# Patient Record
Sex: FEMALE | Race: WHITE | Employment: UNEMPLOYED | URBAN - METROPOLITAN AREA
[De-identification: names, ages, dates, MRNs, and addresses within clinical notes are randomized per-mention and may not be internally consistent; named-entity substitution may affect disease eponyms.]

---
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
## 2020-01-01 ENCOUNTER — HOSPITAL ENCOUNTER (EMERGENCY)
Age: 20
Discharge: HOME | End: 2020-01-01
Attending: EMERGENCY MEDICINE
Payer: MEDICAID

## 2020-01-01 VITALS
TEMPERATURE: 97.7 F | RESPIRATION RATE: 17 BRPM | SYSTOLIC BLOOD PRESSURE: 106 MMHG | OXYGEN SATURATION: 98 % | HEART RATE: 83 BPM | HEIGHT: 63 IN | WEIGHT: 130 LBS | BODY MASS INDEX: 23.04 KG/M2 | DIASTOLIC BLOOD PRESSURE: 68 MMHG

## 2020-01-01 LAB
-: ABNORMAL
AMORPHOUS: ABNORMAL
BACTERIA: ABNORMAL
BILIRUBIN URINE: NEGATIVE
CASTS UA: ABNORMAL /LPF (ref 0–2)
COLOR: YELLOW
COMMENT UA: ABNORMAL
CRYSTALS, UA: ABNORMAL /HPF
DIRECT EXAM: NORMAL
EPITHELIAL CELLS UA: ABNORMAL /HPF (ref 0–5)
GLUCOSE URINE: NEGATIVE
HCG(URINE) PREGNANCY TEST: NEGATIVE
KETONES, URINE: NEGATIVE
LEUKOCYTE ESTERASE, URINE: NEGATIVE
Lab: NORMAL
MUCUS: ABNORMAL
NITRITE, URINE: NEGATIVE
OTHER OBSERVATIONS UA: ABNORMAL
PH UA: 6 (ref 5–8)
PROTEIN UA: ABNORMAL
RBC UA: ABNORMAL /HPF (ref 0–2)
RENAL EPITHELIAL, UA: ABNORMAL /HPF
SPECIFIC GRAVITY UA: 1.03 (ref 1–1.03)
SPECIMEN DESCRIPTION: NORMAL
TRICHOMONAS: ABNORMAL
TURBIDITY: ABNORMAL
URINE HGB: ABNORMAL
UROBILINOGEN, URINE: NORMAL
WBC UA: ABNORMAL /HPF (ref 0–5)
YEAST: ABNORMAL

## 2020-01-01 PROCEDURE — 81025 URINE PREGNANCY TEST: CPT

## 2020-01-01 PROCEDURE — 87591 N.GONORRHOEAE DNA AMP PROB: CPT

## 2020-01-01 PROCEDURE — 99283 EMERGENCY DEPT VISIT LOW MDM: CPT

## 2020-01-01 PROCEDURE — 6370000000 HC RX 637 (ALT 250 FOR IP): Performed by: EMERGENCY MEDICINE

## 2020-01-01 PROCEDURE — 81001 URINALYSIS AUTO W/SCOPE: CPT

## 2020-01-01 PROCEDURE — 87660 TRICHOMONAS VAGIN DIR PROBE: CPT

## 2020-01-01 PROCEDURE — 87510 GARDNER VAG DNA DIR PROBE: CPT

## 2020-01-01 PROCEDURE — 87480 CANDIDA DNA DIR PROBE: CPT

## 2020-01-01 PROCEDURE — 87491 CHLMYD TRACH DNA AMP PROBE: CPT

## 2020-01-01 RX ORDER — ACETAMINOPHEN 325 MG/1
650 TABLET ORAL ONCE
Status: COMPLETED | OUTPATIENT
Start: 2020-01-01 | End: 2020-01-01

## 2020-01-01 RX ADMIN — ACETAMINOPHEN 650 MG: 325 TABLET ORAL at 05:25

## 2020-01-01 ASSESSMENT — PAIN SCALES - GENERAL: PAINLEVEL_OUTOF10: 4

## 2020-01-01 ASSESSMENT — ENCOUNTER SYMPTOMS
COUGH: 0
SHORTNESS OF BREATH: 0
CONSTIPATION: 0
BACK PAIN: 0
SORE THROAT: 0
VOMITING: 0
ABDOMINAL PAIN: 1
DIARRHEA: 0

## 2020-01-01 NOTE — ED PROVIDER NOTES
[facility]  ED  Emergency Department Encounter  EmergencyMedicine Resident     Pt Kelly Whatley  MRN: [id number]  [place] [address]  Date of evaluation: 1/1/20  PCP:  No primary care provider on file. CHIEF COMPLAINT       Chief Complaint   Patient presents with    Dysuria       HISTORY OF PRESENT ILLNESS  (Location/Symptom, Timing/Onset, Context/Setting, Quality, Duration, Modifying Factors, Severity.)      Josette Hathaway is a 23 y.o. female who presents with dysuria and mild vaginal discharge and spotting. States she just finished her menstrual period again. Denies any severe abdominal pain, states she has very mild lower cramping. Has not concern for STDs. Denies prior abdominal surgeries. Denies fevers chills nausea vomiting chest pain shortness of breath diarrhea constipation. PAST MEDICAL / SURGICAL / SOCIAL / FAMILY HISTORY      has a past medical history of ADD (attention deficit disorder), Anxiety, CMV (cytomegalovirus infection) ([address] Depression, Gestational diabetes mellitus (GDM), antepartum, Hearing loss in left ear, Hypothyroidism, Immunizations up to date in pediatric patient, Raynaud disease, Tachycardia, Traumatic injury during pregnancy, third trimester, and Wears glasses. has a past surgical history that includes REMOVAL FOREIGN BODY EAR (Left) and Tympanoplasty (Left, 06/24/2016).     Social History     Socioeconomic History    Marital status: Single     Spouse name: Not on file    Number of children: Not on file    Years of education: Not on file    Highest education level: Not on file   Occupational History    Not on file   Social Needs    Financial resource strain: Not on file    Food insecurity:     Worry: Not on file     Inability: Not on file    Transportation needs:     Medical: Not on file     Non-medical: Not on file   Tobacco Use    Smoking status: Current Every Day Smoker     Types: Cigars    Smokeless tobacco: Never Used  Tobacco comment: 1-2 black and milds daily    Substance and Sexual Activity    Alcohol use: No    Drug use: Yes     Types: Marijuana    Sexual activity: Yes     Partners: Male     Birth control/protection: Condom   Lifestyle    Physical activity:     Days per week: Not on file     Minutes per session: Not on file    Stress: Not on file   Relationships    Social connections:     Talks on phone: Not on file     Gets together: Not on file     Attends Worship service: Not on file     Active member of club or organization: Not on file     Attends meetings of clubs or organizations: Not on file     Relationship status: Not on file    Intimate partner violence:     Fear of current or ex partner: Not on file     Emotionally abused: Not on file     Physically abused: Not on file     Forced sexual activity: Not on file   Other Topics Concern    Not on file   Social History Narrative    Not on file       Family History   Adopted: Yes   Problem Relation Age of Onset    Diabetes Mother         borderline diet controlled    Atrial Fibrillation Mother     Anxiety Disorder Mother     Depression Mother     Seizures Maternal Grandmother     COPD Maternal Grandmother     Hypertension Maternal Grandmother     Cancer Maternal Grandmother         ovarian    Diabetes Type 1  Maternal Grandfather     Diabetes Type 1  Other         m uncle    Bleeding Prob Other         m aunt protein S&C deficiency       Allergies:  Sulfa antibiotics    Home Medications:  Prior to Admission medications    Medication Sig Start Date End Date Taking?  Authorizing Provider   atenolol (TENORMIN) 25 MG tablet Take 1 tablet by mouth daily 12/6/19   Nighat Campbell PA-C   famotidine (PEPCID) 20 MG tablet Take 1 tablet by mouth 2 times daily 12/5/19   Phong Andrade,    ARIPiprazole (ABILIFY) 5 MG tablet Take 5 mg by mouth daily    Historical Provider, MD   ibuprofen (ADVIL;MOTRIN) 800 MG tablet Take 1 tablet by mouth every 8 hours as Pregnancy, Urine   Result Value Ref Range    HCG(Urine) Pregnancy Test NEGATIVE NEGATIVE   Microscopic Urinalysis   Result Value Ref Range    -          WBC, UA 0 TO 2 0 - 5 /HPF    RBC, UA 10 TO 20 0 - 2 /HPF    Casts UA NOT REPORTED 0 - 2 /LPF    Crystals UA NOT REPORTED None /HPF    Epithelial Cells UA 5 TO 10 0 - 5 /HPF    Renal Epithelial, Urine NOT REPORTED 0 /HPF    Bacteria, UA FEW (A) None    Mucus, UA NOT REPORTED None    Trichomonas, UA NOT REPORTED None    Amorphous, UA NOT REPORTED None    Other Observations UA NOT REPORTED NOT REQ. Yeast, UA NOT REPORTED None       RADIOLOGY:  None    EKG  None    All EKG's are interpreted by the Emergency Department Physician who either signs or Co-signs this chart in the absence of a cardiologist.    MDM/EMERGENCY DEPARTMENT COURSE:  Patient is a 22-year-old female that presents with vaginal discharge and dysuria. On exam no acute distress belly is soft nontender. Vitals are stable. Please see attendings note for pelvic exam which she performed. Will send swabs urine and pregnancy test and treat accordingly. Urinalysis no signs of infection, does show blood but she is on her menstrual cycle. Vaginitis probe is negative. Negative pregnancy. Discussed again with the patient if you would want any empiric treatment for STDs, she is not concerned and will follow-up for results on MyChart. Possibly abnormal vaginal bleeding causing the symptoms. Instructed to follow-up with her PCP, will give dose of Tylenol here per patient request.  Return if symptoms worsen. PROCEDURES:  None    CONSULTS:  None    CRITICAL CARE:  None    FINAL IMPRESSION      1.  Abnormal vaginal bleeding          DISPOSITION / PLAN     DISPOSITION Decision To Discharge 01/01/2020 05:14:07 AM      PATIENT REFERRED TO:  [facility]  [address]    If symptoms worsen    [address]  [address]

## 2020-01-01 NOTE — ED PROVIDER NOTES
[address] [facility]     Emergency Department     Faculty Attestation    I performed a history and physical examination of the patient and discussed management with the resident. I have reviewed and agree with the residents findings including all diagnostic interpretations, and treatment plans as written. Any areas of disagreement are noted on the chart. I was personally present for the key portions of any procedures. I have documented in the chart those procedures where I was not present during the key portions. I have reviewed the emergency nurses triage note. I agree with the chief complaint, past medical history, past surgical history, allergies, medications, social and family history as documented unless otherwise noted below. Documentation of the HPI, Physical Exam and Medical Decision Making performed by [email or web address] is based on my personal performance of the HPI, PE and MDM. For Physician Assistant/ Nurse Practitioner cases/documentation I have personally evaluated this patient and have completed at least one if not all key elements of the E/M (history, physical exam, and MDM). Additional findings are as noted. Lower abdominal cramping, vaginal discharge and vaginal bleeding, and spotting.   Patient's last menstrual period lasted for 3 weeks and she just finished it on the 23rd  Also reports burning with urination, and increased frequency    Pelvic exam was performed that shows that present in the vaginal vault, cervical loss is closed with blood oozing from it, no cervical motion tenderness no adnexal tenderness bilaterally  Abdomen is soft nondistended nontender palpation all quadrants no rebound or guarding noted  STD testing, pelvic exam, urine analysis urine pregnancy    Karol Granados D.O, M.P.H  Attending Emergency Medicine Physician         Karol Granados,   01/01/20 2909

## 2020-01-02 LAB
C TRACH DNA GENITAL QL NAA+PROBE: ABNORMAL
N. GONORRHOEAE DNA: NEGATIVE
SPECIMEN DESCRIPTION: ABNORMAL

## 2020-01-03 ENCOUNTER — TELEPHONE (OUTPATIENT)
Dept: PHARMACY | Age: 20
End: 2020-01-03

## 2020-01-03 RX ORDER — AZITHROMYCIN 500 MG/1
1000 TABLET, FILM COATED ORAL ONCE
Qty: 2 TABLET | Refills: 0 | Status: SHIPPED | OUTPATIENT
Start: 2020-01-03 | End: 2020-01-03

## 2020-01-03 NOTE — TELEPHONE ENCOUNTER
CLINICAL PHARMACY NOTE:  Telephone Follow-up for Positive STD Test    At the time of Siddhartha Oviedo's visit to [facility] Emergency Department on 1/1/2020 STD testing was performed. DNA testing was positive for Chlamydia. Spoke to patient on 1/3/2020 to review test results and regarding need to start oral antibiotic therapy. Instructed patient to abstain from sexual intercourse until receiving the antibiotic and then for 7 days after treatment. Patient also advised to inform any partners that they will need to be tested as well. Patient verbally expressed understanding of above plan. Per protocol, prescription for azithromycin 500 mg x2 for 1 dose sent to [facility] on [address] on behalf of Dr. Ana Cruz. [address] [place] [address]  Ph., CACP, Clinical Pharmacist  Anticoagulation Services, [address] [place] [address] Coumadin Clinic  1/3/2020  10:20 AM

## 2020-01-05 ENCOUNTER — HOSPITAL ENCOUNTER (EMERGENCY)
Age: 20
Discharge: HOME | End: 2020-01-05
Attending: EMERGENCY MEDICINE
Payer: MEDICAID

## 2020-01-05 VITALS
SYSTOLIC BLOOD PRESSURE: 102 MMHG | TEMPERATURE: 97.6 F | HEIGHT: 63 IN | HEART RATE: 89 BPM | RESPIRATION RATE: 16 BRPM | DIASTOLIC BLOOD PRESSURE: 62 MMHG | WEIGHT: 130 LBS | OXYGEN SATURATION: 99 % | BODY MASS INDEX: 23.04 KG/M2

## 2020-01-05 PROCEDURE — 6370000000 HC RX 637 (ALT 250 FOR IP): Performed by: PHYSICIAN ASSISTANT

## 2020-01-05 PROCEDURE — 99283 EMERGENCY DEPT VISIT LOW MDM: CPT

## 2020-01-05 RX ORDER — FLUCONAZOLE 100 MG/1
100 TABLET ORAL DAILY
Qty: 3 TABLET | Refills: 0 | Status: SHIPPED | OUTPATIENT
Start: 2020-01-05 | End: 2020-01-08

## 2020-01-05 RX ORDER — FLUCONAZOLE 150 MG/1
150 TABLET ORAL ONCE
Status: COMPLETED | OUTPATIENT
Start: 2020-01-05 | End: 2020-01-05

## 2020-01-05 RX ADMIN — FLUCONAZOLE 150 MG: 150 TABLET ORAL at 14:36

## 2020-01-05 NOTE — ED NOTES
CHIEF COMPLAINT ON ADMISSION: Vaginal itching  Pt states that she started medication for chlamydia 2 days ago. Itching started after taking medication. Itching is constant. Pt has tried warm compresses which relieve symptoms for a period of time. Pt is AOx4 and ambulates per self. C= \"Have you ever felt that you should Cut down on your drinking? \"  No  A= \"Have people Annoyed you by criticizing your drinking? \"  No  G= \"Have you ever felt bad or Guilty about your drinking? \"  No  E= \"Have you ever had a drink as an Eye-opener first thing in the morning to steady your nerves or to help a hangover? \"  No      Deferred []      Reason for deferring: N/A    *If yes to two or more: probable alcohol abuse. [address] [facility], RN  01/05/20 1075

## 2020-01-05 NOTE — ED PROVIDER NOTES
[address]  eMERGENCY dEPARTMENT eNCOUnter      Pt Name: Alethea Sicard  MRN: [id number]  [place] [address]  Date of evaluation: 1/5/2020  Provider: MAY Ag    CHIEF COMPLAINT       Chief Complaint   Patient presents with    Vaginal Itching           HISTORY OF PRESENT ILLNESS  (Location/Symptom, Timing/Onset, Context/Setting, Quality, Duration, Modifying Factors, Severity.)   Alethea Sicard is a 23 y.o. female who presents to the emergency department complaining of vaginal itching. Denies any abdominal pain nausea vomiting discharge or bleeding. Patient states that she was recently treated for STDs with antibiotics, recently had pelvic exam.  She states that shortly thereafter she developed itching. She has developed yeast infections in the past.      Nursing Notes were reviewed. REVIEW OF SYSTEMS    (2-9 systems for level 4, 10 or more for level 5)     Review of Systems   Vaginal itching external    Except as noted above the remainder of the review of systems was reviewed and negative. PAST MEDICAL HISTORY     Past Medical History:   Diagnosis Date    ADD (attention deficit disorder)     Anxiety     CMV (cytome[facility]ovirus infection) ([facility] [address] 2019    Depression     Gestational diabetes mellitus (GDM), antepartum 6/24/2019    Hearing loss in left ear     Hypothyroidism 1/16/2019    Immunizations up to date in pediatric patient     Raynaud disease     Tachycardia     Saw Dr. Vishnu Barker 5 yrs ago\".   Echo and holter monitor done, neg results    Traumatic injury during pregnancy, third trimester     Wears glasses      None otherwise stated in nurses notes    SURGICAL HISTORY       Past Surgical History:   Procedure Laterality Date    REMOVAL FOREIGN BODY EAR Left     \"insect laid eggs\"    TYMPANOPLASTY Left 06/24/2016     None otherwise stated in nurses notes    [address]       Discharge Medication List as of 1/5/2020  2:35 PM      CONTINUE these Nose normal and midline. Eyes: Conjunctivae and EOM are normal. Pupils are equal, round, and reactive to light. Neck: Normal range of motion. Neck supple. Abdominal: Soft. Bowel sounds are normal. No distension and no mass. There is no tenderness. There is no rebound and no guarding. Musculoskeletal: Normal range of motion. Neurological: Alert and oriented to person, place, and time. GCS score is 15. Skin: Skin is warm and dry. No rash noted. No erythema. No pallor. Psychiatric: Mood, memory, affect and judgment normal.           DIAGNOSTIC RESULTS     EKG: All EKG's are interpreted by the Emergency Department Physician who either signs or Co-signs this chart in the absence of a cardiologist.        RADIOLOGY:   All plain film, CT, MRI, and formal ultrasound images (except ED bedside ultrasound) are read by the radiologist  No orders to display       No results found. LABS:  Labs Reviewed - No data to display    All other labs were within normal range or not returned as of this dictation. EMERGENCY DEPARTMENT COURSE and DIFFERENTIAL DIAGNOSIS/MDM:   Vitals:    Vitals:    01/05/20 1358   BP: 102/62   Pulse: 89   Resp: 16   Temp: 97.6 °F (36.4 °C)   SpO2: 99%   Weight: 130 lb (59 kg)   Height: 5' 3\" (1.6 m)         Patient instructed to return to the emergency room if symptoms worsen, return, or any other concern right away which is agreed by the patient    ED MEDS:  Orders Placed This Encounter   Medications    fluconazole (DIFLUCAN) tablet 150 mg    fluconazole (DIFLUCAN) 100 MG tablet     Sig: Take 1 tablet by mouth daily for 3 days     Dispense:  3 tablet     Refill:  0         CONSULTS:  None    PROCEDURES:  None      FINAL IMPRESSION      1.  Yeast infection          DISPOSITION/PLAN   DISPOSITION Decision To Discharge    PATIENT REFERRED TO:  clinic list    Schedule an appointment as soon as possible for a visit       [facility] ED  [facility]

## 2020-01-11 ENCOUNTER — HOSPITAL ENCOUNTER (EMERGENCY)
Age: 20
Discharge: PSYCHIATRIC OR SUBSTANCE USE FACILITY | End: 2020-01-11
Attending: EMERGENCY MEDICINE
Payer: MEDICAID

## 2020-01-11 ENCOUNTER — HOSPITAL ENCOUNTER (INPATIENT)
Age: 20
LOS: 4 days | Discharge: HOME | DRG: 751 | End: 2020-01-15
Attending: PSYCHIATRY & NEUROLOGY | Admitting: PSYCHIATRY & NEUROLOGY
Payer: MEDICAID

## 2020-01-11 VITALS
OXYGEN SATURATION: 98 % | TEMPERATURE: 98.2 F | HEART RATE: 84 BPM | RESPIRATION RATE: 16 BRPM | SYSTOLIC BLOOD PRESSURE: 110 MMHG | DIASTOLIC BLOOD PRESSURE: 71 MMHG

## 2020-01-11 PROBLEM — F33.9 MAJOR DEPRESSIVE DISORDER, RECURRENT (HCC): Status: ACTIVE | Noted: 2020-01-11

## 2020-01-11 PROCEDURE — 99285 EMERGENCY DEPT VISIT HI MDM: CPT

## 2020-01-11 PROCEDURE — 6370000000 HC RX 637 (ALT 250 FOR IP): Performed by: NURSE PRACTITIONER

## 2020-01-11 PROCEDURE — 1240000000 HC EMOTIONAL WELLNESS R&B

## 2020-01-11 PROCEDURE — 90792 PSYCH DIAG EVAL W/MED SRVCS: CPT | Performed by: NURSE PRACTITIONER

## 2020-01-11 RX ORDER — HYDROXYZINE HYDROCHLORIDE 25 MG/1
25 TABLET, FILM COATED ORAL 3 TIMES DAILY PRN
Status: CANCELLED | OUTPATIENT
Start: 2020-01-11

## 2020-01-11 RX ORDER — FAMOTIDINE 20 MG/1
20 TABLET, FILM COATED ORAL 2 TIMES DAILY
Status: DISCONTINUED | OUTPATIENT
Start: 2020-01-11 | End: 2020-01-15 | Stop reason: HOSPADM

## 2020-01-11 RX ORDER — HYDROXYZINE HYDROCHLORIDE 25 MG/1
25 TABLET, FILM COATED ORAL 3 TIMES DAILY PRN
Status: DISCONTINUED | OUTPATIENT
Start: 2020-01-11 | End: 2020-01-15 | Stop reason: HOSPADM

## 2020-01-11 RX ORDER — ACETAMINOPHEN 325 MG/1
650 TABLET ORAL EVERY 4 HOURS PRN
Status: DISCONTINUED | OUTPATIENT
Start: 2020-01-11 | End: 2020-01-15 | Stop reason: HOSPADM

## 2020-01-11 RX ORDER — TRAZODONE HYDROCHLORIDE 50 MG/1
50 TABLET ORAL NIGHTLY PRN
Status: DISCONTINUED | OUTPATIENT
Start: 2020-01-11 | End: 2020-01-15 | Stop reason: HOSPADM

## 2020-01-11 RX ORDER — ARIPIPRAZOLE 5 MG/1
5 TABLET ORAL DAILY
Status: DISCONTINUED | OUTPATIENT
Start: 2020-01-11 | End: 2020-01-13

## 2020-01-11 RX ORDER — ATENOLOL 25 MG/1
25 TABLET ORAL DAILY
Status: DISCONTINUED | OUTPATIENT
Start: 2020-01-11 | End: 2020-01-15 | Stop reason: HOSPADM

## 2020-01-11 RX ORDER — TRAZODONE HYDROCHLORIDE 50 MG/1
50 TABLET ORAL NIGHTLY PRN
Status: CANCELLED | OUTPATIENT
Start: 2020-01-11

## 2020-01-11 RX ORDER — NICOTINE 21 MG/24HR
1 PATCH, TRANSDERMAL 24 HOURS TRANSDERMAL DAILY
Status: DISCONTINUED | OUTPATIENT
Start: 2020-01-11 | End: 2020-01-11

## 2020-01-11 RX ORDER — MAGNESIUM HYDROXIDE/ALUMINUM HYDROXICE/SIMETHICONE 120; 1200; 1200 MG/30ML; MG/30ML; MG/30ML
30 SUSPENSION ORAL EVERY 6 HOURS PRN
Status: CANCELLED | OUTPATIENT
Start: 2020-01-11

## 2020-01-11 RX ORDER — ACETAMINOPHEN 325 MG/1
650 TABLET ORAL EVERY 4 HOURS PRN
Status: CANCELLED | OUTPATIENT
Start: 2020-01-11

## 2020-01-11 RX ORDER — BENZTROPINE MESYLATE 1 MG/ML
2 INJECTION INTRAMUSCULAR; INTRAVENOUS 2 TIMES DAILY PRN
Status: DISCONTINUED | OUTPATIENT
Start: 2020-01-11 | End: 2020-01-15 | Stop reason: HOSPADM

## 2020-01-11 RX ORDER — BENZTROPINE MESYLATE 1 MG/ML
2 INJECTION INTRAMUSCULAR; INTRAVENOUS 2 TIMES DAILY PRN
Status: CANCELLED | OUTPATIENT
Start: 2020-01-11

## 2020-01-11 RX ORDER — MAGNESIUM HYDROXIDE/ALUMINUM HYDROXICE/SIMETHICONE 120; 1200; 1200 MG/30ML; MG/30ML; MG/30ML
30 SUSPENSION ORAL EVERY 6 HOURS PRN
Status: DISCONTINUED | OUTPATIENT
Start: 2020-01-11 | End: 2020-01-15 | Stop reason: HOSPADM

## 2020-01-11 RX ORDER — NICOTINE 21 MG/24HR
1 PATCH, TRANSDERMAL 24 HOURS TRANSDERMAL DAILY
Status: CANCELLED | OUTPATIENT
Start: 2020-01-11

## 2020-01-11 RX ADMIN — FAMOTIDINE 20 MG: 20 TABLET ORAL at 21:09

## 2020-01-11 RX ADMIN — ARIPIPRAZOLE 5 MG: 10 TABLET ORAL at 12:03

## 2020-01-11 RX ADMIN — HYDROXYZINE HYDROCHLORIDE 25 MG: 25 TABLET, FILM COATED ORAL at 21:09

## 2020-01-11 RX ADMIN — FAMOTIDINE 20 MG: 20 TABLET ORAL at 13:21

## 2020-01-11 RX ADMIN — TRAZODONE HYDROCHLORIDE 50 MG: 50 TABLET ORAL at 21:09

## 2020-01-11 RX ADMIN — SERTRALINE HYDROCHLORIDE 50 MG: 50 TABLET ORAL at 12:03

## 2020-01-11 ASSESSMENT — ENCOUNTER SYMPTOMS
ABDOMINAL PAIN: 0
DIARRHEA: 0
BACK PAIN: 0
CONSTIPATION: 0
RHINORRHEA: 0
RHINORRHEA: 0
SINUS PRESSURE: 0
COUGH: 0
TROUBLE SWALLOWING: 0
CHEST TIGHTNESS: 0
SHORTNESS OF BREATH: 0
VOMITING: 0
NAUSEA: 0
DIARRHEA: 0
SHORTNESS OF BREATH: 0
CONSTIPATION: 0
ABDOMINAL PAIN: 0
VOMITING: 0
NAUSEA: 0
COUGH: 0
WHEEZING: 0
COLOR CHANGE: 0
WHEEZING: 0
BACK PAIN: 0

## 2020-01-11 ASSESSMENT — SLEEP AND FATIGUE QUESTIONNAIRES
DO YOU HAVE DIFFICULTY SLEEPING: NO
AVERAGE NUMBER OF SLEEP HOURS: 7
DO YOU USE A SLEEP AID: NO
DO YOU HAVE DIFFICULTY SLEEPING: NO
DO YOU USE A SLEEP AID: NO
AVERAGE NUMBER OF SLEEP HOURS: 6

## 2020-01-11 ASSESSMENT — LIFESTYLE VARIABLES
HISTORY_ALCOHOL_USE: NO
HISTORY_ALCOHOL_USE: NO

## 2020-01-11 ASSESSMENT — PATIENT HEALTH QUESTIONNAIRE - PHQ9: SUM OF ALL RESPONSES TO PHQ QUESTIONS 1-9: 16

## 2020-01-11 ASSESSMENT — PAIN - FUNCTIONAL ASSESSMENT: PAIN_FUNCTIONAL_ASSESSMENT: 0-10

## 2020-01-11 ASSESSMENT — PAIN SCALES - GENERAL: PAINLEVEL_OUTOF10: 0

## 2020-01-11 NOTE — GROUP NOTE
Group Therapy Note    Date: 1/11/2020    Group Start Time: 1000  Group End Time: 9650  Group Topic: Psychoeducation    [address], LSW    patient refused to attend psychoeducation group at 10a after encouragement from staff.   1:1 talk time provided as alternative to group session
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
## 2020-01-11 NOTE — BH NOTE
Patient given tobacco quitline number 33784460987 at this time, refusing to call at this time, states \" I just dont want to quit now\"- patient given information as to the dangers of long term tobacco use. Continue to reinforce the importance of tobacco cessation.
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
## 2020-01-11 NOTE — ED NOTES
MARIMAR spoke to [facility] [employer] to go to  [address]  sw spoke with [facility] with ADOLFO Figueroa. [facility], LSW  01/11/20 3240

## 2020-01-11 NOTE — BH NOTE
`[facility]  Admission Note     Admission Type:   Admission Type: Voluntary    Reason for admission:  Reason for Admission: fight with boy friend suicidal ideation to walk in front of traffic    PATIENT STRENGTHS:  Strengths: Positive Support, Social Skills    Patient Strengths and Limitations:  Limitations: Difficult relationships / poor social skills, Inappropriate/potentially harmful leisure interests    Addictive Behavior:   Addictive Behavior  In the past 3 months, have you felt or has someone told you that you have a problem with:  : None  Do you have a history of Chemical Use?: No  Do you have a history of Alcohol Use?: No  Do you have a history of Street Drug Abuse?: No  Histroy of Prescripton Drug Abuse?: No    Medical Problems:   Past Medical History:   Diagnosis Date    ADD (attention deficit disorder)     Anxiety     CMV (cytomegalovirus infection) ([facility] [address] 2019    Depression     Gestational diabetes mellitus (GDM), antepartum 6/24/2019    Hearing loss in left ear     Hypothyroidism 1/16/2019    Immunizations up to date in pediatric patient     Raynaud disease     Tachycardia     Saw Dr. Shaan Higuera 5 yrs ago\".   Echo and holter monitor done, neg results    Traumatic injury during pregnancy, third trimester     Wears glasses        Status EXAM:  Status and Exam  Normal: Yes  Facial Expression: Flat  Affect: Appropriate  Level of Consciousness: Alert  Mood:Normal: No  Mood: Depressed, Anxious  Motor Activity:Normal: Yes  Interview Behavior: Cooperative  Preception: [place] to Person, [place] to Time, [place] to Place, [place] to Situation  Attention:Normal: Yes  Thought Processes: Blocking  Thought Content:Normal: Yes  Hallucinations: None  Delusions: No  Memory:Normal: Yes  Insight and Judgment: No  Insight and Judgment: Poor Insight, Poor Judgment  Present Suicidal Ideation: Yes  Present Homicidal Ideation: No    Tobacco Screening:  Practical Counseling, on admission, kermit LONDONO, chencho applicable and completed (first 3 are required if patient doesn't refuse):            ( )  Recognizing danger situations (included triggers and roadblocks)                    ( )  Coping skills (new ways to manage stress, exercise, relaxation techniques, changing routine, distraction)                                                           ( )  Basic information about quitting (benefits of quitting, techniques in how to quit, available resources  ( ) Referral for counseling faxed to [facility]                                           (x ) Patient refused counseling  ( ) Patient has not smoked in the last 30 days    Metabolic Screening:    Lab Results   Component Value Date    LABA1C 5.3 06/19/2019       No results found for: CHOL  No results found for: TRIG  No results found for: HDL  No components found for: LDLCAL  No results found for: LABVLDL      There is no height or weight on file to calculate BMI. BP Readings from Last 2 Encounters:   01/11/20 110/71   01/05/20 102/62           Pt admitted with followings belongings:  Dentures: None  Vision - Corrective Lenses: Glasses  Hearing Aid: None  Jewelry: None  Body Piercings Removed: No  Clothing: Footwear, Pants, Shirt, Jacket / coat, Socks, Undergarments (Comment)  Were All Patient Medications Collected?: Not Applicable  Other Valuables: Cell phone(smart phone x2)     Valuables placed in safe in security envelope, number:  \[id number]. Patient taking no home medications. Patient oriented to surroundings and program expectations and copy of patient rights given. Received admission packet:  yes. Consents reviewed, signed yes. Patient verbalize understanding:  yes.     Patient education on precautions: yes  Patient was linked in the past with [facility] and bethany Jimenez RN

## 2020-01-11 NOTE — ED TRIAGE NOTES
Pt c/o suicidal thoughts, states that she got upset when she got into argument with her boyfriend, now states that she is fine & want to go home, states that she has 11 month old at home and 'wants to leave live for her', pt calm at this time, pt into scrubs, belongings labeled & placed in locker by [facility] per protocol, triage completed

## 2020-01-11 NOTE — PLAN OF CARE
Patient is unable to verbalize and or display a decrease in depressive symptoms,she currently denies all and is isolative to room. Patient denies thoughts of self harm and is agreeable to seeking out staff should thoughts of self harm arise. Safe environment maintained. 15 minute checks for safety continued per unit policy. Will continue to monitor for safety and provide support and reassurance as needed.         Problem: Altered Mood, Depressive Behavior:  Goal: Able to verbalize and/or display a decrease in depressive symptoms  Description  Able to verbalize and/or display a decrease in depressive symptoms  1/11/2020 1030 by Jess Ribera LPN  Outcome: Ongoing     Problem: Altered Mood, Depressive Behavior:  Goal: Absence of self-harm  Description  Absence of self-harm  1/11/2020 1030 by Jess Ribera LPN  Outcome: Ongoing

## 2020-01-11 NOTE — GROUP NOTE
Group Therapy Note    Date: 1/11/2020    Group Start Time: 4381  Group End Time: 8518  Group Topic: Community Meeting    [address]    patient refused to attend community meeting group at 9321 after encouragement from staff.   1:1 talk time provided as alternative to group session     Signature:  Letty Cheatham

## 2020-01-11 NOTE — H&P
HISTORY and [address]       NAME:  Filipe Gutierrez  MRN: [id number]   YOB: 2000   Date: 1/11/2020   Age: 23 y.o. Gender: female     COMPLAINT AND PRESENT HISTORY:      Filipe Gutierrez is 23 y.o.,  female, admitted because of suicidal ideation, plan to cut herself. She admitted via the ED at [facility]. Patient has an abrasion on her hands from cutting herself with glass. She denies a history of previous suicide attempts. Patient denies homicidal ideation. Patient denies auditory hallucinations, at times will see \"car crashes\". Patient states she has not been on medication for 1 month. Reports very poor sleep unable to stay asleep at night. Reports appetite is poor, eats about once per day. Memory is intact. She has racing thoughts and history of panic attacks. Per chart notes, was on atenolol with some improvement of panic attacks. Fair insight. Reports an increase in depressive symptoms and isolative behaviors. Her current stressors include not having custody over her 11month-old child and recent break-up with her boyfriend. Her child is in the custody of the child's father or her grandparents. Patient reports feeling hopeless and helpless prior to admission. She has been exhibiting a lack of interest in everyday activities. Fair insight. She denies alcohol or drug abuse. Currently she is living with her mother and is supposed to start work at [employer] in the next week. Prior to admission, patient states she was being worked up by PCP for hyperthyroidism. States she will f/u with PCP after discharge. Patient has a history of SVT, neurocardiogenic syncope and reports that she sees Dr. Marnie Lucas on an outpatient basis. See psychiatric admission note for further psychiatric history.      DIAGNOSTIC RESULTS      U/A:  Lab Results   Component Value Date    COLORU YELLOW 01/01/2020    WBCUA 0 TO 2 01/01/2020    RBCUA 10 TO 20 01/01/2020  Smokeless tobacco: Never Used    Tobacco comment: 1-2 black and milds daily    Substance and Sexual Activity    Alcohol use: No    Drug use: Yes     Types: Marijuana    Sexual activity: Yes     Partners: Male     Birth control/protection: Condom   Lifestyle    Physical activity:     Days per week: None     Minutes per session: None    Stress: None   Relationships    Social connections:     Talks on phone: None     Gets together: None     Attends Restorationist service: None     Active member of club or organization: None     Attends meetings of clubs or organizations: None     Relationship status: None    Intimate partner violence:     Fear of current or ex partner: None     Emotionally abused: None     Physically abused: None     Forced sexual activity: None   Other Topics Concern    None   Social History Narrative    None           REVIEW OF SYSTEMS      Allergies   Allergen Reactions    Sulfa Antibiotics      Unknown reaction       No current facility-administered medications on file prior to encounter. Current Outpatient Medications on File Prior to Encounter   Medication Sig Dispense Refill    atenolol (TENORMIN) 25 MG tablet Take 1 tablet by mouth daily 30 tablet 3    famotidine (PEPCID) 20 MG tablet Take 1 tablet by mouth 2 times daily 60 tablet 0    ARIPiprazole (ABILIFY) 5 MG tablet Take 5 mg by mouth daily      ibuprofen (ADVIL;MOTRIN) 800 MG tablet Take 1 tablet by mouth every 8 hours as needed for Pain 30 tablet 0    sertraline (ZOLOFT) 50 MG tablet take 1 tablet by mouth once daily  0        Review of Systems   Constitutional: Negative for chills and fever. HENT: Negative for congestion, postnasal drip, rhinorrhea, sinus pressure and trouble swallowing. Respiratory: Negative for cough, shortness of breath and wheezing. Cardiovascular: Negative for chest pain and palpitations. Gastrointestinal: Negative for abdominal pain, constipation, diarrhea, nausea and vomiting.

## 2020-01-11 NOTE — BH NOTE
Patient refused to attend Health and wellness  group at 4pm after encouragement from staff. 1:1 talk time offered but refused.

## 2020-01-11 NOTE — PLAN OF CARE
[address] [place] [address]  Initial Interdisciplinary Treatment Plan NOTE    Original treatment plan Date & Time: 1/11/2020 0744    Admission Type:  Admission Type: Voluntary    Reason for admission:   Reason for Admission: fight with boy friend suicidal ideation to walk in front of traffic    Estimated Length of Stay:  5-7days  Estimated Discharge Date:   to be determined by physician    PATIENT STRENGTHS:  Patient Strengths:Strengths: Positive Support, Social Skills  Patient Strengths and Limitations:Limitations: Difficult relationships / poor social skills, Inappropriate/potentially harmful leisure interests  Addictive Behavior: Addictive Behavior  In the past 3 months, have you felt or has someone told you that you have a problem with:  : None  Do you have a history of Chemical Use?: No  Do you have a history of Alcohol Use?: No  Do you have a history of Street Drug Abuse?: No  Histroy of Prescripton Drug Abuse?: No  Medical Problems:  Past Medical History:   Diagnosis Date    ADD (attention deficit disorder)     Anxiety     CMV (cytomegalovirus infection) ([facility] [address] 2019    Depression     Gestational diabetes mellitus (GDM), antepartum 6/24/2019    Hearing loss in left ear     Hypothyroidism 1/16/2019    Immunizations up to date in pediatric patient     Raynaud disease     Tachycardia     Saw Dr. Tomi Toure 5 yrs ago\".   Echo and holter monitor done, neg results    Traumatic injury during pregnancy, third trimester     Wears glasses      Status EXAM:Status and Exam  Normal: Yes  Facial Expression: Flat  Affect: Appropriate  Level of Consciousness: Alert  Mood:Normal: No  Mood: Depressed, Anxious  Motor Activity:Normal: Yes  Interview Behavior: Cooperative  Preception: [place] to Person, [place] to Time, [place] to Place, [place] to Situation  Attention:Normal: Yes  Thought Processes: Blocking  Thought Content:Normal: Yes  Hallucinations: None  Delusions: No  Memory:Normal: Yes  Insight and Judgment: No  Insight and Judgment: Poor Insight, Poor Judgment  Present Suicidal Ideation: Yes  Present Homicidal Ideation: No    EDUCATION:   Learner Progress Toward Treatment Goals:  Reviewed group plans and strategies for care    Method:  Group therapy, medication compliance, individualized assessments and care planning    Outcome:  Needs reinforcement    PATIENT GOALS:  Pt did not identify, to be discussed with patient within 72 hours.     PLAN/TREATMENT RECOMMENDATIONS:   Group therapy, medications compliance, goal setting, individualized assessments and care, continue to monitor pt on unit      SHORT-TERM GOALS:   Time frame for Short-Term Goals:  5-7 days    LONG-TERM GOALS:  Time frame for Long-Term Goals:  6 months    Members Present in Team Meeting:       Ayo Larios

## 2020-01-11 NOTE — ED PROVIDER NOTES
[address]  ED  Emergency Department Encounter  Emergency Medicine Resident     Pt Name: Anu Edgar  MRN: [id number]  [place] 2000  Date of evaluation: 1/11/20  PCP:  No primary care provider on file. CHIEF COMPLAINT       Chief Complaint   Patient presents with    Mental Health Problem       HISTORY OFPRESENT ILLNESS  (Location/Symptom, Timing/Onset, Context/Setting, Quality, Duration, Modifying Factors,Severity.)      Anu Edgar is a 23 y.o. female who presents with Brittany ideation. Patient walked to the emergency department after having thoughts of suicide. Patient states it has been going on over the last several days, including break-up with her boyfriend, not having custody of her child and family issues. Patient states she would walk into traffic to kill herself. Patient states she cut herself on the dorsal aspect of her left hand superficially with glass just 2 days ago. Patient denies any medical complaints. PAST MEDICAL / SURGICAL / SOCIAL / FAMILY HISTORY      has a past medical history of ADD (attention deficit disorder), Anxiety, CMV (cytomegalovirus infection) ([facility] [address] Depression, Gestational diabetes mellitus (GDM), antepartum, Hearing loss in left ear, Hypothyroidism, Immunizations up to date in pediatric patient, Raynaud disease, Tachycardia, Traumatic injury during pregnancy, third trimester, and Wears glasses. has a past surgical history that includes REMOVAL FOREIGN BODY EAR (Left) and Tympanoplasty (Left, 06/24/2016).      Social History     Socioeconomic History    Marital status: Single     Spouse name: Not on file    Number of children: Not on file    Years of education: Not on file    Highest education level: Not on file   Occupational History    Not on file   Social Needs    Financial resource strain: Not on file    Food insecurity:     Worry: Not on file     Inability: Not on file    Transportation needs:     Medical: Not

## 2020-01-11 NOTE — GROUP NOTE
Group Therapy Note    Date: 1/11/2020    Group Start Time: 1330  Group End Time: 8687  Group Topic: Recreational    [facility]    [address]    patient refused to attend recreational therapy group at 1330 after encouragement from staff.   1:1 talk time provided as alternative to group session     Signature:  Isaak De La Paz

## 2020-01-12 LAB
ABSOLUTE EOS #: 0.1 K/UL (ref 0–0.4)
ABSOLUTE IMMATURE GRANULOCYTE: ABNORMAL K/UL (ref 0–0.3)
ABSOLUTE LYMPH #: 2.3 K/UL (ref 1.2–5.2)
ABSOLUTE MONO #: 0.6 K/UL (ref 0.1–1.3)
ALBUMIN SERPL-MCNC: 4.3 G/DL (ref 3.5–5.2)
ALBUMIN/GLOBULIN RATIO: ABNORMAL (ref 1–2.5)
ALP BLD-CCNC: 94 U/L (ref 35–104)
ALT SERPL-CCNC: 21 U/L (ref 5–33)
ANION GAP SERPL CALCULATED.3IONS-SCNC: 14 MMOL/L (ref 9–17)
AST SERPL-CCNC: 32 U/L
BASOPHILS # BLD: 1 % (ref 0–2)
BASOPHILS ABSOLUTE: 0 K/UL (ref 0–0.2)
BILIRUB SERPL-MCNC: 0.43 MG/DL (ref 0.3–1.2)
BUN BLDV-MCNC: 11 MG/DL (ref 6–20)
BUN/CREAT BLD: ABNORMAL (ref 9–20)
CALCIUM SERPL-MCNC: 9.4 MG/DL (ref 8.6–10.4)
CHLORIDE BLD-SCNC: 103 MMOL/L (ref 98–107)
CHOLESTEROL/HDL RATIO: 4.9
CHOLESTEROL: 194 MG/DL
CO2: 23 MMOL/L (ref 20–31)
CREAT SERPL-MCNC: 0.71 MG/DL (ref 0.5–0.9)
DIFFERENTIAL TYPE: ABNORMAL
EOSINOPHILS RELATIVE PERCENT: 2 % (ref 0–4)
ESTIMATED AVERAGE GLUCOSE: 108 MG/DL
GFR AFRICAN AMERICAN: ABNORMAL ML/MIN
GFR NON-AFRICAN AMERICAN: ABNORMAL ML/MIN
GFR SERPL CREATININE-BSD FRML MDRD: ABNORMAL ML/MIN/{1.73_M2}
GFR SERPL CREATININE-BSD FRML MDRD: ABNORMAL ML/MIN/{1.73_M2}
GLUCOSE BLD-MCNC: 86 MG/DL (ref 70–99)
HBA1C MFR BLD: 5.4 % (ref 4–6)
HCT VFR BLD CALC: 39.9 % (ref 36–46)
HDLC SERPL-MCNC: 40 MG/DL
HEMOGLOBIN: 13.7 G/DL (ref 12–16)
IMMATURE GRANULOCYTES: ABNORMAL %
LDL CHOLESTEROL: 121 MG/DL (ref 0–130)
LYMPHOCYTES # BLD: 41 % (ref 25–45)
MCH RBC QN AUTO: 32.2 PG (ref 26–34)
MCHC RBC AUTO-ENTMCNC: 34.4 G/DL (ref 31–37)
MCV RBC AUTO: 93.8 FL (ref 80–100)
MONOCYTES # BLD: 10 % (ref 2–8)
NRBC AUTOMATED: ABNORMAL PER 100 WBC
PDW BLD-RTO: 13.4 % (ref 11.5–14.9)
PLATELET # BLD: 295 K/UL (ref 150–450)
PLATELET ESTIMATE: ABNORMAL
PMV BLD AUTO: 6.5 FL (ref 6–12)
POTASSIUM SERPL-SCNC: 3.9 MMOL/L (ref 3.7–5.3)
RBC # BLD: 4.25 M/UL (ref 4–5.2)
RBC # BLD: ABNORMAL 10*6/UL
SEG NEUTROPHILS: 46 % (ref 34–64)
SEGMENTED NEUTROPHILS ABSOLUTE COUNT: 2.6 K/UL (ref 1.3–9.1)
SODIUM BLD-SCNC: 140 MMOL/L (ref 135–144)
THYROXINE, FREE: 1 NG/DL (ref 0.93–1.7)
TOTAL PROTEIN: 7.4 G/DL (ref 6.4–8.3)
TRIGL SERPL-MCNC: 164 MG/DL
TSH SERPL DL<=0.05 MIU/L-ACNC: 0.81 MIU/L (ref 0.3–5)
VLDLC SERPL CALC-MCNC: ABNORMAL MG/DL (ref 1–30)
WBC # BLD: 5.6 K/UL (ref 4.5–13.5)
WBC # BLD: ABNORMAL 10*3/UL

## 2020-01-12 PROCEDURE — 1240000000 HC EMOTIONAL WELLNESS R&B

## 2020-01-12 PROCEDURE — 80061 LIPID PANEL: CPT

## 2020-01-12 PROCEDURE — 6370000000 HC RX 637 (ALT 250 FOR IP): Performed by: NURSE PRACTITIONER

## 2020-01-12 PROCEDURE — 85025 COMPLETE CBC W/AUTO DIFF WBC: CPT

## 2020-01-12 PROCEDURE — 84439 ASSAY OF FREE THYROXINE: CPT

## 2020-01-12 PROCEDURE — 80053 COMPREHEN METABOLIC PANEL: CPT

## 2020-01-12 PROCEDURE — 99232 SBSQ HOSP IP/OBS MODERATE 35: CPT | Performed by: PSYCHIATRY & NEUROLOGY

## 2020-01-12 PROCEDURE — 83036 HEMOGLOBIN GLYCOSYLATED A1C: CPT

## 2020-01-12 PROCEDURE — 36415 COLL VENOUS BLD VENIPUNCTURE: CPT

## 2020-01-12 PROCEDURE — 84443 ASSAY THYROID STIM HORMONE: CPT

## 2020-01-12 RX ADMIN — ARIPIPRAZOLE 5 MG: 10 TABLET ORAL at 08:10

## 2020-01-12 RX ADMIN — FAMOTIDINE 20 MG: 20 TABLET ORAL at 08:14

## 2020-01-12 RX ADMIN — TRAZODONE HYDROCHLORIDE 50 MG: 50 TABLET ORAL at 21:06

## 2020-01-12 RX ADMIN — SERTRALINE HYDROCHLORIDE 50 MG: 50 TABLET ORAL at 08:10

## 2020-01-12 RX ADMIN — FAMOTIDINE 20 MG: 20 TABLET ORAL at 21:06

## 2020-01-12 NOTE — PLAN OF CARE
[address] [place] [address]  Day 3 Interdisciplinary Treatment Plan NOTE    Review Date & Time: 1/12/2020 1307    Admission Type:   Admission Type: Voluntary    Reason for admission:  Reason for Admission: fight with boy friend suicidal ideation to walk in front of traffic  Estimated Length of Stay:  5-7 days  Estimated Discharge Date Update:   to be determined by physician    PATIENT STRENGTHS:  Patient Strengths:Strengths: Social Skills, Communication, No significant Physical Illness  Patient Strengths and Limitations:Limitations: Tendency to isolate self, Difficulty problem solving/relies on others to help solve problems, Difficult relationships / poor social skills  Addictive Behavior:Addictive Behavior  In the past 3 months, have you felt or has someone told you that you have a problem with:  : None  Do you have a history of Chemical Use?: No  Do you have a history of Alcohol Use?: No  Do you have a history of Street Drug Abuse?: No  Histroy of Prescripton Drug Abuse?: No  Medical Problems:  Past Medical History:   Diagnosis Date    ADD (attention deficit disorder)     Anxiety     Asthma     CMV (cytome[address]ovirus infection) ([facility] [address] 2019    Depression     Gestational diabetes mellitus (GDM), antepartum 6/24/2019    Hearing loss in left ear     Hypothyroidism 1/16/2019    Immunizations up to date in pediatric patient     Raynaud disease     Tachycardia     Saw Dr. Heidi Caban 5 yrs ago\".   Echo and holter monitor done, neg results    Traumatic injury during pregnancy, third trimester     Wears glasses        Risk:  Fall RiskTotal: 53  Dorian Scale Dorian Scale Score: 21  BVC Total: 0  Change in scores:  No Changes to plan of Care:  No    Status EXAM:   Status and Exam  Normal: No  Facial Expression: Flat, Expressionless  Affect: Appropriate  Level of Consciousness: Lethargic  Mood:Normal: No  Mood: Depressed, Anxious, Sad  Motor Activity:Normal: No  Motor Activity: Decreased  Interview Behavior: Cooperative  Preception: [place] to Person, [place] to Time, [place] to Place, [place] to Situation  Attention:Normal: Yes  Attention: (n/a)  Thought Processes: Blocking  Thought Content:Normal: Yes  Thought Content: Poverty of Content  Hallucinations: None  Delusions: No  Memory:Normal: Yes  Insight and Judgment: No  Insight and Judgment: Unmotivated  Present Suicidal Ideation: No  Present Homicidal Ideation: No    Daily Assessment Last Entry:   Daily Sleep (WDL): Within Defined Limits         Patient Currently in Pain: Denies  Daily Nutrition (WDL): Within Defined Limits    Patient Monitoring:  Frequency of Checks: 4 times per hour, close    Psychiatric Symptoms:   Depression Symptoms  Depression Symptoms: Isolative  Anxiety Symptoms  Anxiety Symptoms: No problems reported or observed. Ny Symptoms  Ny Symptoms: No problems reported or observed. Psychosis Symptoms  Delusion Type: No problems reported or observed. Suicide Risk CSSR-S:  1) Within the past month, have you wished you were dead or wished you could go to sleep and not wake up? : Yes  2) Have you actually had any thoughts of killing yourself? : Yes  3) Have you been thinking about how you might kill yourself? : Yes  5) Have you started to work out or worked out the details of how to kill yourself? Do you intend to carry out this plan? : No  6) Have you ever done anything, started to do anything, or prepared to do anything to end your life?: No  Change in Result:  no Change in Plan of care:  no      EDUCATION:   Learner Progress Toward Treatment Goals:   Reviewed results and recommendations of this team, Reviewed group plan and strategies, Reviewed signs, symptoms and risk of self harm and violent behavior, Reviewed goals and plan of care    Method:  small group, individual verbal education    Outcome:   Verbalized by patient but needs reinforcement to obtain goals    PATIENT GOALS:  Short term:  Continue medications, attend groups.   Long term: Follow up with [facility].      PLAN/TREATMENT RECOMMENDATIONS UPDATE:  continue with group therapies, increased socialization, continue planning for after discharge goals, continue with medication compliance    SHORT-TERM GOALS UPDATE:   Time frame for Short-Term Goals:  5-7 days    LONG-TERM GOALS UPDATE:   Time frame for Long-Term Goals:  6 months    Members Present in Team Meeting:     Reji Larios

## 2020-01-12 NOTE — GROUP NOTE
Date: 1/12/2020    Group Start Time: 1600  Group End Time: 36  Group Topic: Healthy Living/Wellness    FARHAD Willoughby        Group Therapy Note    Attendees: 10         Patient's Goal:  Wellness & Education    Status After Intervention:  Improved    Participation Level:  Active Listener    Participation Quality: Attentive      Speech:  normal      Thought Process/Content: Logical      Affective Functioning: Flat      Mood: anxious      Level of consciousness:  Alert and Oriented x4      Response to Learning: Capable of insight      Endings: None Reported    Modes of Intervention: Socialization      Discipline Responsible[facility]      Signature:  Geovanny Medina

## 2020-01-12 NOTE — PLAN OF CARE
Patient is unable to verbalize and or display a decrease in depressive symptoms, patient remains isolative to room majority of shift. Patient denies thoughts of self harm and is agreeable to seeking out staff should thoughts of self harm arise. Safe environment maintained. 15 minute checks for safety continued per unit policy. Will continue to monitor for safety and provide support and reassurance as needed.             Problem: Altered Mood, Depressive Behavior:  Goal: Able to verbalize and/or display a decrease in depressive symptoms  Description  Able to verbalize and/or display a decrease in depressive symptoms  1/12/2020 0909 by Sheldon Mccloud LPN  Outcome: Ongoing       Problem: Altered Mood, Depressive Behavior:  Goal: Absence of self-harm  Description  Absence of self-harm  1/12/2020 0909 by Sheldon Mccloud LPN  Outcome: Ongoing

## 2020-01-12 NOTE — GROUP NOTE
Group Therapy Note    Date: 1/12/2020    Group Start Time: 1000  Group End Time: 5435  Group Topic: Psychoeducation    [address], LSW    patient refused to attend psychoeducation group at 10a after encouragement from staff.   1:1 talk time provided as alternative to group session

## 2020-01-12 NOTE — BH NOTE
Patient did not attend 2:30 open rec group at 1430 due to refusal despite many attempts from this staff

## 2020-01-12 NOTE — GROUP NOTE
Group Therapy Note    Date: 1/12/2020    Group Start Time: 8205  Group End Time: 3751  Group Topic: Community Meeting    [address]    patient refused to attend community meeting group at 5179 after encouragement from staff.   1:1 talk time provided as alternative to group session     Signature:  Kizzy Archuleta

## 2020-01-12 NOTE — PROGRESS NOTES
Department of Psychiatry  Attending Progress Note      SUBJECTIVE:  Found her in her room. Very depressed and withdrawn. Isolating and withdrawing. Medication complaint. OBJECTIVE    Physical  VITALS:  BP (!) 96/55   Pulse 65   Temp 97.4 °F (36.3 °C) (Oral)   Resp 14   Ht 5' 3\" (1.6 m)   Wt 130 lb (59 kg)   LMP 12/08/2019 (Approximate)   BMI 23.03 kg/m²     Mental Status Examination:      Pt. was alert, fully oriented, and cooperative. Appearance and hygiene weredisheveled, poor hygiene . Mood was depressed. Affect was \"dysthymic and poorly reactive Thought process was circumstantial. Patient denied any hallucinations or paranoia.     Patient denied suicidal ideations. Patient denied homicidal ideations . Patient's gross cognitive functions were intact. Insight and judgement were poor.  Both recent and remote memory were intact.     Psychomotor status was slowed      Diagnostic Impression  Active Problems:    Major depressive disorder, recurrent (HCC)      Medications  Current Facility-Administered Medications: acetaminophen (TYLENOL) tablet 650 mg, 650 mg, Oral, Q4H PRN  aluminum & magnesium hydroxide-simethicone (MAALOX) 200-200-20 MG/5ML suspension 30 mL, 30 mL, Oral, Q6H PRN  benztropine mesylate (COGENTIN) injection 2 mg, 2 mg, Intramuscular, BID PRN  magnesium hydroxide (MILK OF MAGNESIA) 400 MG/5ML suspension 30 mL, 30 mL, Oral, Daily PRN  nicotine polacrilex (NICORETTE) gum 2 mg, 2 mg, Oral, Q2H PRN  traZODone (DESYREL) tablet 50 mg, 50 mg, Oral, Nightly PRN  hydrOXYzine (ATARAX) tablet 25 mg, 25 mg, Oral, TID PRN  ARIPiprazole (ABILIFY) tablet 5 mg, 5 mg, Oral, Daily  sertraline (ZOLOFT) tablet 50 mg, 50 mg, Oral, Daily  famotidine (PEPCID) tablet 20 mg, 20 mg, Oral, BID  atenolol (TENORMIN) tablet 25 mg, 25 mg, Oral, Daily    ASSESSMENT AND PLAN    Will continue to adjust medications accordingly

## 2020-01-13 LAB
AMPHETAMINE SCREEN URINE: NEGATIVE
BARBITURATE SCREEN URINE: NEGATIVE
BENZODIAZEPINE SCREEN, URINE: NEGATIVE
BUPRENORPHINE URINE: NORMAL
CANNABINOID SCREEN URINE: NEGATIVE
COCAINE METABOLITE, URINE: NEGATIVE
HCG(URINE) PREGNANCY TEST: NEGATIVE
MDMA URINE: NORMAL
METHADONE SCREEN, URINE: NEGATIVE
METHAMPHETAMINE, URINE: NORMAL
OPIATES, URINE: NEGATIVE
OXYCODONE SCREEN URINE: NEGATIVE
PHENCYCLIDINE, URINE: NEGATIVE
PROPOXYPHENE, URINE: NORMAL
TEST INFORMATION: NORMAL
TRICYCLIC ANTIDEPRESSANTS, UR: NORMAL

## 2020-01-13 PROCEDURE — 87491 CHLMYD TRACH DNA AMP PROBE: CPT

## 2020-01-13 PROCEDURE — 81025 URINE PREGNANCY TEST: CPT

## 2020-01-13 PROCEDURE — 87591 N.GONORRHOEAE DNA AMP PROB: CPT

## 2020-01-13 PROCEDURE — 6370000000 HC RX 637 (ALT 250 FOR IP): Performed by: NURSE PRACTITIONER

## 2020-01-13 PROCEDURE — 80307 DRUG TEST PRSMV CHEM ANLYZR: CPT

## 2020-01-13 PROCEDURE — 1240000000 HC EMOTIONAL WELLNESS R&B

## 2020-01-13 PROCEDURE — 99232 SBSQ HOSP IP/OBS MODERATE 35: CPT | Performed by: NURSE PRACTITIONER

## 2020-01-13 RX ORDER — ARIPIPRAZOLE 5 MG/1
5 TABLET ORAL NIGHTLY
Status: DISCONTINUED | OUTPATIENT
Start: 2020-01-14 | End: 2020-01-15 | Stop reason: HOSPADM

## 2020-01-13 RX ADMIN — SERTRALINE HYDROCHLORIDE 50 MG: 50 TABLET ORAL at 08:51

## 2020-01-13 RX ADMIN — FAMOTIDINE 20 MG: 20 TABLET ORAL at 22:21

## 2020-01-13 RX ADMIN — TRAZODONE HYDROCHLORIDE 50 MG: 50 TABLET ORAL at 22:21

## 2020-01-13 RX ADMIN — FAMOTIDINE 20 MG: 20 TABLET ORAL at 08:48

## 2020-01-13 RX ADMIN — HYDROXYZINE HYDROCHLORIDE 25 MG: 25 TABLET, FILM COATED ORAL at 22:21

## 2020-01-13 RX ADMIN — ARIPIPRAZOLE 5 MG: 10 TABLET ORAL at 08:48

## 2020-01-13 NOTE — GROUP NOTE
Group Therapy Note    Date: 1/13/2020    Group Start Time: 1100  Group End Time: 9803  Group Topic: Cognitive Skills    [facility] [address], [facility]        Group Therapy Note    Attendees: 4         Patient's Goal:  To increase interpersonal skills     Notes:  Patient attended group and participated     Status After Intervention:  Improved    Participation Level:  Active Listener and Interactive    Participation Quality: Appropriate, Attentive and Sharing      Speech:  normal      Thought Process/Content: Logical      Affective Functioning: Congruent      Mood: euthymic      Level of consciousness:  Alert and Oriented x4      Response to Learning: Progressing to goal      Endings: None Reported    Modes of Intervention: Socialization and Activity      Discipline Responsible: Psychoeducational Specialist      Signature:  Alfredo Lucia

## 2020-01-13 NOTE — BH NOTE
patient refused to attend wrap-up group at 2030 after encouragement from staff.   1:1 talk time provided as alternative to group session

## 2020-01-13 NOTE — GROUP NOTE
Group Therapy Note    Date: 1/13/2020    Group Start Time: 0900  Group End Time: 0930  Group Topic: Community Meeting    [facility] [address], CTRS        Group Therapy Note    Attendees: 9         Patient's Goal:  To increase interpersonal skills     Notes:  Patient attended group and participated     Status After Intervention:  Improved    Participation Level:  Active Listener and Interactive    Participation Quality: Appropriate, Attentive and Sharing      Speech:  normal      Thought Process/Content: Logical      Affective Functioning: Congruent      Mood: euthymic      Level of consciousness:  Alert and Oriented x4      Response to Learning: Progressing to goal      Endings: None Reported    Modes of Intervention: Education, Support and Socialization      Discipline Responsible: Psychoeducational Specialist      Signature:  Heather English

## 2020-01-13 NOTE — GROUP NOTE
Group Therapy Note    Date: 1/13/2020    Group Start Time: 1330  Group End Time: 6456  Group Topic: Psychoeducation    JEREMY Moreno    Group Therapy Note    Attendees: 7    Patient's Goal:  Pt will demonstrate improved interpersonal and communication skills    Notes:  Pt attended group and participated    Status After Intervention:  Improved    Participation Level:  Active Listener and Interactive    Participation Quality: Appropriate, Attentive, Sharing and Supportive      Speech:  normal      Thought Process/Content: Logical  Linear      Affective Functioning: Congruent      Mood: euthymic      Level of consciousness:  Alert, Oriented x4 and Attentive      Response to Learning: Able to verbalize current knowledge/experience, Able to verbalize/acknowledge new learning, Able to retain information, Capable of insight, Able to change behavior and Progressing to goal      Endings: None Reported    Modes of Intervention: Education, Support, Socialization, Exploration and Problem-solving      Discipline Responsible: Psychoeducational Specialist      Signature:  Gavin Jolley [place]

## 2020-01-13 NOTE — PROGRESS NOTES
MCH 32.2 26 - 34 pg    MCHC 34.4 31 - 37 g/dL    RDW 13.4 11.5 - 14.9 %    Platelets 846 391 - 924 k/uL    MPV 6.5 6.0 - 12.0 fL    NRBC Automated NOT REPORTED per 100 WBC    Differential Type NOT REPORTED     Seg Neutrophils 46 34 - 64 %    Lymphocytes 41 25 - 45 %    Monocytes 10 (H) 2 - 8 %    Eosinophils % 2 0 - 4 %    Basophils 1 0 - 2 %    Immature Granulocytes NOT REPORTED 0 %    Segs Absolute 2.60 1.3 - 9.1 k/uL    Absolute Lymph # 2.30 1.2 - 5.2 k/uL    Absolute Mono # 0.60 0.1 - 1.3 k/uL    Absolute Eos # 0.10 0.0 - 0.4 k/uL    Basophils Absolute 0.00 0.0 - 0.2 k/uL    Absolute Immature Granulocyte NOT REPORTED 0.00 - 0.30 k/uL    WBC Morphology NOT REPORTED     RBC Morphology NOT REPORTED     Platelet Estimate NOT REPORTED    Comprehensive Metabolic Panel w/ Reflex to MG    Collection Time: 01/12/20  7:12 AM   Result Value Ref Range    Glucose 86 70 - 99 mg/dL    BUN 11 6 - 20 mg/dL    CREATININE 0.71 0.50 - 0.90 mg/dL    Bun/Cre Ratio NOT REPORTED 9 - 20    Calcium 9.4 8.6 - 10.4 mg/dL    Sodium 140 135 - 144 mmol/L    Potassium 3.9 3.7 - 5.3 mmol/L    Chloride 103 98 - 107 mmol/L    CO2 23 20 - 31 mmol/L    Anion Gap 14 9 - 17 mmol/L    Alkaline Phosphatase 94 35 - 104 U/L    ALT 21 5 - 33 U/L    AST 32 (H) <32 U/L    Total Bilirubin 0.43 0.3 - 1.2 mg/dL    Total Protein 7.4 6.4 - 8.3 g/dL    Alb 4.3 3.5 - 5.2 g/dL    Albumin/Globulin Ratio NOT REPORTED 1.0 - 2.5    GFR Non-African American  >60 mL/min     Pediatric GFR requires additional information. Refer to [facility] website for calculator.     GFR  NOT REPORTED >60 mL/min    GFR Comment          GFR Staging NOT REPORTED    Hemoglobin A1c    Collection Time: 01/12/20  7:12 AM   Result Value Ref Range    Hemoglobin A1C 5.4 4.0 - 6.0 %    Estimated Avg Glucose 108 mg/dL   Lipid panel - fasting    Collection Time: 01/12/20  7:12 AM   Result Value Ref Range    Cholesterol 194 <200 mg/dL    HDL 40 (L) >40 mg/dL    LDL Cholesterol 121 0 - 130 mg/dL    Chol/HDL Ratio 4.9 <5    Triglycerides 164 (H) <150 mg/dL    VLDL NOT REPORTED (H) 1 - 30 mg/dL   T4, free    Collection Time: 01/12/20  7:12 AM   Result Value Ref Range    Thyroxine, Free 1.00 0.93 - 1.70 ng/dL   TSH without Reflex    Collection Time: 01/12/20  7:12 AM   Result Value Ref Range    TSH 0.81 0.30 - 5.00 mIU/L       Medications  Current Facility-Administered Medications   Medication Dose Route Frequency Provider Last Rate Last Dose    [START ON 1/14/2020] ARIPiprazole (ABILIFY) tablet 5 mg  5 mg Oral Nightly DARRIAN Whitt CNP        [START ON 1/14/2020] sertraline (ZOLOFT) tablet 50 mg  50 mg Oral Nightly DARRIAN Whitt CNP        acetaminophen (TYLENOL) tablet 650 mg  650 mg Oral Q4H PRN Shana Thomas, APRN - CNP        aluminum & magnesium hydroxide-simethicone (MAALOX) 200-200-20 MG/5ML suspension 30 mL  30 mL Oral Q6H PRN Shana Thomas, APRN - CNP        benztropine mesylate (COGENTIN) injection 2 mg  2 mg Intramuscular BID PRN Shana Thomas, DARRIAN - CNP        magnesium hydroxide (MILK OF MAGNESIA) 400 MG/5ML suspension 30 mL  30 mL Oral Daily PRN Shana Thomas, APRN - CNP        nicotine polacrilex (NICORETTE) gum 2 mg  2 mg Oral Q2H PRN Shana Thomas, DARRIAN - CNP        traZODone (DESYREL) tablet 50 mg  50 mg Oral Nightly PRN Shana Thomas, APRN - CNP   50 mg at 01/12/20 2106    hydrOXYzine (ATARAX) tablet 25 mg  25 mg Oral TID PRN Shana Thomas, DARRIAN - CNP   25 mg at 01/11/20 2109    famotidine (PEPCID) tablet 20 mg  20 mg Oral BID DARRIAN Whitt CNP   20 mg at 01/13/20 0848    atenolol (TENORMIN) tablet 25 mg  25 mg Oral Daily DARRIAN Whitt CNP             [START ON 1/14/2020] ARIPiprazole  5 mg Oral Nightly    [START ON 1/14/2020] sertraline  50 mg Oral Nightly    famotidine  20 mg Oral BID    atenolol  25 mg Oral Daily       ASSESSMENT  Major depressive disorder, recurrent ([facility]     Patient's Response to Treatment:

## 2020-01-13 NOTE — FLOWSHEET NOTE
*Patient participated in the Spirituality Group       01/13/20 1529   Encounter Summary   Services provided to: Patient   Referral/Consult From: Rounding   Continue Visiting   (1/13/20)   Complexity of Encounter Low   Length of Encounter 30 minutes   Spiritual/Sikhism   Type Spiritual support   Assessment Calm; Approachable   Intervention Active listening   Outcome Receptive

## 2020-01-14 LAB
-: ABNORMAL
AMORPHOUS: ABNORMAL
BACTERIA: ABNORMAL
BILIRUBIN URINE: NEGATIVE
CASTS UA: ABNORMAL /LPF
COLOR: YELLOW
COMMENT UA: ABNORMAL
CRYSTALS, UA: ABNORMAL /HPF
EPITHELIAL CELLS UA: ABNORMAL /HPF
GLUCOSE URINE: NEGATIVE
KETONES, URINE: NEGATIVE
LEUKOCYTE ESTERASE, URINE: NEGATIVE
MUCUS: ABNORMAL
NITRITE, URINE: NEGATIVE
OTHER OBSERVATIONS UA: ABNORMAL
PH UA: 6.5 (ref 5–8)
PROTEIN UA: NEGATIVE
RBC UA: ABNORMAL /HPF
RENAL EPITHELIAL, UA: ABNORMAL /HPF
SPECIFIC GRAVITY UA: 1.02 (ref 1–1.03)
TRICHOMONAS: ABNORMAL
TURBIDITY: CLEAR
URINE HGB: ABNORMAL
UROBILINOGEN, URINE: NORMAL
WBC UA: ABNORMAL /HPF
YEAST: ABNORMAL

## 2020-01-14 PROCEDURE — 1240000000 HC EMOTIONAL WELLNESS R&B

## 2020-01-14 PROCEDURE — 6370000000 HC RX 637 (ALT 250 FOR IP): Performed by: NURSE PRACTITIONER

## 2020-01-14 PROCEDURE — 99232 SBSQ HOSP IP/OBS MODERATE 35: CPT | Performed by: PSYCHIATRY & NEUROLOGY

## 2020-01-14 PROCEDURE — 6370000000 HC RX 637 (ALT 250 FOR IP): Performed by: PSYCHIATRY & NEUROLOGY

## 2020-01-14 PROCEDURE — 87491 CHLMYD TRACH DNA AMP PROBE: CPT

## 2020-01-14 PROCEDURE — 81001 URINALYSIS AUTO W/SCOPE: CPT

## 2020-01-14 RX ORDER — SERTRALINE HYDROCHLORIDE 100 MG/1
100 TABLET, FILM COATED ORAL NIGHTLY
Status: DISCONTINUED | OUTPATIENT
Start: 2020-01-14 | End: 2020-01-15 | Stop reason: HOSPADM

## 2020-01-14 RX ADMIN — ARIPIPRAZOLE 5 MG: 5 TABLET ORAL at 20:58

## 2020-01-14 RX ADMIN — SERTRALINE HYDROCHLORIDE 100 MG: 100 TABLET ORAL at 20:58

## 2020-01-14 RX ADMIN — TRAZODONE HYDROCHLORIDE 50 MG: 50 TABLET ORAL at 20:58

## 2020-01-14 RX ADMIN — HYDROXYZINE HYDROCHLORIDE 25 MG: 25 TABLET, FILM COATED ORAL at 20:58

## 2020-01-14 RX ADMIN — FAMOTIDINE 20 MG: 20 TABLET ORAL at 08:33

## 2020-01-14 RX ADMIN — FAMOTIDINE 20 MG: 20 TABLET ORAL at 20:58

## 2020-01-14 ASSESSMENT — PAIN SCALES - GENERAL: PAINLEVEL_OUTOF10: 0

## 2020-01-14 NOTE — PROGRESS NOTES
Department of Psychiatry  Attending Progress Note  Chief Complaint: Major depressive disorder, recurrent ([address]     SUBJECTIVE:    Patient was seen today in the dayroom area. Reports feeling better. Concerned about her STD work up. . Patient currently denies suicidal or homicidal ideation. Patient also denies any delusions, hallucinations or paranoia. Patient attends groups and can be seen socializing with peers. She states her sleep has been great and no change in her appetite. Patient will continue to require inpatient stabilization. Charting and medications were reviewed. OBJECTIVE    Physical  BP (!) 92/50   Pulse 60   Temp 97.2 °F (36.2 °C) (Oral)   Resp 14   Ht 5' 3\" (1.6 m)   Wt 130 lb (59 kg)   LMP 12/08/2019 (Approximate)   BMI 23.03 kg/m²      Mental Status Evaluation:  Orientation: alertness: alert   Mood:.  \"feeling great\"      Affect:  normal and mood-congruent      Appearance:  age appropriate and casually dressed   Activity:  Within Normal Limits   Gait/Posture: Normal   Speech:  normal pitch and normal volume   Thought Process:  goal directed   Thought Content:  Appropriate   Sensorium:  person, place, time/date and situation   Cognition:  grossly intact   Memory: intact   Insight:  Fair   Judgment: fair   Suicidal Ideations: denies suicidal ideation   Homicidal Ideations: Negative for homicidal ideation      Medication Side Effects: \"feels tired\"         Attention Span attention span and concentration were age appropriate       Labs  Recent Results (from the past 72 hour(s))   CBC auto differential    Collection Time: 01/12/20  7:12 AM   Result Value Ref Range    WBC 5.6 4.5 - 13.5 k/uL    RBC 4.25 4.0 - 5.2 m/uL    Hemoglobin 13.7 12.0 - 16.0 g/dL    Hematocrit 39.9 36 - 46 %    MCV 93.8 80 - 100 fL    MCH 32.2 26 - 34 pg    MCHC 34.4 31 - 37 g/dL    RDW 13.4 11.5 - 14.9 %    Platelets 641 412 - 029 k/uL    MPV 6.5 6.0 - 12.0 fL    NRBC Automated NOT REPORTED per 100 WBC    Differential Type NOT REPORTED     Seg Neutrophils 46 34 - 64 %    Lymphocytes 41 25 - 45 %    Monocytes 10 (H) 2 - 8 %    Eosinophils % 2 0 - 4 %    Basophils 1 0 - 2 %    Immature Granulocytes NOT REPORTED 0 %    Segs Absolute 2.60 1.3 - 9.1 k/uL    Absolute Lymph # 2.30 1.2 - 5.2 k/uL    Absolute Mono # 0.60 0.1 - 1.3 k/uL    Absolute Eos # 0.10 0.0 - 0.4 k/uL    Basophils Absolute 0.00 0.0 - 0.2 k/uL    Absolute Immature Granulocyte NOT REPORTED 0.00 - 0.30 k/uL    WBC Morphology NOT REPORTED     RBC Morphology NOT REPORTED     Platelet Estimate NOT REPORTED    Comprehensive Metabolic Panel w/ Reflex to MG    Collection Time: 01/12/20  7:12 AM   Result Value Ref Range    Glucose 86 70 - 99 mg/dL    BUN 11 6 - 20 mg/dL    CREATININE 0.71 0.50 - 0.90 mg/dL    Bun/Cre Ratio NOT REPORTED 9 - 20    Calcium 9.4 8.6 - 10.4 mg/dL    Sodium 140 135 - 144 mmol/L    Potassium 3.9 3.7 - 5.3 mmol/L    Chloride 103 98 - 107 mmol/L    CO2 23 20 - 31 mmol/L    Anion Gap 14 9 - 17 mmol/L    Alkaline Phosphatase 94 35 - 104 U/L    ALT 21 5 - 33 U/L    AST 32 (H) <32 U/L    Total Bilirubin 0.43 0.3 - 1.2 mg/dL    Total Protein 7.4 6.4 - 8.3 g/dL    Alb 4.3 3.5 - 5.2 g/dL    Albumin/Globulin Ratio NOT REPORTED 1.0 - 2.5    GFR Non-African American  >60 mL/min     Pediatric GFR requires additional information. Refer to [facility] website for calculator.     GFR  NOT REPORTED >60 mL/min    GFR Comment          GFR Staging NOT REPORTED    Hemoglobin A1c    Collection Time: 01/12/20  7:12 AM   Result Value Ref Range    Hemoglobin A1C 5.4 4.0 - 6.0 %    Estimated Avg Glucose 108 mg/dL   Lipid panel - fasting    Collection Time: 01/12/20  7:12 AM   Result Value Ref Range    Cholesterol 194 <200 mg/dL    HDL 40 (L) >40 mg/dL    LDL Cholesterol 121 0 - 130 mg/dL    Chol/HDL Ratio 4.9 <5    Triglycerides 164 (H) <150 mg/dL    VLDL NOT REPORTED (H) 1 - 30 mg/dL   T4, free    Collection Time: 01/12/20  7:12 AM   Result Value Ref Range

## 2020-01-14 NOTE — PLAN OF CARE
Problem: Altered Mood, Depressive Behavior:  Goal: Ability to disclose and discuss suicidal ideas will improve  Description  Ability to disclose and discuss suicidal ideas will improve  Outcome: Ongoing     Problem: Altered Mood, Depressive Behavior:  Goal: Absence of self-harm  Description  Absence of self-harm       Pt denies suicidal ideations at this time. Pt agreed to seek staff at anytime she felt like any urges to harm self would arise. Safety checks maintained [id number]. Pt remains free from self harm this shift. Pt denies wanting to cause harm to self or others at this time. Pt encouraged to seek nursing staff at anytime if she felt at danger to themselves or others. Pt states understanding. Safety checks maintained [id number]    Pt out this evening, social with peers and staff, on phone a lot. Pt denies SI/HI, denies thoughts of self harm. Reports that she feels ready for potential discharge.

## 2020-01-14 NOTE — PLAN OF CARE
Patient is out on the unit throughout the day, appetite is good and patient stated she slept well last night. Patients affect is bright upon approach and social with peers and staff. Patient is attending groups and took all medications as prescribed. Patient denies depression, anxiety and any thoughts to harm self or others. Patient is looking forward to discharge soon. No self harm noted.

## 2020-01-14 NOTE — GROUP NOTE
Date:  1/13/20 Start Time: 830pm  End Time: 915pm    Number Participants in Group:  6 of 18    Goal:  Patient will demonstrate increased interpersonal interaction   Topic: wrap up    Discipline Responsible:   OT  AT   x Nsg.  RT  Other       Participation Level:     None  Minimal   x Active Listener x Interactive    Monopolizing         Participation Quality:  x Appropriate  Inappropriate   x       Attentive        Intrusive   x       Sharing        Resistant   x       Supportive        Lethargic       Affective:   x Congruent  Incongruent  Blunted  Flat    Constricted  Anxious  Elated  Angry    Labile  Depressed  Other         Cognitive:  x Alert  Oriented PPTP     Concentration x G  F  P   Attention Span x G  F  P   Short-Term Memory x G  F  P   Long-Term Memory x G  F  P   ProblemSolving/  Decision Making x G  F  P   Ability to Process  Information x G  F  P      Contributing Factors             Delusional             Hallucinating             Flight of Ideas             Other:       Modes of Intervention:  x Education  Support  Exploration   x Clarifying  Problem Solving  Confrontation    Socialization  Limit Setting  Reality Testing    Activity  Movement  Media    Other:            Response to Learning:  x Able to verbalize current knowledge/experience   x Able to verbalize/acknowledge new learning    Able to retain information    Capable of insight    Able to change behavior    Progressing to goal    Other:        Comments:

## 2020-01-14 NOTE — GROUP NOTE
Group Therapy Note    Date: 1/14/2020    Group Start Time: 1330  Group End Time: 4791  Group Topic: Recreational    [facility] JOAQUINI C    Imelda Gonzalez        Group Therapy Note    Attendees: 8/17         Patient's Goal:  To increase positive coping skills     Notes:      Status After Intervention:  Improved    Participation Level:  Active Listener and Interactive    Participation Quality: Appropriate, Attentive and Sharing      Speech:  normal      Thought Process/Content: Logical  Linear      Affective Functioning: Congruent      Mood: euthymic      Level of consciousness:  Alert, Oriented x4 and Attentive      Response to Learning: Able to verbalize current knowledge/experience, Able to verbalize/acknowledge new learning, Able to retain information and Progressing to goal      Endings: None Reported    Modes of Intervention: Education, Support, Socialization, Exploration, Clarifying, Problem-solving and Activity      Discipline Responsible: Psychoeducational Specialist      Signature:  Imelda Gonzalez

## 2020-01-15 VITALS
TEMPERATURE: 98.1 F | RESPIRATION RATE: 14 BRPM | SYSTOLIC BLOOD PRESSURE: 91 MMHG | WEIGHT: 130 LBS | HEIGHT: 63 IN | BODY MASS INDEX: 23.04 KG/M2 | HEART RATE: 64 BPM | DIASTOLIC BLOOD PRESSURE: 57 MMHG

## 2020-01-15 PROBLEM — F33.9 MAJOR DEPRESSIVE DISORDER, RECURRENT (HCC): Status: RESOLVED | Noted: 2020-01-11 | Resolved: 2020-01-15

## 2020-01-15 LAB
C. TRACHOMATIS DNA ,URINE: NEGATIVE
C. TRACHOMATIS DNA ,URINE: NEGATIVE
N. GONORRHOEAE DNA, URINE: NEGATIVE
SPECIMEN DESCRIPTION: NORMAL
SPECIMEN DESCRIPTION: NORMAL

## 2020-01-15 PROCEDURE — 99238 HOSP IP/OBS DSCHRG MGMT 30/<: CPT | Performed by: PSYCHIATRY & NEUROLOGY

## 2020-01-15 PROCEDURE — 6370000000 HC RX 637 (ALT 250 FOR IP): Performed by: NURSE PRACTITIONER

## 2020-01-15 RX ORDER — SERTRALINE HYDROCHLORIDE 100 MG/1
100 TABLET, FILM COATED ORAL NIGHTLY
Qty: 30 TABLET | Refills: 0 | Status: ON HOLD | OUTPATIENT
Start: 2020-01-15 | End: 2020-03-18 | Stop reason: SDUPTHER

## 2020-01-15 RX ORDER — ARIPIPRAZOLE 5 MG/1
5 TABLET ORAL NIGHTLY
Qty: 30 TABLET | Refills: 0 | Status: ON HOLD | OUTPATIENT
Start: 2020-01-15 | End: 2020-03-18 | Stop reason: SDUPTHER

## 2020-01-15 RX ADMIN — FAMOTIDINE 20 MG: 20 TABLET ORAL at 08:30

## 2020-01-15 RX ADMIN — ATENOLOL 25 MG: 25 TABLET ORAL at 08:30

## 2020-01-15 NOTE — GROUP NOTE
Group Therapy Note    Date: 1/15/2020    Group Start Time: 0900  Group End Time: 0915  Group Topic: Community Meeting    FARHAD ZELAYA    Cornell Gerber        Group Therapy Note    Attendees: 6/19         Patient's Goal:  To increase interpersonal interaction     Notes:      Status After Intervention:  Improved    Participation Level:  Active Listener and Interactive    Participation Quality: Appropriate, Attentive and Sharing      Speech:  normal      Thought Process/Content: Logical  Linear      Affective Functioning: Congruent      Mood: euthymic      Level of consciousness:  Alert, Oriented x4 and Attentive      Response to Learning: Able to verbalize current knowledge/experience, Able to verbalize/acknowledge new learning, Able to retain information and Progressing to goal      Endings: None Reported    Modes of Intervention: Education, Support, Socialization, Exploration, Clarifying and Problem-solving      Discipline Responsible: Psychoeducational Specialist      Signature:  Cornell Gerber

## 2020-01-15 NOTE — DISCHARGE SUMMARY
Physician Discharge Summary     Patient ID:  Aleisha Hatfield  [id number]  1 y.o.  2000    Admit date: 1/11/2020    Discharge date and time: No discharge date for patient encounter. Admitting Physician: Lonnie Samaniego MD     Discharge Physician: Nikita Arora MD    Admission Diagnoses: Major depressive disorder, recurrent [facility]) [F33.9]    Discharge Diagnoses: same    Admission Condition: serious    Discharged Condition: fair    Indication for Admission: suicidal ideation    Hospital Course: Admitted to unit. Resumed and adjusted medications. Her moo dimproved and alter she denied any suicidal ideation. Consults: none    Significant Diagnostic Studies: labs: Outstanding Order Results     Date and Time Order Name Status Description    1/14/2020 1723 Chlamydia trachomatis DNA, Urine In process           Treatments: mood stabilizers    Discharge Exam:    Orientation: alertness: alert   Mood:.  \"feeling great\"      Affect:  normal and mood-congruent      Appearance:  age appropriate and casually dressed   Activity:  Within Normal Limits   Gait/Posture: Normal   Speech:  normal pitch and normal volume   Thought Process:  goal directed   Thought Content:  Appropriate   Sensorium:  person, place, time/date and situation   Cognition:  grossly intact   Memory: intact   Insight:  Fair   Judgment: fair   Suicidal Ideations: denies suicidal ideation   Homicidal Ideations: Negative for homicidal ideation      Medication Side Effects: none          Attention Span attention span and concentration were age appropriate       Disposition: home    Patient Instructions:      Medication List      CHANGE how you take these medications    ARIPiprazole 5 MG tablet  Commonly known as:  ABILIFY  Take 1 tablet by mouth nightly  What changed:  when to take this  Notes to patient:  Improves mood     sertraline 100 MG tablet  Commonly known as:  ZOLOFT  Take 1 tablet by mouth nightly  What changed:    · medication

## 2020-01-15 NOTE — CARE COORDINATION
Bridge Appointment completed: Reviewed Discharge Instructions with patient. Patient verbalizes understanding and agreement with the discharge plan using the teachback method.        Discharge Arrangements: Claire 1/22/2020 at [phone] with Lupis Michel NP    Guardian notified: n/a  Discharge destination/address: [address] [place] [address]  Transported by:  cab

## 2020-01-15 NOTE — GROUP NOTE
Group Therapy Note    Date: 1/15/2020    Group Start Time: 1000  Group End Time: 4620  Group Topic: Psychotherapy    [facility] [address] SOLOMON Johnson        Group Therapy Note    Attendees: 3         Patient's Goal:  Expression of feeling     Notes:      Status After Intervention:  Unchanged    Participation Level:  Active Listener and Interactive    Participation Quality: Appropriate and Attentive      Speech:  normal      Thought Process/Content: Logical      Affective Functioning: Congruent      Mood: euthymic      Level of consciousness:  Alert and Oriented x4      Response to Learning: Able to verbalize current knowledge/experience and Able to verbalize/acknowledge new learning      Endings: None Reported    Modes of Intervention: Education and Support      Discipline Responsible: /Counselor      Signature:  SOLOMON Duncan

## 2020-01-22 ENCOUNTER — HOSPITAL ENCOUNTER (EMERGENCY)
Age: 20
Discharge: HOME | End: 2020-01-22
Attending: EMERGENCY MEDICINE
Payer: MEDICAID

## 2020-01-22 ENCOUNTER — HOSPITAL ENCOUNTER (EMERGENCY)
Age: 20
Discharge: HOME | End: 2020-01-23
Attending: EMERGENCY MEDICINE
Payer: MEDICAID

## 2020-01-22 VITALS
WEIGHT: 133 LBS | HEART RATE: 105 BPM | BODY MASS INDEX: 23.57 KG/M2 | DIASTOLIC BLOOD PRESSURE: 75 MMHG | TEMPERATURE: 97.5 F | RESPIRATION RATE: 16 BRPM | SYSTOLIC BLOOD PRESSURE: 115 MMHG | OXYGEN SATURATION: 99 % | HEIGHT: 63 IN

## 2020-01-22 VITALS
DIASTOLIC BLOOD PRESSURE: 76 MMHG | TEMPERATURE: 97.3 F | WEIGHT: 133 LBS | RESPIRATION RATE: 18 BRPM | OXYGEN SATURATION: 100 % | SYSTOLIC BLOOD PRESSURE: 108 MMHG | HEIGHT: 63 IN | BODY MASS INDEX: 23.57 KG/M2 | HEART RATE: 82 BPM

## 2020-01-22 LAB
-: ABNORMAL
AMORPHOUS: ABNORMAL
BACTERIA: ABNORMAL
BILIRUBIN URINE: NEGATIVE
CASTS UA: ABNORMAL /LPF (ref 0–8)
COLOR: YELLOW
CRYSTALS, UA: ABNORMAL /HPF
DIRECT EXAM: NORMAL
EPITHELIAL CELLS UA: ABNORMAL /HPF (ref 0–5)
GLUCOSE URINE: NEGATIVE
HCG(URINE) PREGNANCY TEST: NEGATIVE
KETONES, URINE: NEGATIVE
LEUKOCYTE ESTERASE, URINE: NEGATIVE
Lab: NORMAL
MUCUS: ABNORMAL
NITRITE, URINE: NEGATIVE
OTHER OBSERVATIONS UA: ABNORMAL
PH UA: 6.5 (ref 5–8)
PROTEIN UA: NEGATIVE
RBC UA: ABNORMAL /HPF (ref 0–4)
RENAL EPITHELIAL, UA: ABNORMAL /HPF
SPECIFIC GRAVITY UA: 1.02 (ref 1–1.03)
SPECIMEN DESCRIPTION: NORMAL
TRICHOMONAS: ABNORMAL
TURBIDITY: ABNORMAL
URINE HGB: ABNORMAL
UROBILINOGEN, URINE: NORMAL
WBC UA: ABNORMAL /HPF (ref 0–5)
YEAST: ABNORMAL

## 2020-01-22 PROCEDURE — 96372 THER/PROPH/DIAG INJ SC/IM: CPT

## 2020-01-22 PROCEDURE — 81001 URINALYSIS AUTO W/SCOPE: CPT

## 2020-01-22 PROCEDURE — 81025 URINE PREGNANCY TEST: CPT

## 2020-01-22 PROCEDURE — 87491 CHLMYD TRACH DNA AMP PROBE: CPT

## 2020-01-22 PROCEDURE — 99283 EMERGENCY DEPT VISIT LOW MDM: CPT

## 2020-01-22 PROCEDURE — 87660 TRICHOMONAS VAGIN DIR PROBE: CPT

## 2020-01-22 PROCEDURE — 87591 N.GONORRHOEAE DNA AMP PROB: CPT

## 2020-01-22 PROCEDURE — 87510 GARDNER VAG DNA DIR PROBE: CPT

## 2020-01-22 PROCEDURE — 6370000000 HC RX 637 (ALT 250 FOR IP): Performed by: PHYSICIAN ASSISTANT

## 2020-01-22 PROCEDURE — 99282 EMERGENCY DEPT VISIT SF MDM: CPT

## 2020-01-22 PROCEDURE — 87480 CANDIDA DNA DIR PROBE: CPT

## 2020-01-22 PROCEDURE — 6360000002 HC RX W HCPCS: Performed by: PHYSICIAN ASSISTANT

## 2020-01-22 RX ORDER — AZITHROMYCIN 250 MG/1
1000 TABLET, FILM COATED ORAL ONCE
Status: COMPLETED | OUTPATIENT
Start: 2020-01-22 | End: 2020-01-22

## 2020-01-22 RX ORDER — CEFTRIAXONE SODIUM 250 MG/1
250 INJECTION, POWDER, FOR SOLUTION INTRAMUSCULAR; INTRAVENOUS ONCE
Status: COMPLETED | OUTPATIENT
Start: 2020-01-22 | End: 2020-01-22

## 2020-01-22 RX ADMIN — CEFTRIAXONE SODIUM 250 MG: 250 INJECTION, POWDER, FOR SOLUTION INTRAMUSCULAR; INTRAVENOUS at 06:45

## 2020-01-22 RX ADMIN — AZITHROMYCIN 1000 MG: 250 TABLET, FILM COATED ORAL at 06:45

## 2020-01-22 ASSESSMENT — ENCOUNTER SYMPTOMS
DIARRHEA: 0
BACK PAIN: 0
NAUSEA: 0
COLOR CHANGE: 0
ABDOMINAL PAIN: 0
VOMITING: 0
SHORTNESS OF BREATH: 0
SORE THROAT: 0
COUGH: 0

## 2020-01-22 NOTE — ED PROVIDER NOTES
[facility] ED  eMERGENCY dEPARTMENT eNCOUnter      Pt Name: Filipe Gutierrez  MRN: [id number]  [place] [address]  Date of evaluation: 1/22/2020  Provider: Glynis Severin, PA-C    CHIEF COMPLAINT       Chief Complaint   Patient presents with    Vaginal Discharge             HISTORY OF PRESENT ILLNESS  (Location/Symptom, Timing/Onset, Context/Setting, Quality, Duration, Modifying Factors, Severity.)   Filipe Gutierrez is a 23 y.o. female who presents to the emergencydepartment complaining 2 weeks of vaginal discharge. She states that there has been some blood in the discharge and denies being pregnant but would like to be tested. She denies any chance of STD. She has no dysuria but states that she does have to go frequently. She denies any chest pain or shortness of breath. No abdominal pain. No nausea or vomiting. No fever or chills. No other symptoms. REVIEW OF SYSTEMS    (2-9 systems for level 4, 10 ormore for level 5)     Review of Systems   Constitutional: Negative for chills, fatigue and fever. HENT: Negative for sore throat. Respiratory: Negative for cough and shortness of breath. Cardiovascular: Negative for chest pain, palpitations and leg swelling. Gastrointestinal: Negative for abdominal pain, diarrhea, nausea and vomiting. Genitourinary: Positive for frequency and vaginal discharge. Negative for decreased urine volume, dysuria, flank pain, genital sores, hematuria, pelvic pain, urgency, vaginal bleeding and vaginal pain. Musculoskeletal: Negative for back pain, neck pain and neck stiffness. Skin: Negative for color change. Neurological: Negative for dizziness, facial asymmetry, speech difficulty, weakness, light-headedness, numbness and headaches.          PAST MEDICAL HISTORY         Diagnosis Date    ADD (attention deficit disorder)     Anxiety     Asthma     CMV (cytomegalovirus infection) ([facility] [address] 2019    Depression     Gestational has been smoking cigars. She has never used smokeless tobacco. She reports current drug use. Drug: Marijuana. She reports that she does not drink alcohol. PHYSICAL EXAM    (up to 7 for level 4, 8 or more for level 5)     Vitals:    01/22/20 0626   BP: 126/80   Pulse: 73   Resp: 18   Temp: 97.3 °F (36.3 °C)   TempSrc: Oral   SpO2: 100%   Weight: 133 lb (60.3 kg)   Height: 5' 3\" (1.6 m)       Physical Exam  Vitals signs and nursing note reviewed. Exam conducted with a chaperone present. Constitutional:       General: She is not in acute distress. Appearance: Normal appearance. She is normal weight. She is not ill-appearing, toxic-appearing or diaphoretic. HENT:      Head: Normocephalic and atraumatic. Nose: Nose normal.      Mouth/Throat:      Mouth: Mucous membranes are moist.      Pharynx: Oropharynx is clear. Eyes:      Extraocular Movements: Extraocular movements intact. Conjunctiva/sclera: Conjunctivae normal.      Pupils: Pupils are equal, round, and reactive to light. Neck:      Musculoskeletal: Normal range of motion and neck supple. Comments: No meningeal signs. Cardiovascular:      Rate and Rhythm: Normal rate and regular rhythm. Pulses: Normal pulses. Heart sounds: Normal heart sounds. No murmur. No friction rub. No gallop. Pulmonary:      Effort: Pulmonary effort is normal. No respiratory distress. Breath sounds: Normal breath sounds. No stridor. No wheezing, rhonchi or rales. Abdominal:      General: Abdomen is flat. Bowel sounds are normal. There is no distension. Palpations: Abdomen is soft. There is no mass. Tenderness: There is no tenderness. There is no right CVA tenderness, left CVA tenderness, guarding or rebound. Hernia: No hernia is present. Comments: No peritoneal signs. Genitourinary:     Comments: Upon speculum examination there was some brown discharge noted in the vaginal vault and cervical loss appeared closed.   No masses or lesions. No laceration. Upon bimanual examination there is no cervical motion tenderness and no tenderness over the uterus or to the adnexa bilaterally. Nurse Rajan Dorantes was my chaperone. Musculoskeletal: Normal range of motion. Skin:     General: Skin is warm. Capillary Refill: Capillary refill takes less than 2 seconds. Neurological:      General: No focal deficit present. Mental Status: She is alert and oriented to person, place, and time. Cranial Nerves: No cranial nerve deficit. Psychiatric:         Mood and Affect: Mood normal.         Behavior: Behavior normal.         Thought Content: Thought content normal.         Judgment: Judgment normal.           DIAGNOSTIC RESULTS       No orders to display         LABS:  Labs Reviewed   URINALYSIS WITH MICROSCOPIC - Abnormal; Notable for the following components:       Result Value    Turbidity UA CLOUDY (*)     Urine Hgb LARGE (*)     Bacteria, UA FEW (*)     All other components within normal limits   VAGINITIS DNA PROBE   C.TRACHOMATIS N.GONORRHOEAE DNA   PREGNANCY, URINE           EMERGENCY DEPARTMENT COURSE and DIFFERENTIAL DIAGNOSIS/MDM:   Vitals:    Vitals:    01/22/20 0626   BP: 126/80   Pulse: 73   Resp: 18   Temp: 97.3 °F (36.3 °C)   TempSrc: Oral   SpO2: 100%   Weight: 133 lb (60.3 kg)   Height: 5' 3\" (1.6 m)       This is a 22-year-old female presenting to the emergency department complaining of vaginal discharge. We will perform pelvic laboratory studies at this time. My differential diagnosis includes pregnancy, UTI, STD, candidiasis, bacterial vaginosis. She is slightly concerned for an STD at this time and we will prophylactically treat her using 250 mg IM of ceftriaxone and 1000 mg p.o. of azithromycin. She was told to refrain from sexual intercourse for 7 days and tell all of her partners. Pregnancy test is negative. Urinalysis and vaginitis probe are negative as well. This could potentially be an STD.   Vital

## 2020-01-22 NOTE — ED PROVIDER NOTES
significant/life threatening deterioration in this patient's condition which required my urgent intervention. Total critical care time was 0 minutes. This excludes any time for separately reportable procedures.        [place],   01/22/20 6194 [address] [place] [address],   01/22/20 4839

## 2020-01-23 ASSESSMENT — ENCOUNTER SYMPTOMS
COUGH: 0
SHORTNESS OF BREATH: 0
VOMITING: 0
NAUSEA: 0

## 2020-01-23 NOTE — ED PROVIDER NOTES
[facility]  ED  Emergency Department Encounter  EmergencyMedicine Resident     Pt Sudarshan Foote  MRN: [id number]  [place] [address]  Date of evaluation: 1/22/20  PCP:  No primary care provider on file. CHIEF COMPLAINT       Chief Complaint   Patient presents with    Other         HISTORY OF PRESENT ILLNESS  (Location/Symptom, Timing/Onset, Context/Setting, Quality, Duration,Modifying Factors, Severity.)      Ki Sommer is a 23 y.o. female who presents with a bump on her vagina. Patient states she was seen earlier today but she did have a bump then. She noticed that after using the restroom about an hour ago. Says it is at the top of her vagina. Is not painful, there is no sores. Denies any discharge. Denies any pain. Nothing seems to make it better she is not tried anything for it. Severity is mild duration constant          PAST MEDICAL / SURGICAL / SOCIAL / FAMILY HISTORY      has a past medical history of ADD (attention deficit disorder), Anxiety, Asthma, CMV (cytomegalovirus infection) ([facility] [address] Depression, Gestational diabetes mellitus (GDM), antepartum, Hearing loss in left ear, Hypothyroidism, Immunizations up to date in pediatric patient, Raynaud disease, Tachycardia, Traumatic injury during pregnancy, third trimester, and Wears glasses. has a past surgical history that includes REMOVAL FOREIGN BODY EAR (Left) and Tympanoplasty (Left, 06/24/2016).     Social History     Socioeconomic History    Marital status: Single     Spouse name: Not on file    Number of children: Not on file    Years of education: Not on file    Highest education level: Not on file   Occupational History    Not on file   Social Needs    Financial resource strain: Not on file    Food insecurity:     Worry: Not on file     Inability: Not on file    Transportation needs:     Medical: Not on file     Non-medical: Not on file   Tobacco Use    Smoking status: Current Every Day Smoker     Types: Cigars    Smokeless tobacco: Never Used    Tobacco comment: 1-2 black and milds daily    Substance and Sexual Activity    Alcohol use: No    Drug use: Yes     Types: Marijuana    Sexual activity: Yes     Partners: Male     Birth control/protection: Condom   Lifestyle    Physical activity:     Days per week: Not on file     Minutes per session: Not on file    Stress: Not on file   Relationships    Social connections:     Talks on phone: Not on file     Gets together: Not on file     Attends Religion service: Not on file     Active member of club or organization: Not on file     Attends meetings of clubs or organizations: Not on file     Relationship status: Not on file    Intimate partner violence:     Fear of current or ex partner: Not on file     Emotionally abused: Not on file     Physically abused: Not on file     Forced sexual activity: Not on file   Other Topics Concern    Not on file   Social History Narrative    Not on file       Patient advised to stop smoking or to avoid tobacco use. Family History   Adopted: Yes   Problem Relation Age of Onset    Diabetes Mother         borderline diet controlled    Atrial Fibrillation Mother     Anxiety Disorder Mother     Depression Mother     Seizures Maternal Grandmother     COPD Maternal Grandmother     Hypertension Maternal Grandmother     Cancer Maternal Grandmother         ovarian    Diabetes Type 1  Maternal Grandfather     Diabetes Type 1  Other         m uncle    Bleeding Prob Other         m aunt protein S&C deficiency        Allergies:  Sulfa antibiotics    HomeMedications:  Prior to Admission medications    Medication Sig Start Date End Date Taking?  Authorizing Provider   sertraline (ZOLOFT) 100 MG tablet Take 1 tablet by mouth nightly 1/15/20   Etelvina Morris MD   ARIPiprazole (ABILIFY) 5 MG tablet Take 1 tablet by mouth nightly 1/15/20   Etelvina Morris MD   atenolol (TENORMIN) 25 MG tablet Take 1 tablet by mouth DO  01/23/20 0004

## 2020-01-23 NOTE — ED NOTES
Pt to ED with c/o \"a bump on the right side of my vagina. \"  Pt reports she was seen here earlier today and had blood work done and treated for STD's. Pt reports she was going to the bathroom when she got home and pulled her pants up and \"it hurt and I looked down there and saw a bump. \"  Pt resting on cot. RR even and non labored. No signs of distress noted at this time. will continue to monitor.       Jazmin Bob RN  01/22/20 6786

## 2020-01-24 ENCOUNTER — TELEPHONE (OUTPATIENT)
Dept: PHARMACY | Age: 20
End: 2020-01-24

## 2020-01-30 ENCOUNTER — HOSPITAL ENCOUNTER (EMERGENCY)
Age: 20
Discharge: HOME | End: 2020-01-30
Attending: EMERGENCY MEDICINE
Payer: MEDICAID

## 2020-01-30 VITALS
SYSTOLIC BLOOD PRESSURE: 108 MMHG | HEART RATE: 110 BPM | TEMPERATURE: 97.9 F | HEIGHT: 63 IN | WEIGHT: 133 LBS | OXYGEN SATURATION: 97 % | RESPIRATION RATE: 14 BRPM | DIASTOLIC BLOOD PRESSURE: 79 MMHG | BODY MASS INDEX: 23.57 KG/M2

## 2020-01-30 LAB
-: ABNORMAL
AMORPHOUS: ABNORMAL
BACTERIA: ABNORMAL
BILIRUBIN URINE: NEGATIVE
CASTS UA: ABNORMAL /LPF
COLOR: YELLOW
COMMENT UA: ABNORMAL
CRYSTALS, UA: ABNORMAL /HPF
DIRECT EXAM: NORMAL
EPITHELIAL CELLS UA: ABNORMAL /HPF
GLUCOSE URINE: NEGATIVE
HCG(URINE) PREGNANCY TEST: NEGATIVE
KETONES, URINE: NEGATIVE
LEUKOCYTE ESTERASE, URINE: ABNORMAL
Lab: NORMAL
MUCUS: ABNORMAL
NITRITE, URINE: NEGATIVE
OTHER OBSERVATIONS UA: ABNORMAL
PH UA: 5.5 (ref 5–8)
PROTEIN UA: NEGATIVE
RBC UA: ABNORMAL /HPF
RENAL EPITHELIAL, UA: ABNORMAL /HPF
SPECIFIC GRAVITY UA: 1.03 (ref 1–1.03)
SPECIMEN DESCRIPTION: NORMAL
TRICHOMONAS: ABNORMAL
TURBIDITY: CLEAR
URINE HGB: NEGATIVE
UROBILINOGEN, URINE: NORMAL
WBC UA: ABNORMAL /HPF
YEAST: ABNORMAL

## 2020-01-30 PROCEDURE — 99283 EMERGENCY DEPT VISIT LOW MDM: CPT

## 2020-01-30 PROCEDURE — 81001 URINALYSIS AUTO W/SCOPE: CPT

## 2020-01-30 PROCEDURE — 87480 CANDIDA DNA DIR PROBE: CPT

## 2020-01-30 PROCEDURE — 6370000000 HC RX 637 (ALT 250 FOR IP): Performed by: EMERGENCY MEDICINE

## 2020-01-30 PROCEDURE — 87491 CHLMYD TRACH DNA AMP PROBE: CPT

## 2020-01-30 PROCEDURE — 87591 N.GONORRHOEAE DNA AMP PROB: CPT

## 2020-01-30 PROCEDURE — 87086 URINE CULTURE/COLONY COUNT: CPT

## 2020-01-30 PROCEDURE — 87660 TRICHOMONAS VAGIN DIR PROBE: CPT

## 2020-01-30 PROCEDURE — 81025 URINE PREGNANCY TEST: CPT

## 2020-01-30 PROCEDURE — 87510 GARDNER VAG DNA DIR PROBE: CPT

## 2020-01-30 RX ORDER — CEPHALEXIN 250 MG/1
500 CAPSULE ORAL ONCE
Status: COMPLETED | OUTPATIENT
Start: 2020-01-30 | End: 2020-01-30

## 2020-01-30 RX ORDER — CEPHALEXIN 500 MG/1
500 CAPSULE ORAL 2 TIMES DAILY
Qty: 14 CAPSULE | Refills: 0 | Status: SHIPPED | OUTPATIENT
Start: 2020-01-30 | End: 2020-02-06

## 2020-01-30 RX ADMIN — CEPHALEXIN 500 MG: 250 CAPSULE ORAL at 12:57

## 2020-01-30 ASSESSMENT — ENCOUNTER SYMPTOMS
VOMITING: 0
DIARRHEA: 0
NAUSEA: 0
CONSTIPATION: 0
BACK PAIN: 0
ABDOMINAL PAIN: 1

## 2020-01-30 ASSESSMENT — PAIN DESCRIPTION - ORIENTATION: ORIENTATION: LOWER;MID

## 2020-01-30 ASSESSMENT — PAIN DESCRIPTION - LOCATION: LOCATION: ABDOMEN

## 2020-01-30 ASSESSMENT — PAIN DESCRIPTION - DESCRIPTORS: DESCRIPTORS: ACHING

## 2020-01-30 ASSESSMENT — PAIN DESCRIPTION - PAIN TYPE: TYPE: ACUTE PAIN

## 2020-01-30 ASSESSMENT — PAIN SCALES - GENERAL: PAINLEVEL_OUTOF10: 3

## 2020-01-30 NOTE — ED PROVIDER NOTES
DISPOSITION / PLAN     DISPOSITION Decision To Discharge 01/30/2020 12:45:46 PM      PATIENT REFERRED TO:  Your Primary Care Provider  If you do not have one, please see clinic list below.   Schedule an appointment as soon as possible for a visit in 1 week      [facility] ED  [address]  [address] [place] [id number]  [phone]  Go to   As needed, If symptoms worsen      DISCHARGE MEDICATIONS:  Discharge Medication List as of 1/30/2020 12:51 PM      START taking these medications    Details   cephALEXin (KEFLEX) 500 MG capsule Take 1 capsule by mouth 2 times daily for 7 days, Disp-14 capsule, R-0Print             Jo-Ann Cormier MD  Emergency Medicine Attending      (Please note that portions of this note were completed with a voice recognition program.  Efforts were made to edit the dictations but occasionallywords are mis-transcribed.)          Jo-Ann Cormier MD  01/30/20 4045

## 2020-01-31 LAB
C TRACH DNA GENITAL QL NAA+PROBE: NEGATIVE
CULTURE: NO GROWTH
Lab: NORMAL
N. GONORRHOEAE DNA: NEGATIVE
SPECIMEN DESCRIPTION: NORMAL
SPECIMEN DESCRIPTION: NORMAL

## 2020-02-09 ENCOUNTER — HOSPITAL ENCOUNTER (EMERGENCY)
Age: 20
Discharge: HOME | End: 2020-02-10
Attending: EMERGENCY MEDICINE
Payer: MEDICAID

## 2020-02-09 VITALS
OXYGEN SATURATION: 96 % | RESPIRATION RATE: 21 BRPM | TEMPERATURE: 97.8 F | SYSTOLIC BLOOD PRESSURE: 141 MMHG | DIASTOLIC BLOOD PRESSURE: 98 MMHG | HEART RATE: 88 BPM

## 2020-02-09 PROCEDURE — 6360000002 HC RX W HCPCS

## 2020-02-09 PROCEDURE — 99284 EMERGENCY DEPT VISIT MOD MDM: CPT

## 2020-02-09 PROCEDURE — 6370000000 HC RX 637 (ALT 250 FOR IP)

## 2020-02-09 RX ORDER — AMMONIA INHALANTS 0.04 G/.3ML
INHALANT RESPIRATORY (INHALATION)
Status: DISCONTINUED
Start: 2020-02-09 | End: 2020-02-10 | Stop reason: HOSPADM

## 2020-02-09 RX ORDER — AMMONIA INHALANTS 0.04 G/.3ML
INHALANT RESPIRATORY (INHALATION)
Status: COMPLETED
Start: 2020-02-09 | End: 2020-02-09

## 2020-02-09 RX ORDER — LORAZEPAM 1 MG/1
1 TABLET ORAL 3 TIMES DAILY PRN
Qty: 12 TABLET | Refills: 0 | Status: SHIPPED | OUTPATIENT
Start: 2020-02-09 | End: 2020-02-28 | Stop reason: SDUPTHER

## 2020-02-09 RX ORDER — LORAZEPAM 2 MG/ML
INJECTION INTRAMUSCULAR
Status: COMPLETED
Start: 2020-02-09 | End: 2020-02-09

## 2020-02-09 RX ADMIN — LORAZEPAM 1 MG: 2 INJECTION INTRAMUSCULAR; INTRAVENOUS at 23:29

## 2020-02-09 RX ADMIN — AMMONIA INHALANTS: 0.04 INHALANT RESPIRATORY (INHALATION) at 23:28

## 2020-02-09 RX ADMIN — AMMONIA INHALANTS: 0.04 INHALANT RESPIRATORY (INHALATION) at 23:45

## 2020-02-09 RX ADMIN — AMMONIA INHALANTS: 0.04 INHALANT RESPIRATORY (INHALATION) at 23:57

## 2020-02-10 ENCOUNTER — NURSE TRIAGE (OUTPATIENT)
Dept: OTHER | Facility: CLINIC | Age: 20
End: 2020-02-10

## 2020-02-10 NOTE — ED PROVIDER NOTES
EMERGENCY DEPARTMENT ENCOUNTER    Pt Name: Awilda Banks  MRN: [id number]  [place] [address]  Date of evaluation: 2/9/20  CHIEF COMPLAINT       Chief Complaint   Patient presents with    Seizures     HISTORY OF PRESENT ILLNESS   HPI      HISTORY OF PRESENT ILLNESS:  Past medical history of depression, anxiety presents for chief complaint of seizure-like activity. Patient has been seen previously for seizure-like activity. Per EMS patient had 3 minutes of tonic-clonic activity with no postictal state. Patient has been under increasing life stressors. Severity is moderate. No aggravating or relieving factors. Timing is 1 day. Course is intermittent.   Context is history of pseudoseizures  -----------------------  -----------------------  REVIEW OF SYSTEMS  ED Caveat: [none]  Gen:  No fever, no chills  CV: No CP, no palpitations  Resp: No SOB, no respiratory distress  GI: No V/D, no abd pain  : No dysuria, no increased frequency  Skin: No rash, no purulent lesions  Eyes: No blurry vision, No double vision  MSK: No back pain, no joint pain  Neuro: No HA, no sensation changes  Psych: No SI/HI  -----------------------  -----------------------  ALLERGIES  -per nursing records, reviewed    PAST MEDICAL HISTORY  -See HPI    SOCIAL HISTORY  -No daily drinking, no IV drugs  -----------------------  -----------------------  PHYSICAL EXAM  Gen: Alert, no acute distress  Skin: Warm, no rashes  Head: Normocephalic, atraumatic  Neck: No midline tenderness, no nuchal rigidity  Eye: EOMI, PERRLA, normal conjunctiva  ENT: Mucous membranes moist, no pharyngeal erythema  CV: Normal rate, no rubs  Resp: Respirations unlabored, lungs clear to auscultation  GI: Soft, non distended, no large abdominal masses, non tender  MSK: No midline back pain, no large joint effusions  Neuro: Alert and oriented, no focal neurological deficits observed  Psych: Cooperative, appropriate mood and

## 2020-02-10 NOTE — ED NOTES
Upon wheeling the pt out of the ED in a wheelchair the patient then began having a pseudoseizure. Pt was sternal rubbed. Pt then became alert and answering questions appropriately.       Maliha Duffy RN  02/10/20 9020

## 2020-02-10 NOTE — ED NOTES
Pt is having a pseudoseizure. Pt is sternal rubbed. When this RN put the patients hand and arm over her head, it stays in place and does not fall to the patients face.       Helga Manning RN  02/10/20 9651

## 2020-02-12 ENCOUNTER — HOSPITAL ENCOUNTER (EMERGENCY)
Age: 20
Discharge: HOME | End: 2020-02-12
Attending: EMERGENCY MEDICINE
Payer: MEDICAID

## 2020-02-12 VITALS
HEART RATE: 102 BPM | HEIGHT: 63 IN | WEIGHT: 133 LBS | BODY MASS INDEX: 23.57 KG/M2 | TEMPERATURE: 99 F | SYSTOLIC BLOOD PRESSURE: 112 MMHG | DIASTOLIC BLOOD PRESSURE: 78 MMHG | RESPIRATION RATE: 16 BRPM | OXYGEN SATURATION: 95 %

## 2020-02-12 LAB
ABSOLUTE EOS #: 0.14 K/UL (ref 0–0.44)
ABSOLUTE IMMATURE GRANULOCYTE: <0.03 K/UL (ref 0–0.3)
ABSOLUTE LYMPH #: 2.76 K/UL (ref 1.2–5.2)
ABSOLUTE MONO #: 0.72 K/UL (ref 0.1–1.4)
ANION GAP SERPL CALCULATED.3IONS-SCNC: 18 MMOL/L (ref 9–17)
BASOPHILS # BLD: 0 % (ref 0–2)
BASOPHILS ABSOLUTE: 0.03 K/UL (ref 0–0.2)
BUN BLDV-MCNC: 15 MG/DL (ref 6–20)
BUN/CREAT BLD: ABNORMAL (ref 9–20)
CALCIUM SERPL-MCNC: 9.9 MG/DL (ref 8.6–10.4)
CHLORIDE BLD-SCNC: 102 MMOL/L (ref 98–107)
CO2: 22 MMOL/L (ref 20–31)
CREAT SERPL-MCNC: 0.56 MG/DL (ref 0.5–0.9)
DIFFERENTIAL TYPE: ABNORMAL
EOSINOPHILS RELATIVE PERCENT: 2 % (ref 1–4)
GFR AFRICAN AMERICAN: ABNORMAL ML/MIN
GFR NON-AFRICAN AMERICAN: ABNORMAL ML/MIN
GFR SERPL CREATININE-BSD FRML MDRD: ABNORMAL ML/MIN/{1.73_M2}
GFR SERPL CREATININE-BSD FRML MDRD: ABNORMAL ML/MIN/{1.73_M2}
GLUCOSE BLD-MCNC: 91 MG/DL (ref 70–99)
HCT VFR BLD CALC: 41.4 % (ref 36.3–47.1)
HEMOGLOBIN: 13.8 G/DL (ref 11.9–15.1)
IMMATURE GRANULOCYTES: 0 %
LYMPHOCYTES # BLD: 37 % (ref 25–45)
MCH RBC QN AUTO: 32.2 PG (ref 25.2–33.5)
MCHC RBC AUTO-ENTMCNC: 33.3 G/DL (ref 28.4–34.8)
MCV RBC AUTO: 96.5 FL (ref 82.6–102.9)
MONOCYTES # BLD: 10 % (ref 2–8)
NRBC AUTOMATED: 0 PER 100 WBC
PDW BLD-RTO: 13 % (ref 11.8–14.4)
PLATELET # BLD: 296 K/UL (ref 138–453)
PLATELET ESTIMATE: ABNORMAL
PMV BLD AUTO: 9.1 FL (ref 8.1–13.5)
POTASSIUM SERPL-SCNC: 4.2 MMOL/L (ref 3.7–5.3)
PROLACTIN: 12.15 UG/L (ref 4.79–23.3)
RBC # BLD: 4.29 M/UL (ref 3.95–5.11)
RBC # BLD: ABNORMAL 10*6/UL
SEG NEUTROPHILS: 51 % (ref 34–64)
SEGMENTED NEUTROPHILS ABSOLUTE COUNT: 3.85 K/UL (ref 1.8–8)
SODIUM BLD-SCNC: 142 MMOL/L (ref 135–144)
WBC # BLD: 7.5 K/UL (ref 4.5–13.5)
WBC # BLD: ABNORMAL 10*3/UL

## 2020-02-12 PROCEDURE — 84146 ASSAY OF PROLACTIN: CPT

## 2020-02-12 PROCEDURE — 85025 COMPLETE CBC W/AUTO DIFF WBC: CPT

## 2020-02-12 PROCEDURE — 80048 BASIC METABOLIC PNL TOTAL CA: CPT

## 2020-02-12 PROCEDURE — 93005 ELECTROCARDIOGRAM TRACING: CPT | Performed by: EMERGENCY MEDICINE

## 2020-02-12 PROCEDURE — 99285 EMERGENCY DEPT VISIT HI MDM: CPT

## 2020-02-12 RX ORDER — TRAZODONE HYDROCHLORIDE 50 MG/1
50 TABLET ORAL NIGHTLY
Status: ON HOLD | COMMUNITY
End: 2020-03-18 | Stop reason: SDUPTHER

## 2020-02-12 ASSESSMENT — ENCOUNTER SYMPTOMS
COUGH: 0
ABDOMINAL PAIN: 0
CONSTIPATION: 0
DIARRHEA: 0
SHORTNESS OF BREATH: 0
BLOOD IN STOOL: 0
VOMITING: 0
SORE THROAT: 0
WHEEZING: 0
BACK PAIN: 0
NAUSEA: 0

## 2020-02-12 ASSESSMENT — PAIN DESCRIPTION - PAIN TYPE: TYPE: ACUTE PAIN

## 2020-02-12 ASSESSMENT — PAIN DESCRIPTION - FREQUENCY: FREQUENCY: CONTINUOUS

## 2020-02-12 ASSESSMENT — PAIN DESCRIPTION - DESCRIPTORS: DESCRIPTORS: ACHING

## 2020-02-12 ASSESSMENT — PAIN SCALES - GENERAL: PAINLEVEL_OUTOF10: 5

## 2020-02-12 NOTE — ED NOTES
Pt had \"seizure like\" activity again, Dr Marilee Gonzalez and Dr barboza at bedside. Pt become alert directly after \"seizing\".       Mellisa Almaguer RN  02/12/20 5499

## 2020-02-12 NOTE — ED PROVIDER NOTES
[facility] ED  Emergency Department Encounter  EmergencyMedicine Resident     Pt Butch Dial  MRN: [id number]  [place] 2000  Date of evaluation: 2/12/20  PCP:  No primary care provider on file. CHIEF COMPLAINT       Chief Complaint   Patient presents with    Seizures     rapid response from Car 1       HISTORY OF PRESENT ILLNESS  (Location/Symptom, Timing/Onset, Context/Setting, Quality, Duration, Modifying Factors, Severity.)      Hernando Phillips is a 23 y.o. female who presents with short seizure-like activity with no postictal.  Patient had activity while visiting father on cardiac side. Patient was brought to the ER. Once in the ER patient had 22nd spell again no postictal.  Patient states she has a history of pseudoseizures. Review of neurologic records patient had EEG that did not show any elective form discharges neurology was planning on holding off on antiepileptics patient currently not on any antiepileptics. Medicine team ordered EKG. PAST MEDICAL / SURGICAL / SOCIAL / FAMILY HISTORY      has a past medical history of ADD (attention deficit disorder), Anxiety, Asthma, CMV (cytomegalovirus infection) ([facility] [address] Depression, Gestational diabetes mellitus (GDM), antepartum, Hearing loss in left ear, Hypothyroidism, Immunizations up to date in pediatric patient, Raynaud disease, Tachycardia, Traumatic injury during pregnancy, third trimester, and Wears glasses. has a past surgical history that includes REMOVAL FOREIGN BODY EAR (Left) and Tympanoplasty (Left, 06/24/2016).     Social History     Socioeconomic History    Marital status: Single     Spouse name: Not on file    Number of children: Not on file    Years of education: Not on file    Highest education level: Not on file   Occupational History    Not on file   Social Needs    Financial resource strain: Not on file    Food insecurity:     Worry: Not on file     Inability: Not on file   [facility] Transportation needs:     Medical: Not on file     Non-medical: Not on file   Tobacco Use    Smoking status: Current Every Day Smoker     Types: Cigars    Smokeless tobacco: Never Used    Tobacco comment: 1-2 black and milds daily    Substance and Sexual Activity    Alcohol use: No    Drug use: Yes     Types: Marijuana    Sexual activity: Yes     Partners: Male     Birth control/protection: Condom   Lifestyle    Physical activity:     Days per week: Not on file     Minutes per session: Not on file    Stress: Not on file   Relationships    Social connections:     Talks on phone: Not on file     Gets together: Not on file     Attends Advent service: Not on file     Active member of club or organization: Not on file     Attends meetings of clubs or organizations: Not on file     Relationship status: Not on file    Intimate partner violence:     Fear of current or ex partner: Not on file     Emotionally abused: Not on file     Physically abused: Not on file     Forced sexual activity: Not on file   Other Topics Concern    Not on file   Social History Narrative    Not on file       Family History   Adopted: Yes   Problem Relation Age of Onset    Diabetes Mother         borderline diet controlled    Atrial Fibrillation Mother     Anxiety Disorder Mother     Depression Mother     Seizures Maternal Grandmother     COPD Maternal Grandmother     Hypertension Maternal Grandmother     Cancer Maternal Grandmother         ovarian    Diabetes Type 1  Maternal Grandfather     Diabetes Type 1  Other         m uncle    Bleeding Prob Other         m aunt protein S&C deficiency       Allergies:  Sulfa antibiotics    Home Medications:  Prior to Admission medications    Medication Sig Start Date End Date Taking?  Authorizing Provider   traZODone (DESYREL) 50 MG tablet Take 50 mg by mouth nightly   Yes Historical Provider, MD   sertraline (ZOLOFT) 100 MG tablet Take 1 tablet by mouth nightly 1/15/20  Yes Viviana Starr MD Sahra   ARIPiprazole (ABILIFY) 5 MG tablet Take 1 tablet by mouth nightly 1/15/20  Yes Karon Santos MD   LORazepam (ATIVAN) 1 MG tablet Take 1 tablet by mouth 3 times daily as needed for Anxiety for up to 30 days. 2/9/20 3/10/20  Xuan Martinez MD   atenolol (TENORMIN) 25 MG tablet Take 1 tablet by mouth daily 12/6/19   Patience Gray PA-C   famotidine (PEPCID) 20 MG tablet Take 1 tablet by mouth 2 times daily 12/5/19   Jd Stephens DO   ibuprofen (ADVIL;MOTRIN) 800 MG tablet Take 1 tablet by mouth every 8 hours as needed for Pain 11/1/19   Senu Harris MD       REVIEW OF SYSTEMS    (2-9 systems for level 4, 10 or more for level 5)      Review of Systems   Constitutional: Negative for diaphoresis and fever. HENT: Negative for sore throat. Respiratory: Negative for cough, shortness of breath and wheezing. Cardiovascular: Negative for chest pain, palpitations and leg swelling. Gastrointestinal: Negative for abdominal pain, blood in stool, constipation, diarrhea, nausea and vomiting. Genitourinary: Negative for dysuria, frequency and hematuria. Musculoskeletal: Negative for back pain and neck pain. Skin: Negative for rash. Neurological: Positive for seizures. Negative for dizziness and headaches. Hematological: Does not bruise/bleed easily. PHYSICAL EXAM   (up to 7 for level 4, 8 or more for level 5)      INITIAL VITALS:   /78   Pulse 102   Temp 99 °F (37.2 °C) (Oral)   Resp 16   Ht 5' 3\" (1.6 m)   Wt 133 lb (60.3 kg)   LMP 02/10/2020 (Approximate)   SpO2 95%   BMI 23.56 kg/m²     Physical Exam  Constitutional:       Appearance: She is well-developed. She is not diaphoretic. HENT:      Head: Normocephalic and atraumatic. Eyes:      General:         Right eye: No discharge. Left eye: No discharge. Pupils: Pupils are equal, round, and reactive to light. Neck:      Musculoskeletal: Normal range of motion.    Cardiovascular:      Rate and Rhythm: Normal rate and regular rhythm. Heart sounds: Normal heart sounds. No murmur. No friction rub. No gallop. Pulmonary:      Effort: No respiratory distress. Breath sounds: No wheezing or rales. Chest:      Chest wall: No tenderness. Abdominal:      General: Bowel sounds are normal. There is no distension. Palpations: Abdomen is soft. There is no mass. Tenderness: There is no abdominal tenderness. There is no guarding or rebound. Musculoskeletal: Normal range of motion. General: No tenderness or deformity. Skin:     General: Skin is warm and dry. Coloration: Skin is not pale. Findings: No erythema or rash. Neurological:      General: No focal deficit present. Mental Status: She is alert and oriented to person, place, and time. Sensory: No sensory deficit. Motor: No weakness. Psychiatric:         Speech: Speech normal.         Behavior: Behavior normal.         Thought Content: Thought content normal.         Judgment: Judgment normal.         DIFFERENTIAL  DIAGNOSIS     PLAN (LABS / IMAGING / EKG):  Orders Placed This Encounter   Procedures    CBC Auto Differential    Basic Metabolic Panel    PROLACTIN       MEDICATIONS ORDERED:  No orders of the defined types were placed in this encounter.         DIAGNOSTIC RESULTS / EMERGENCY DEPARTMENT COURSE / MDM     LABS:  Results for orders placed or performed during the hospital encounter of 02/12/20   CBC Auto Differential   Result Value Ref Range    WBC 7.5 4.5 - 13.5 k/uL    RBC 4.29 3.95 - 5.11 m/uL    Hemoglobin 13.8 11.9 - 15.1 g/dL    Hematocrit 41.4 36.3 - 47.1 %    MCV 96.5 82.6 - 102.9 fL    MCH 32.2 25.2 - 33.5 pg    MCHC 33.3 28.4 - 34.8 g/dL    RDW 13.0 11.8 - 14.4 %    Platelets 076 502 - 072 k/uL    MPV 9.1 8.1 - 13.5 fL    NRBC Automated 0.0 0.0 per 100 WBC    Differential Type NOT REPORTED     Seg Neutrophils 51 34 - 64 %    Lymphocytes 37 25 - 45 %    Monocytes 10 (H) 2 - 8 %    Eosinophils % 2 1 - 4 %    Basophils 0 0 - 2 %    Immature Granulocytes 0 0 %    Segs Absolute 3.85 1.80 - 8.00 k/uL    Absolute Lymph # 2.76 1.20 - 5.20 k/uL    Absolute Mono # 0.72 0.10 - 1.40 k/uL    Absolute Eos # 0.14 0.00 - 0.44 k/uL    Basophils Absolute 0.03 0.00 - 0.20 k/uL    Absolute Immature Granulocyte <0.03 0.00 - 0.30 k/uL    WBC Morphology NOT REPORTED     RBC Morphology NOT REPORTED     Platelet Estimate NOT REPORTED    Basic Metabolic Panel   Result Value Ref Range    Glucose 91 70 - 99 mg/dL    BUN 15 6 - 20 mg/dL    CREATININE 0.56 0.50 - 0.90 mg/dL    Bun/Cre Ratio NOT REPORTED 9 - 20    Calcium 9.9 8.6 - 10.4 mg/dL    Sodium 142 135 - 144 mmol/L    Potassium 4.2 3.7 - 5.3 mmol/L    Chloride 102 98 - 107 mmol/L    CO2 22 20 - 31 mmol/L    Anion Gap 18 (H) 9 - 17 mmol/L    GFR Non-African American  >60 mL/min     Pediatric GFR requires additional information. Refer to [facility] website for calculator. GFR  NOT REPORTED >60 mL/min    GFR Comment          GFR Staging NOT REPORTED    PROLACTIN   Result Value Ref Range    Prolactin 12.15 4.79 - 23.30 ug/L       RADIOLOGY:     No results found. EKG    EKG: normal EKG, normal sinus rhythm. All EKG's are interpreted by the Emergency Department Physician whoeither signs or Co-signs this chart in the absence of a cardiologist.    MDM/EMERGENCY DEPARTMENT COURSE:      ED Course as of Feb 12 1924 Wed Feb 12, 2020 1812 On exam no focal neuro deficits patient alert and oriented x3. Patient had another episode of seizure-like activity which I was able to witness. When I lifted patient's arm up she pulled it away during this episode. When the arm was placed over her face patient moved hand away as to not allow her to have face. Patient was tracking attending around room with her eyes. Patient was asked to slow breathing as she was slightly tachypneic she was able to slow this on her own. No hypertonicity or tonic-clonic motion.     [KW] 1921 Prolactin: 12.15 [KW]   1923 Prolactin not elevated, patient has had no other seizure-like activity. Will discharge to family, instructions to follow-up with primary care provider. Instructed her to call neurology and follow-up with them as needed. [KW]      ED Course User Index  [KW] Emir Deluna DO         PROCEDURES:  None    CONSULTS:  None    CRITICAL CARE:  None    FINAL IMPRESSION      1.  Seizure-like activity [facility])          DISPOSITION / PLAN     DISPOSITION     PATIENT REFERRED TO:  [facility] AT [facility]  [id number] [id number] [phone]  Schedule an appointment as soon as possible for a visit   For Follow Up      DISCHARGE MEDICATIONS:  New Prescriptions    No medications on file       Emir Deluna DO  Emergency Medicine Resident    (Please note that portions of this note were completed with a voicerecognition program.  Efforts were made to edit the dictations but occasionally words are mis-transcribed.)       Emir Deluna DO  02/12/20 1924

## 2020-02-12 NOTE — ED PROVIDER NOTES
[facility]     Emergency Department     Faculty Attestation    I performed a history and physical examination of the patient and discussed management with the resident. I reviewed the resident´s note and agree with the documented findings and plan of care. Any areas of disagreement are noted on the chart. I was personally present for the key portions of any procedures. I have documented in the chart those procedures where I was not present during the key portions. I have reviewed the emergency nurses triage note. I agree with the chief complaint, past medical history, past surgical history, allergies, medications, social and family history as documented unless otherwise noted below. For Physician Assistant/ Nurse Practitioner cases/documentation I have personally evaluated this patient and have completed at least one if not all key elements of the E/M (history, physical exam, and MDM). Additional findings are as noted. History of pseudoseizures, negative work-up per neurology.        EKG Interpretation    Interpreted by emergency department physician    Rhythm: normal sinus   Rate: normal/100  Axis: normal  Ectopy: none  Conduction: normal  ST Segments: no acute change  T Waves: no acute change  Q Waves: none    Clinical Impression: Normal EKG    CHANTEL Maier MD  02/12/20 [id number]

## 2020-02-12 NOTE — ED NOTES
Pt presents with full body psuedoseizure activity once again, sternal rubbed and became alert and oriented, NAD noted.       Lopez Alvarado RN  02/12/20 [id number]

## 2020-02-12 NOTE — ED NOTES
Pt brought as rapid response from Car 1 for seizure like activity. Hx of pseudoseizures noted. Pt states she was given ativan but hasn't taken any. Pt arrived alert and oriented upon arrival, answering questions appropriately, no postictal phase noted. Pt placed on monitor, EKG completed, iv and labs established. Seizure precautions in place.       Sunny Melo RN  02/12/20 [id number]

## 2020-02-13 LAB
EKG ATRIAL RATE: 100 BPM
EKG P AXIS: 44 DEGREES
EKG P-R INTERVAL: 128 MS
EKG Q-T INTERVAL: 346 MS
EKG QRS DURATION: 84 MS
EKG QTC CALCULATION (BAZETT): 446 MS
EKG R AXIS: 57 DEGREES
EKG T AXIS: 41 DEGREES
EKG VENTRICULAR RATE: 100 BPM

## 2020-02-13 PROCEDURE — 93010 ELECTROCARDIOGRAM REPORT: CPT | Performed by: INTERNAL MEDICINE

## 2020-02-13 NOTE — FLOWSHEET NOTE
SPIRITUAL CARE DEPARTMENT - [address]  PROGRESS NOTE    Shift date: 2.12.2020  Shift day: Wednesday   Shift # 2    Room # 27/27   Name: Santiago Moody            Age: 23 y.o. Gender: female          Muslim: Unknown   Place of [place]: unknown    Referral: Rapid Response    Admit Date & Time: 2/12/2020  5:56 PM    PATIENT/EVENT DESCRIPTION:  Santiago Moody is a 23 y.o. female who had a RAPID RESPONSE called. Patient seems to be suffering from seizures. SPIRITUAL ASSESSMENT/INTERVENTION:  Patient appears to be coping but does go into seizures on the way to the ED and in the room. Patient asked mother to be contacted Rivka Mclean [phone]. Spoke to mother who stated that the cardiac unit as patient's father is in with heart issues and may be transferred to [facility]. Mother arrived and states that her daughter's condition is stress induced. Mother is feeling overwhelmed due to the health of the  and now her daughter. Mother immediately went back to be with her  as plans are made for his transfer. SPIRITUAL CARE FOLLOW-UP PLAN:  Chaplains will remain available to offer spiritual and emotional support as needed. Electronically signed by Nahid May on 2/12/2020 at 7:53 PM.  [facility]  [phone]       02/12/20 1900   Encounter Summary   Services provided to: Patient; Family   Referral/Consult From: [facility] Family members   Continue Visiting   (2.12.2020)   Complexity of Encounter High   Length of Encounter 15 minutes   Spiritual Assessment Completed Yes   Routine   Type Initial   Assessment Approachable;Coping   Intervention Active listening;Sustaining presence/ Ministry of presence; Explored feelings, thoughts, concerns;Explored coping resources   Outcome Expressed gratitude;Engaged in conversation;Expressed feelings/needs/concerns     Electronically signed by Treva Yoder on

## 2020-02-13 NOTE — ED NOTES
Family arrived to bedside, No seizure activity noted. Pt remains on monitor. NAD noted. Call light within reach.      Sanchez Baltazar RN  02/12/20 [id number]

## 2020-02-28 ENCOUNTER — OFFICE VISIT (OUTPATIENT)
Dept: NEUROLOGY | Age: 20
End: 2020-02-28
Payer: MEDICAID

## 2020-02-28 VITALS
HEART RATE: 93 BPM | DIASTOLIC BLOOD PRESSURE: 69 MMHG | HEIGHT: 63 IN | WEIGHT: 140 LBS | BODY MASS INDEX: 24.8 KG/M2 | SYSTOLIC BLOOD PRESSURE: 99 MMHG

## 2020-02-28 PROCEDURE — 99205 OFFICE O/P NEW HI 60 MIN: CPT | Performed by: PSYCHIATRY & NEUROLOGY

## 2020-02-28 RX ORDER — LORAZEPAM 1 MG/1
1 TABLET ORAL 3 TIMES DAILY PRN
Qty: 12 TABLET | Refills: 0 | Status: ON HOLD | OUTPATIENT
Start: 2020-02-28 | End: 2020-03-19
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
## 2020-02-28 NOTE — PATIENT INSTRUCTIONS
1. Seizure precaution  2. MRI brain w/wo  3. LTME   4. Follow with psychiatrist for full evaluation whether bipolar is possible, may consider lamictal.   5. Psychology referral  6.  Use ativan as needed for huge anxiety or seizure    Return after Yumikorashaun Cruz MD, MS

## 2020-03-08 ENCOUNTER — HOSPITAL ENCOUNTER (EMERGENCY)
Age: 20
Discharge: HOME | End: 2020-03-08
Attending: EMERGENCY MEDICINE
Payer: MEDICAID

## 2020-03-08 VITALS
BODY MASS INDEX: 24.8 KG/M2 | WEIGHT: 140 LBS | OXYGEN SATURATION: 96 % | HEIGHT: 63 IN | SYSTOLIC BLOOD PRESSURE: 109 MMHG | HEART RATE: 118 BPM | RESPIRATION RATE: 16 BRPM | DIASTOLIC BLOOD PRESSURE: 71 MMHG | TEMPERATURE: 97.9 F

## 2020-03-08 LAB
-: ABNORMAL
AMORPHOUS: ABNORMAL
BACTERIA: ABNORMAL
BILIRUBIN URINE: NEGATIVE
CASTS UA: ABNORMAL /LPF
COLOR: YELLOW
COMMENT UA: ABNORMAL
CRYSTALS, UA: ABNORMAL /HPF
DIRECT EXAM: NORMAL
EPITHELIAL CELLS UA: ABNORMAL /HPF
GLUCOSE URINE: NEGATIVE
HCG(URINE) PREGNANCY TEST: NEGATIVE
KETONES, URINE: NEGATIVE
LEUKOCYTE ESTERASE, URINE: NEGATIVE
Lab: NORMAL
MUCUS: ABNORMAL
NITRITE, URINE: NEGATIVE
OTHER OBSERVATIONS UA: ABNORMAL
PH UA: 6 (ref 5–8)
PROTEIN UA: NEGATIVE
RBC UA: ABNORMAL /HPF
RENAL EPITHELIAL, UA: ABNORMAL /HPF
SPECIFIC GRAVITY UA: 1.03 (ref 1–1.03)
SPECIMEN DESCRIPTION: NORMAL
TRICHOMONAS: ABNORMAL
TURBIDITY: CLEAR
URINE HGB: ABNORMAL
UROBILINOGEN, URINE: NORMAL
WBC UA: ABNORMAL /HPF
YEAST: ABNORMAL

## 2020-03-08 PROCEDURE — 99283 EMERGENCY DEPT VISIT LOW MDM: CPT

## 2020-03-08 PROCEDURE — 87591 N.GONORRHOEAE DNA AMP PROB: CPT

## 2020-03-08 PROCEDURE — 87510 GARDNER VAG DNA DIR PROBE: CPT

## 2020-03-08 PROCEDURE — 81025 URINE PREGNANCY TEST: CPT

## 2020-03-08 PROCEDURE — 87480 CANDIDA DNA DIR PROBE: CPT

## 2020-03-08 PROCEDURE — 87086 URINE CULTURE/COLONY COUNT: CPT

## 2020-03-08 PROCEDURE — 87660 TRICHOMONAS VAGIN DIR PROBE: CPT

## 2020-03-08 PROCEDURE — 87491 CHLMYD TRACH DNA AMP PROBE: CPT

## 2020-03-08 PROCEDURE — 81001 URINALYSIS AUTO W/SCOPE: CPT

## 2020-03-08 NOTE — ED PROVIDER NOTES
[address]  eMERGENCY dEPARTMENT eNCOUnter      Pt Name: Alli Jacome  MRN: [id number]  [place] 2000  Date of evaluation: 3/8/2020  Provider: Candido Meneses PA-C    CHIEF COMPLAINT       Chief Complaint   Patient presents with    Vaginal Discharge           HISTORY OF PRESENT ILLNESS  (Location/Symptom, Timing/Onset, Context/Setting, Quality, Duration, Modifying Factors, Severity.)   Alli Jacome is a 23 y.o. female who presents to the emergency department requesting STD evaluation. Pt states she has had a white/ yellow thick vaginal discharge with dysuria and pelvic pressure for 3 months. Reports she has been treated for gonorrhea and chlamydia but the discharge will not go away. Denies vaginal bleeding, abd pain, nausea, emesis, fevers, back pain, hematuria. Pt states her partner has not been treated because she has been cleared of infection. No other complaints. Nursing Notes were reviewed. REVIEW OF SYSTEMS    (2-9 systems for level 4, 10 or more for level 5)     Review of Systems   Constitutional: Negative. HENT: Negative. Eyes: Negative. Respiratory: Negative. Cardiovascular: Negative. Gastrointestinal: Negative. Musculoskeletal: Negative. Endocrine: Negative. Genitourinary: vaginal discharge, dysuria   Skin: Negative. Allergic/Immunologic: Negative. Neurological: Negative. Hematological: Negative. Psychiatric/Behavioral: Negative. Except as noted above the remainder of the review of systems was reviewed and negative.        PAST MEDICAL HISTORY     Past Medical History:   Diagnosis Date    ADD (attention deficit disorder)     Anxiety     Asthma     CMV (cytomegalovirus infection) ([facility] [address] 2019    Depression     Gestational diabetes mellitus (GDM), antepartum 6/24/2019    Hearing loss in left ear     Hypothyroidism 1/16/2019    Immunizations up to date in pediatric patient     Pseudoseizures     Raynaud disease     Tachycardia Saw Dr. Janice Zhao 5 yrs ago\". Echo and holter monitor done, neg results    Traumatic injury during pregnancy, third trimester     Wears glasses      None otherwise stated in nurses notes    SURGICAL HISTORY       Past Surgical History:   Procedure Laterality Date    REMOVAL FOREIGN BODY EAR Left     \"insect laid eggs\"    TYMPANOPLASTY Left 06/24/2016     None otherwise stated in nurses notes    [address]       Discharge Medication List as of 3/8/2020  8:43 PM      CONTINUE these medications which have NOT CHANGED    Details   LORazepam (ATIVAN) 1 MG tablet Take 1 tablet by mouth 3 times daily as needed for Anxiety for up to 30 days. , Disp-12 tablet, R-0Normal      traZODone (DESYREL) 50 MG tablet Take 50 mg by mouth nightlyHistorical Med      sertraline (ZOLOFT) 100 MG tablet Take 1 tablet by mouth nightly, Disp-30 tablet, R-0Print      ARIPiprazole (ABILIFY) 5 MG tablet Take 1 tablet by mouth nightly, Disp-30 tablet, R-0Print      atenolol (TENORMIN) 25 MG tablet Take 1 tablet by mouth daily, Disp-30 tablet, R-3Normal      famotidine (PEPCID) 20 MG tablet Take 1 tablet by mouth 2 times daily, Disp-60 tablet, R-0Print      ibuprofen (ADVIL;MOTRIN) 800 MG tablet Take 1 tablet by mouth every 8 hours as needed for Pain, Disp-30 tablet, R-0Print             ALLERGIES     Sulfa antibiotics    FAMILY HISTORY           Adopted: Yes   Problem Relation Age of Onset    Diabetes Mother         borderline diet controlled    Atrial Fibrillation Mother     Anxiety Disorder Mother     Depression Mother     Heart Disease Mother     Seizures Maternal Grandmother     COPD Maternal Grandmother     Hypertension Maternal Grandmother     Cancer Maternal Grandmother         ovarian    Heart Disease Maternal Grandmother     Diabetes Type 1  Maternal Grandfather     Diabetes Type 1  Other         m uncle    Bleeding Prob Other         m aunt protein S&C deficiency     Family Status   Relation Name Status Practice  [facility]  [phone]  Call       [address]  [facility] [id number]  [address] [place] [id number] [phone]            DISCHARGE MEDICATIONS:  Discharge Medication List as of 3/8/2020  8:43 PM          (Please note that portions of this note were completed with a voice recognition program.  Efforts were made to edit the dictations but occasionally words are mis-transcribed.)    Mayank Turner [address] Torie Nguyen PA-C  03/08/20 2541

## 2020-03-08 NOTE — ED NOTES
CHIEF COMPLAINT ON ADMISSION: vaginal discharge  Pt states that she was treated for chlamydia 4 months ago, yet discharge remains. Pt is AOx4 and ambulates per self. C= \"Have you ever felt that you should Cut down on your drinking? \"  No  A= \"Have people Annoyed you by criticizing your drinking? \"  No  G= \"Have you ever felt bad or Guilty about your drinking? \"  No  E= \"Have you ever had a drink as an Eye-opener first thing in the morning to steady your nerves or to help a hangover? \"  No      Deferred []      Reason for deferring: N/A    *If yes to two or more: probable alcohol abuse. [address] [facility], RN  03/08/20 2838

## 2020-03-09 LAB
C TRACH DNA GENITAL QL NAA+PROBE: NEGATIVE
CULTURE: NORMAL
Lab: NORMAL
N. GONORRHOEAE DNA: NEGATIVE
SPECIMEN DESCRIPTION: NORMAL
SPECIMEN DESCRIPTION: NORMAL

## 2020-03-15 ENCOUNTER — HOSPITAL ENCOUNTER (EMERGENCY)
Age: 20
Discharge: ANOTHER HOSPITAL | End: 2020-03-15
Attending: EMERGENCY MEDICINE
Payer: MEDICAID

## 2020-03-15 ENCOUNTER — HOSPITAL ENCOUNTER (INPATIENT)
Age: 20
LOS: 3 days | Discharge: HOME | DRG: 756 | End: 2020-03-18
Attending: STUDENT IN AN ORGANIZED HEALTH CARE EDUCATION/TRAINING PROGRAM | Admitting: STUDENT IN AN ORGANIZED HEALTH CARE EDUCATION/TRAINING PROGRAM
Payer: MEDICAID

## 2020-03-15 VITALS
TEMPERATURE: 98.5 F | HEART RATE: 70 BPM | HEIGHT: 63 IN | RESPIRATION RATE: 16 BRPM | OXYGEN SATURATION: 98 % | DIASTOLIC BLOOD PRESSURE: 68 MMHG | SYSTOLIC BLOOD PRESSURE: 105 MMHG | WEIGHT: 142 LBS | BODY MASS INDEX: 25.16 KG/M2

## 2020-03-15 PROBLEM — R56.9 SEIZURES (HCC): Status: ACTIVE | Noted: 2020-03-15

## 2020-03-15 LAB
ABSOLUTE EOS #: 0 K/UL (ref 0–0.4)
ABSOLUTE IMMATURE GRANULOCYTE: ABNORMAL K/UL (ref 0–0.3)
ABSOLUTE LYMPH #: 1.3 K/UL (ref 1.2–5.2)
ABSOLUTE MONO #: 0.5 K/UL (ref 0.1–1.3)
ANION GAP SERPL CALCULATED.3IONS-SCNC: 14 MMOL/L (ref 9–17)
BASOPHILS # BLD: 0 % (ref 0–2)
BASOPHILS ABSOLUTE: 0 K/UL (ref 0–0.2)
BUN BLDV-MCNC: 11 MG/DL (ref 6–20)
BUN/CREAT BLD: NORMAL (ref 9–20)
CALCIUM SERPL-MCNC: 9.6 MG/DL (ref 8.6–10.4)
CHLORIDE BLD-SCNC: 103 MMOL/L (ref 98–107)
CO2: 24 MMOL/L (ref 20–31)
CREAT SERPL-MCNC: 0.66 MG/DL (ref 0.5–0.9)
DIFFERENTIAL TYPE: ABNORMAL
EOSINOPHILS RELATIVE PERCENT: 1 % (ref 0–4)
GFR AFRICAN AMERICAN: NORMAL ML/MIN
GFR NON-AFRICAN AMERICAN: NORMAL ML/MIN
GFR SERPL CREATININE-BSD FRML MDRD: NORMAL ML/MIN/{1.73_M2}
GFR SERPL CREATININE-BSD FRML MDRD: NORMAL ML/MIN/{1.73_M2}
GLUCOSE BLD-MCNC: 77 MG/DL (ref 70–99)
HCT VFR BLD CALC: 40.7 % (ref 36–46)
HEMOGLOBIN: 13.7 G/DL (ref 12–16)
IMMATURE GRANULOCYTES: ABNORMAL %
LYMPHOCYTES # BLD: 19 % (ref 25–45)
MAGNESIUM: 1.8 MG/DL (ref 1.7–2.2)
MCH RBC QN AUTO: 31.1 PG (ref 26–34)
MCHC RBC AUTO-ENTMCNC: 33.7 G/DL (ref 31–37)
MCV RBC AUTO: 92.4 FL (ref 80–100)
MONOCYTES # BLD: 8 % (ref 2–8)
NRBC AUTOMATED: ABNORMAL PER 100 WBC
PDW BLD-RTO: 12.9 % (ref 11.5–14.9)
PLATELET # BLD: 306 K/UL (ref 150–450)
PLATELET ESTIMATE: ABNORMAL
PMV BLD AUTO: 6.8 FL (ref 6–12)
POTASSIUM SERPL-SCNC: 4.1 MMOL/L (ref 3.7–5.3)
RBC # BLD: 4.41 M/UL (ref 4–5.2)
RBC # BLD: ABNORMAL 10*6/UL
SEG NEUTROPHILS: 72 % (ref 34–64)
SEGMENTED NEUTROPHILS ABSOLUTE COUNT: 4.8 K/UL (ref 1.3–9.1)
SODIUM BLD-SCNC: 141 MMOL/L (ref 135–144)
WBC # BLD: 6.6 K/UL (ref 4.5–13.5)
WBC # BLD: ABNORMAL 10*3/UL

## 2020-03-15 PROCEDURE — 83735 ASSAY OF MAGNESIUM: CPT

## 2020-03-15 PROCEDURE — 36415 COLL VENOUS BLD VENIPUNCTURE: CPT

## 2020-03-15 PROCEDURE — 80048 BASIC METABOLIC PNL TOTAL CA: CPT

## 2020-03-15 PROCEDURE — 6370000000 HC RX 637 (ALT 250 FOR IP): Performed by: NURSE PRACTITIONER

## 2020-03-15 PROCEDURE — 95720 EEG PHY/QHP EA INCR W/VEEG: CPT | Performed by: PSYCHIATRY & NEUROLOGY

## 2020-03-15 PROCEDURE — 95708 EEG WO VID EA 12-26HR UNMNTR: CPT

## 2020-03-15 PROCEDURE — 2580000003 HC RX 258: Performed by: NURSE PRACTITIONER

## 2020-03-15 PROCEDURE — 6360000002 HC RX W HCPCS: Performed by: NURSE PRACTITIONER

## 2020-03-15 PROCEDURE — 85025 COMPLETE CBC W/AUTO DIFF WBC: CPT

## 2020-03-15 PROCEDURE — 99285 EMERGENCY DEPT VISIT HI MDM: CPT

## 2020-03-15 PROCEDURE — 2060000000 HC ICU INTERMEDIATE R&B

## 2020-03-15 PROCEDURE — 99222 1ST HOSP IP/OBS MODERATE 55: CPT | Performed by: NURSE PRACTITIONER

## 2020-03-15 RX ORDER — PROMETHAZINE HYDROCHLORIDE 25 MG/1
12.5 TABLET ORAL EVERY 6 HOURS PRN
Status: DISCONTINUED | OUTPATIENT
Start: 2020-03-15 | End: 2020-03-18 | Stop reason: HOSPADM

## 2020-03-15 RX ORDER — SODIUM CHLORIDE 0.9 % (FLUSH) 0.9 %
10 SYRINGE (ML) INJECTION EVERY 12 HOURS SCHEDULED
Status: DISCONTINUED | OUTPATIENT
Start: 2020-03-15 | End: 2020-03-18 | Stop reason: HOSPADM

## 2020-03-15 RX ORDER — POLYETHYLENE GLYCOL 3350 17 G/17G
17 POWDER, FOR SOLUTION ORAL DAILY PRN
Status: DISCONTINUED | OUTPATIENT
Start: 2020-03-15 | End: 2020-03-18 | Stop reason: HOSPADM

## 2020-03-15 RX ORDER — LORAZEPAM 2 MG/ML
1 INJECTION INTRAMUSCULAR PRN
Status: DISCONTINUED | OUTPATIENT
Start: 2020-03-15 | End: 2020-03-18 | Stop reason: HOSPADM

## 2020-03-15 RX ORDER — ACETAMINOPHEN 650 MG/1
650 SUPPOSITORY RECTAL EVERY 6 HOURS PRN
Status: DISCONTINUED | OUTPATIENT
Start: 2020-03-15 | End: 2020-03-18 | Stop reason: HOSPADM

## 2020-03-15 RX ORDER — OXCARBAZEPINE 150 MG/1
150 TABLET, FILM COATED ORAL 2 TIMES DAILY
Status: COMPLETED | OUTPATIENT
Start: 2020-03-15 | End: 2020-03-16

## 2020-03-15 RX ORDER — ACETAMINOPHEN 325 MG/1
650 TABLET ORAL EVERY 6 HOURS PRN
Status: DISCONTINUED | OUTPATIENT
Start: 2020-03-15 | End: 2020-03-18 | Stop reason: HOSPADM

## 2020-03-15 RX ORDER — ONDANSETRON 2 MG/ML
4 INJECTION INTRAMUSCULAR; INTRAVENOUS EVERY 6 HOURS PRN
Status: DISCONTINUED | OUTPATIENT
Start: 2020-03-15 | End: 2020-03-18 | Stop reason: HOSPADM

## 2020-03-15 RX ORDER — SODIUM CHLORIDE 0.9 % (FLUSH) 0.9 %
10 SYRINGE (ML) INJECTION PRN
Status: DISCONTINUED | OUTPATIENT
Start: 2020-03-15 | End: 2020-03-18 | Stop reason: HOSPADM

## 2020-03-15 RX ADMIN — SODIUM CHLORIDE, PRESERVATIVE FREE 10 ML: 5 INJECTION INTRAVENOUS at 20:21

## 2020-03-15 RX ADMIN — ENOXAPARIN SODIUM 40 MG: 40 INJECTION SUBCUTANEOUS at 20:20

## 2020-03-15 RX ADMIN — ACETAMINOPHEN 650 MG: 325 TABLET ORAL at 20:21

## 2020-03-15 RX ADMIN — OXCARBAZEPINE 150 MG: 150 TABLET, FILM COATED ORAL at 20:20

## 2020-03-15 ASSESSMENT — PAIN DESCRIPTION - FREQUENCY: FREQUENCY: INTERMITTENT

## 2020-03-15 ASSESSMENT — ENCOUNTER SYMPTOMS
SINUS PRESSURE: 0
WHEEZING: 0
CONSTIPATION: 0
FACIAL SWELLING: 0
SHORTNESS OF BREATH: 0
EYE PAIN: 0
EYE REDNESS: 0
CHEST TIGHTNESS: 0
COLOR CHANGE: 0
ABDOMINAL PAIN: 0
VOMITING: 0
DIARRHEA: 0
BLOOD IN STOOL: 0
SORE THROAT: 0
EYE DISCHARGE: 0
NAUSEA: 0
RHINORRHEA: 0
COUGH: 0
BACK PAIN: 0
TROUBLE SWALLOWING: 0

## 2020-03-15 ASSESSMENT — PAIN DESCRIPTION - LOCATION: LOCATION: OTHER (COMMENT)

## 2020-03-15 ASSESSMENT — PAIN SCALES - GENERAL
PAINLEVEL_OUTOF10: 4
PAINLEVEL_OUTOF10: 4
PAINLEVEL_OUTOF10: 0

## 2020-03-15 ASSESSMENT — PAIN DESCRIPTION - PAIN TYPE: TYPE: ACUTE PAIN

## 2020-03-15 ASSESSMENT — PAIN DESCRIPTION - ONSET: ONSET: ON-GOING

## 2020-03-15 ASSESSMENT — PAIN DESCRIPTION - DESCRIPTORS: DESCRIPTORS: ACHING;CRAMPING

## 2020-03-15 ASSESSMENT — PAIN DESCRIPTION - ORIENTATION: ORIENTATION: LOWER

## 2020-03-15 NOTE — ED PROVIDER NOTES
Neutrophils 72 (*)     Lymphocytes 19 (*)     All other components within normal limits   BASIC METABOLIC PANEL   MAGNESIUM         MEDICAL DECISION MAKING:     Patient appears to have had a seizure but woke up immediately from it with no postictal state which does sound consistent with a pseudoseizure. Patient states that she gets these a couple times when she has them back to back which already has had to. Patient is awake alert answering all questions therefore we will just do some basic labs watch her for a little bit and hopefully discharge. EMERGENCY DEPARTMENT COURSE:   Vitals:    Vitals:    03/15/20 1146   BP: 121/77   Pulse: 104   Resp: 18   Temp: 98.5 °F (36.9 °C)   TempSrc: Oral   SpO2: 97%   Weight: 142 lb (64.4 kg)   Height: 5' 3\" (1.6 m)       The patient was given the following medications while in the emergency department:  No orders of the defined types were placed in this encounter. -------------------------  1:04 PM EDT  Patient had an episode while here that lasted only about 2 minutes. Patient does have tonic-clonic movements but was awake and able to respond to me. I spoke with Dr. Felipe Myaes for the patient's neurologist and since the patient was supposed to be getting an LTME study done anyways he recommended that we go ahead and does transfer to [facility] so that they can do that. I will set up the transfer. 1:17 PM EDT  Spoke with Dr. Samayoa Officer neurology over at [facility] who accepts the transfer. CONSULTS:  IP CONSULT TO NEUROLOGY    PROCEDURES:  None    FINAL IMPRESSION      1. Seizure-like activity [facility])          DISPOSITION/PLAN   DISPOSITION Decision To Transfer 03/15/2020 01:05:17 PM      PATIENT REFERREDTO:  No follow-up provider specified.     DISCHARGEMEDICATIONS:  New Prescriptions    No medications on file       (Please note that portions of this note were completed with a voice recognition program.  Efforts were made to edit thedictations but occasionally words are mis-transcribed.)    Stephanie Kunz MD  Attending Emergency Physician                        Stephanie Kunz MD  03/15/20 2399

## 2020-03-15 NOTE — ED NOTES
EMTALA form completed, signed by patient and copy placed on chart.      Unruly Connelly RN  03/15/20 9974

## 2020-03-15 NOTE — H&P
glasses         Past Surgical History:     Past Surgical History:   Procedure Laterality Date    REMOVAL FOREIGN BODY EAR Left     \"insect laid eggs\"    TYMPANOPLASTY Left 06/24/2016        Medications Prior to Admission:     Prior to Admission medications    Medication Sig Start Date End Date Taking? Authorizing Provider   ARIPiprazole (ABILIFY) 5 MG tablet Take 1 tablet by mouth nightly 1/15/20  Yes Robert Bocanegra MD   LORazepam (ATIVAN) 1 MG tablet Take 1 tablet by mouth 3 times daily as needed for Anxiety for up to 30 days. 2/28/20 3/29/20  Eladia Sapp MD   traZODone (DESYREL) 50 MG tablet Take 50 mg by mouth nightly    Historical Provider, MD   sertraline (ZOLOFT) 100 MG tablet Take 1 tablet by mouth nightly  Patient not taking: Reported on 2/28/2020 1/15/20   Nikita Arora MD   atenolol (TENORMIN) 25 MG tablet Take 1 tablet by mouth daily  Patient not taking: Reported on 2/28/2020 12/6/19   Hernando Ortega PA-C   famotidine (PEPCID) 20 MG tablet Take 1 tablet by mouth 2 times daily  Patient not taking: Reported on 2/28/2020 12/5/19   Justin Steel DO   ibuprofen (ADVIL;MOTRIN) 800 MG tablet Take 1 tablet by mouth every 8 hours as needed for Pain  Patient not taking: Reported on 2/28/2020 11/1/19   Yolanda Villasenor MD        Allergies:     Sulfa antibiotics    Social History:     Tobacco:    reports that she has been smoking cigars. She has been smoking about 0.50 packs per day. She has never used smokeless tobacco.  Alcohol:      reports no history of alcohol use. Drug Use:  reports previous drug use. Drug: Marijuana.     Family History:     Family History   Adopted: Yes   Problem Relation Age of Onset    Diabetes Mother         borderline diet controlled    Atrial Fibrillation Mother     Anxiety Disorder Mother     Depression Mother     Heart Disease Mother     Seizures Maternal Grandmother     COPD Maternal Grandmother     Hypertension Maternal Grandmother     Cancer Maternal Grandmother ovarian    Heart Disease Maternal Grandmother     Diabetes Type 1  Maternal Grandfather     Diabetes Type 1  Other         m uncle    Bleeding Prob Other         m aunt protein S&C deficiency       Review of Systems:     ROS:  Constitutional  Negative for fever and chills    HEENT  Negative for ear discharge, ear pain, nosebleed    Eyes  Negative for photophobia, pain and discharge    Respiratory  Negative for hemoptysis and sputum    Cardiovascular  Negative for orthopnea, claudication and PND    Gastrointestinal  Negative for abdominal pain, diarrhea, blood in stool    Musculoskeletal  Negative for joint pain, negative for myalgia    Skin  Negative for rash or itching    Endo/heme/allergies  Negative for polydipsia, environmental allergy    Psychiatric/behavioral  Negative for suicidal ideation. Patient is not anxious        Physical Exam:   BP 98/62   Pulse 69   Temp 98.5 °F (36.9 °C) (Oral)   Resp 19   SpO2 100%   Temp (24hrs), Av.5 °F (36.9 °C), Min:98.5 °F (36.9 °C), Max:98.5 °F (36.9 °C)    No results for input(s): POCGLU in the last 72 hours. No intake or output data in the 24 hours ending 03/15/20 1740    General Appearance:  alert, well appearing, and in no acute distress  Mental status: oriented to person, place, and time with normal affect  Head:  normocephalic, atraumatic. Eye: no icterus, redness, pupils equal and reactive, extraocular eye movements intact, conjunctiva clear  Ear: normal external ear, no discharge, hearing intact  Nose:  no drainage noted  Mouth: mucous membranes moist  Neck: supple, no carotid bruits, thyroid not palpable  Lungs: Bilateral equal air entry, clear to ausculation, no wheezing, rales or rhonchi, normal effort  Cardiovascular: normal rate, regular rhythm, no murmur, gallop, rub.   Abdomen: Soft, nontender, nondistended, normal bowel sounds, no hepatomegaly or splenomegaly  Neurologic: There are no new focal motor or sensory deficits, normal muscle tone and sent to Dr. Sanders primary care provider on file.

## 2020-03-15 NOTE — ED NOTES
Bed: 04  Expected date:   Expected time:   Means of arrival:   Comments:     Melania Hoover RN  03/15/20 1143

## 2020-03-16 LAB
ABSOLUTE EOS #: 0.1 K/UL (ref 0–0.44)
ABSOLUTE IMMATURE GRANULOCYTE: <0.03 K/UL (ref 0–0.3)
ABSOLUTE LYMPH #: 1.99 K/UL (ref 1.2–5.2)
ABSOLUTE MONO #: 0.55 K/UL (ref 0.1–1.4)
ALBUMIN SERPL-MCNC: 4.2 G/DL (ref 3.5–5.2)
ALBUMIN/GLOBULIN RATIO: 1.3 (ref 1–2.5)
ALP BLD-CCNC: 99 U/L (ref 35–104)
ALT SERPL-CCNC: 15 U/L (ref 5–33)
ANION GAP SERPL CALCULATED.3IONS-SCNC: 10 MMOL/L (ref 9–17)
AST SERPL-CCNC: 16 U/L
BASOPHILS # BLD: 1 % (ref 0–2)
BASOPHILS ABSOLUTE: 0.04 K/UL (ref 0–0.2)
BILIRUB SERPL-MCNC: 0.25 MG/DL (ref 0.3–1.2)
BUN BLDV-MCNC: 14 MG/DL (ref 6–20)
BUN/CREAT BLD: ABNORMAL (ref 9–20)
CALCIUM SERPL-MCNC: 9.4 MG/DL (ref 8.6–10.4)
CHLORIDE BLD-SCNC: 102 MMOL/L (ref 98–107)
CO2: 26 MMOL/L (ref 20–31)
CREAT SERPL-MCNC: 0.62 MG/DL (ref 0.5–0.9)
DIFFERENTIAL TYPE: ABNORMAL
EOSINOPHILS RELATIVE PERCENT: 2 % (ref 1–4)
GFR AFRICAN AMERICAN: ABNORMAL ML/MIN
GFR NON-AFRICAN AMERICAN: ABNORMAL ML/MIN
GFR SERPL CREATININE-BSD FRML MDRD: ABNORMAL ML/MIN/{1.73_M2}
GFR SERPL CREATININE-BSD FRML MDRD: ABNORMAL ML/MIN/{1.73_M2}
GLUCOSE BLD-MCNC: 89 MG/DL (ref 70–99)
HCT VFR BLD CALC: 41.7 % (ref 36.3–47.1)
HEMOGLOBIN: 13.9 G/DL (ref 11.9–15.1)
IMMATURE GRANULOCYTES: 0 %
LYMPHOCYTES # BLD: 38 % (ref 25–45)
MCH RBC QN AUTO: 31.5 PG (ref 25.2–33.5)
MCHC RBC AUTO-ENTMCNC: 33.3 G/DL (ref 28.4–34.8)
MCV RBC AUTO: 94.6 FL (ref 82.6–102.9)
MONOCYTES # BLD: 11 % (ref 2–8)
NRBC AUTOMATED: 0 PER 100 WBC
PDW BLD-RTO: 12.8 % (ref 11.8–14.4)
PLATELET # BLD: 285 K/UL (ref 138–453)
PLATELET ESTIMATE: ABNORMAL
PMV BLD AUTO: 8.8 FL (ref 8.1–13.5)
POTASSIUM SERPL-SCNC: 4.3 MMOL/L (ref 3.7–5.3)
RBC # BLD: 4.41 M/UL (ref 3.95–5.11)
RBC # BLD: ABNORMAL 10*6/UL
SEG NEUTROPHILS: 48 % (ref 34–64)
SEGMENTED NEUTROPHILS ABSOLUTE COUNT: 2.53 K/UL (ref 1.8–8)
SODIUM BLD-SCNC: 138 MMOL/L (ref 135–144)
TOTAL PROTEIN: 7.4 G/DL (ref 6.4–8.3)
WBC # BLD: 5.2 K/UL (ref 4.5–13.5)
WBC # BLD: ABNORMAL 10*3/UL

## 2020-03-16 PROCEDURE — 36415 COLL VENOUS BLD VENIPUNCTURE: CPT

## 2020-03-16 PROCEDURE — 95718 EEG PHYS/QHP 2-12 HR W/VEEG: CPT | Performed by: PSYCHIATRY & NEUROLOGY

## 2020-03-16 PROCEDURE — 97530 THERAPEUTIC ACTIVITIES: CPT

## 2020-03-16 PROCEDURE — 85025 COMPLETE CBC W/AUTO DIFF WBC: CPT

## 2020-03-16 PROCEDURE — 2580000003 HC RX 258: Performed by: NURSE PRACTITIONER

## 2020-03-16 PROCEDURE — 6360000002 HC RX W HCPCS: Performed by: NURSE PRACTITIONER

## 2020-03-16 PROCEDURE — 99232 SBSQ HOSP IP/OBS MODERATE 35: CPT | Performed by: NURSE PRACTITIONER

## 2020-03-16 PROCEDURE — 97161 PT EVAL LOW COMPLEX 20 MIN: CPT

## 2020-03-16 PROCEDURE — 95714 VEEG EA 12-26 HR UNMNTR: CPT

## 2020-03-16 PROCEDURE — 95720 EEG PHY/QHP EA INCR W/VEEG: CPT | Performed by: PSYCHIATRY & NEUROLOGY

## 2020-03-16 PROCEDURE — 80053 COMPREHEN METABOLIC PANEL: CPT

## 2020-03-16 PROCEDURE — 6370000000 HC RX 637 (ALT 250 FOR IP): Performed by: NURSE PRACTITIONER

## 2020-03-16 PROCEDURE — 80183 DRUG SCRN QUANT OXCARBAZEPIN: CPT

## 2020-03-16 PROCEDURE — 2060000000 HC ICU INTERMEDIATE R&B

## 2020-03-16 RX ADMIN — SODIUM CHLORIDE, PRESERVATIVE FREE 10 ML: 5 INJECTION INTRAVENOUS at 20:33

## 2020-03-16 RX ADMIN — SODIUM CHLORIDE, PRESERVATIVE FREE 10 ML: 5 INJECTION INTRAVENOUS at 08:52

## 2020-03-16 RX ADMIN — OXCARBAZEPINE 150 MG: 150 TABLET, FILM COATED ORAL at 08:51

## 2020-03-16 RX ADMIN — ENOXAPARIN SODIUM 40 MG: 40 INJECTION SUBCUTANEOUS at 20:34

## 2020-03-16 RX ADMIN — ACETAMINOPHEN 650 MG: 325 TABLET ORAL at 21:37

## 2020-03-16 ASSESSMENT — PAIN - FUNCTIONAL ASSESSMENT
PAIN_FUNCTIONAL_ASSESSMENT: 0-10
PAIN_FUNCTIONAL_ASSESSMENT: 0-10

## 2020-03-16 ASSESSMENT — PAIN SCALES - GENERAL: PAINLEVEL_OUTOF10: 5

## 2020-03-16 ASSESSMENT — PAIN DESCRIPTION - DESCRIPTORS: DESCRIPTORS: CRAMPING

## 2020-03-16 NOTE — PLAN OF CARE
Pt assessed as a fall risk this shift. Remains free from falls and accidental injury at this time. Fall precautions in place, including falling star sign. Floor free from obstacles, and bed is locked and in lowest position. Adequate lighting provided. Pt encouraged to call before getting Out Of Bed for any need. Will continue to monitor needs during hourly rounding, and reinforce education on use of call light.

## 2020-03-16 NOTE — PROGRESS NOTES
NEUROLOGY INPATIENT PROGRESS NOTE    3/16/2020         Current Exam:     Chart reviewed. Discussed with RN. She continues on LTM E; so far EEG has been normal with no events or seizures. She has no acute complaints this morning. Brief History:    Ekta Leger is a  23 y.o. female with H/O seizure/pseudoseizure, who was admitted on 3/15/2020 for LTME after having 2 seizure events on day of arrival. She started Trileptal one week ago by her psychaitrist. Follows with epileptologist Dr. Ernesto Pack outpatient. The patient reports while at work she had a 30-minute seizure. She reports this started with right hand trembling, as it usually does, and proceeded to entire body shaking. She does think that she lost consciousness and cannot recall details around the event. She reports that she did bite her tongue. For EMS while in route to the hospital there was another seizure event and she was given Versed. There has been no report of any postictal state, with notes documented patient was alert and fully oriented following her seizure spells. She does admit to history of pseudoseizures and admits to high stress recently. No current facility-administered medications on file prior to encounter. Current Outpatient Medications on File Prior to Encounter   Medication Sig Dispense Refill    ARIPiprazole (ABILIFY) 5 MG tablet Take 1 tablet by mouth nightly 30 tablet 0    LORazepam (ATIVAN) 1 MG tablet Take 1 tablet by mouth 3 times daily as needed for Anxiety for up to 30 days.  12 tablet 0    traZODone (DESYREL) 50 MG tablet Take 50 mg by mouth nightly      sertraline (ZOLOFT) 100 MG tablet Take 1 tablet by mouth nightly (Patient not taking: Reported on 2/28/2020) 30 tablet 0    atenolol (TENORMIN) 25 MG tablet Take 1 tablet by mouth daily (Patient not taking: Reported on 2/28/2020) 30 tablet 3    famotidine (PEPCID) 20 MG tablet Take 1 tablet by mouth 2 times daily (Patient not taking: Reported on LABA1C 5.4 01/12/2020           Diagnostic data reviewed:  LTME (starting 3/15/20) -  During this day of recording no events were recorded. The interictal EEG was normal. Monitoring was continued in order to record the patient's typical events. The EKG channel revealed no abnormalities. Impression:  -Seizure like activity; epileptic vs non epileptic vs both      Plan:  1. Continue LTME to capture events  2. Patient to receive Trileptal 150 mg this morning and then stop  3. Seizure precautions, IV Ativan 1 mg as needed prolonged seizure  4. Patient will need MRI brain with and without as per Dr. Arely Anton last outpatient note; can consider obtaining prior to discharge  5. PT/OT  6. DVT prophylaxis; Lovenox      Please note that this note was generated using a voice recognition dictation software. Although every effort was made to ensure the accuracy of this automated transcription, some errors in transcription may have occurred.

## 2020-03-16 NOTE — CARE COORDINATION
Case Management Initial Discharge Plan  Michael Bernabe,             Met with:patient to discuss discharge plans. Information verified: address, contacts, phone number, , insurance Yes  PCP: Rashel Eng PA-C  Date of last visit: 4 months    Insurance Provider: pending medicaid    Discharge Planning    Living Arrangements:  Children, Parent, Family Members   Support Systems:  Family Members, Parent, [id number] has 2 stories  4 stairs to climb to get into front door, 1 flight stairs to climb to reach second floor  Location of bedroom/bathroom in home 2nd floor    Patient able to perform ADL's:Independent    Current Services (outpatient & in home) none  DME equipment: none  DME provider:       Potential Assistance Needed:  N/A    Patient agreeable to home care: No  [place] of choice provided:  n/a    Prior SNF/Rehab Placement and Facility: none  Agreeable to SNF/Rehab: No  [place] of choice provided: n/a   Evaluation: n/a    Expected Discharge date:  20  Patient expects to be discharged to:  Homes  Follow Up Appointment: Best Day/ Time: Monday AM    Transportation provider: family/bike  Transportation arrangements needed for discharge: No, mother will transport    Readmission Risk              Risk of Unplanned Readmission:        31             Does patient have a readmission risk score greater than 14?: Yes  If yes, follow-up appointment must be made within 7 days of discharge.      Goals of Care: Medication management      Discharge Plan: Return home with no skilled needs          Electronically signed by Suellen Harrison RN on 3/16/20 at 5:18 PM EDT

## 2020-03-16 NOTE — PROCEDURES
[facility]        CONTINUOUS VIDEO EEG MONITORING           PATIENT: Josette Hathaway  MRN #: [id number]  DATE: 3/15/2020 at 10:32PM to 3/18/2020 at 3:15PM  REFERRING PHYSICIAN:  ALEJANDRA Trevino     BRIEF HISTORY: Patient is a 23year old female with psychiatric disorder, who presented with prolonged seizure like activities, LTME to evaluate. AEDs:  Trileptal  150mg BID    EEG DESCRIPTION: 21 EEG electrodes were placed according to International 10/20 System. Single EKG electrode was also placed. Video recording was time-locked with EEG recording. BASELINE: Recording started with sleep patterns in stage II and deep sleep. In awake state, the background was organized and continuous consisting of an admixture of frequencies of alpha, beta and theta, delta ranging between 10-50uV. There was a posterior dominant alpha rhythm at 8-9Hz, which was symmetric and reactive to eye opening/closure. Spontaneous variability and reactivity to stimulation were present. Hyperventilation and photic stimulation were not performed. Single lead EKG showed regular, heart rate at 60s per minute. Day 1 - 3/15/2020 at 10:32PM to 3/16/2020 at 5PM  AEDs:  Trileptal  150mg BID-> off  Interictal: Posterior dominant rhythm was 8-9Hz. Ictal: 9:33AM-9:35AM, there were 4 button pushes, 2:58PM, all accidental    Summary: During above recoding period, there was normal awake and asleep EEG. 5 button pushes were accidental.     Day 2 - 3/16/2020 at 5PM to 3/17/2020 at 8AM  AEDs:  None  Interictal: Posterior dominant rhythm was 8-9Hz. normal awake and asleep EEG     Ictal: at 11:21PM, patient was laying in bed, awake, eyes closed, right hand random shook at very low frequency, then slightly repetitive back arching like movement, patient moved head from ammonia salt on nostrils, EEG was obscured with lead and motion artifacts, no EEG seizures.

## 2020-03-16 NOTE — PLAN OF CARE
Problem: Coping:  Goal: Ability to cope will improve  Description: Ability to cope will improve  Outcome: Ongoing  Goal: Ability to identify appropriate support needs will improve  Description: Ability to identify appropriate support needs will improve  Outcome: Ongoing     Problem: Health Behavior:  Goal: Ability to manage health-related needs will improve  Description: Ability to manage health-related needs will improve  Outcome: Ongoing  Note: No seizure like activity this shift. Remains free from injury. Safety precautions in place. Fall precatiouns in place.

## 2020-03-16 NOTE — PROGRESS NOTES
Physical Therapy    Facility/Department: [facility] NEURO  Initial Assessment    NAME: Azalea Murillo  : 2000  MRN: [id number]    Date of Service: 3/16/2020  The patient is a 23 y.o. Non-/non  female who presents to the hospital for the management of seizures. She is to be [facility] to [facility]. She started Trileptal one week ago by her psychaitrist. Follows with Dr. Kelin Costa. The patient reports while at work today she had a 30-minute seizure. She reports this started with right hand trembling, as it usually does, and proceeded to entire body shaking. She does think that she lost consciousness and cannot recall details around the event. She reports that she did bite her tongue. For EMS while in route to the hospital there was another seizure event and she was given Versed. There has been no report of any postictal state, with notes documented patient was alert and fully oriented following her seizure spells. She does admit to history of pseudoseizures and admits to high stress recently. Discharge Recommendations:  Home with assist PRN     PT Equipment Recommendations  Equipment Needed: No    Assessment    Pt stated that she has problems with her balance, so I had her do several standing activities with no LOB  Prognosis: Good  Decision Making: Low Complexity  PT Education: PT Role;Plan of Care  Barriers to Learning: none  No Skilled PT: Independent with functional mobility   REQUIRES PT FOLLOW UP: No  Activity Tolerance  Activity Tolerance: Patient Tolerated treatment well       Patient Diagnosis(es): There were no encounter diagnoses.      has a past medical history of ADD (attention deficit disorder), Anxiety, Asthma, CMV (cytomegalovirus infection) ([address] Depression, Gestational diabetes mellitus (GDM), antepartum, Hearing loss in left ear, Hypothyroidism, Immunizations up to date in pediatric patient, Pseudoseizures, Raynaud disease, Seizures ([address] Tachycardia, Traumatic injury during Transfers: Independent  Ambulation  Ambulation?: Yes  Ambulation 1  Surface: level tile  Device: No Device  Assistance: Independent  Quality of Gait: steady gait, no LOB   Gait Deviations: None  Distance: pacing in room d/t LTME and RN requested not to disconnect for longer walk; gait in room ~50' independently  Stairs/Curb  Stairs?: No     Balance  Posture: Good  Sitting - Static: Good  Sitting - Dynamic: Good  Standing - Static: Good  Standing - Dynamic: Good  Other exercises  Other exercises 1: standing ex x 15 reps with CG+1  Other exercises 2: lunges A/P x 10 reps, CG+1      Plan   Plan  Times per week: DC PT--pt is independent  Safety Devices  Type of devices: Call light within reach, Patient at risk for falls, Left in bed, Nurse notified  Restraints  Initially in place: No    AM-PAC Score  AM-PAC Inpatient Mobility Raw Score : 24 (03/16/20 1146)  AM-PAC Inpatient T-Scale Score : 61.14 (03/16/20 1146)  Mobility Inpatient CMS 0-100% Score: 0 (03/16/20 1146)  Mobility Inpatient CMS G-Code Modifier : [address] (03/16/20 1146)          Goals  Short term goals  Time Frame for Short term goals: 1 visit  Short term goal 1: evaluate and give recommendations: pt is independent; continued PT not necessary  Patient Goals   Patient goals : feel better       Therapy Time   Individual Concurrent Group Co-treatment   Time In 0940         Time Out 1010         Minutes 30                 Kirsten Tang, PT

## 2020-03-17 LAB — OXCARBAZEPINE: 5 UG/ML (ref 3–35)

## 2020-03-17 PROCEDURE — 2060000000 HC ICU INTERMEDIATE R&B

## 2020-03-17 PROCEDURE — 6360000002 HC RX W HCPCS: Performed by: NURSE PRACTITIONER

## 2020-03-17 PROCEDURE — 99232 SBSQ HOSP IP/OBS MODERATE 35: CPT | Performed by: NURSE PRACTITIONER

## 2020-03-17 PROCEDURE — 95714 VEEG EA 12-26 HR UNMNTR: CPT

## 2020-03-17 PROCEDURE — 6370000000 HC RX 637 (ALT 250 FOR IP): Performed by: NURSE PRACTITIONER

## 2020-03-17 PROCEDURE — 2580000003 HC RX 258: Performed by: NURSE PRACTITIONER

## 2020-03-17 PROCEDURE — 95720 EEG PHY/QHP EA INCR W/VEEG: CPT | Performed by: PSYCHIATRY & NEUROLOGY

## 2020-03-17 RX ADMIN — SODIUM CHLORIDE, PRESERVATIVE FREE 10 ML: 5 INJECTION INTRAVENOUS at 09:09

## 2020-03-17 RX ADMIN — ENOXAPARIN SODIUM 40 MG: 40 INJECTION SUBCUTANEOUS at 21:44

## 2020-03-17 RX ADMIN — ACETAMINOPHEN 650 MG: 325 TABLET ORAL at 10:53

## 2020-03-17 RX ADMIN — SODIUM CHLORIDE, PRESERVATIVE FREE 10 ML: 5 INJECTION INTRAVENOUS at 21:45

## 2020-03-17 ASSESSMENT — PAIN - FUNCTIONAL ASSESSMENT
PAIN_FUNCTIONAL_ASSESSMENT: 0-10

## 2020-03-17 ASSESSMENT — PAIN SCALES - GENERAL
PAINLEVEL_OUTOF10: 0
PAINLEVEL_OUTOF10: 5
PAINLEVEL_OUTOF10: 0
PAINLEVEL_OUTOF10: 6

## 2020-03-17 NOTE — PLAN OF CARE
Problem: Falls - Risk of:  Goal: Will remain free from falls  Description: Will remain free from falls  3/16/2020 1633 by Guicho Ashby RN  Outcome: Ongoing     Problem: Coping:  Goal: Ability to cope will improve  Description: Ability to cope will improve  Outcome: Ongoing  Goal: Ability to identify appropriate support needs will improve  Description: Ability to identify appropriate support needs will improve  Outcome: Ongoing     Problem: Health Behavior:  Goal: Ability to manage health-related needs will improve  Description: Ability to manage health-related needs will improve  Outcome: Ongoing   seizure like activity this shift recorded on LTME. Remains free from injury. Safety precautions in place. Fall precatiouns in place.  Will continue to monitor

## 2020-03-17 NOTE — PROGRESS NOTES
NEUROLOGY INPATIENT PROGRESS NOTE    3/17/2020         Current Exam:     Chart reviewed. Discussed with RN. The patient had 2 seizure-like episodes overnight, both attenuated with ammonia salts per staff. The patient reports this is characteristic of her typical seizure event at home. Discussed with Dr. Robby Shipman, this event did not correlate with epileptiform activity on EEG. Brief History:    Brittny Rocha is a  23 y.o. female with H/O seizure/pseudoseizure, who was admitted on 3/15/2020 for LTME after having 2 seizure events on day of arrival. She started Trileptal one week ago by her psychaitrist. Follows with epileptologist Dr. Robby Shipman outpatient. The patient reports while at work she had a 30-minute seizure. She reports this started with right hand trembling, as it usually does, and proceeded to entire body shaking. She does think that she lost consciousness and cannot recall details around the event. She reports that she did bite her tongue. For EMS while in route to the hospital there was another seizure event and she was given Versed. There has been no report of any postictal state, with notes documented patient was alert and fully oriented following her seizure spells. She does admit to history of pseudoseizures and admits to high stress recently. No current facility-administered medications on file prior to encounter. Current Outpatient Medications on File Prior to Encounter   Medication Sig Dispense Refill    ARIPiprazole (ABILIFY) 5 MG tablet Take 1 tablet by mouth nightly 30 tablet 0    LORazepam (ATIVAN) 1 MG tablet Take 1 tablet by mouth 3 times daily as needed for Anxiety for up to 30 days.  12 tablet 0    traZODone (DESYREL) 50 MG tablet Take 50 mg by mouth nightly      sertraline (ZOLOFT) 100 MG tablet Take 1 tablet by mouth nightly (Patient not taking: Reported on 2/28/2020) 30 tablet 0    atenolol (TENORMIN) 25 MG tablet Take 1 tablet by mouth daily (Patient not S&C deficiency       Objective:   BP (!) 95/47   Pulse 55   Temp 96.8 °F (36 °C) (Oral)   Resp 13   Ht 5' 3\" (1.6 m)   Wt 142 lb (64.4 kg)   SpO2 99%   BMI 25.15 kg/m²     Blood pressure range: Systolic (45ARB), CFD:01 , Min:95 , CMQ:504   ; Diastolic (30NKK), FBR:62, Min:47, Max:60      Review of Systems:  Constitutional  Negative for fever and chills    HEENT  Negative for ear discharge, ear pain, nosebleed    Eyes  Negative for photophobia, pain and discharge    Respiratory  Negative for hemoptysis and sputum    Cardiovascular  Negative for orthopnea, claudication and PND    Gastrointestinal  Negative for abdominal pain, diarrhea, blood in stool    Musculoskeletal  Negative for joint pain, negative for myalgia    Skin  Negative for rash or itching    Endo/heme/allergies  Negative for polydipsia, environmental allergy    Psychiatric/behavioral  Negative for suicidal ideation.  Patient is not anxious        NEUROLOGIC EXAMINATION  GENERAL  Appears comfortable and in no distress   HEENT  NC/ AT   NECK  Supple   MENTAL STATUS:  Alert, oriented, intact memory, no confusion, normal speech, normal language, no hallucination or delusion   CRANIAL NERVES: CN II-XII grossly intact   MOTOR FUNCTION:  Lifts all limbs without difficulty with normal bulk, normal tone and no involuntary movements, no tremor   SENSORY FUNCTION:  Normal touch, normal pin   CEREBELLAR FUNCTION:  Intact fine motor control over upper limbs   REFLEX FUNCTION:  Symmetric, no perverted reflex, no Babinski sign   STATION and GAIT  Not tested       Data:    Lab Results:   CBC:   Recent Labs     03/15/20  1155 03/16/20  0522   WBC 6.6 5.2   HGB 13.7 13.9    285     BMP:    Recent Labs     03/15/20  1155 03/16/20  0522    138   K 4.1 4.3    102   CO2 24 26   BUN 11 14   CREATININE 0.66 0.62   GLUCOSE 77 89         Lab Results   Component Value Date    CHOL 194 01/12/2020    LDLCHOLESTEROL 121 01/12/2020    HDL 40 (L) 01/12/2020 TRIG 164 (H) 01/12/2020    ALT 15 03/16/2020    AST 16 03/16/2020    TSH 0.81 01/12/2020    INR 1.0 07/24/2019    GLUF 114 (H) 06/24/2019    LABA1C 5.4 01/12/2020           Diagnostic data reviewed:  LTME (starting 3/15/20) -  Summary: During this day of recording no events were recorded. The interictal EEG was normal. Monitoring was continued in order to record the patient's typical events. The EKG channel revealed no abnormalities. Summary: During above recoding period, there was normal awake and asleep EEG. 5 button pushes were accidental.     Summary: During above recoding period, there was normal awake and asleep EEG. One recorded event was not epileptic in nature. Impression:  -Seizure like activity; epileptic vs non epileptic vs both      Plan:  1. Continue LTME to capture further events  2. EEG techs will perform hyperventilation and photic stimulation  3. Sleep deprivation tonight  4. Patient currently off of Trileptal  5. Seizure precautions, IV Ativan 1 mg as needed prolonged seizure  6. Patient will need MRI brain with and without as per Dr. Tracey Elizondo last outpatient note; can consider obtaining prior to discharge  7. PT/OT  8. DVT prophylaxis; Lovenox      Please note that this note was generated using a voice recognition dictation software. Although every effort was made to ensure the accuracy of this automated transcription, some errors in transcription may have occurred.

## 2020-03-17 NOTE — PROGRESS NOTES
Pt has 2 back to back episodes of seizure like episodes. Both recorded on LTME. Pt had tremors in bilateral arms. Ammonia salt inhalants were used with both episodes. Tremors stopped post use of inhalants. Pt alert and orientated x4 with no incontinence. Pt experienced an episode of hyperventilation post second seizure like episode. Pt encouraged to and assisted in controlling breathing.   After 5 minutes, respiratory rate and depth returned to normal.  Will continue to monitor

## 2020-03-18 VITALS
TEMPERATURE: 97.3 F | WEIGHT: 142 LBS | HEART RATE: 71 BPM | OXYGEN SATURATION: 100 % | RESPIRATION RATE: 13 BRPM | HEIGHT: 63 IN | SYSTOLIC BLOOD PRESSURE: 101 MMHG | DIASTOLIC BLOOD PRESSURE: 56 MMHG | BODY MASS INDEX: 25.16 KG/M2

## 2020-03-18 PROCEDURE — 2580000003 HC RX 258: Performed by: NURSE PRACTITIONER

## 2020-03-18 PROCEDURE — 99239 HOSP IP/OBS DSCHRG MGMT >30: CPT | Performed by: NURSE PRACTITIONER

## 2020-03-18 PROCEDURE — 95711 VEEG 2-12 HR UNMONITORED: CPT

## 2020-03-18 PROCEDURE — 95718 EEG PHYS/QHP 2-12 HR W/VEEG: CPT | Performed by: PSYCHIATRY & NEUROLOGY

## 2020-03-18 RX ORDER — OXCARBAZEPINE 150 MG/1
150 TABLET, FILM COATED ORAL 2 TIMES DAILY
Qty: 60 TABLET | Refills: 3 | Status: ON HOLD | OUTPATIENT
Start: 2020-03-18 | End: 2020-03-22 | Stop reason: HOSPADM

## 2020-03-18 RX ORDER — TRAZODONE HYDROCHLORIDE 50 MG/1
50 TABLET ORAL NIGHTLY
Qty: 30 TABLET | Refills: 3 | Status: ON HOLD | OUTPATIENT
Start: 2020-03-18 | End: 2020-03-22 | Stop reason: HOSPADM

## 2020-03-18 RX ORDER — SERTRALINE HYDROCHLORIDE 100 MG/1
100 TABLET, FILM COATED ORAL NIGHTLY
Qty: 30 TABLET | Refills: 3 | Status: ON HOLD | OUTPATIENT
Start: 2020-03-18 | End: 2020-03-22 | Stop reason: HOSPADM

## 2020-03-18 RX ORDER — ARIPIPRAZOLE 5 MG/1
5 TABLET ORAL NIGHTLY
Qty: 30 TABLET | Refills: 3 | Status: ON HOLD | OUTPATIENT
Start: 2020-03-18 | End: 2020-03-22 | Stop reason: HOSPADM

## 2020-03-18 RX ORDER — FAMOTIDINE 20 MG/1
20 TABLET, FILM COATED ORAL 2 TIMES DAILY
Qty: 60 TABLET | Refills: 3 | Status: SHIPPED | OUTPATIENT
Start: 2020-03-18 | End: 2020-08-06

## 2020-03-18 RX ADMIN — SODIUM CHLORIDE, PRESERVATIVE FREE 10 ML: 5 INJECTION INTRAVENOUS at 10:45

## 2020-03-18 ASSESSMENT — PAIN - FUNCTIONAL ASSESSMENT
PAIN_FUNCTIONAL_ASSESSMENT: 0-10
PAIN_FUNCTIONAL_ASSESSMENT: 0-10

## 2020-03-18 ASSESSMENT — PAIN SCALES - GENERAL: PAINLEVEL_OUTOF10: 0

## 2020-03-18 NOTE — PROGRESS NOTES
without atrophy or fasciculation   Motor function  Strength: 5/5 RUE, 5/5 RLE, 5/5 LUE, 5/5  LLE  Normal bulk and tone                  Sensory function Grossly intact     Cerebellar No visible tremors   Reflex function 2/4 symmetric throughout  Down going plantar response bilaterally   Gait                  Not tested       DATA      Lab Results   Component Value Date    WBC 5.2 03/16/2020    HGB 13.9 03/16/2020    HCT 41.7 03/16/2020     03/16/2020    CHOL 194 01/12/2020    TRIG 164 (H) 01/12/2020    HDL 40 (L) 01/12/2020    ALT 15 03/16/2020    AST 16 03/16/2020     03/16/2020    K 4.3 03/16/2020     03/16/2020    CREATININE 0.62 03/16/2020    BUN 14 03/16/2020    CO2 26 03/16/2020    TSH 0.81 01/12/2020    INR 1.0 07/24/2019    GLUF 114 (H) 06/24/2019    LABA1C 5.4 01/12/2020       LTME (3/15 - 3/18/2020):   Day 1: Normal    Day 2: One recorded event was not epileptic in nature     Day 3: One recorded body shaking episode, lasted for 9 minutes, was not epileptic in nature            PRIOR DATA:  CT HEAD (11/2019): No acute intracranial abnormality     EEG (7/2019): Possible focal cortical dysfunction in the right posterior head region. No   epileptiform discharges or EEG/clinical seizures were not seen. At times, there was T5 lead artifact, which made right posterior focal cortical dysfunction less certain. IMPRESSION & PLAN: 23 y.o.  female admitted for  Elective LTME to capture and characterize her typical events; last dose of Trileptal 150 mg twice a day was given on 3/16 AM.  Patient was sleep deprived last night.  LTME - 2 recorded events were nonepileptic in nature, rest normal.  Most probably patient has pseudoseizures    DVT prophylaxis; is on Lovenox 40 mg SC daily    Comorbid conditions - neurocardiogenic syncope, SVT, Raynaud disease, hypothyroidism, ADHD, anxiety, depression     D/C home today        Please note that this note was generated using a voice recognition

## 2020-03-18 NOTE — DISCHARGE SUMMARY
During her stay patient stated hemodynamically stable and was independent in her ADLs. It was decided to discharge the patient home today on her home medications. Patient will follow-up with Dr. Abiola Garcia, as OP.       Significant therapeutic interventions:   LTME; Trileptal 150 mg twice a day was weaned off. Pt was sleep deprived (3/17-3/18). Normal EEG; 2 events were nonepileptic in nature    DVT prophylaxis; was on Lovenox 40 mg SC daily        Significant Diagnostic Studies:   Labs / Micro:  CBC:   Lab Results   Component Value Date    WBC 5.2 03/16/2020    RBC 4.41 03/16/2020    HGB 13.9 03/16/2020    HCT 41.7 03/16/2020    MCV 94.6 03/16/2020    MCH 31.5 03/16/2020    MCHC 33.3 03/16/2020    RDW 12.8 03/16/2020     03/16/2020     BMP:    Lab Results   Component Value Date    GLUCOSE 89 03/16/2020     03/16/2020    K 4.3 03/16/2020     03/16/2020    CO2 26 03/16/2020    ANIONGAP 10 03/16/2020    BUN 14 03/16/2020    CREATININE 0.62 03/16/2020    BUNCRER NOT REPORTED 03/16/2020    CALCIUM 9.4 03/16/2020    LABGLOM  03/16/2020     Pediatric GFR requires additional information. Refer to [facility] website for calculator. GFRAA NOT REPORTED 03/16/2020    GFR      03/16/2020    GFR NOT REPORTED 03/16/2020     HFP:    Lab Results   Component Value Date    PROT 7.4 03/16/2020     CMP:    Lab Results   Component Value Date    GLUCOSE 89 03/16/2020     03/16/2020    K 4.3 03/16/2020     03/16/2020    CO2 26 03/16/2020    BUN 14 03/16/2020    CREATININE 0.62 03/16/2020    ANIONGAP 10 03/16/2020    ALKPHOS 99 03/16/2020    ALT 15 03/16/2020    AST 16 03/16/2020    BILITOT 0.25 03/16/2020    LABALBU 4.2 03/16/2020    ALBUMIN 1.3 03/16/2020    LABGLOM  03/16/2020     Pediatric GFR requires additional information. Refer to [facility] website for calculator.     GFRAA NOT REPORTED 03/16/2020    GFR      03/16/2020    GFR NOT REPORTED 03/16/2020    PROT 7.4 03/16/2020    CALCIUM 9.4 03/16/2020 PT/INR:    Lab Results   Component Value Date    PROTIME 10.3 07/24/2019    INR 1.0 07/24/2019     PTT:   Lab Results   Component Value Date    APTT 23.0 07/24/2019     FLP:    Lab Results   Component Value Date    CHOL 194 01/12/2020    TRIG 164 01/12/2020    HDL 40 01/12/2020     U/A:    Lab Results   Component Value Date    COLORU YELLOW 03/08/2020    TURBIDITY CLEAR 03/08/2020    SPECGRAV 1.030 03/08/2020    HGBUR MOD 03/08/2020    PHUR 6.0 03/08/2020    PROTEINU NEGATIVE 03/08/2020    GLUCOSEU NEGATIVE 03/08/2020    KETUA NEGATIVE 03/08/2020    BILIRUBINUR NEGATIVE 03/08/2020    UROBILINOGEN Normal 03/08/2020    NITRU NEGATIVE 03/08/2020    LEUKOCYTESUR NEGATIVE 03/08/2020     TSH:    Lab Results   Component Value Date    TSH 0.81 01/12/2020     Diagnostic Data:  LTME (3/15 - 3/18/2020):   Day 1: Normal     Day 2: One recorded event was not epileptic in nature      Day 3: One recorded body shaking episode, lasted for 9 minutes, was not epileptic in nature              PRIOR DATA:  CT HEAD (11/2019): No acute intracranial abnormality      EEG (7/2019): Possible focal cortical dysfunction in the right posterior head region. No   epileptiform discharges or EEG/clinical seizures were not seen. At times, there was T5 lead artifact, which made right posterior focal cortical dysfunction less certain. Consultations:    Consults:     Final Specialist Recommendations/Findings:   None      The patient was seen and examined on day of discharge and this discharge summary is in conjunction with any daily progress note from day of discharge.     Discharge plan:     Disposition: Home    Physician Follow Up:   MD Nelson Beavers  [facility] Nelson Mulligan [address] [place] [address]  [phone]    Schedule an appointment as soon as possible for a visit in 1 month  nonepilaptic seizures        Diet: regular diet      Activity: As tolerated      Instructions to Patient:   Follow-up with your psychiatrist as outpatient     Follow-up

## 2020-03-19 ENCOUNTER — HOSPITAL ENCOUNTER (INPATIENT)
Age: 20
LOS: 4 days | Discharge: HOME | DRG: 751 | End: 2020-03-23
Attending: EMERGENCY MEDICINE | Admitting: PSYCHIATRY & NEUROLOGY
Payer: MEDICAID

## 2020-03-19 PROBLEM — R45.851 DEPRESSION WITH SUICIDAL IDEATION: Status: ACTIVE | Noted: 2020-03-19

## 2020-03-19 PROBLEM — F32.A DEPRESSION WITH SUICIDAL IDEATION: Status: ACTIVE | Noted: 2020-03-19

## 2020-03-19 PROCEDURE — 1240000000 HC EMOTIONAL WELLNESS R&B

## 2020-03-19 PROCEDURE — 99285 EMERGENCY DEPT VISIT HI MDM: CPT

## 2020-03-19 PROCEDURE — 6370000000 HC RX 637 (ALT 250 FOR IP): Performed by: PSYCHIATRY & NEUROLOGY

## 2020-03-19 RX ORDER — OXCARBAZEPINE 300 MG/1
300 TABLET, FILM COATED ORAL 2 TIMES DAILY
Status: DISCONTINUED | OUTPATIENT
Start: 2020-03-19 | End: 2020-03-23 | Stop reason: HOSPADM

## 2020-03-19 RX ORDER — TRAZODONE HYDROCHLORIDE 50 MG/1
50 TABLET ORAL NIGHTLY PRN
Status: DISCONTINUED | OUTPATIENT
Start: 2020-03-19 | End: 2020-03-23 | Stop reason: HOSPADM

## 2020-03-19 RX ADMIN — OXCARBAZEPINE 300 MG: 300 TABLET, FILM COATED ORAL at 21:52

## 2020-03-19 RX ADMIN — SERTRALINE HYDROCHLORIDE 50 MG: 50 TABLET ORAL at 16:18

## 2020-03-19 RX ADMIN — TRAZODONE HYDROCHLORIDE 50 MG: 50 TABLET ORAL at 22:19

## 2020-03-19 ASSESSMENT — SLEEP AND FATIGUE QUESTIONNAIRES
DO YOU HAVE DIFFICULTY SLEEPING: YES
DO YOU USE A SLEEP AID: NO
DIFFICULTY ARISING: NO
AVERAGE NUMBER OF SLEEP HOURS: 6
DIFFICULTY STAYING ASLEEP: YES
DIFFICULTY FALLING ASLEEP: YES
SLEEP PATTERN: DISTURBED/INTERRUPTED SLEEP
RESTFUL SLEEP: YES

## 2020-03-19 ASSESSMENT — ENCOUNTER SYMPTOMS
BLOOD IN STOOL: 0
EYE REDNESS: 0
EYE DISCHARGE: 0
VOMITING: 0
CHEST TIGHTNESS: 0
SINUS PRESSURE: 0
CONSTIPATION: 0
WHEEZING: 0
COLOR CHANGE: 0
RHINORRHEA: 0
TROUBLE SWALLOWING: 0
NAUSEA: 0
EYE PAIN: 0
DIARRHEA: 0
FACIAL SWELLING: 0
BACK PAIN: 0
ABDOMINAL PAIN: 0
SORE THROAT: 0
SHORTNESS OF BREATH: 0
COUGH: 0

## 2020-03-19 ASSESSMENT — PATIENT HEALTH QUESTIONNAIRE - PHQ9: SUM OF ALL RESPONSES TO PHQ QUESTIONS 1-9: 14

## 2020-03-19 ASSESSMENT — PAIN SCALES - GENERAL: PAINLEVEL_OUTOF10: 4

## 2020-03-19 ASSESSMENT — LIFESTYLE VARIABLES
HISTORY_ALCOHOL_USE: NO
HISTORY_ALCOHOL_USE: NO

## 2020-03-19 ASSESSMENT — PAIN DESCRIPTION - LOCATION: LOCATION: NECK

## 2020-03-19 NOTE — ED PROVIDER NOTES
[address] [facility]  EMERGENCY DEPARTMENT ENCOUNTER      Pt Name: Brittny Rocha  MRN: [id number]  [place] 2000  Date of evaluation: 3/19/20      CHIEF COMPLAINT       Chief Complaint   Patient presents with   [address] Problem         HISTORY OF PRESENT ILLNESS    Brittny Rocha is a 23 y.o. female who presents complaining of Psychiatric Evaluation. Patient is here stating that she is feeling suicidal.  Patient states she has had a mental breakdown this morning and told her mom that she was going to kill her self and her mom told her to come here to be evaluated. Patient does have a history of depression and takes Trileptal for but states that does not help. Patient does have voices all the time and multiple ideas of what she might do to hurt her self but no actual attempts. Patient denies any somatic complaints today. REVIEW OF SYSTEMS       Review of Systems   Constitutional: Negative for activity change, appetite change, chills, diaphoresis and fever. HENT: Negative for congestion, ear pain, facial swelling, nosebleeds, rhinorrhea, sinus pressure, sore throat and trouble swallowing. Eyes: Negative for pain, discharge and redness. Respiratory: Negative for cough, chest tightness, shortness of breath and wheezing. Cardiovascular: Negative for chest pain, palpitations and leg swelling. Gastrointestinal: Negative for abdominal pain, blood in stool, constipation, diarrhea, nausea and vomiting. Genitourinary: Negative for difficulty urinating, dysuria, flank pain, frequency, genital sores and hematuria. Musculoskeletal: Negative for arthralgias, back pain, gait problem, joint swelling, myalgias and neck pain. Skin: Negative for color change, pallor, rash and wound. Neurological: Negative for dizziness, tremors, seizures, syncope, speech difficulty, weakness, numbness and headaches. Psychiatric/Behavioral: Positive for dysphoric mood and suicidal ideas.  Negative DISPOSITION/PLAN   DISPOSITION Admitted 03/19/2020 11:21:58 AM      PATIENT REFERREDTO:  Danya Hussein[address]  [phone]            DISCHARGEMEDICATIONS:  New Prescriptions    No medications on file       (Please note that portions of this note were completed with a voice recognition program.  Efforts were made to edit thedictations but occasionally words are mis-transcribed.)    Gonzalez Rodriguez MD  Attending Emergency Physician                      Gonzalez Rodriguez MD  03/19/20 5205

## 2020-03-19 NOTE — GROUP NOTE
Group Therapy Note    Date: 3/19/2020    Group Start Time: 1430  Group End Time: 1500  Group Topic: Recreational    STCZ TIMA Saldaña, CTRS    Group Therapy Note    Attendees: 7    Patient's Goal:  Increase interpersonal interaction     Notes:  Pt attended group and participated in activity. Pt interacted with staff and peers. Status After Intervention:  Improved    Participation Level:  Active Listener and Interactive    Participation Quality: Appropriate, Attentive and Sharing    Speech:  normal    Thought Process/Content: Logical    Affective Functioning: Congruent    Mood: euthymic    Level of consciousness:  Alert, Oriented x4 and Attentive    Response to Learning: Able to verbalize current knowledge/experience, Able to verbalize/acknowledge new learning, Able to retain information, Capable of insight and Progressing to goal    Endings: None Reported    Modes of Intervention: Education, Socialization, Exploration, Problem-solving, Activity, Limit-setting and Reality-testing    Discipline Responsible: Psychoeducational Specialist    Signature:  Dane Saldaña, [address]

## 2020-03-19 NOTE — GROUP NOTE
Group Therapy Note    Date: 3/19/2020    Group Start Time: 0900  Group End Time: 8084  Group Topic: Community Meeting    FARHAD Regalado Pt unable to attend     Signature:  Liset Liang

## 2020-03-19 NOTE — ED NOTES
Pt arrived to the ED today after she states she had a mental break down. Pt states she told her mom that she was going to kill herself. Pt denies any attempt to kill herself at this time. Pt states she was going to OD on her pills but did not have access to them. Pt states since she was not able to OD she was planning on walking into traffic. Pt is calm and cooperative at this time.       Elina Bose RN  03/19/20 0499
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
## 2020-03-19 NOTE — BH NOTE
Depression Mother     Heart Disease Mother     Seizures Maternal Grandmother     COPD Maternal Grandmother     Hypertension Maternal Grandmother     Cancer Maternal Grandmother         ovarian    Heart Disease Maternal Grandmother     Diabetes Type 1  Maternal Grandfather     Diabetes Type 1  Other         m uncle    Bleeding Prob Other         m aunt protein S&C deficiency       SOCIAL HISTORY:     Social History     Socioeconomic History    Marital status: Single     Spouse name: None    Number of children: None    Years of education: None    Highest education level: None   Occupational History    None   Social Needs    Financial resource strain: None    Food insecurity     Worry: None     Inability: None    Transportation needs     Medical: None     Non-medical: None   Tobacco Use    Smoking status: Current Every Day Smoker     Packs/day: 0.50     Types: Cigars    Smokeless tobacco: Never Used    Tobacco comment: 1-2 black and milds daily    Substance and Sexual Activity    Alcohol use: No    Drug use: Yes     Types: Marijuana     Comment: monthly     Sexual activity: Yes     Partners: Male     Birth control/protection: Condom   Lifestyle    Physical activity     Days per week: None     Minutes per session: None    Stress: None   Relationships    Social connections     Talks on phone: None     Gets together: None     Attends Church service: None     Active member of club or organization: None     Attends meetings of clubs or organizations: None     Relationship status: None    Intimate partner violence     Fear of current or ex partner: None     Emotionally abused: None     Physically abused: None     Forced sexual activity: None   Other Topics Concern    None   Social History Narrative    None     Social History     Socioeconomic History    Marital status: Single     Spouse name: Not on file    Number of children: Not on file    Years of education: Not on file   [employer] Highest education level: Not on file   Occupational History    Not on file   Social Needs    Financial resource strain: Not on file    Food insecurity     Worry: Not on file     Inability: Not on file    Transportation needs     Medical: Not on file     Non-medical: Not on file   Tobacco Use    Smoking status: Current Every Day Smoker     Packs/day: 0.50     Types: Cigars    Smokeless tobacco: Never Used    Tobacco comment: 1-2 black and milds daily    Substance and Sexual Activity    Alcohol use: No    Drug use: Yes     Types: Marijuana     Comment: monthly     Sexual activity: Yes     Partners: Male     Birth control/protection: Condom   Lifestyle    Physical activity     Days per week: Not on file     Minutes per session: Not on file    Stress: Not on file   Relationships    Social connections     Talks on phone: Not on file     Gets together: Not on file     Attends Hindu service: Not on file     Active member of club or organization: Not on file     Attends meetings of clubs or organizations: Not on file     Relationship status: Not on file    Intimate partner violence     Fear of current or ex partner: Not on file     Emotionally abused: Not on file     Physically abused: Not on file     Forced sexual activity: Not on file   Other Topics Concern    Not on file   Social History Narrative    Not on file       /71   Pulse 93   Temp 98.2 °F (36.8 °C) (Oral)   Resp 14   Ht 5' 3\" (1.6 m)   Wt 143 lb (64.9 kg)   SpO2 98%   BMI 25.33 kg/m²   MENTAL STATUS EXAMINATION:     MOOD-is dysphoric   Affect-is depressed  Patient feels helpless hopeless and worthless  Psychomotor activity : decreased. APPEARANCE:  Personal hygiene is fair. PSYCHOSIS:  Denies auditory or visual hallucinations. Denies paranoid delusions. ORIENTATION:  Oriented to time place and person. Recent and remote memory is grossly intact. Intelligence appears to be average  CONCENTRATION:  poor.     SPEECH : goal directed , but slow .     Length of stay : 5-7 days      Strengths:  Patient signed voluntarily and agreed to take medications  The patient is a client of [facility]    Weaknesses: The patient exhibited poor impulse control and poor coping skills  The patient has borderline personality symptoms    Diagnosis: Principal Problem:    Depression with suicidal ideation  Resolved Problems:    * No resolved hospital problems.  *         Treatment plan:      [START ON 3/20/2020] influenza virus vaccine  0.5 mL Intramuscular Once         Chart was reviewed the patient has been interviewed  He agreed to resume Trileptal and increase it from 150 BID to 300 mg p.o. twice daily and start Zoloft 50 mg p.o. daily for symptoms of depression  Patient was discussed with the  and the treatment team.   Provided Supportive and insight-oriented psychotherapy  Provided empathic listening, validation and support  Provided support & education of purpose, risks vs. benefits and side effects of treatment with psychotropics  Provided support and education about risk of not receiving treatment  Patient acknowledged and mutually decided to continue with current treatment plan  Provided education about stress management, exercise and healthy diet  Provided support and encouragement to contact office sooner if needed - verbally acknowledged  Provided support and encouragement to continue working with other practitioners  Provided support and encouragement to consider (continue) working with counselor,   Recommended follow up with community mental health center or private practice psychiatry upon discharge    Jony Hardy MD  Board Certified Psychiatrist  3/19/2020 2:19 PM

## 2020-03-19 NOTE — BH NOTE
Pt does not meet criteria for suicide precautions. Suicide precautions discontinued from ED encounter.
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
## 2020-03-20 PROCEDURE — G0008 ADMIN INFLUENZA VIRUS VAC: HCPCS | Performed by: PSYCHIATRY & NEUROLOGY

## 2020-03-20 PROCEDURE — 6370000000 HC RX 637 (ALT 250 FOR IP): Performed by: PSYCHIATRY & NEUROLOGY

## 2020-03-20 PROCEDURE — 6360000002 HC RX W HCPCS: Performed by: PSYCHIATRY & NEUROLOGY

## 2020-03-20 PROCEDURE — 1240000000 HC EMOTIONAL WELLNESS R&B

## 2020-03-20 PROCEDURE — 90686 IIV4 VACC NO PRSV 0.5 ML IM: CPT | Performed by: PSYCHIATRY & NEUROLOGY

## 2020-03-20 RX ORDER — HYDROXYZINE HYDROCHLORIDE 25 MG/1
25 TABLET, FILM COATED ORAL 3 TIMES DAILY PRN
Status: DISCONTINUED | OUTPATIENT
Start: 2020-03-20 | End: 2020-03-20

## 2020-03-20 RX ORDER — HYDROXYZINE HYDROCHLORIDE 25 MG/1
25 TABLET, FILM COATED ORAL 3 TIMES DAILY PRN
Status: DISCONTINUED | OUTPATIENT
Start: 2020-03-20 | End: 2020-03-23 | Stop reason: HOSPADM

## 2020-03-20 RX ORDER — ACETAMINOPHEN 325 MG/1
650 TABLET ORAL EVERY 4 HOURS PRN
Status: DISCONTINUED | OUTPATIENT
Start: 2020-03-20 | End: 2020-03-23 | Stop reason: HOSPADM

## 2020-03-20 RX ADMIN — ACETAMINOPHEN 650 MG: 325 TABLET, FILM COATED ORAL at 11:18

## 2020-03-20 RX ADMIN — TRAZODONE HYDROCHLORIDE 50 MG: 50 TABLET ORAL at 21:08

## 2020-03-20 RX ADMIN — HYDROXYZINE HYDROCHLORIDE 25 MG: 25 TABLET, FILM COATED ORAL at 21:08

## 2020-03-20 RX ADMIN — ACETAMINOPHEN 650 MG: 325 TABLET, FILM COATED ORAL at 20:04

## 2020-03-20 RX ADMIN — OXCARBAZEPINE 300 MG: 300 TABLET, FILM COATED ORAL at 09:19

## 2020-03-20 RX ADMIN — INFLUENZA A VIRUS A/BRISBANE/02/2018 IVR-190 (H1N1) ANTIGEN (PROPIOLACTONE INACTIVATED), INFLUENZA A VIRUS A/KANSAS/14/2017 X-327 (H3N2) ANTIGEN (PROPIOLACTONE INACTIVATED), INFLUENZA B VIRUS B/MARYLAND/15/2016 ANTIGEN (PROPIOLACTONE INACTIVATED), INFLUENZA B VIRUS B/PHUKET/3073/2013 BVR-1B ANTIGEN (PROPIOLACTONE INACTIVATED) 0.5 ML: 15; 15; 15; 15 INJECTION, SUSPENSION INTRAMUSCULAR at 09:46

## 2020-03-20 RX ADMIN — SERTRALINE HYDROCHLORIDE 50 MG: 50 TABLET ORAL at 09:19

## 2020-03-20 RX ADMIN — HYDROXYZINE HYDROCHLORIDE 25 MG: 25 TABLET, FILM COATED ORAL at 11:55

## 2020-03-20 RX ADMIN — OXCARBAZEPINE 300 MG: 300 TABLET, FILM COATED ORAL at 21:08

## 2020-03-20 ASSESSMENT — PAIN SCALES - GENERAL
PAINLEVEL_OUTOF10: 3
PAINLEVEL_OUTOF10: 0
PAINLEVEL_OUTOF10: 5
PAINLEVEL_OUTOF10: 0

## 2020-03-20 ASSESSMENT — ENCOUNTER SYMPTOMS
CONSTIPATION: 0
SINUS PAIN: 0
TROUBLE SWALLOWING: 0
SHORTNESS OF BREATH: 0
ABDOMINAL PAIN: 0
DIARRHEA: 0
VOMITING: 0
WHEEZING: 0
COUGH: 0
NAUSEA: 0

## 2020-03-20 NOTE — PLAN OF CARE
[facility]  Initial Interdisciplinary Treatment Plan NO      Original treatment plan Date & Time: 3/20/2020 0803    Admission Type:  Admission Type: Voluntary    Reason for admission:   Reason for Admission: suicidal ideation without a specific plan     Estimated Length of Stay:  5-7days  Estimated Discharge Date: to be determined by physician    PATIENT STRENGTHS:  Patient Strengths:Strengths: Communication, No significant Physical Illness, Positive Support, Connection to output provider, Employment, Social Skills, Medication Compliance, Motivated  Patient Strengths and Limitations:Limitations: External locus of control, Unrealistic self-view  Addictive Behavior: Addictive Behavior  In the past 3 months, have you felt or has someone told you that you have a problem with:  : None  Do you have a history of Chemical Use?: No  Do you have a history of Alcohol Use?: No  Do you have a history of Street Drug Abuse?: No  Histroy of Prescripton Drug Abuse?: No  Medical Problems:  Past Medical History:   Diagnosis Date    ADD (attention deficit disorder)     Anxiety     Asthma     [facility] 2019    Depression     Gestational diabetes mellitus (GDM), antepartum 6/24/2019    Hearing loss in left ear     Hypothyroidism 1/16/2019    Immunizations up to date in pediatric patient     Pseudoseizures     Raynaud disease     Seizures ([facility] 3/15/2020    Tachycardia     Saw Dr. Tamiko Mora 5 yrs ago\".   Echo and holter monitor done, neg results    Traumatic injury during pregnancy, third trimester     Wears glasses      Status EXAM:Status and Exam  Normal: Yes  Facial Expression: Brightened, Elevated  Affect: Appropriate  Level of Consciousness: Alert  Mood:Normal: No  Mood: Elated, Euphoric  Motor Activity:Normal: Yes  Interview Behavior: Cooperative  Preception: [place] to Person, [place] to Time, [place] to Place, [place] to Situation  Attention:Normal: Yes  Attention: Hyperalert  Thought Processes: Circumstantial  Thought Content:Normal: Yes  Thought Content: Preoccupations  Hallucinations: None  Delusions: No  Memory:Normal: Yes  Memory: Poor Remote  Insight and Judgment: No  Insight and Judgment: Poor Insight, Poor Judgment  Present Suicidal Ideation: No  Present Homicidal Ideation: No    EDUCATION:   Learner Progress Toward Treatment Goals: reviewed group plans and strategies for care    Method:group therapy, medication compliance, individualized assessments and care planning    Outcome: needs reinforcement    PATIENT GOALS: to be discussed with patient within 72 hours    PLAN/TREATMENT RECOMMENDATIONS:     continue group therapy , medications compliance, goal setting, individualized assessments and care, continue to monitor pt on unit      SHORT-TERM GOALS:   Time frame for Short-Term Goals: 5-7 days    LONG-TERM GOALS:  Time frame for Long-Term Goals: 6 months  Members Present in Team Meeting: See Signature Sheet    FER NewellS

## 2020-03-20 NOTE — H&P
injury during pregnancy, third trimester     Wears glasses        SURGICAL HISTORY       Past Surgical History:   Procedure Laterality Date    REMOVAL FOREIGN BODY EAR Left     \"insect laid eggs\"    TYMPANOPLASTY Left 06/24/2016       FAMILY HISTORY       Family History   Adopted: Yes   Problem Relation Age of Onset    Diabetes Mother         borderline diet controlled    Atrial Fibrillation Mother     Anxiety Disorder Mother     Depression Mother     Heart Disease Mother     Seizures Maternal Grandmother     COPD Maternal Grandmother     Hypertension Maternal Grandmother     Cancer Maternal Grandmother         ovarian    Heart Disease Maternal Grandmother     Diabetes Type 1  Maternal Grandfather     Diabetes Type 1  Other         m uncle    Bleeding Prob Other         m aunt protein S&C deficiency       SOCIAL HISTORY       Social History     Socioeconomic History    Marital status: Single     Spouse name: None    Number of children: None    Years of education: None    Highest education level: None   Occupational History    None   Social Needs    Financial resource strain: None    Food insecurity     Worry: None     Inability: None    Transportation needs     Medical: None     Non-medical: None   Tobacco Use    Smoking status: Current Every Day Smoker     Packs/day: 0.50     Types: Cigars    Smokeless tobacco: Never Used    Tobacco comment: 1-2 black and milds daily    Substance and Sexual Activity    Alcohol use: No    Drug use: Yes     Types: Marijuana     Comment: monthly     Sexual activity: Yes     Partners: Male     Birth control/protection: Condom   Lifestyle    Physical activity     Days per week: None     Minutes per session: None    Stress: None   Relationships    Social connections     Talks on phone: None     Gets together: None     Attends [employer] service: None     Active member of club or organization: None     Attends meetings of clubs or organizations: None (Oral)   Resp 14   Ht 5' 3\" (1.6 m)   Wt 143 lb (64.9 kg)   SpO2 98%   BMI 25.33 kg/m²  Body mass index is 25.33 kg/m². Pt was examined with a nurse present in the room. GENERAL APPEARANCE:  Ligia Ward is 23 y.o.,  female, nourished, conscious, alert. Does not appear to be distress or pain at this time. SKIN:  Warm, dry, no cyanosis or jaundice. HEAD:  Normocephalic, atraumatic, no swelling or tenderness. EYES:  Glasses. Pupils equal, reactive to light, Conjunctiva is clear, EOMs intact morris. eyelids WNL. EARS:  No discharge, no marked hearing loss. NOSE:  No rhinorrhea, epistaxis or septal deformity. THROAT:  Uvula midline. No ulceration bleeding or discharge. NECK:  No stiffness, trachea central.    CHEST:  Symmetrical and equal on expansion. HEART:  Regular rate and rhythm. S1 > S2, No audible murmurs or gallops. LUNGS:  Equal on expansion, normal breath sounds. ABDOMEN:  Soft on palpation. No localized tenderness. No guarding or rigidity. LYMPHATICS:  No palpable anterior cervical lymphadenopathy. LOCOMOTOR, BACK AND SPINE:  No tenderness or deformities. EXTREMITIES:  Symmetrical, no pedal edema. No calf tenderness. No discoloration or ulcerations. NEUROLOGIC:  The patient is conscious, alert, oriented, Gait and balance WNL. No apparent focal sensory deficits. No motor deficits, muscle strength equal Morris. No facial droop, tongue protrudes centrally, no slurring of the speech. Cranial nerves 3-12 reveal no deficits, taste and smell not assessed. PROVISIONAL DIAGNOSES:      Principal Problem:    Depression with suicidal ideation  Resolved Problems:    * No resolved hospital problems.  DARRIAN Grimes - CNP on 3/20/2020 at 2:02 PM

## 2020-03-20 NOTE — GROUP NOTE
Group Therapy Note    Date: 3/19/2020    Group Start Time: 2030  Group End Time: 2100  Group Topic: Wrap-Up    FARHAD Hu RN        Group Therapy Note    Attendees: 5            Discipline Responsible:   OT  AT   x Ns.  RT  Other       Participation Level:     None  Minimal   x Active Listener x Interactive    Monopolizing         Participation Quality:  x Appropriate  Inappropriate   x       Attentive        Intrusive   x       Sharing        Resistant   x       Supportive        Lethargic       Affective:   x Congruent  Incongruent  Blunted  Flat    Constricted  Anxious  Elated  Angry    Labile  Depressed  Other         Cognitive:  x Alert  Oriented PPTP     Concentration x G  F  P   Attention Span x G  F  P   Short-Term Memory x G  F  P   Long-Term Memory x G  F  P   ProblemSolving/  Decision Making x G  F  P   Ability to Process  Information x G  F  P      Contributing Factors             Delusional             Hallucinating             Flight of Ideas             Other:       Modes of Intervention:  x Education  Support  Exploration   x Clarifying  Problem Solving  Confrontation    Socialization  Limit Setting  Reality Testing    Activity  Movement  Media    Other:            Response to Learning:  x Able to verbalize current knowledge/experience   x Able to verbalize/acknowledge new learning    Able to retain information    Capable of insight    Able to change behavior    Progressing to goal    Other:        Comments:

## 2020-03-20 NOTE — GROUP NOTE
Group Therapy Note    Date: 3/20/2020    Group Start Time: 1430  Group End Time: 2493  Group Topic: Psychoeducation    FARHAD Ag, CTRS    Patient refused to attend music trivia group at 1430 after encouragement from staff. 1:1 talk time offered by staff as alternative to group session.

## 2020-03-20 NOTE — BH NOTE
PRN Note    Pt was anxious as evidenced by fidgeting, pacing, poor eye contact, verbalization of anxiety. Pt requested medication for anxiety. Nurse administered atarax, pt tolerated well and is sitting in day room now.

## 2020-03-20 NOTE — PLAN OF CARE
Problem: Altered Mood, Depressive Behavior:  Goal: Able to verbalize and/or display a decrease in depressive symptoms  Description: Able to verbalize and/or display a decrease in depressive symptoms  3/20/2020 1135 by Isa Valdez RN  Outcome: Ongoing  Note: Pt denies depression and anxiety at this time. She denies all thoughts to hurt herself at this time. Safety checks maintained q15 min and irregular rounding maintained. Problem: Altered Mood, Depressive Behavior:  Goal: Ability to disclose and discuss suicidal ideas will improve  Description: Ability to disclose and discuss suicidal ideas will improve  3/20/2020 1135 by Isa Valdez RN  Outcome: Ongoing  Note: Pt denies suicidal ideation at this time. She is brightened, social and cooperative with treatment including groups and medications. Safety checks maintained q15 min and irregular rounding maintained.

## 2020-03-21 PROCEDURE — 6370000000 HC RX 637 (ALT 250 FOR IP): Performed by: PSYCHIATRY & NEUROLOGY

## 2020-03-21 PROCEDURE — 1240000000 HC EMOTIONAL WELLNESS R&B

## 2020-03-21 RX ADMIN — OXCARBAZEPINE 300 MG: 300 TABLET, FILM COATED ORAL at 21:08

## 2020-03-21 RX ADMIN — TRAZODONE HYDROCHLORIDE 50 MG: 50 TABLET ORAL at 21:08

## 2020-03-21 RX ADMIN — ACETAMINOPHEN 650 MG: 325 TABLET, FILM COATED ORAL at 12:24

## 2020-03-21 RX ADMIN — OXCARBAZEPINE 300 MG: 300 TABLET, FILM COATED ORAL at 10:10

## 2020-03-21 RX ADMIN — HYDROXYZINE HYDROCHLORIDE 25 MG: 25 TABLET, FILM COATED ORAL at 18:30

## 2020-03-21 RX ADMIN — SERTRALINE HYDROCHLORIDE 50 MG: 50 TABLET ORAL at 10:10

## 2020-03-21 ASSESSMENT — PAIN SCALES - GENERAL
PAINLEVEL_OUTOF10: 0
PAINLEVEL_OUTOF10: 4

## 2020-03-21 NOTE — PLAN OF CARE
Homicidal Ideation: No    Daily Assessment Last Entry:   Daily Sleep (WDL): Within Defined Limits         Patient Currently in Pain: No  Daily Nutrition (WDL): Within Defined Limits    Patient Monitoring:  Frequency of Checks: 4 times per hour, close    Psychiatric Symptoms:   Depression Symptoms  Depression Symptoms: Isolative, Loss of interest  Anxiety Symptoms  Anxiety Symptoms: Generalized  Ny Symptoms  Ny Symptoms: No problems reported or observed. Psychosis Symptoms  Delusion Type: No problems reported or observed. Suicide Risk CSSR-S:  Have you wished you were dead or wished you could go to sleep and not wake up? : NO  Have you actually had any thoughts of killing yourself? : NO  Have you ever done anything, started to do anything, or prepared to do anything to end your life?: NO  Change in Result              no              Change in Plan of care               no       EDUCATION:   EDUCATION:   Learner Progress Toward Treatment Goals: Reviewed results and recommendations of this team, Reviewed group plan and strategies, Reviewed signs, symptoms and risk of self harm and violent behavior, Reviewed goals and plan of care    Method:small group, individual verbal education    Outcome:verbalized by patient, but needs reinforcement to obtain goals    PATIENT GOALS:  Short term: pt declined to attend team meeting to review care plan et tx goals. pt did not develop a short term goal   Long term: pt did not develop a long term goal    PLAN/TREATMENT RECOMMENDATIONS UPDATE: continue with group therapies, increased socialization, continue planning for after discharge goals, continue with medication compliance    SHORT-TERM GOALS UPDATE:   Time frame for Short-Term Goals: 5-7 days    LONG-TERM GOALS UPDATE:   Time frame for Long-Term Goals: 6 months  Members Present in Team Meeting: See Signature Sheet    Jennifer Hernandez denies pain/discomfort

## 2020-03-21 NOTE — GROUP NOTE
Group Therapy Note    Date: 3/21/2020    Group Start Time: 0900  Group End Time: 0007  Group Topic: Community Meeting    [facility] TIMA [place], [address]        Group Therapy Note    Attendees: 10/22       Pt did not participate in Community Meeting/Goals Group at 900am when encouraged by RT due to resting in room. Pt was offered talk time as an alternative to group but declined.       Discipline Responsible: Psychoeducational Specialist      Signature:  Logan Montero

## 2020-03-22 PROBLEM — F32.A DEPRESSION: Status: RESOLVED | Noted: 2019-01-09 | Resolved: 2020-03-22

## 2020-03-22 PROBLEM — F32.A DEPRESSION WITH SUICIDAL IDEATION: Status: RESOLVED | Noted: 2020-03-19 | Resolved: 2020-03-22

## 2020-03-22 PROBLEM — F33.2 SEVERE RECURRENT MAJOR DEPRESSION WITHOUT PSYCHOTIC FEATURES (HCC): Status: ACTIVE | Noted: 2020-03-22

## 2020-03-22 PROBLEM — R45.851 DEPRESSION WITH SUICIDAL IDEATION: Status: RESOLVED | Noted: 2020-03-19 | Resolved: 2020-03-22

## 2020-03-22 PROCEDURE — 6370000000 HC RX 637 (ALT 250 FOR IP): Performed by: PSYCHIATRY & NEUROLOGY

## 2020-03-22 PROCEDURE — 1240000000 HC EMOTIONAL WELLNESS R&B

## 2020-03-22 RX ORDER — HYDROXYZINE HYDROCHLORIDE 25 MG/1
25 TABLET, FILM COATED ORAL 3 TIMES DAILY PRN
Qty: 30 TABLET | Refills: 0 | Status: SHIPPED | OUTPATIENT
Start: 2020-03-22 | End: 2020-04-01

## 2020-03-22 RX ORDER — OXCARBAZEPINE 300 MG/1
300 TABLET, FILM COATED ORAL 2 TIMES DAILY
Qty: 60 TABLET | Refills: 3 | Status: SHIPPED | OUTPATIENT
Start: 2020-03-22 | End: 2020-08-06

## 2020-03-22 RX ORDER — TRAZODONE HYDROCHLORIDE 50 MG/1
50 TABLET ORAL NIGHTLY PRN
Qty: 14 TABLET | Refills: 0 | Status: SHIPPED | OUTPATIENT
Start: 2020-03-22 | End: 2020-12-16

## 2020-03-22 RX ADMIN — OXCARBAZEPINE 300 MG: 300 TABLET, FILM COATED ORAL at 10:52

## 2020-03-22 RX ADMIN — NICOTINE POLACRILEX 2 MG: 2 GUM, CHEWING BUCCAL at 19:21

## 2020-03-22 RX ADMIN — TRAZODONE HYDROCHLORIDE 50 MG: 50 TABLET ORAL at 23:02

## 2020-03-22 RX ADMIN — SERTRALINE HYDROCHLORIDE 50 MG: 50 TABLET ORAL at 10:52

## 2020-03-22 RX ADMIN — OXCARBAZEPINE 300 MG: 300 TABLET, FILM COATED ORAL at 23:02

## 2020-03-22 RX ADMIN — HYDROXYZINE HYDROCHLORIDE 25 MG: 25 TABLET, FILM COATED ORAL at 21:25

## 2020-03-22 NOTE — PROGRESS NOTES
psychotherapy psychotherapy  Recommended involvement in Unit milieu  Provided empathic listening, validation and support  Patient acknowledged and mutually decided to continue with current treatment plan  Discharge planning was discussed with the patient. Kellee Mitchell MD        This note was created with the assistance of a speech-recognition program.  Although the intention is to generate a document that actually reflects the content of the visit, no guarantees can be provided that every mistake has been identified and corrected by editing.

## 2020-03-22 NOTE — GROUP NOTE
Group Therapy Note    Date: 3/22/2020    Group Start Time: 1600  Group End Time: 36  Group Topic: Healthy Living/Wellness    [facility] GAURI Blunt        Group Therapy Note    Attendees: 19/19         Patient's Goal: participate in safety checks    Status After Intervention:  Unchanged    Participation Level:  Active Listener    Participation Quality: Appropriate      Speech:  normal      Thought Process/Content: Logical      Affective Functioning: Congruent      Mood: stable      Level of consciousness:  Alert and Oriented x4      Response to Learning: Able to verbalize current knowledge/experience      Endings: None Reported    Modes of Intervention: Safety checks      Discipline Responsible: [employer]       Signature:  Juancarlos Blunt

## 2020-03-22 NOTE — GROUP NOTE
Group Therapy Note    Date: 3/22/2020    Group Start Time: 0900  Group End Time: 8104  Group Topic: Community Meeting    FARHAD ASTORGA    [place], [place]        Group Therapy Note    Attendees: 6/19         Pt did not participate in Community Meeting/Goals Group at 900am when encouraged by RT due to resting in room. Pt was offered talk time as an alternative to group but declined.         Discipline Responsible: Psychoeducational Specialist        Signature:  Jose Hammond

## 2020-03-22 NOTE — GROUP NOTE
Group Therapy Note    Date: 3/22/2020    Group Start Time: 1100  Group End Time: 0802  Group Topic: Cognitive Skills    FARHAD Dunn        Group Therapy Note    Attendees: 9/19       Patient's Goal:  Participate appropriately in brain teasers exercise to increase positive coping skills and interpersonal communications. Status After Intervention:  Improved    Participation Level:  Active Listener    Participation Quality: Appropriate      Speech:  normal      Thought Process/Content: Logical      Affective Functioning: Congruent      Mood: stable      Level of consciousness:  Alert and Oriented x4      Response to Learning: Able to verbalize current knowledge/experience      Endings: None Reported    Modes of Intervention: Education, Problem-solving and Activity      Discipline Responsible: [employer].      Signature:  Lilia Dunn

## 2020-03-22 NOTE — GROUP NOTE
Group Therapy Note    Date: 3/21/2020    Group Start Time: 2030  Group End Time: 2100  Group Topic: Wrap-Up    FARHAD [facility] D    Darwin Poag        Group Therapy Note    Attendees:        Patient's Goal:  relaxation    Notes:  Goal met, patient relaxed and socialized in dayroom with peers    Status After Intervention:  Improved    Participation Level: Interactive    Participation Quality: Appropriate, Attentive and Supportive      Speech:  normal      Thought Process/Content: Logical      Affective Functioning: Congruent      Mood: anxious      Level of consciousness:  Alert, Oriented x4 and Attentive      Response to Learning: Able to verbalize current knowledge/experience, Able to verbalize/acknowledge new learning, Able to retain information, Capable of insight, Able to change behavior and Progressing to goal      Endings: None Reported    Modes of Intervention: Education, Support, Socialization, Exploration, Clarifying and Problem-solving      Discipline Responsible: [address]      Signature:  Darwin Silverman

## 2020-03-22 NOTE — PROGRESS NOTES
PROGRESS NOTE  The chart has been reviewed and the patient was interviewed in the conference room. The patient was a pleasant and cooperative during the evaluation. She denied current thoughts of harming self or others. She reported significant improvement in her mood. She denied visual or auditory hallucinations. The discharge plan and the relapse prevention plan has been discussed with the patient. The patient reported feeling ready to be discharged. She will follow-up with [facility] after discharge. We will plan to discharge the patient by Monday. MENTAL STATUS EXAMINATION:    BP (!) 95/53   Pulse 82   Temp 98.1 °F (36.7 °C) (Oral)   Resp 13   Ht 5' 3\" (1.6 m)   Wt 143 lb (64.9 kg)   SpO2 98%   BMI 25.33 kg/m²     MOOD-is euthymic  Affect-is congruent  Patient feels helpless hopeless and worthless  Psychomotor activity : decreased. APPEARANCE:  Personal hygiene is fair  PSYCHOSIS:  Denies auditory or visual hallucinations. Denies paranoid delusions. ORIENTATION:  Oriented to time place and person. Recent and remote memory is grossly intact. Intelligence appears to be average  CONCENTRATION:  poor. SPEECH : goal directed , but slow . DIAGNOSIS:    Principal Problem:    Depression with suicidal ideation  Resolved Problems:    * No resolved hospital problems. *      LAB:    No results found for this or any previous visit (from the past 72 hour(s)).         TREATMENT PLAN:     OXcarbazepine  300 mg Oral BID    sertraline  50 mg Oral Daily     acetaminophen, hydrOXYzine, traZODone    Chart was reviewed the patient has been interviewed  Continue the same medications as prescribed above  Patient was discussed with the  and the treatment team.   Provided Supportive and insight-oriented psychotherapy psychotherapy  Recommended involvement in Unit milieu  Provided empathic listening, validation and support  Patient acknowledged and mutually decided to continue with current treatment plan  Discharge planning was discussed with the patient. Linda Green MD        This note was created with the assistance of a speech-recognition program.  Although the intention is to generate a document that actually reflects the content of the visit, no guarantees can be provided that every mistake has been identified and corrected by editing.

## 2020-03-23 VITALS
OXYGEN SATURATION: 98 % | TEMPERATURE: 97.3 F | WEIGHT: 143 LBS | RESPIRATION RATE: 14 BRPM | BODY MASS INDEX: 25.34 KG/M2 | DIASTOLIC BLOOD PRESSURE: 55 MMHG | SYSTOLIC BLOOD PRESSURE: 93 MMHG | HEART RATE: 113 BPM | HEIGHT: 63 IN

## 2020-03-23 PROCEDURE — 6370000000 HC RX 637 (ALT 250 FOR IP): Performed by: PSYCHIATRY & NEUROLOGY

## 2020-03-23 RX ADMIN — OXCARBAZEPINE 300 MG: 300 TABLET, FILM COATED ORAL at 10:17

## 2020-03-23 RX ADMIN — SERTRALINE HYDROCHLORIDE 50 MG: 50 TABLET ORAL at 10:17

## 2020-03-23 NOTE — PLAN OF CARE
Problem: Altered Mood, Depressive Behavior:  Goal: Able to verbalize and/or display a decrease in depressive symptoms  Description: Able to verbalize and/or display a decrease in depressive symptoms  3/22/2020 2252 by Munira Cole  Outcome: Ongoing  Note: Patient denies suicidal ideation at this time. Out in the milieu, social with peers. Cooperative. Compliant with all prescribed medications during this shift. Safety checks maintained q15min and irregular rounding. Problem: Altered Mood, Depressive Behavior:  Goal: Ability to disclose and discuss suicidal ideas will improve  Description: Ability to disclose and discuss suicidal ideas will improve  3/22/2020 2252 by Munira Cole  Outcome: Ongoing  Note: Pt denies thoughts of self harm and is agreeable to seeking out should thoughts of self harm arise. Safe environment maintained. Q15 minute checks for safety cont per unit policy. Will cont to monitor for safety and provides support and reassurance as needed.

## 2020-03-23 NOTE — GROUP NOTE
Group Therapy Note    Date: 3/23/2020    Group Start Time: 2030  Group End Time: 2130  Group Topic: Relaxation    [facility]    Tommy Canavan        Group Therapy Note    Attendees: 12/19         Patient's Goal:  Relaxation Group    Notes:  Socialization, activity, 1:1 talk time    Status After Intervention:  Improved    Participation Level:  Active Listener and Interactive    Participation Quality: Appropriate, Attentive, Sharing and Supportive      Speech:  normal      Thought Process/Content: Logical      Affective Functioning: Congruent      Mood: within normal limits      Level of consciousness:  Alert, Oriented x4 and Attentive      Response to Learning: Able to verbalize current knowledge/experience, Able to verbalize/acknowledge new learning, Able to retain information, Capable of insight and Progressing to goal      Endings: None Reported    Modes of Intervention: Support, Socialization and Activity      Discipline Responsible: [address], [facility]      Signature:  Tommy Canavan

## 2020-03-23 NOTE — PLAN OF CARE
Problem: Altered Mood, Depressive Behavior:  Goal: Ability to disclose and discuss suicidal ideas will improve  Description: Ability to disclose and discuss suicidal ideas will improve  3/23/2020 1051 by Elin Parker RN  Outcome: Ongoing  Note: Ms. Nafisa Hoyos adheres to her meds as prescribed. She denies suicidal ideation and thoughts of harm to self and others. Ms. Nafisa Hoyos denies a depressed or anxious mood. She is out in the milieu and social with her peers. Safety rounds maintained.

## 2020-03-23 NOTE — GROUP NOTE
Group Therapy Note    Date: 3/23/2020    Group Start Time: 0900  Group End Time: 2307  Group Topic: Community Meeting    FARHAD Collins Mace Day    patient refused to attend community meeting group at 0900 after encouragement from staff.   1:1 talk time provided as alternative to group session     Signature:  Lux Duron

## 2020-03-24 NOTE — DISCHARGE SUMMARY
Pepcid  Take 1 tablet by mouth 2 times daily  Notes to patient:  Reduce stomach acid        STOP taking these medications    ARIPiprazole 5 MG tablet  Commonly known as:  ABILIFY     atenolol 25 MG tablet  Commonly known as:  Tenormin     LORazepam 1 MG tablet  Commonly known as:  ATIVAN           Where to Get Your Medications      These medications [facility]  [address], [address]    Phone:  [phone]   · hydrOXYzine 25 MG tablet  · OXcarbazepine 300 MG tablet  · sertraline 50 MG tablet  · traZODone 50 MG tablet           Deni Rose MD

## 2020-03-25 ENCOUNTER — OFFICE VISIT (OUTPATIENT)
Dept: OBGYN CLINIC | Age: 20
End: 2020-03-25
Payer: MEDICAID

## 2020-03-25 VITALS
BODY MASS INDEX: 24.8 KG/M2 | WEIGHT: 140 LBS | HEIGHT: 63 IN | HEART RATE: 72 BPM | SYSTOLIC BLOOD PRESSURE: 98 MMHG | DIASTOLIC BLOOD PRESSURE: 60 MMHG

## 2020-03-25 PROCEDURE — 99212 OFFICE O/P EST SF 10 MIN: CPT | Performed by: OBSTETRICS & GYNECOLOGY

## 2020-03-25 NOTE — PROGRESS NOTES
Zaida Villafana  3/25/2020    YOB: 2000      VIA [facility] office     HPI:  Zaida Villafana is a 23 y.o. female      Patient complaining of thick stringy discharge, no odor, no irritation or skin rashes. States she has noticed a thicker mucous for the past 5 months on and off throughout the month. Patient is declining ANY cultures today. Would like to start OCP       OB History    Para Term  AB Living   1 1 1 0 0 1   SAB TAB Ectopic Molar Multiple Live Births   0 0 0 0 0 1      # Outcome Date GA Lbr South/2nd Weight Sex Delivery Anes PTL Lv   1 Term 19 38w6d 02::22 7 lb 11.3 oz (3.495 kg) M Vag-Spont EPI N TERESA      Name: Jae Garcia: 8  Apgar5: 9       Past Medical History:   Diagnosis Date    ADD (attention deficit disorder)     Anxiety     Asthma     CMV (cytomegalovirus infection) ([address]     Depression     Gestational diabetes mellitus (GDM), antepartum 2019    Hearing loss in left ear     Hypothyroidism 2019    Immunizations up to date in pediatric patient     Neurocardiogenic syncope     Pseudoseizures     Raynaud disease     Seizures ([address] 3/15/2020    SVT (supraventricular tachycardia) ([address]     Tachycardia     Saw Dr. Sudeep Escamilla 5 yrs ago\".   Echo and holter monitor done, neg results    Traumatic injury during pregnancy, third trimester     Wears glasses        Past Surgical History:   Procedure Laterality Date    REMOVAL FOREIGN BODY EAR Left     \"insect laid eggs\"    TYMPANOPLASTY Left 2016       Family History   Adopted: Yes   Problem Relation Age of Onset    Diabetes Mother         borderline diet controlled    Atrial Fibrillation Mother     Anxiety Disorder Mother     Depression Mother     Heart Disease Mother     Seizures Maternal Grandmother     COPD Maternal Grandmother     Hypertension Maternal Grandmother     Cancer Maternal Grandmother         ovarian    Heart Bimanual exam reveals normal uterus and adnexa. External genitalia: normal general appearance  Vaginal: normal mucosa without prolapse or lesions, normal without tenderness, induration or masses and normal rugae  Cervix: normal appearance  Adnexa: normal bimanual exam  Uterus: normal single, nontender    Diagnostics:  No results found. Lab Results:  Results for orders placed or performed during the hospital encounter of 03/15/20   Comprehensive Metabolic Panel w/ Reflex to MG   Result Value Ref Range    Glucose 89 70 - 99 mg/dL    BUN 14 6 - 20 mg/dL    CREATININE 0.62 0.50 - 0.90 mg/dL    Bun/Cre Ratio NOT REPORTED 9 - 20    Calcium 9.4 8.6 - 10.4 mg/dL    Sodium 138 135 - 144 mmol/L    Potassium 4.3 3.7 - 5.3 mmol/L    Chloride 102 98 - 107 mmol/L    CO2 26 20 - 31 mmol/L    Anion Gap 10 9 - 17 mmol/L    Alkaline Phosphatase 99 35 - 104 U/L    ALT 15 5 - 33 U/L    AST 16 <32 U/L    Total Bilirubin 0.25 (L) 0.3 - 1.2 mg/dL    Total Protein 7.4 6.4 - 8.3 g/dL    Alb 4.2 3.5 - 5.2 g/dL    Albumin/Globulin Ratio 1.3 1.0 - 2.5    GFR Non-African American  >60 mL/min     Pediatric GFR requires additional information. Refer to [facility] website for calculator.     GFR  NOT REPORTED >60 mL/min    GFR Comment          GFR Staging NOT REPORTED    CBC auto differential   Result Value Ref Range    WBC 5.2 4.5 - 13.5 k/uL    RBC 4.41 3.95 - 5.11 m/uL    Hemoglobin 13.9 11.9 - 15.1 g/dL    Hematocrit 41.7 36.3 - 47.1 %    MCV 94.6 82.6 - 102.9 fL    MCH 31.5 25.2 - 33.5 pg    MCHC 33.3 28.4 - 34.8 g/dL    RDW 12.8 11.8 - 14.4 %    Platelets 269 246 - 258 k/uL    MPV 8.8 8.1 - 13.5 fL    NRBC Automated 0.0 0.0 per 100 WBC    Differential Type NOT REPORTED     Seg Neutrophils 48 34 - 64 %    Lymphocytes 38 25 - 45 %    Monocytes 11 (H) 2 - 8 %    Eosinophils % 2 1 - 4 %    Basophils 1 0 - 2 %    Immature Granulocytes 0 0 %    Segs Absolute 2.53 1.80 - 8.00 k/uL    Absolute Lymph # 1.99 1.20 - 5.20 k/uL    Absolute
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
## 2020-04-03 ENCOUNTER — TELEMEDICINE (OUTPATIENT)
Dept: NEUROLOGY | Age: 20
End: 2020-04-03
Payer: MEDICAID

## 2020-04-03 PROCEDURE — 99214 OFFICE O/P EST MOD 30 MIN: CPT | Performed by: PSYCHIATRY & NEUROLOGY

## 2020-05-04 ENCOUNTER — PATIENT MESSAGE (OUTPATIENT)
Dept: INTERNAL MEDICINE CLINIC | Age: 20
End: 2020-05-04

## 2020-05-04 NOTE — TELEPHONE ENCOUNTER
From: Phyllis Layne  To: Catherine Colon PA-C  Sent: 5/4/2020 2:42 PM EDT  Subject: Non-Urgent Medical Question    Am I able to return to work if I had an upset stomach and headaches?

## 2020-05-09 ENCOUNTER — HOSPITAL ENCOUNTER (EMERGENCY)
Age: 20
Discharge: HOME | End: 2020-05-10
Attending: EMERGENCY MEDICINE
Payer: MEDICAID

## 2020-05-09 LAB
ABSOLUTE EOS #: 0 K/UL (ref 0–0.4)
ABSOLUTE IMMATURE GRANULOCYTE: ABNORMAL K/UL (ref 0–0.3)
ABSOLUTE LYMPH #: 1.7 K/UL (ref 1.2–5.2)
ABSOLUTE MONO #: 0.5 K/UL (ref 0.1–1.3)
ANION GAP SERPL CALCULATED.3IONS-SCNC: 13 MMOL/L (ref 9–17)
BASOPHILS # BLD: 1 % (ref 0–2)
BASOPHILS ABSOLUTE: 0 K/UL (ref 0–0.2)
BUN BLDV-MCNC: 8 MG/DL (ref 6–20)
BUN/CREAT BLD: NORMAL (ref 9–20)
CALCIUM SERPL-MCNC: 9.2 MG/DL (ref 8.6–10.4)
CHLORIDE BLD-SCNC: 104 MMOL/L (ref 98–107)
CO2: 22 MMOL/L (ref 20–31)
CREAT SERPL-MCNC: 0.62 MG/DL (ref 0.5–0.9)
DIFFERENTIAL TYPE: ABNORMAL
EOSINOPHILS RELATIVE PERCENT: 0 % (ref 0–4)
GFR AFRICAN AMERICAN: NORMAL ML/MIN
GFR NON-AFRICAN AMERICAN: NORMAL ML/MIN
GFR SERPL CREATININE-BSD FRML MDRD: NORMAL ML/MIN/{1.73_M2}
GFR SERPL CREATININE-BSD FRML MDRD: NORMAL ML/MIN/{1.73_M2}
GLUCOSE BLD-MCNC: 87 MG/DL (ref 70–99)
HCG QUANTITATIVE: <1 IU/L
HCT VFR BLD CALC: 41.7 % (ref 36–46)
HEMOGLOBIN: 14.1 G/DL (ref 12–16)
IMMATURE GRANULOCYTES: ABNORMAL %
LYMPHOCYTES # BLD: 34 % (ref 25–45)
MCH RBC QN AUTO: 31.2 PG (ref 26–34)
MCHC RBC AUTO-ENTMCNC: 33.9 G/DL (ref 31–37)
MCV RBC AUTO: 92.1 FL (ref 80–100)
MONOCYTES # BLD: 11 % (ref 2–8)
NRBC AUTOMATED: ABNORMAL PER 100 WBC
PDW BLD-RTO: 12.1 % (ref 11.5–14.9)
PLATELET # BLD: 271 K/UL (ref 150–450)
PLATELET ESTIMATE: ABNORMAL
PMV BLD AUTO: 7.5 FL (ref 6–12)
POTASSIUM SERPL-SCNC: 3.7 MMOL/L (ref 3.7–5.3)
RBC # BLD: 4.53 M/UL (ref 4–5.2)
RBC # BLD: ABNORMAL 10*6/UL
SEG NEUTROPHILS: 54 % (ref 34–64)
SEGMENTED NEUTROPHILS ABSOLUTE COUNT: 2.7 K/UL (ref 1.3–9.1)
SODIUM BLD-SCNC: 139 MMOL/L (ref 135–144)
WBC # BLD: 5 K/UL (ref 4.5–13.5)
WBC # BLD: ABNORMAL 10*3/UL

## 2020-05-09 PROCEDURE — 84702 CHORIONIC GONADOTROPIN TEST: CPT

## 2020-05-09 PROCEDURE — 99284 EMERGENCY DEPT VISIT MOD MDM: CPT

## 2020-05-09 PROCEDURE — 85025 COMPLETE CBC W/AUTO DIFF WBC: CPT

## 2020-05-09 PROCEDURE — 36415 COLL VENOUS BLD VENIPUNCTURE: CPT

## 2020-05-09 PROCEDURE — 6370000000 HC RX 637 (ALT 250 FOR IP): Performed by: EMERGENCY MEDICINE

## 2020-05-09 PROCEDURE — 80048 BASIC METABOLIC PNL TOTAL CA: CPT

## 2020-05-09 PROCEDURE — 93005 ELECTROCARDIOGRAM TRACING: CPT | Performed by: EMERGENCY MEDICINE

## 2020-05-09 PROCEDURE — 2580000003 HC RX 258: Performed by: EMERGENCY MEDICINE

## 2020-05-09 RX ORDER — 0.9 % SODIUM CHLORIDE 0.9 %
1000 INTRAVENOUS SOLUTION INTRAVENOUS ONCE
Status: COMPLETED | OUTPATIENT
Start: 2020-05-09 | End: 2020-05-09

## 2020-05-09 RX ORDER — ONDANSETRON 4 MG/1
4 TABLET, ORALLY DISINTEGRATING ORAL ONCE
Status: COMPLETED | OUTPATIENT
Start: 2020-05-09 | End: 2020-05-09

## 2020-05-09 RX ORDER — ACETAMINOPHEN 500 MG
1000 TABLET ORAL ONCE
Status: COMPLETED | OUTPATIENT
Start: 2020-05-09 | End: 2020-05-09

## 2020-05-09 RX ADMIN — ACETAMINOPHEN 1000 MG: 500 TABLET, FILM COATED ORAL at 23:04

## 2020-05-09 RX ADMIN — ONDANSETRON 4 MG: 4 TABLET, ORALLY DISINTEGRATING ORAL at 23:04

## 2020-05-09 RX ADMIN — SODIUM CHLORIDE 1000 ML: 9 INJECTION, SOLUTION INTRAVENOUS at 22:45

## 2020-05-09 ASSESSMENT — PAIN SCALES - GENERAL
PAINLEVEL_OUTOF10: 5
PAINLEVEL_OUTOF10: 3

## 2020-05-09 ASSESSMENT — PAIN DESCRIPTION - PAIN TYPE: TYPE: ACUTE PAIN

## 2020-05-09 ASSESSMENT — PAIN DESCRIPTION - LOCATION: LOCATION: ABDOMEN

## 2020-05-10 VITALS
HEIGHT: 63 IN | TEMPERATURE: 98.3 F | HEART RATE: 90 BPM | RESPIRATION RATE: 20 BRPM | SYSTOLIC BLOOD PRESSURE: 110 MMHG | WEIGHT: 140 LBS | DIASTOLIC BLOOD PRESSURE: 79 MMHG | OXYGEN SATURATION: 98 % | BODY MASS INDEX: 24.8 KG/M2

## 2020-05-10 ASSESSMENT — PAIN SCALES - WONG BAKER: WONGBAKER_NUMERICALRESPONSE: 0

## 2020-05-10 ASSESSMENT — PAIN SCALES - GENERAL: PAINLEVEL_OUTOF10: 0

## 2020-05-11 ASSESSMENT — ENCOUNTER SYMPTOMS
SINUS PAIN: 0
CHEST TIGHTNESS: 0
RHINORRHEA: 0
DIARRHEA: 0
VOMITING: 0
CONSTIPATION: 0
NAUSEA: 1
COUGH: 0
BACK PAIN: 0
ABDOMINAL PAIN: 0
EYE PAIN: 0
SHORTNESS OF BREATH: 0

## 2020-05-11 NOTE — ED PROVIDER NOTES
erythema or rash. Neurological:      Mental Status: She is alert and oriented to person, place, and time. Coordination: Coordination normal.      Comments: Patient is alert oriented x 4 with appropriate speech. No pronator drift. Bilateral upper and lower extremities with normal and symmetric strength, tone, and sensation. Normal coordination. Normal gait. Psychiatric:         Behavior: Behavior normal.         Thought Content: Thought content normal.         Judgment: Judgment normal.         DIFFERENTIAL DIAGNOSIS/IMPRESSION     DDX: pseudoseizures, dehydration, arrhythmia, electrolyte disturbance     Impression: 23 y.o. female who presents with increased pseudoseizures and dizziness. Patient has a h/o pseudoseizures but has increased number and has had associated mild headache which is typical of her normal symptoms. However she has also felt fatigued and slightly dizzy. Exam is unremakrbale with normal neuro exam.  Normal VSS. This is likely stress induced.   However, will check EKG to look for arrhythmia, basic labs and give 1 L of IVF and then re-eval.      DIAGNOSTIC RESULTS     EKG: All EKG's are interpreted by the Emergency Department Physician who either signs or Co-signs this chart in the absence of a cardiologist.    EKG Interpretation    Interpreted by emergency department physician    Rhythm: normal sinus  and sinus arrhythmia  Rate: normal  Axis: normal  Ectopy: none  Conduction: normal  ST Segments: no acute change  T Waves: no acute change  Q Waves: none    Clinical Impression: normal sinus with sinus arrhythmia with no acute changes when compared to EKG on 3/31/19    [address]: Pattie Diego reviewed by me and abnormal results are displayed above     Labs Reviewed   CBC WITH AUTO DIFFERENTIAL - Abnormal; Notable for the following components:       Result Value    Monocytes 11 (*)     All other components within normal limits   BASIC METABOLIC PANEL   HCG, QUANTITATIVE, PREGNANCY       RADIOLOGY: All plain film, CT, MRI, and formal ultrasound images (except ED bedside ultrasound) are read by the radiologist, see reports below, unless otherwise noted in ED Course, MDM or here. No results found. BEDSIDE ULTRASOUND:  Not clinically indicated at this time. ED COURSE      ED Medication Orders (From admission, onward)    Start Ordered     Status Ordering Provider    05/09/20 2315 05/09/20 2301  acetaminophen (TYLENOL) tablet 1,000 mg  ONCE      Last MAR action:  Given - by Dakota Ledezma on 05/09/20 at 2304 CELESTE MCGOVERN    05/09/20 2315 05/09/20 2301  ondansetron (ZOFRAN-ODT) disintegrating tablet 4 mg  ONCE      Last MAR action:  Given - by Dakota Ledezma on 05/09/20 at 2304 CELESTE MCGOVERN    05/09/20 2215 05/09/20 2210  0.9 % sodium chloride bolus  ONCE      Last MAR action:  Stopped - by Dakota Ledezma on 05/09/20 at 16591 QuangCELESTE deleon          EMERGENCYDEPARTMENT COURSE:    Patient requesting tylenol and nuasea meds. Labs unremarkable. Re-eval patient and she is feeling improved. Continues to remain stable. [id number] [address] Henriquez for d/c home. F/u with primary care. Return if any concerns arise. PPE:  Given that patient presented with viral like symptoms consistent with possible CoVID infection, proper PPE was donned. Patient was wearing mask at all time when I was in the room. I was wearing mask and goggles at all times when in the room including doorway and when > 6 feet away. During direct patient exam and when I was within 6 feet of the patient I was wearing hair net, procedural mask, goggles, gown, and double gloves. PROCEDURES:  None     CONSULTS:  None    CRITICAL CARE:  None      FINAL IMPRESSION      1.  Dizziness          DISPOSITION / PLAN     DISPOSITION Decision To Discharge 05/10/2020 12:36:10 AM      PATIENT REFERRED TO:  Valdemar Shaikh PA-C  [address] [place] [address]  [phone]    Schedule an appointment as soon as

## 2020-05-12 LAB
EKG ATRIAL RATE: 63 BPM
EKG P AXIS: 49 DEGREES
EKG P-R INTERVAL: 136 MS
EKG Q-T INTERVAL: 430 MS
EKG QRS DURATION: 92 MS
EKG QTC CALCULATION (BAZETT): 440 MS
EKG R AXIS: 69 DEGREES
EKG T AXIS: 49 DEGREES
EKG VENTRICULAR RATE: 63 BPM

## 2020-05-12 PROCEDURE — 93010 ELECTROCARDIOGRAM REPORT: CPT | Performed by: INTERNAL MEDICINE

## 2020-06-12 ENCOUNTER — HOSPITAL ENCOUNTER (EMERGENCY)
Age: 20
Discharge: HOME | End: 2020-06-12
Attending: EMERGENCY MEDICINE
Payer: MEDICAID

## 2020-06-12 VITALS
TEMPERATURE: 98.7 F | OXYGEN SATURATION: 99 % | RESPIRATION RATE: 17 BRPM | HEART RATE: 84 BPM | WEIGHT: 140 LBS | HEIGHT: 63 IN | BODY MASS INDEX: 24.8 KG/M2 | SYSTOLIC BLOOD PRESSURE: 104 MMHG | DIASTOLIC BLOOD PRESSURE: 66 MMHG

## 2020-06-12 LAB
ABSOLUTE EOS #: 0 K/UL (ref 0–0.4)
ABSOLUTE IMMATURE GRANULOCYTE: ABNORMAL K/UL (ref 0–0.3)
ABSOLUTE LYMPH #: 1.4 K/UL (ref 1.2–5.2)
ABSOLUTE MONO #: 0.5 K/UL (ref 0.1–1.3)
ANION GAP SERPL CALCULATED.3IONS-SCNC: 13 MMOL/L (ref 9–17)
BASOPHILS # BLD: 1 % (ref 0–2)
BASOPHILS ABSOLUTE: 0 K/UL (ref 0–0.2)
BUN BLDV-MCNC: 9 MG/DL (ref 6–20)
BUN/CREAT BLD: ABNORMAL (ref 9–20)
CALCIUM SERPL-MCNC: 9.4 MG/DL (ref 8.6–10.4)
CHLORIDE BLD-SCNC: 103 MMOL/L (ref 98–107)
CO2: 22 MMOL/L (ref 20–31)
CREAT SERPL-MCNC: 0.55 MG/DL (ref 0.5–0.9)
DIFFERENTIAL TYPE: ABNORMAL
EOSINOPHILS RELATIVE PERCENT: 1 % (ref 0–4)
GFR AFRICAN AMERICAN: >60 ML/MIN
GFR NON-AFRICAN AMERICAN: >60 ML/MIN
GFR SERPL CREATININE-BSD FRML MDRD: ABNORMAL ML/MIN/{1.73_M2}
GFR SERPL CREATININE-BSD FRML MDRD: ABNORMAL ML/MIN/{1.73_M2}
GLUCOSE BLD-MCNC: 108 MG/DL (ref 70–99)
HCT VFR BLD CALC: 42 % (ref 36–46)
HEMOGLOBIN: 14 G/DL (ref 12–16)
IMMATURE GRANULOCYTES: ABNORMAL %
LYMPHOCYTES # BLD: 26 % (ref 25–45)
MAGNESIUM: 2.1 MG/DL (ref 1.6–2.6)
MCH RBC QN AUTO: 30.7 PG (ref 26–34)
MCHC RBC AUTO-ENTMCNC: 33.4 G/DL (ref 31–37)
MCV RBC AUTO: 91.8 FL (ref 80–100)
MONOCYTES # BLD: 10 % (ref 2–8)
NRBC AUTOMATED: ABNORMAL PER 100 WBC
PDW BLD-RTO: 12.8 % (ref 11.5–14.9)
PLATELET # BLD: 263 K/UL (ref 150–450)
PLATELET ESTIMATE: ABNORMAL
PMV BLD AUTO: 7.4 FL (ref 6–12)
POTASSIUM SERPL-SCNC: 3.7 MMOL/L (ref 3.7–5.3)
RBC # BLD: 4.57 M/UL (ref 4–5.2)
RBC # BLD: ABNORMAL 10*6/UL
SEG NEUTROPHILS: 62 % (ref 34–64)
SEGMENTED NEUTROPHILS ABSOLUTE COUNT: 3.2 K/UL (ref 1.3–9.1)
SODIUM BLD-SCNC: 138 MMOL/L (ref 135–144)
WBC # BLD: 5.1 K/UL (ref 4.5–13.5)
WBC # BLD: ABNORMAL 10*3/UL

## 2020-06-12 PROCEDURE — 99284 EMERGENCY DEPT VISIT MOD MDM: CPT

## 2020-06-12 PROCEDURE — 80048 BASIC METABOLIC PNL TOTAL CA: CPT

## 2020-06-12 PROCEDURE — 83735 ASSAY OF MAGNESIUM: CPT

## 2020-06-12 PROCEDURE — 80183 DRUG SCRN QUANT OXCARBAZEPIN: CPT

## 2020-06-12 PROCEDURE — 85025 COMPLETE CBC W/AUTO DIFF WBC: CPT

## 2020-06-12 PROCEDURE — 36415 COLL VENOUS BLD VENIPUNCTURE: CPT

## 2020-06-12 ASSESSMENT — ENCOUNTER SYMPTOMS
CONSTIPATION: 0
BLOOD IN STOOL: 0
COLOR CHANGE: 0
RHINORRHEA: 0
EYE REDNESS: 0
WHEEZING: 0
BACK PAIN: 0
SINUS PRESSURE: 0
VOMITING: 0
SORE THROAT: 0
TROUBLE SWALLOWING: 0
DIARRHEA: 0
NAUSEA: 0
CHEST TIGHTNESS: 0
FACIAL SWELLING: 0
COUGH: 0
EYE DISCHARGE: 0
EYE PAIN: 0
ABDOMINAL PAIN: 0
SHORTNESS OF BREATH: 0
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
## 2020-06-12 NOTE — ED PROVIDER NOTES
[facility]  [phone]    If symptoms worsen      DISCHARGEMEDICATIONS:  Current Discharge Medication List          (Please note that portions of this note were completed with a voice recognition program.  Efforts were made to edit thedictations but occasionally words are mis-transcribed.)    Kim Walker MD  Attending Emergency Physician                        Kim Walker MD  06/12/20 2002
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
## 2020-06-16 LAB — OXCARBAZEPINE: 10 UG/ML (ref 3–35)

## 2020-06-21 ENCOUNTER — APPOINTMENT (OUTPATIENT)
Dept: GENERAL RADIOLOGY | Age: 20
End: 2020-06-21
Payer: MEDICAID

## 2020-06-21 ENCOUNTER — HOSPITAL ENCOUNTER (EMERGENCY)
Age: 20
Discharge: HOME | End: 2020-06-21
Attending: EMERGENCY MEDICINE
Payer: MEDICAID

## 2020-06-21 VITALS
TEMPERATURE: 98.4 F | RESPIRATION RATE: 17 BRPM | SYSTOLIC BLOOD PRESSURE: 107 MMHG | DIASTOLIC BLOOD PRESSURE: 69 MMHG | WEIGHT: 140 LBS | BODY MASS INDEX: 24.8 KG/M2 | OXYGEN SATURATION: 98 % | HEART RATE: 106 BPM

## 2020-06-21 LAB
ABSOLUTE EOS #: 0.1 K/UL (ref 0–0.44)
ABSOLUTE IMMATURE GRANULOCYTE: <0.03 K/UL (ref 0–0.3)
ABSOLUTE LYMPH #: 1.8 K/UL (ref 1.2–5.2)
ABSOLUTE MONO #: 0.62 K/UL (ref 0.1–1.4)
ANION GAP SERPL CALCULATED.3IONS-SCNC: 18 MMOL/L (ref 9–17)
BASOPHILS # BLD: 1 % (ref 0–2)
BASOPHILS ABSOLUTE: 0.04 K/UL (ref 0–0.2)
BUN BLDV-MCNC: 7 MG/DL (ref 6–20)
BUN/CREAT BLD: ABNORMAL (ref 9–20)
CALCIUM SERPL-MCNC: 8.4 MG/DL (ref 8.6–10.4)
CHLORIDE BLD-SCNC: 106 MMOL/L (ref 98–107)
CO2: 15 MMOL/L (ref 20–31)
CREAT SERPL-MCNC: 0.61 MG/DL (ref 0.5–0.9)
DIFFERENTIAL TYPE: ABNORMAL
EOSINOPHILS RELATIVE PERCENT: 2 % (ref 1–4)
GFR AFRICAN AMERICAN: >60 ML/MIN
GFR NON-AFRICAN AMERICAN: >60 ML/MIN
GFR SERPL CREATININE-BSD FRML MDRD: ABNORMAL ML/MIN/{1.73_M2}
GFR SERPL CREATININE-BSD FRML MDRD: ABNORMAL ML/MIN/{1.73_M2}
GLUCOSE BLD-MCNC: 79 MG/DL (ref 70–99)
HCG QUALITATIVE: NEGATIVE
HCT VFR BLD CALC: 42.6 % (ref 36.3–47.1)
HEMOGLOBIN: 14 G/DL (ref 11.9–15.1)
IMMATURE GRANULOCYTES: 0 %
LYMPHOCYTES # BLD: 29 % (ref 25–45)
MCH RBC QN AUTO: 31.2 PG (ref 25.2–33.5)
MCHC RBC AUTO-ENTMCNC: 32.9 G/DL (ref 28.4–34.8)
MCV RBC AUTO: 94.9 FL (ref 82.6–102.9)
MONOCYTES # BLD: 10 % (ref 2–8)
NRBC AUTOMATED: 0 PER 100 WBC
PDW BLD-RTO: 12.6 % (ref 11.8–14.4)
PLATELET # BLD: 266 K/UL (ref 138–453)
PLATELET ESTIMATE: ABNORMAL
PMV BLD AUTO: 9.6 FL (ref 8.1–13.5)
POTASSIUM SERPL-SCNC: 3.9 MMOL/L (ref 3.7–5.3)
RBC # BLD: 4.49 M/UL (ref 3.95–5.11)
RBC # BLD: ABNORMAL 10*6/UL
SEG NEUTROPHILS: 58 % (ref 34–64)
SEGMENTED NEUTROPHILS ABSOLUTE COUNT: 3.75 K/UL (ref 1.8–8)
SODIUM BLD-SCNC: 139 MMOL/L (ref 135–144)
WBC # BLD: 6.3 K/UL (ref 4.5–13.5)
WBC # BLD: ABNORMAL 10*3/UL

## 2020-06-21 PROCEDURE — 84703 CHORIONIC GONADOTROPIN ASSAY: CPT

## 2020-06-21 PROCEDURE — 85025 COMPLETE CBC W/AUTO DIFF WBC: CPT

## 2020-06-21 PROCEDURE — 80183 DRUG SCRN QUANT OXCARBAZEPIN: CPT

## 2020-06-21 PROCEDURE — 2580000003 HC RX 258: Performed by: EMERGENCY MEDICINE

## 2020-06-21 PROCEDURE — 80048 BASIC METABOLIC PNL TOTAL CA: CPT

## 2020-06-21 PROCEDURE — 99284 EMERGENCY DEPT VISIT MOD MDM: CPT

## 2020-06-21 PROCEDURE — 73610 X-RAY EXAM OF ANKLE: CPT

## 2020-06-21 PROCEDURE — 6360000002 HC RX W HCPCS: Performed by: EMERGENCY MEDICINE

## 2020-06-21 PROCEDURE — 96374 THER/PROPH/DIAG INJ IV PUSH: CPT

## 2020-06-21 RX ORDER — 0.9 % SODIUM CHLORIDE 0.9 %
1000 INTRAVENOUS SOLUTION INTRAVENOUS ONCE
Status: COMPLETED | OUTPATIENT
Start: 2020-06-21 | End: 2020-06-21

## 2020-06-21 RX ORDER — ONDANSETRON 2 MG/ML
4 INJECTION INTRAMUSCULAR; INTRAVENOUS ONCE
Status: COMPLETED | OUTPATIENT
Start: 2020-06-21 | End: 2020-06-21

## 2020-06-21 RX ADMIN — ONDANSETRON 4 MG: 2 INJECTION, SOLUTION INTRAMUSCULAR; INTRAVENOUS at 16:30

## 2020-06-21 RX ADMIN — SODIUM CHLORIDE 1000 ML: 9 INJECTION, SOLUTION INTRAVENOUS at 16:29

## 2020-06-21 ASSESSMENT — ENCOUNTER SYMPTOMS
NAUSEA: 1
TROUBLE SWALLOWING: 0
BLOOD IN STOOL: 0
VOMITING: 0
ABDOMINAL PAIN: 0
CONSTIPATION: 0
BACK PAIN: 0
SHORTNESS OF BREATH: 0

## 2020-06-21 ASSESSMENT — PAIN DESCRIPTION - ONSET: ONSET: SUDDEN

## 2020-06-21 ASSESSMENT — PAIN DESCRIPTION - DESCRIPTORS: DESCRIPTORS: ACHING

## 2020-06-21 ASSESSMENT — PAIN DESCRIPTION - LOCATION: LOCATION: FOOT

## 2020-06-21 ASSESSMENT — PAIN DESCRIPTION - ORIENTATION: ORIENTATION: RIGHT

## 2020-06-21 ASSESSMENT — PAIN DESCRIPTION - FREQUENCY: FREQUENCY: CONTINUOUS

## 2020-06-21 ASSESSMENT — PAIN DESCRIPTION - PAIN TYPE: TYPE: ACUTE PAIN

## 2020-06-21 ASSESSMENT — PAIN SCALES - GENERAL: PAINLEVEL_OUTOF10: 5
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
## 2020-06-21 NOTE — ED PROVIDER NOTES
diabetes mellitus (GDM), antepartum, Hearing loss in left ear, Hypothyroidism, Immunizations up to date in pediatric patient, Neurocardiogenic syncope, Pseudoseizures, Raynaud disease, Seizures ([address] SVT ([address]raventricular tachycardia) [address] Tachycardia, Traumatic injury during pregnancy, third trimester, and Wears glasses. has a past surgical history that includes REMOVAL FOREIGN BODY EAR (Left) and Tympanoplasty (Left, 06/24/2016).     Social History     Socioeconomic History    Marital status: Single     Spouse name: Not on file    Number of children: Not on file    Years of education: Not on file    Highest education level: Not on file   Occupational History    Not on file   Social Needs    Financial resource strain: Not on file    Food insecurity     Worry: Not on file     Inability: Not on file    Transportation needs     Medical: Not on file     Non-medical: Not on file   Tobacco Use    Smoking status: Current Every Day Smoker     Packs/day: 0.50     Types: Cigars    Smokeless tobacco: Never Used    Tobacco comment: 1-2 black and milds daily    Substance and Sexual Activity    Alcohol use: No    Drug use: Not Currently     Types: Marijuana     Comment: monthly     Sexual activity: Yes     Partners: Male     Birth control/protection: Condom   Lifestyle    Physical activity     Days per week: Not on file     Minutes per session: Not on file    Stress: Not on file   Relationships    Social connections     Talks on phone: Not on file     Gets together: Not on file     Attends Orthodox service: Not on file     Active member of club or organization: Not on file     Attends meetings of clubs or organizations: Not on file     Relationship status: Not on file    Intimate partner violence     Fear of current or ex partner: Not on file     Emotionally abused: Not on file     Physically abused: Not on file     Forced sexual activity: Not on file   Other Topics Concern    Not on file   Social for back pain and neck pain. Skin: Positive for wound. Negative for rash. Neurological: Positive for seizures and syncope. Negative for weakness. Psychiatric/Behavioral: Negative for confusion. PHYSICAL EXAM   (up to 7 for level 4, 8 or more for level 5)      INITIAL VITALS:   /69   Pulse 106   Temp 98.4 °F (36.9 °C)   Resp 17   Wt 140 lb (63.5 kg)   SpO2 98%   BMI 24.80 kg/m²     Physical Exam  Vitals signs and nursing note reviewed. Constitutional:       General: She is not in acute distress. Appearance: She is diaphoretic. She is not toxic-appearing. Comments: Disheveled   HENT:      Head: Normocephalic and atraumatic. Right Ear: Tympanic membrane normal.      Left Ear: Tympanic membrane normal.      Mouth/Throat:      Mouth: Mucous membranes are moist.      Pharynx: Oropharynx is clear. Eyes:      General: No visual field deficit or scleral icterus. Extraocular Movements: Extraocular movements intact. Pupils: Pupils are equal, round, and reactive to light. Neck:      Musculoskeletal: Normal range of motion and neck supple. No neck rigidity. Comments: Very superficial abrasion to the neck, no carotid bruit no expanding hematoma no dysphonia  Cardiovascular:      Rate and Rhythm: Regular rhythm. Tachycardia present. Pulses: Normal pulses. Pulmonary:      Effort: Pulmonary effort is normal. No respiratory distress. Breath sounds: Normal breath sounds. Abdominal:      General: There is no distension. Palpations: Abdomen is soft. Tenderness: There is no abdominal tenderness. There is no right CVA tenderness, left CVA tenderness, guarding or rebound. Musculoskeletal: Normal range of motion. General: No deformity. Right ankle: Tenderness. Lateral malleolus and medial malleolus tenderness found. No head of 5th metatarsal and no proximal fibula tenderness found.  Achilles tendon normal. Achilles tendon exhibits normal

## 2020-06-21 NOTE — ED NOTES
Met with pt at bedside who reports ongoing family problems, states that she called off work today and her family insists that she is faking not feeling well. Pt states that her family consistently makes her feel bad about her mental illness. Pt is forthcoming with the fact that she is not taking her medications as prescribed (Lithium), but states that she has been taking her Trileptal. Pt states that she has her first appointment with her therapist tomorrow via teleconferencing and she is looking forward to that. Pt denies any current active suicidal or homicidal ideation and does not want to be admitted for inpatient treatment. Pt states that her boyfriend and his mother are here and she feels safe with them. Pt is able to contract for safety going home with them and reports that the pocketknife to her throat was an attention seeking behavior. Pt was able to identify that music is a good coping mechanism for her and she can utilize this to decrease her depression. Pt states that she begins to feel depressed and then experiences anxiety which she cannot control. Pt does have a child that medical records indicate lives with his biological father, unknown if pt has visitation or why their shared custody agreement ended, pt did not volunteer this information to this writer. Spoke with pt's boyfriend and his mother re: plan of care and pt's safety. Both parties agreed that pt was safe to stay with them and report that her mother does not want her back anyway so she would have nowhere else to stay. Pt works full time at [employer] on morning and afternoon shifts and has no issues with transportation. Will reevaluate when ready for discharge, Marci Ozuna does suspect some aspects of borderline personality disorder due to documentation and self report of high risk sexual behaviors, turbulent relationship issues, history of self injury and presentation of attention seeking behavior.  Pt would benefit from individual therapy

## 2020-06-21 NOTE — ED PROVIDER NOTES
[address] [facility]     Emergency Department     Faculty Note/ Attestation      Pt Name: Ra Vega                                       MRN: [id number]  [place] 2000  Date of evaluation: 6/21/2020    Patients PCP:    Eleuterio Casiano PA-C      Attestation  I performed a history and physical examination of the patient and discussed management with the resident. I reviewed the residents note and agree with the documented findings and plan of care. Any areas of disagreement are noted on the chart. I was personally present for the key portions of any procedures. I have documented in the chart those procedures where I was not present during the key portions. I have reviewed the emergency nurses triage note. I agree with the chief complaint, past medical history, past surgical history, allergies, medications, social and family history as documented unless otherwise noted below. For Physician Assistant/ Nurse Practitioner cases/documentation I have personally evaluated this patient and have completed at least one if not all key elements of the E/M (history, physical exam, and MDM). Additional findings are as noted.       Initial Screens:             Vitals:    Vitals:    06/21/20 1517 06/21/20 1525   BP: 107/69    Pulse: 106    Resp: 17    Temp:  98.4 °F (36.9 °C)   TempSrc: Oral    SpO2: 98%    Weight: 140 lb (63.5 kg)        CHIEF COMPLAINT       Chief Complaint   Patient presents with    Seizures             DIAGNOSTIC RESULTS             RADIOLOGY:   XR ANKLE RIGHT (MIN 3 VIEWS)    (Results Pending)         LABS:  Labs Reviewed   CBC WITH AUTO DIFFERENTIAL   BASIC METABOLIC PANEL   HCG, SERUM, QUALITATIVE         EMERGENCY DEPARTMENT COURSE:     -------------------------  BP: 107/69, Temp: 98.4 °F (36.9 °C), Pulse: 106, Resp: 16      Comments    25-year-old female with a history of pseudoseizures and psychiatric distress, was drinking alcohol last night and has not been taking her

## 2020-06-23 ENCOUNTER — HOSPITAL ENCOUNTER (EMERGENCY)
Age: 20
Discharge: HOME | End: 2020-06-23
Attending: EMERGENCY MEDICINE
Payer: MEDICAID

## 2020-06-23 VITALS
HEART RATE: 93 BPM | TEMPERATURE: 98.8 F | RESPIRATION RATE: 16 BRPM | OXYGEN SATURATION: 100 % | HEIGHT: 63 IN | WEIGHT: 145 LBS | BODY MASS INDEX: 25.69 KG/M2 | SYSTOLIC BLOOD PRESSURE: 117 MMHG | DIASTOLIC BLOOD PRESSURE: 63 MMHG

## 2020-06-23 LAB
-: NORMAL
AMORPHOUS: NORMAL
BACTERIA: NORMAL
BILIRUBIN URINE: NEGATIVE
CASTS UA: NORMAL /LPF
COLOR: YELLOW
COMMENT UA: ABNORMAL
CRYSTALS, UA: NORMAL /HPF
EPITHELIAL CELLS UA: NORMAL /HPF
GLUCOSE URINE: NEGATIVE
HCG(URINE) PREGNANCY TEST: NEGATIVE
KETONES, URINE: NEGATIVE
LEUKOCYTE ESTERASE, URINE: ABNORMAL
MUCUS: NORMAL
NITRITE, URINE: NEGATIVE
OTHER OBSERVATIONS UA: NORMAL
PH UA: 6 (ref 5–8)
PROTEIN UA: ABNORMAL
RBC UA: NORMAL /HPF
RENAL EPITHELIAL, UA: NORMAL /HPF
SPECIFIC GRAVITY UA: 1.02 (ref 1–1.03)
TRICHOMONAS: NORMAL
TURBIDITY: ABNORMAL
URINE HGB: ABNORMAL
UROBILINOGEN, URINE: NORMAL
WBC UA: NORMAL /HPF
YEAST: NORMAL

## 2020-06-23 PROCEDURE — 99283 EMERGENCY DEPT VISIT LOW MDM: CPT

## 2020-06-23 PROCEDURE — 87086 URINE CULTURE/COLONY COUNT: CPT

## 2020-06-23 PROCEDURE — 87591 N.GONORRHOEAE DNA AMP PROB: CPT

## 2020-06-23 PROCEDURE — 87088 URINE BACTERIA CULTURE: CPT

## 2020-06-23 PROCEDURE — 6370000000 HC RX 637 (ALT 250 FOR IP): Performed by: STUDENT IN AN ORGANIZED HEALTH CARE EDUCATION/TRAINING PROGRAM

## 2020-06-23 PROCEDURE — 87491 CHLMYD TRACH DNA AMP PROBE: CPT

## 2020-06-23 PROCEDURE — 81001 URINALYSIS AUTO W/SCOPE: CPT

## 2020-06-23 PROCEDURE — 81025 URINE PREGNANCY TEST: CPT

## 2020-06-23 PROCEDURE — 87186 SC STD MICRODIL/AGAR DIL: CPT

## 2020-06-23 RX ORDER — ACETAMINOPHEN 500 MG
1000 TABLET ORAL EVERY 6 HOURS PRN
Qty: 30 TABLET | Refills: 0 | Status: SHIPPED | OUTPATIENT
Start: 2020-06-23 | End: 2020-12-16

## 2020-06-23 RX ORDER — IBUPROFEN 600 MG/1
600 TABLET ORAL EVERY 6 HOURS PRN
Qty: 15 TABLET | Refills: 0 | Status: SHIPPED | OUTPATIENT
Start: 2020-06-23 | End: 2020-12-16

## 2020-06-23 RX ORDER — CEPHALEXIN 250 MG/1
500 CAPSULE ORAL ONCE
Status: COMPLETED | OUTPATIENT
Start: 2020-06-23 | End: 2020-06-23

## 2020-06-23 RX ORDER — CEPHALEXIN 500 MG/1
500 CAPSULE ORAL 2 TIMES DAILY
Qty: 14 CAPSULE | Refills: 0 | Status: SHIPPED | OUTPATIENT
Start: 2020-06-23 | End: 2020-06-30

## 2020-06-23 RX ADMIN — CEPHALEXIN 500 MG: 250 CAPSULE ORAL at 17:57

## 2020-06-23 ASSESSMENT — ENCOUNTER SYMPTOMS
ABDOMINAL PAIN: 0
EYE REDNESS: 0
CHEST TIGHTNESS: 0
SHORTNESS OF BREATH: 0
EYE DISCHARGE: 0

## 2020-06-23 NOTE — ED PROVIDER NOTES
[address] [facility] ED  Emergency Department  Faculty Attestation       I performed a history and physical examination of the patient and discussed management with the resident. I reviewed the residents note and agree with the documented findings including all diagnostic interpretations and plan of care. Any areas of disagreement are noted on the chart. I was personally present for the key portions of any procedures. I have documented in the chart those procedures where I was not present during the key portions. I have reviewed the emergency nurses triage note. I agree with the chief complaint, past medical history, past surgical history, allergies, medications, social and family history as documented unless otherwise noted below. Documentation of the HPI, Physical Exam and Medical Decision Making performed by [email or web address] is based on my personal performance of the HPI, PE and MDM. For Physician Assistant/ Nurse Practitioner cases/documentation I have personally evaluated this patient and have completed at least one if not all key elements of the E/M (history, physical exam, and MDM). Additional findings are as noted. Pertinent Comments     Primary Care Physician: Leanne Rosas PA-C    ED Triage Vitals [06/23/20 1604]   BP Temp Temp Source Pulse Resp SpO2 Height Weight   117/63 98.8 °F (37.1 °C) Oral 93 16 100 % 5' 3\" (1.6 m) 145 lb (65.8 kg)        History/Physical: This is a 21 y.o. female who presents to the Emergency Department with complaint of dysuria and increased urinary frequency. Also has a sore throat. On exam patient has some mild suprapubic tenderness, no CVA tenderness. Posterior oropharynx with no erythema or exudate. Controlling secretions well. No cervical lymphadenopathy. MDM/Plan: Urinary symptoms, likely UTI. Will check urine. Also playing a sore throat, but no clinical signs or symptoms of bacterial pharyngitis, therefore no indication for swab at this time.   Likely plan for discharge home. PPE:  Patient was screened and has no clinical signs or symptoms of a CoVID-19 infection at this time. However, given current pandemic and atypical presentations, face mask, eye protection, and gloves were worn during examination.     CRITICAL CARE: None     Lisette Stovall MD  Attending Emergency Physician        Lisette Stovall MD  06/23/20 9983

## 2020-06-24 LAB
C. TRACHOMATIS DNA ,URINE: NEGATIVE
N. GONORRHOEAE DNA, URINE: NEGATIVE
SPECIMEN DESCRIPTION: NORMAL

## 2020-06-24 NOTE — ED PROVIDER NOTES
She is well-developed. HENT:      Head: Normocephalic and atraumatic. Nose: Nose normal.      Mouth/Throat:      Mouth: Mucous membranes are moist.   Eyes:      General: No scleral icterus. Conjunctiva/sclera: Conjunctivae normal.      Pupils: Pupils are equal, round, and reactive to light. Neck:      Musculoskeletal: Neck supple. Trachea: No tracheal deviation. Cardiovascular:      Rate and Rhythm: Normal rate and regular rhythm. Heart sounds: Normal heart sounds. No murmur. No friction rub. No gallop. Pulmonary:      Effort: Pulmonary effort is normal. No respiratory distress. Breath sounds: Normal breath sounds. No wheezing or rales. Abdominal:      General: Bowel sounds are normal. There is no distension. Palpations: Abdomen is soft. There is no mass. Tenderness: There is abdominal tenderness. There is no guarding or rebound. Comments: Mild suprapubic tenderness, no guarding no masses no rebound   Musculoskeletal: Normal range of motion. Skin:     General: Skin is warm and dry. Findings: No erythema or rash. Neurological:      Mental Status: She is alert and oriented to person, place, and time.    Psychiatric:         Behavior: Behavior normal.         DIFFERENTIAL  DIAGNOSIS     PLAN (LABS / IMAGING / EKG):  Orders Placed This Encounter   Procedures    C.trachomatis N.gonorrhoeae DNA, Urine    Culture, Urine    Urinalysis Reflex to Culture    Pregnancy, Urine    Microscopic Urinalysis       MEDICATIONS ORDERED:  Orders Placed This Encounter   Medications    cephALEXin (KEFLEX) capsule 500 mg    ibuprofen (ADVIL;MOTRIN) 600 MG tablet     Sig: Take 1 tablet by mouth every 6 hours as needed for Pain     Dispense:  15 tablet     Refill:  0    acetaminophen (APAP EXTRA STRENGTH) 500 MG tablet     Sig: Take 2 tablets by mouth every 6 hours as needed for Pain     Dispense:  30 tablet     Refill:  0    cephALEXin (KEFLEX) 500 MG capsule     Sig: Take 1 capsule by mouth 2 times daily for 7 days     Dispense:  14 capsule     Refill:  0       DDX: UTI versus STD versus pregnancy    Initial MDM/Plan: 21 y.o. female who presents with suprapubic abdominal pain, hematuria, dysuria, increased frequency. Will get urinalysis, pregnancy, GC chlamydia. Anticipate discharge. DIAGNOSTIC RESULTS / EMERGENCY DEPARTMENT COURSE / MDM     LABS:  Labs Reviewed   URINE RT REFLEX TO CULTURE - Abnormal; Notable for the following components:       Result Value    Turbidity UA TURBID (*)     Urine Hgb LARGE (*)     Protein, UA 1+ (*)     Leukocyte Esterase, Urine LARGE (*)     All other components within normal limits   C.TRACHOMATIS N.GONORRHOEAE DNA, URINE   CULTURE, URINE   PREGNANCY, URINE   MICROSCOPIC URINALYSIS         RADIOLOGY:  No results found. EMERGENCY DEPARTMENT COURSE:  ED Course as of Jun 23 2228   Tue Jun 23, 2020   1737 Leukocyte Esterase, Urine(!): LARGE [MS]   1737 WBC, UA: 10 TO 20 [MS]      ED Course User Index  [MS] Carissa Ricardo, DO     Large leukoesterase and 10-20 white cells on a good urine sample. Will treat with Keflex for 7 days. GC chlamydia testing sent. [facility] and that if she has positive she will need further treatment. Requesting HIV and hepatitis testing did discuss with her that she needs to follow-up with the public health department for this. Understanding agreeable to plan. Encouraged to return emergency department for any worsening signs or symptoms. · Based on the low acuity of concerning symptoms and improvement of symptoms, patient will be discharged with follow up and prescription information listed in the Disposition section. · Patient states they will follow-up with primary care physician and/or return to the emergency department should they experience a change or worsening of symptoms.   · Patient will be discharged with resources: summary of visit, instructions, follow-up information, prescriptions if necessary and clinics available. · Patient/ family instructed to read discharge paperwork. All of their questions and concerns were addressed. · Suspicion for any acute life-threatening processes is low. Patient voices understanding of plan. PROCEDURES:  None    CONSULTS:  None    CRITICAL CARE:  Please see attending note    FINAL IMPRESSION      1.  Acute UTI (urinary tract infection)          DISPOSITION / PLAN     DISPOSITION Decision To Discharge 06/23/2020 05:52:21 PM        PATIENTREFERRED TO:  Lulú Pal PA-C  [address] [place] [address]  [phone]    Schedule an appointment as soon as possible for a visit in 1 week  Follow up within 1 week, Return to ED sooner if symptoms worsen,       DISCHARGE MEDICATIONS:  Discharge Medication List as of 6/23/2020  5:52 PM      START taking these medications    Details   ibuprofen (ADVIL;MOTRIN) 600 MG tablet Take 1 tablet by mouth every 6 hours as needed for Pain, Disp-15 tablet, R-0Print      acetaminophen (APAP EXTRA STRENGTH) 500 MG tablet Take 2 tablets by mouth every 6 hours as needed for Pain, Disp-30 tablet, R-0Print      cephALEXin (KEFLEX) 500 MG capsule Take 1 capsule by mouth 2 times daily for 7 days, Disp-14 capsule, R-0Print             Guru Mejia DO  EmergencyMedicine Resident    (Please note that portions of this note were completed with a voice recognition program.  Efforts were made to edit the dictations but occasionally words are mis-transcribed.)       Guru Mejia DO  Resident  06/23/20 0150

## 2020-06-25 LAB — OXCARBAZEPINE: <1 UG/ML (ref 3–35)

## 2020-06-26 LAB
CULTURE: ABNORMAL
Lab: ABNORMAL
SPECIMEN DESCRIPTION: ABNORMAL

## 2020-06-30 ENCOUNTER — HOSPITAL ENCOUNTER (EMERGENCY)
Age: 20
Discharge: HOME | End: 2020-06-30
Attending: EMERGENCY MEDICINE
Payer: MEDICAID

## 2020-06-30 VITALS
TEMPERATURE: 98.8 F | HEART RATE: 87 BPM | DIASTOLIC BLOOD PRESSURE: 85 MMHG | OXYGEN SATURATION: 99 % | HEIGHT: 62 IN | SYSTOLIC BLOOD PRESSURE: 102 MMHG | RESPIRATION RATE: 14 BRPM | BODY MASS INDEX: 26.68 KG/M2 | WEIGHT: 145 LBS

## 2020-06-30 LAB
ABSOLUTE EOS #: 0.1 K/UL (ref 0–0.4)
ABSOLUTE IMMATURE GRANULOCYTE: ABNORMAL K/UL (ref 0–0.3)
ABSOLUTE LYMPH #: 1.1 K/UL (ref 1.2–5.2)
ABSOLUTE MONO #: 0.5 K/UL (ref 0.1–1.3)
ALBUMIN SERPL-MCNC: 4 G/DL (ref 3.5–5.2)
ALBUMIN/GLOBULIN RATIO: ABNORMAL (ref 1–2.5)
ALP BLD-CCNC: 88 U/L (ref 35–104)
ALT SERPL-CCNC: 10 U/L (ref 5–33)
ANION GAP SERPL CALCULATED.3IONS-SCNC: 11 MMOL/L (ref 9–17)
AST SERPL-CCNC: 13 U/L
BASOPHILS # BLD: 1 % (ref 0–2)
BASOPHILS ABSOLUTE: 0 K/UL (ref 0–0.2)
BILIRUB SERPL-MCNC: 0.25 MG/DL (ref 0.3–1.2)
BUN BLDV-MCNC: 9 MG/DL (ref 6–20)
BUN/CREAT BLD: ABNORMAL (ref 9–20)
CALCIUM SERPL-MCNC: 9 MG/DL (ref 8.6–10.4)
CHLORIDE BLD-SCNC: 103 MMOL/L (ref 98–107)
CO2: 22 MMOL/L (ref 20–31)
CREAT SERPL-MCNC: 0.7 MG/DL (ref 0.5–0.9)
DIFFERENTIAL TYPE: ABNORMAL
EOSINOPHILS RELATIVE PERCENT: 2 % (ref 0–4)
GFR AFRICAN AMERICAN: >60 ML/MIN
GFR NON-AFRICAN AMERICAN: >60 ML/MIN
GFR SERPL CREATININE-BSD FRML MDRD: ABNORMAL ML/MIN/{1.73_M2}
GFR SERPL CREATININE-BSD FRML MDRD: ABNORMAL ML/MIN/{1.73_M2}
GLUCOSE BLD-MCNC: 90 MG/DL (ref 70–99)
HCG QUALITATIVE: NEGATIVE
HCT VFR BLD CALC: 38.9 % (ref 36–46)
HEMOGLOBIN: 13.3 G/DL (ref 12–16)
IMMATURE GRANULOCYTES: ABNORMAL %
LACTIC ACID: 1.4 MMOL/L (ref 0.5–2.2)
LIPASE: 19 U/L (ref 13–60)
LITHIUM DATE LAST DOSE: NORMAL
LITHIUM DOSE AMOUNT: NORMAL
LITHIUM DOSE TIME: NORMAL
LITHIUM LEVEL: 0.8 MMOL/L (ref 0.6–1.2)
LYMPHOCYTES # BLD: 23 % (ref 25–45)
MCH RBC QN AUTO: 31.3 PG (ref 26–34)
MCHC RBC AUTO-ENTMCNC: 34.2 G/DL (ref 31–37)
MCV RBC AUTO: 91.4 FL (ref 80–100)
MONOCYTES # BLD: 10 % (ref 2–8)
NRBC AUTOMATED: ABNORMAL PER 100 WBC
PDW BLD-RTO: 12.6 % (ref 11.5–14.9)
PLATELET # BLD: 276 K/UL (ref 150–450)
PLATELET ESTIMATE: ABNORMAL
PMV BLD AUTO: 6.7 FL (ref 6–12)
POTASSIUM SERPL-SCNC: 4 MMOL/L (ref 3.7–5.3)
RBC # BLD: 4.26 M/UL (ref 4–5.2)
RBC # BLD: ABNORMAL 10*6/UL
SEG NEUTROPHILS: 64 % (ref 34–64)
SEGMENTED NEUTROPHILS ABSOLUTE COUNT: 3.1 K/UL (ref 1.3–9.1)
SODIUM BLD-SCNC: 136 MMOL/L (ref 135–144)
TOTAL PROTEIN: 7.1 G/DL (ref 6.4–8.3)
WBC # BLD: 4.8 K/UL (ref 4.5–13.5)
WBC # BLD: ABNORMAL 10*3/UL

## 2020-06-30 PROCEDURE — 80183 DRUG SCRN QUANT OXCARBAZEPIN: CPT

## 2020-06-30 PROCEDURE — 80178 ASSAY OF LITHIUM: CPT

## 2020-06-30 PROCEDURE — 83605 ASSAY OF LACTIC ACID: CPT

## 2020-06-30 PROCEDURE — 36415 COLL VENOUS BLD VENIPUNCTURE: CPT

## 2020-06-30 PROCEDURE — 84703 CHORIONIC GONADOTROPIN ASSAY: CPT

## 2020-06-30 PROCEDURE — 99284 EMERGENCY DEPT VISIT MOD MDM: CPT

## 2020-06-30 PROCEDURE — 83690 ASSAY OF LIPASE: CPT

## 2020-06-30 PROCEDURE — 85025 COMPLETE CBC W/AUTO DIFF WBC: CPT

## 2020-06-30 PROCEDURE — 80053 COMPREHEN METABOLIC PANEL: CPT

## 2020-06-30 RX ORDER — LORAZEPAM 2 MG/ML
INJECTION INTRAMUSCULAR
Status: DISCONTINUED
Start: 2020-06-30 | End: 2020-06-30 | Stop reason: HOSPADM

## 2020-06-30 NOTE — ED NOTES
Pt thrashing about in bed, appeared to be seizing. No incontinence of urine or stool.   Pt stopped seizure like activity when Dr. Heaven Maurer did a sternum rub on pt     Bandar Crocker RN  06/30/20 3201

## 2020-06-30 NOTE — ED NOTES
Pt alert and orient, awake and answering questions appropriately. No further seizure activity has been noted. Pt continues with seizure precautions, including padding to siderails.      Zulema Esteves RN  06/30/20 1791

## 2020-06-30 NOTE — ED PROVIDER NOTES
[address] ED  eMERGENCY dEPARTMENT eNCOUnter      Pt Name: Bassem Bonds  MRN: [id number]  [place] 2000  Date of evaluation: 6/30/20      CHIEF COMPLAINT       Chief Complaint   Patient presents with    Seizures     HISTORY OF PRESENT ILLNESS   HPI 21 y.o. female to the emergency department for evaluation of a shaking episode. History is limited secondary to altered mental status. History is obtained from review of the medical record and EMS report. According to EMS they were called to [facility] for patient having a seizure. When they arrived she was having generalized whole body shaking. She required 6 mg of Versed, and the shaking episode stopped. The patient has a history of pseudoseizures. The patient was admitted in March 2020, she had LTM E, there were 2 clinical events were nonepileptic in nature. At presentation the patient sleeping and unable to provide clinical history. REVIEW OF SYSTEMS       Review of Systems  Unable to provide secondary to medications given by EMS      PAST MEDICAL HISTORY     Past Medical History:   Diagnosis Date    ADD (attention deficit disorder)     Anxiety     Asthma     CMV (cytomegalovirus infection) ([address] 2019    Depression     Gestational diabetes mellitus (GDM), antepartum 6/24/2019    Hearing loss in left ear     Hypothyroidism 1/16/2019    Immunizations up to date in pediatric patient     Neurocardiogenic syncope     Pseudoseizures     Raynaud disease     Seizures ([address] 3/15/2020    SV[facility] tachy[facility]     Tachycardia     Saw Dr. Latisha Pradhan 5 yrs ago\".   Echo and holter monitor done, neg results    Traumatic injury during pregnancy, third trimester     Wears glasses        SURGICAL HISTORY       Past Surgical History:   Procedure Laterality Date    REMOVAL FOREIGN BODY EAR Left     \"insect laid eggs\"    TYMPANOPLASTY Left 06/24/2016       CURRENT MEDICATIONS       Discharge Medication List as of 6/30/2020 11:12 AM      CONTINUE these medications which have NOT CHANGED    Details   ibuprofen (ADVIL;MOTRIN) 600 MG tablet Take 1 tablet by mouth every 6 hours as needed for Pain, Disp-15 tablet, R-0Print      acetaminophen (APAP EXTRA STRENGTH) 500 MG tablet Take 2 tablets by mouth every 6 hours as needed for Pain, Disp-30 tablet, R-0Print      cephALEXin (KEFLEX) 500 MG capsule Take 1 capsule by mouth 2 times daily for 7 days, Disp-14 capsule, R-0Print      norethindrone-ethinyl estradiol-Fe (LO LOESTRIN FE) 1 MG-10 MCG / 10 MCG tablet Take 1 tablet by mouth daily, Disp-28 tablet, R-11Normal      OXcarbazepine (TRILEPTAL) 300 MG tablet Take 1 tablet by mouth 2 times daily, Disp-60 tablet, R-3Normal      sertraline (ZOLOFT) 50 MG tablet Take 1 tablet by mouth daily, Disp-30 tablet, R-3Normal      traZODone (DESYREL) 50 MG tablet Take 1 tablet by mouth nightly as needed for Sleep, Disp-14 tablet, R-0Normal      famotidine (PEPCID) 20 MG tablet Take 1 tablet by mouth 2 times daily, Disp-60 tablet, R-3Normal             ALLERGIES     is allergic to sulfa antibiotics. FAMILY HISTORY     is adopted. SOCIAL HISTORY      reports that she has been smoking cigars. She has been smoking about 0.50 packs per day. She has never used smokeless tobacco. She reports previous drug use. Drug: Marijuana. She reports that she does not drink alcohol.     PHYSICAL EXAM     INITIAL VITALS: /85   Pulse 87   Temp 98.8 °F (37.1 °C)   Resp 14   Ht 5' 2\" (1.575 m)   Wt 145 lb (65.8 kg)   SpO2 99%   BMI 26.52 kg/m²   General: NAD  Head: Normocephalic, atraumatic  Eye: Pupils equal round reactive to light, no conjunctivitis  Heart: Regular rate and rhythm no murmurs  Lungs: Clear to auscultation bilaterally, no respiratory distress  Chest wall: No crepitus, no tenderness palpation  Abdomen: Soft, nontender, nondistended, with no peritoneal signs  Neurologic: Drowsy, patient is moving all extremities to command, but too somnolent to participate with a detailed neurologic exam  extremities: Full range of motion, no cyanosis, no edema, no signs of trauma, no tenderness to palpation    MEDICAL DECISION MAKING:     MDM  This is a 45-year-old female presenting with seizure-like activity. She does have a history of pseudoseizures. We will check laboratory studies evaluate for any significant electrolyte abnormalities, will check a lactic acid, will monitor for any respiratory depression, will monitor her until she returns to baseline. Emergency Department course:  8:30 AM  Patient becoming more alert, reports that she has had some congestion and a runny nose and a cough, moved in the COVID precautions. 9:30 AM  Laboratory studies are unremarkable, patient is returned to her baseline    10:30 AM  Called to the patient's bedside. She had whole body shaking and was bouncing on the bed. Pt would not respond to any verbal stimuli. A sternal rub ended the shaking episode, the patient opened her eyes and immediately grabbed my hand away. I suspect a pseudoseizure. 11:30 AM  No more shaking episodes, patient is at her baseline. D/w pt the results, treatment plan, warning precautions for prompt ED return and importance of close OP FU, she verbalizes understanding and agrees with the treatment plan. DIAGNOSTIC RESULTS     LABS: All lab results were reviewed by myself, and all abnormals are listed below. Labs Reviewed   CBC WITH AUTO DIFFERENTIAL - Abnormal; Notable for the following components:       Result Value    Lymphocytes 23 (*)     Monocytes 10 (*)     Absolute Lymph # 1.10 (*)     All other components within normal limits   COMPREHENSIVE METABOLIC PANEL - Abnormal; Notable for the following components:     Total Bilirubin 0.25 (*)     All other components within normal limits   LACTIC ACID   LITHIUM LEVEL   HCG, SERUM, QUALITATIVE   LIPASE   OXCARBAZEPINE LEVEL       EMERGENCY DEPARTMENT COURSE:   Vitals:    Vitals: 06/30/20 0910 06/30/20 0930 06/30/20 1000 06/30/20 1100   BP: (!) 95/54 (!) 91/58 (!) 94/57 102/85   Pulse:       Resp:       Temp:       SpO2: 96% 98% 98% 99%   Weight:       Height:           The patient was given the following medications while in the emergency department:  Orders Placed This Encounter   Medications    DISCONTD: LORazepam (ATIVAN) 2 MG/ML injection     MARCUS KWON: elfego override     -------------------------  CRITICAL CARE:   CONSULTS: None  PROCEDURES: Procedures     FINAL IMPRESSION      1.  Convulsions, unspecified convulsion type [facility])          DISPOSITION/PLAN   DISPOSITION Decision To Discharge 06/30/2020 11:11:43 AM      PATIENT REFERRED TO:  Waleska Quevedo, [address]  [phone]    In 2 days      Rowena Galloway MD  [address] [place] [address] [address] [facility] ED [address] [place] [address] [facility]  [phone]    If symptoms worsen      DISCHARGE MEDICATIONS:  Discharge Medication List as of 6/30/2020 11:12 AM            Modesta Medeiros MD  Attending Emergency Physician                      Modesta Medeiros MD  07/01/20 5415

## 2020-06-30 NOTE — ED NOTES
Pt brought in by ems from her place of employment for seizure. Pt appears post itcle. Pt is verbal when asked questions. Pt was given versed by ems for seizure. Pt is known to us with history of seizures.      Zulema Esteves RN  06/30/20 9184

## 2020-06-30 NOTE — ED NOTES
Pt's eyes open. Pt is having appropriate conversation with this nurse. Pt is eupneic and PWD.      Jose R Ovalle RN  06/30/20 7821

## 2020-07-01 ENCOUNTER — TELEPHONE (OUTPATIENT)
Dept: INTERNAL MEDICINE CLINIC | Age: 20
End: 2020-07-01

## 2020-07-01 ENCOUNTER — TELEMEDICINE (OUTPATIENT)
Dept: INTERNAL MEDICINE CLINIC | Age: 20
End: 2020-07-01
Payer: MEDICAID

## 2020-07-01 PROCEDURE — 99213 OFFICE O/P EST LOW 20 MIN: CPT | Performed by: PHYSICIAN ASSISTANT

## 2020-07-01 RX ORDER — HYDROXYZINE HYDROCHLORIDE 25 MG/1
TABLET, FILM COATED ORAL
COMMUNITY
Start: 2020-06-17 | End: 2021-03-02

## 2020-07-01 RX ORDER — LITHIUM CARBONATE 300 MG/1
CAPSULE ORAL
COMMUNITY
Start: 2020-06-17 | End: 2020-12-16

## 2020-07-01 RX ORDER — CHLORHEXIDINE GLUCONATE 0.12 MG/ML
RINSE ORAL
COMMUNITY
Start: 2020-06-24 | End: 2020-12-16

## 2020-07-01 RX ORDER — SERTRALINE HYDROCHLORIDE 100 MG/1
TABLET, FILM COATED ORAL
COMMUNITY
Start: 2020-06-17 | End: 2020-08-06 | Stop reason: SDUPTHER

## 2020-07-01 NOTE — PROGRESS NOTES
[address] [id number]  Dept: [phone]  Dept Fax: [phone]    Virtual Visit Progress Note  Date of patient's visit: 7/3/2020  Patient's Name:  Matthew Nelson YOB: 2000            Patient Care Team:  Swathi Narayanan PA-C as PCP - General (Physician Assistant)  Swathi Narayanan PA-C as PCP - [facility]    REASON FOR VISIT:  Same day visit    HISTORY OF PRESENT ILLNESS:      Chief Complaint   Patient presents with    Pharyngitis     onset 1 week ago, states sore throat has subsided    Cough     states has subsided    Nasal Congestion     states has subsided    Other     Pt's work requesting a note ok to return to work 07/07/20     History was obtained from the patient. Matthew Nelson is a 21 y.o. female here today virtually for above. Cough, sore throat. Symptoms started one week ago. She denies associated fever/chills, chest pain, dyspnea, myalgias, headache. Has been off work past week, symptoms completely resolved. No known sick contacts. MEDICATIONS:      Current Outpatient Medications   Medication Sig Dispense Refill    chlorhexidine (PERIDEX) 0.12 % solution Rinse with 1/2 ounce by mouth for 30 seconds then spit out.  use twice a day      hydrOXYzine (ATARAX) 25 MG tablet take 1 tablet by mouth three times a day      lithium 300 MG capsule take 1 capsule by mouth twice a day      sertraline (ZOLOFT) 100 MG tablet take 1 tablet by mouth once daily      norethindrone-ethinyl estradiol-Fe (LO LOESTRIN FE) 1 MG-10 MCG / 10 MCG tablet Take 1 tablet by mouth daily 28 tablet 11    traZODone (DESYREL) 50 MG tablet Take 1 tablet by mouth nightly as needed for Sleep 14 tablet 0    ibuprofen (ADVIL;MOTRIN) 600 MG tablet Take 1 tablet by mouth every 6 hours as needed for Pain (Patient not taking: Reported on 7/1/2020) 15 tablet 0    acetaminophen (APAP EXTRA STRENGTH) 500 MG tablet Take 2 tablets by mouth every 6 hours as needed for Pain (Patient not taking: Reported on 7/1/2020) 30 tablet 0    OXcarbazepine (TRILEPTAL) 300 MG tablet Take 1 tablet by mouth 2 times daily (Patient not taking: Reported on 7/1/2020) 60 tablet 3    sertraline (ZOLOFT) 50 MG tablet Take 1 tablet by mouth daily (Patient not taking: Reported on 7/1/2020) 30 tablet 3    famotidine (PEPCID) 20 MG tablet Take 1 tablet by mouth 2 times daily (Patient not taking: Reported on 3/30/2020) 60 tablet 3     No current facility-administered medications for this visit. ALLERGIES:      Allergies   Allergen Reactions    Sulfa Antibiotics      Unknown reaction     SOCIAL HISTORY   Reviewed and no change from previous record. Nik Jessica  reports that she has been smoking cigars. She has been smoking about 0.50 packs per day. She has never used smokeless tobacco.    FAMILY HISTORY:    Reviewed and no change from previous visit    REVIEW OF SYSTEMS:    Review of Systems   Constitutional: Negative for chills, diaphoresis, fatigue and fever. HENT: Positive for congestion and sore throat. Negative for drooling, ear discharge, ear pain, hearing loss, postnasal drip, sinus pressure, trouble swallowing and voice change. Eyes: Negative for pain and redness. Respiratory: Positive for cough. Negative for chest tightness, shortness of breath and wheezing. Cardiovascular: Negative for chest pain and leg swelling. Gastrointestinal: Negative for abdominal pain, nausea and vomiting. Musculoskeletal: Negative for myalgias, neck pain and neck stiffness. Skin: Negative for color change and rash. Neurological: Positive for seizures (pseudoseizures). Negative for dizziness, tremors, syncope, facial asymmetry, speech difficulty, weakness, light-headedness, numbness and headaches. Hematological: Negative for adenopathy.       PHYSICAL EXAM:      Patient-Reported Vitals 7/1/2020   Patient-Reported Weight 140 lbs   Patient-Reported reduce the patient's risk of exposure to COVID-19 and provide necessary medical care. The patient (and/or legal guardian) has also been advised to contact this office for worsening conditions or problems, and seek emergency medical treatment and/or call 911 if deemed necessary. Services were provided through a video synchronous discussion virtually to substitute for in-person clinic visit. Patient and provider were located at their individual homes. ZACHARIAH Simons [facility]  7/3/2020, 9:56 AM    Please note that this chart wasgenerated using voice recognition Dragon dictation software. Although every effort was made to ensure the accuracy of this automated transcription, some errors in transcription may have occurred.

## 2020-07-02 LAB — OXCARBAZEPINE: <1 UG/ML (ref 3–35)

## 2020-07-02 RX ORDER — FLUCONAZOLE 150 MG/1
150 TABLET ORAL ONCE
Qty: 2 TABLET | Refills: 0 | Status: SHIPPED | OUTPATIENT
Start: 2020-07-02 | End: 2020-07-02

## 2020-07-02 NOTE — TELEPHONE ENCOUNTER
Patient requesting refill for birth control , in addition needs script for yeast infection.     [facility]

## 2020-07-03 ASSESSMENT — ENCOUNTER SYMPTOMS
CHEST TIGHTNESS: 0
WHEEZING: 0
SINUS PRESSURE: 0
COLOR CHANGE: 0
VOICE CHANGE: 0
ABDOMINAL PAIN: 0
TROUBLE SWALLOWING: 0
COUGH: 1
SHORTNESS OF BREATH: 0
EYE REDNESS: 0
NAUSEA: 0
SORE THROAT: 1
VOMITING: 0
EYE PAIN: 0

## 2020-08-06 ENCOUNTER — NURSE ONLY (OUTPATIENT)
Dept: PRIMARY CARE CLINIC | Age: 20
End: 2020-08-06

## 2020-08-06 ENCOUNTER — TELEMEDICINE (OUTPATIENT)
Dept: INTERNAL MEDICINE CLINIC | Age: 20
End: 2020-08-06
Payer: MEDICAID

## 2020-08-06 ENCOUNTER — HOSPITAL ENCOUNTER (OUTPATIENT)
Age: 20
Setting detail: SPECIMEN
Discharge: HOME | End: 2020-08-06
Payer: MEDICAID

## 2020-08-06 PROCEDURE — 99213 OFFICE O/P EST LOW 20 MIN: CPT | Performed by: NURSE PRACTITIONER

## 2020-08-06 RX ORDER — SERTRALINE HYDROCHLORIDE 100 MG/1
100 TABLET, FILM COATED ORAL DAILY
COMMUNITY
End: 2020-12-16

## 2020-08-06 RX ORDER — ONDANSETRON 4 MG/1
4 TABLET, ORALLY DISINTEGRATING ORAL 3 TIMES DAILY PRN
Qty: 21 TABLET | Refills: 0 | Status: SHIPPED | OUTPATIENT
Start: 2020-08-06 | End: 2020-12-16

## 2020-08-06 ASSESSMENT — PATIENT HEALTH QUESTIONNAIRE - PHQ9
SUM OF ALL RESPONSES TO PHQ QUESTIONS 1-9: 0
SUM OF ALL RESPONSES TO PHQ QUESTIONS 1-9: 0
SUM OF ALL RESPONSES TO PHQ9 QUESTIONS 1 & 2: 0
2. FEELING DOWN, DEPRESSED OR HOPELESS: 0
1. LITTLE INTEREST OR PLEASURE IN DOING THINGS: 0

## 2020-08-06 ASSESSMENT — ENCOUNTER SYMPTOMS
SINUS PRESSURE: 0
DIARRHEA: 0
SINUS PAIN: 0
VOMITING: 1
SORE THROAT: 0
COUGH: 1
RHINORRHEA: 0
ABDOMINAL PAIN: 1
NAUSEA: 1

## 2020-08-06 NOTE — LETTER
DANIEL L[facility]  [address],[facility] [address] [place] [address]  Phone: [phone]  Fax: [phone]    DARRIAN Lawson CNP        August 6, 2020     Patient: Tobias Johns   YOB: 2000   Date of Visit: 8/6/2020       To Whom it May Concern:    Karissa Hooks was seen by virtual visit on 8/6/2020. She should not return to work pending results of ordered test.    If you have any questions or concerns, please don't hesitate to call.     Sincerely,           DARRIAN Lawson CNP

## 2020-08-06 NOTE — PROGRESS NOTES
Visit Information    Have you changed or started any medications since your last visit including any over-the-counter medicines, vitamins, or herbal medicines? no   Are you having any side effects from any of your medications? -  no  Have you stopped taking any of your medications? Is so, why? -  no    Have you seen any other physician or provider since your last visit? No  Have you had any other diagnostic tests since your last visit? Yes - Records Obtained  Have you been seen in the emergency room and/or had an admission to a hospital since we last saw you? Yes - Records Obtained  Have you had your routine dental cleaning in the past 6 months? yes -    Have you activated your [employer] account? If not, what are your barriers? Yes     Patient Care Team:  Nicki Vargas PA-C as PCP - General (Physician Assistant)  Nicki Vargas PA-C as PCP [facility]    Medical History Review  Past Medical, Family, and Social History reviewed and does contribute to the patient presenting condition    Health Maintenance   Topic Date Due    Pneumococcal 0-64 years Vaccine (1 of 1 - PPSV23) 2006    Flu vaccine (1) 2020    Chlamydia screen  2021    DTaP/Tdap/Td vaccine (8 - Td) 2029    Hepatitis A vaccine  Completed    Hepatitis B vaccine  Completed    Hib vaccine  Completed    HPV vaccine  Completed    Varicella vaccine  Completed    Meningococcal (ACWY) vaccine  Completed    HIV screen  Completed     Chief Complaint   Patient presents with    Other     Pt presents with non productive cough,nausea and headache, chills and abdominal pain lower. Pt describes the abdominal pain constant stabbing pain with no other symtpoms. Onset one week ago  trieed OTC advile and pepto bismol with  some relief.    [address] [facility]       2020    TELEHEALTH EVALUATION -- Audio/Visual (During YOAER-23 public health emergency)    HPI:    Erick Rubio (:  2000) has requested an audio/video evaluation for the following concern(s):    Patient complains of fatigue, nonproductive cough,  Intermittent abdominal pain, with nausea and vomiting, chills, but no fever. Symptoms started about a week ago and this morning she had an episode of vomiting at work. She works at [employer] lives at home with her baby and her parents. She denies change in sense of taste or smell. She denies body aches. She has not eaten anything unusual, has no urinary symptoms. She is requesting COVID testing. Review of Systems   Constitutional: Positive for chills and fatigue. Negative for fever. HENT: Positive for congestion. Negative for ear pain, postnasal drip, rhinorrhea, sinus pressure, sinus pain and sore throat. Respiratory: Positive for cough. Cardiovascular: Negative for chest pain and palpitations. Gastrointestinal: Positive for abdominal pain (intermittent, dull), nausea (mild) and vomiting. Negative for diarrhea. Genitourinary: Negative for difficulty urinating, dysuria and frequency. Musculoskeletal: Negative for arthralgias and myalgias. Psychiatric/Behavioral: The patient is nervous/anxious. Prior to Visit Medications    Medication Sig Taking? Authorizing Provider   sertraline (ZOLOFT) 100 MG tablet Take 100 mg by mouth daily Yes Historical Provider, MD   ondansetron (ZOFRAN-ODT) 4 MG disintegrating tablet Take 1 tablet by mouth 3 times daily as needed for Nausea or Vomiting Yes DARRIAN Davenport - CNP   chlorhexidine (PERIDEX) 0.12 % solution Rinse with 1/2 ounce by mouth for 30 seconds then spit out.  use twice a day Yes Historical Provider, MD   hydrOXYzine (ATARAX) 25 MG tablet take 1 tablet by mouth three times a day Yes Historical Provider, MD   lithium 300 MG capsule take 1 capsule by mouth twice a day Yes Historical Provider, MD   ibuprofen (ADVIL;MOTRIN) 600 MG tablet Take 1 tablet by mouth every 6 hours as needed for Pain Yes Mandeep Lynch, DO   acetaminophen (APAP EXTRA STRENGTH) 500 MG tablet Take 2 tablets by mouth every 6 hours as needed for Pain Yes Elizabeth Hay, DO   norethindrone-ethinyl estradiol-Fe (LO LOESTRIN FE) 1 MG-10 MCG / 10 MCG tablet Take 1 tablet by mouth daily Yes Patricia Prost, DO   traZODone (DESYREL) 50 MG tablet Take 1 tablet by mouth nightly as needed for Sleep  Ricki Toscano MD       Social History     Tobacco Use    Smoking status: Current Every Day Smoker     Packs/day: 0.50     Types: Cigars    Smokeless tobacco: Never Used    Tobacco comment: 1-2 black and milds daily    Substance Use Topics    Alcohol use: No    Drug use: Not Currently     Types: Marijuana     Comment: monthly         Allergies   Allergen Reactions    Sulfa Antibiotics      Unknown reaction   ,   Past Medical History:   Diagnosis Date    ADD (attention deficit disorder)     Anxiety     Asthma     CMV [facility]omegalovirus infection) ([facility] [address] 2019    Depression     Gestational diabetes mellitus (GDM), antepartum 6/24/2019    Hearing loss in left ear     Hypothyroidism 1/16/2019    Immunizations up to date in pediatric patient     Neurocardiogenic syncope     Pseudoseizures     Raynaud disease     Seizures ([facility] [address] 3/15/2020    SV[facility]ra[facility] tachycardia) ([facility]     Tachycardia     Saw Dr. Desmond Cates 5 yrs ago\". Echo and holter monitor done, neg results    Traumatic injury during pregnancy, third trimester     Wears glasses    ,   Past Surgical History:   Procedure Laterality Date    REMOVAL FOREIGN BODY EAR Left     \"insect laid eggs\"    TYMPANOPLASTY Left 06/24/2016       PHYSICAL EXAMINATION:  Patient was not physically examined as this is a virtual visit. Physical findings limited and were observed by video.       [ INSTRUCTIONS:  \"[x]\" Indicates a positive item  \"[]\" Indicates a negative item  -- DELETE ALL ITEMS NOT EXAMINED]  Vital Signs: (As obtained by patient/caregiver or practitioner observation)    Blood pressure-  Heart rate- Respiratory rate-    Temperature- 97 Pulse oximetry-     Constitutional: [] Appears well-developed and well-nourished [x] No apparent distress      [] Abnormal-   Mental status  [x] Alert and awake  [] Oriented to person/place/time [x]Able to follow commands      Eyes:  EOM    []  Normal  [] Abnormal-  Sclera  [x]  Normal  [] Abnormal -         Discharge [x]  None visible  [] Abnormal -    HENT:   [x] Normocephalic, atraumatic. [] Abnormal   [] Mouth/Throat: Mucous membranes are moist.     External Ears [x] Normal  [] Abnormal-     Neck: [x] No visualized mass     Pulmonary/Chest: [x] Respiratory effort normal.  [] No visualized signs of difficulty breathing or respiratory distress        [] Abnormal-      Musculoskeletal:   [] Normal gait with no signs of ataxia         [x] Normal range of motion of neck        [] Abnormal-       Neurological:        [x] No Facial Asymmetry (Cranial nerve 7 motor function) (limited exam to video visit)          [] No gaze palsy        [] Abnormal-         Skin:        [x] No significant exanthematous lesions or discoloration noted on facial skin         [] Abnormal-            Psychiatric:       [x] Normal Affect [] No Hallucinations        [] Abnormal-     Other pertinent observable physical exam findings-     ASSESSMENT/PLAN:  Visit Diagnoses and Associated Orders     Nausea and vomiting, intractability of vomiting not specified, unspecified vomiting type    -  Primary    COVID-19 Ambulatory [[id number] Custom]   - Future Order    ondansetron (ZOFRAN-ODT) 4 MG disintegrating tablet [[id number]]           Chills (without fever)        Continue supportive treatment, including rest and fluids.   Tylenol or Motrin as needed    COVID-19 Ambulatory [[id number] Custom]   - Future Order         Fatigue, unspecified type        Continue supportive treatment, including rest and fluids    COVID-19 Ambulatory [[id number] Custom]   - Future Order         SVT (supraventricular tachycardia) ([facility] [address] Asymptomatic, follows with Dr Eliza Boyle [facility]    sertraline (ZOLOFT) 100 MG tablet [[id number]]            Return if symptoms worsen or fail to improve. Justin Young is a 21 y.o. female being evaluated by a Virtual Visit (video visit) encounter to address concerns as mentioned above. A caregiver was present when appropriate. Due to this being a TeleHealth encounter (During PBLJY-03 public health emergency), evaluation of the following organ systems was limited: Vitals/Constitutional/EENT/Resp/CV/GI//MS/Neuro/Skin/Heme-Lymph-Imm. Pursuant to the emergency declaration under the [address] [place] [address], [address] [place] [address] authority and the [employer] and [employer] Act, this Virtual Visit was conducted with patient's (and/or legal guardian's) consent, to reduce the patient's risk of exposure to COVID-19 and provide necessary medical care. The patient (and/or legal guardian) has also been advised to contact this office for worsening conditions or problems, and seek emergency medical treatment and/or call 911 if deemed necessary. Patient identification was verified at the start of the visit: Yes    Total time spent on this encounter: Not billed by time    Services were provided through a video synchronous discussion virtually to substitute for in-person clinic visit. Patient and provider were located at their individual homes. --DARRIAN Epps CNP on 8/6/2020 at 11:48 AM    An electronic signature was used to authenticate this note.

## 2020-08-06 NOTE — PATIENT INSTRUCTIONS
Patient Education        10 Things to Do When You Have COVID-19    Stay home. Don't go to school, work, or public areas. And don't use public transportation, ride-shares, or taxis unless you have no choice. Leave your home only if you need to get medical care. But call the doctor's office first so they know you're coming. And wear a cloth face cover. Ask before leaving isolation. Talk with your doctor or other health professional about when it will be safe for you to leave isolation. Wear a cloth face cover when you are around other people. It can help stop the spread of the virus when you cough or sneeze. Limit contact with people in your home. If possible, stay in a separate bedroom and use a separate bathroom. Avoid contact with pets and other animals. If possible, have a friend or family member care for them while you're sick. Cover your mouth and nose with a tissue when you cough or sneeze. Then throw the tissue in the trash right away. Wash your hands often, especially after you cough or sneeze. Use soap and water, and scrub for at least 20 seconds. If soap and water aren't available, use an alcohol-based hand . Don't share personal household items. These include bedding, towels, cups and glasses, and eating utensils. Clean and disinfect your home every day. Use household  or disinfectant wipes or sprays. Take special care to clean things that you grab with your hands. These include doorknobs, remote controls, phones, and handles on your refrigerator and microwave. And don't forget countertops, tabletops, bathrooms, and computer keyboards. Take acetaminophen (Tylenol) to relieve fever and body aches. Read and follow all instructions on the label. Current as of: May 8, 2020               Content Version: 12.5  © 2006-2020 Healthwise, Incorporated. Care instructions adapted under license by [facility] ([facility]).  If you have questions about a medical

## 2020-08-09 LAB — SARS-COV-2, NAA: NOT DETECTED

## 2020-08-10 ENCOUNTER — HOSPITAL ENCOUNTER (OUTPATIENT)
Age: 20
Setting detail: SPECIMEN
Discharge: HOME | End: 2020-08-10
Payer: MEDICAID

## 2020-08-10 LAB — HCG QUANTITATIVE: <1 IU/L

## 2020-08-10 PROCEDURE — 36415 COLL VENOUS BLD VENIPUNCTURE: CPT

## 2020-08-10 PROCEDURE — 84702 CHORIONIC GONADOTROPIN TEST: CPT

## 2020-11-06 ENCOUNTER — TELEPHONE (OUTPATIENT)
Dept: INTERNAL MEDICINE CLINIC | Age: 20
End: 2020-11-06

## 2020-11-24 ENCOUNTER — NURSE TRIAGE (OUTPATIENT)
Dept: OTHER | Facility: CLINIC | Age: 20
End: 2020-11-24

## 2020-11-24 ENCOUNTER — TELEMEDICINE (OUTPATIENT)
Dept: INTERNAL MEDICINE CLINIC | Age: 20
End: 2020-11-24
Payer: MEDICAID

## 2020-11-24 ENCOUNTER — TELEPHONE (OUTPATIENT)
Dept: INTERNAL MEDICINE CLINIC | Age: 20
End: 2020-11-24

## 2020-11-24 PROCEDURE — G2012 BRIEF CHECK IN BY MD/QHP: HCPCS | Performed by: NURSE PRACTITIONER

## 2020-11-24 RX ORDER — AMOXICILLIN 500 MG/1
500 CAPSULE ORAL 2 TIMES DAILY
Qty: 14 CAPSULE | Refills: 0 | Status: SHIPPED | OUTPATIENT
Start: 2020-11-24 | End: 2020-12-01

## 2020-11-24 NOTE — PROGRESS NOTES
Visit Information    Have you changed or started any medications since your last visit including any over-the-counter medicines, vitamins, or herbal medicines? no   Are you having any side effects from any of your medications? -  no  Have you stopped taking any of your medications? Is so, why? -  no    Have you seen any other physician or provider since your last visit? No  Have you had any other diagnostic tests since your last visit? No  Have you been seen in the emergency room and/or had an admission to a hospital since we last saw you? No  Have you had your routine dental cleaning in the past 6 months? yes -     Have you activated your [employer] account? If not, what are your barriers? Yes     Patient Care Team:  Demetrius Bedolla PA-C as PCP - General (Physician Assistant)  Demetrius Bedolla PA-C as PCP - [facility] Provider    Medical History Review  Past Medical, Family, and Social History reviewed and does not contribute to the patient presenting condition    Health Maintenance   Topic Date Due    Pneumococcal 0-64 years Vaccine (1 of 1 - PPSV23) 05/28/2006    Flu vaccine (1) 09/01/2020    Chlamydia screen  06/23/2021    DTaP/Tdap/Td vaccine (8 - Td) 05/07/2029    Hepatitis A vaccine  Completed    Hepatitis B vaccine  Completed    Hib vaccine  Completed    HPV vaccine  Completed    Varicella vaccine  Completed    Meningococcal (ACWY) vaccine  Completed    HIV screen  Completed         [facility] [address] [address] [address]  Dept: [phone]  Dept Fax: [phone]    Virtual Visit Progress Note  Date of patient's visit: 11/24/2020  Patient's Name:  Cherry Martinez YOB: 2000            Patient Care Team:  Demetrius Bedolla PA-C as PCP - General (Physician Assistant)  Demetrius Bedolla PA-C as PCP - [facility] Provider    REASON FOR VISIT:  Same day visit    HISTORY OF PRESENT ILLNESS:    No chief complaint on file.       History was obtained from the patient. Duncan Avila is a 21 y.o. female here today via telephone for L otalgia and difficulty hearing that started yesterday. Patient endorses history of ear infections annually, and reports otalgia is similar to pain with past ear infections. She denies fevers, sore throat, cough, shortness of breath. Patient Active Problem List   Diagnosis    ADD (attention deficit disorder)    SVT (supraventricular tachycardia) (HCC)    Family history of clotting disorder    Family history of diabetes mellitus    Family history of blood clots    Neurocardiogenic syncope    CMV IgM+     19 M Apg  Wt 7#11    Seizures ([facility] [address]    Severe recurrent major depression without psychotic features ([facility] [address]       MEDICATIONS:      Current Outpatient Medications   Medication Sig Dispense Refill    amoxicillin (AMOXIL) 500 MG capsule Take 1 capsule by mouth 2 times daily for 7 days 14 capsule 0    hydrOXYzine (ATARAX) 25 MG tablet take 1 tablet by mouth three times a day      sertraline (ZOLOFT) 100 MG tablet Take 100 mg by mouth daily      ondansetron (ZOFRAN-ODT) 4 MG disintegrating tablet Take 1 tablet by mouth 3 times daily as needed for Nausea or Vomiting (Patient not taking: Reported on 2020) 21 tablet 0    chlorhexidine (PERIDEX) 0.12 % solution Rinse with 1/2 ounce by mouth for 30 seconds then spit out.  use twice a day      lithium 300 MG capsule take 1 capsule by mouth twice a day      ibuprofen (ADVIL;MOTRIN) 600 MG tablet Take 1 tablet by mouth every 6 hours as needed for Pain (Patient not taking: Reported on 2020) 15 tablet 0    acetaminophen (APAP EXTRA STRENGTH) 500 MG tablet Take 2 tablets by mouth every 6 hours as needed for Pain (Patient not taking: Reported on 2020) 30 tablet 0    norethindrone-ethinyl estradiol-Fe (LO LOESTRIN FE) 1 MG-10 MCG / 10 MCG tablet Take 1 tablet by mouth daily (Patient not taking: Reported on 2020) 28 tablet 11    traZODone (DESYREL) 50 MG tablet Take 1 tablet by mouth nightly as needed for Sleep 14 tablet 0     No current facility-administered medications for this visit. ALLERGIES:      Allergies   Allergen Reactions    Sulfa Antibiotics      Unknown reaction       SOCIAL HISTORY   Reviewed and no change from previous record. Manasa Sequeira  reports that she has been smoking cigars. She has been smoking about 0.50 packs per day. She has never used smokeless tobacco.    FAMILY HISTORY:    Reviewed and No change from previous visit    REVIEW OF SYSTEMS:    Review of Systems     PHYSICAL EXAM:    There were no vitals filed for this visit. Exam was limited due to telephone encounter. General - alert, , in no distress  Neurological - alert, oriented x 3, normal speech    ASSESSMENT AND PLAN:      1. Right ear pain  -suspect possible otitis media, unable to perform physical exam due to telephone encounter  - amoxicillin (AMOXIL) 500 MG capsule; Take 1 capsule by mouth 2 times daily for 7 days  Dispense: 14 capsule; Refill: 0      FOLLOW UP AND INSTRUCTIONS:   Return if symptoms worsen or fail to improve. Discussed use, benefit, and side effects of prescribed medications. All patient questions answered. Patient voiced understanding. Patient given educational materials - see patient instructions    Patient being evaluated by a Virtual Visit (video visit) encounter to address concerns as mentioned above. A caregiver was present when appropriate. Due to this being a TeleHealth encounter (During [facility] public [facility] emergency), evaluation of the following organ systems was limited: Vitals/Constitutional/EENT/Resp/CV/GI//MS/Neuro/Skin/Heme-Lymph-Imm.   Pursuant to the emergency declaration under the [facility] waiver authority and the [employer] and [employer] Act, this Virtual Visit was conducted with patient's (and/or legal guardian's) consent, to reduce the patient's risk of exposure to COVID-19 and provide necessary medical care. The patient (and/or legal guardian) has also been advised to contact this office for worsening conditions or problems, and seek emergency medical treatment and/or call 911 if deemed necessary. Services were provided through a video synchronous discussion virtually to substitute for in-person clinic visit. Patient and provider were located at their individual homes. DARRIAN Driscoll - CNP   [facility]  11/24/2020, 3:50 PM    Please note that this chart wasgenerated using voice recognition Dragon dictation software. Although every effort was made to ensure the accuracy of this automated transcription, some errors in transcription may have occurred.

## 2020-11-24 NOTE — TELEPHONE ENCOUNTER
Reason for Disposition   Earache lasts > 1 hour    Answer Assessment - Initial Assessment Questions  1. LOCATION: \"Which ear is involved? \"        Both ear left ear hard to hear, right makes noise    2. SENSATION: \"Describe how the ear feels. \"       Congestion    3. ONSET:  \"When did the ear symptoms start?\"        1 week    4. PAIN: \"Do you also have an earache? \" If so, ask: \"How bad is it? \" (Scale 1-10; or mild, moderate, severe)      7/10 comes and goes    5. CAUSE: \"What do you think is causing the ear congestion? \"      Ear infection    6. URI: \"Do you have a runny nose or cough? \"       Has pink eye    7. NASAL ALLERGIES: \"Are there symptoms of hay fever, such as sneezing or a clear nasal discharge? \"      Stuffy nose    8. PREGNANCY: \"Is there any chance you are pregnant? \" \"When was your last menstrual period? \"      possible    Protocols used: EAR - CONGESTION-ADULT-[place]    Patient called pre-service center [facility]) [address] with red flag complaint. Brief description of triage: as above    Triage indicates for patient to be seen today    Care advice provided, patient verbalizes understanding; denies any other questions or concerns; instructed to call back for any new or worsening symptoms. Writer provided warm transfer to [facility]   at [facility] for appointment scheduling. Attention Provider: Thank you for allowing me to participate in the care of your patient. The patient was connected to triage in response to information provided to the [facility]. Please do not respond through this encounter as the response is not directed to a shared pool.

## 2020-12-04 RX ORDER — AMOXICILLIN AND CLAVULANATE POTASSIUM 875; 125 MG/1; MG/1
1 TABLET, FILM COATED ORAL 2 TIMES DAILY
Qty: 14 TABLET | Refills: 0 | Status: SHIPPED | OUTPATIENT
Start: 2020-12-04 | End: 2020-12-07 | Stop reason: SDUPTHER

## 2020-12-07 RX ORDER — AMOXICILLIN AND CLAVULANATE POTASSIUM 875; 125 MG/1; MG/1
1 TABLET, FILM COATED ORAL 2 TIMES DAILY
Qty: 14 TABLET | Refills: 0 | Status: SHIPPED | OUTPATIENT
Start: 2020-12-07 | End: 2020-12-14

## 2020-12-16 ENCOUNTER — TELEMEDICINE (OUTPATIENT)
Dept: OBGYN CLINIC | Age: 20
End: 2020-12-16
Payer: MEDICAID

## 2020-12-16 PROCEDURE — 99213 OFFICE O/P EST LOW 20 MIN: CPT | Performed by: NURSE PRACTITIONER

## 2020-12-16 NOTE — PROGRESS NOTES
Evonne Crocker  2020    Evonne Crocker (:  2000) has requested an audio/video evaluation for the following concern(s):    1. Dysmenorrhea          TELEHEALTH EVALUATION -- Audio/Visual (During GHOAI-62 public health emergency)      Evonne Crocker is a 21 y.o. female     Desires to restart OCP. States she was trying to conceive and changed her mind. Hx of Lo Estrin. Denies concerns/ complaints on OCP. Desires to restart. She was here to follow-up regarding her labs and diagnostics ordered at her last visit for the diagnosis of:    ICD-10-CM    1. Dysmenorrhea  N94.6        She does not have any specific chief complaint today. Her bowels are regular and she is voiding without difficulty. Past Medical History:   Diagnosis Date    ADD (attention deficit disorder)     Anxiety     Asthma     [facility]     Depression     Gestational diabetes mellitus (GDM), antepartum 2019    Hearing loss in left ear     Hypothyroidism 2019    Immunizations up to date in pediatric patient     Neurocardiogenic syncope     Pseudoseizures     Raynaud disease     Seizures ([facility] 3/15/2020    SVT [facility]raventricular tachycardia) ([facility])     Tachycardia     Saw Dr. Nick Swanson 5 yrs ago\".   Echo and holter monitor done, neg results    Traumatic injury during pregnancy, third trimester     Wears glasses          Past Surgical History:   Procedure Laterality Date    REMOVAL FOREIGN BODY EAR Left     \"insect laid eggs\"    TYMPANOPLASTY Left 2016         Family History   Adopted: Yes   Problem Relation Age of Onset    Diabetes Mother         borderline diet controlled    Atrial Fibrillation Mother     Anxiety Disorder Mother     Depression Mother     Heart Disease Mother     Seizures Maternal Grandmother     COPD Maternal Grandmother     Hypertension Maternal Grandmother     Cancer Maternal Grandmother         ovarian  Heart Disease Maternal Grandmother     Diabetes Type 1  Maternal Grandfather     Diabetes Type 1  Other         m uncle    Bleeding Prob Other         m aunt protein S&C deficiency         Social History     Tobacco Use    Smoking status: Current Every Day Smoker     Packs/day: 0.50     Types: Cigars    Smokeless tobacco: Never Used    Tobacco comment: 1-2 black and milds daily    Substance Use Topics    Alcohol use: No    Drug use: Not Currently     Types: Marijuana     Comment: monthly          MEDICATIONS:  Current Outpatient Medications   Medication Sig Dispense Refill    norethindrone-ethinyl estradiol-Fe (LO LOESTRIN FE) 1 MG-10 MCG / 10 MCG tablet Take 1 tablet by mouth daily 28 tablet 3    hydrOXYzine (ATARAX) 25 MG tablet take 1 tablet by mouth three times a day       No current facility-administered medications for this visit. ALLERGIES:  Allergies as of 12/16/2020 - Review Complete 12/16/2020   Allergen Reaction Noted    Sulfa antibiotics  04/08/2015         not currently breastfeeding. PE is limited due to the virtual visit    Abdomen: Soft non-tender; good bowel sounds. No guarding, rebound or rigidity. No CVA tenderness bilaterally reported when questioned. (Viewed Virtually)    Extremities: No calf tenderness, DTR 2/4, and No edema bilaterally as inspected by video and palpation by the patient (Viewed Virtually)    Pelvic: (Virtual Visit-Not Completed)    Diagnostics:  No results found. Lab Results:  Results for orders placed or performed during the hospital encounter of 08/10/20   HCG, Quantitative, Pregnancy   Result Value Ref Range    hCG Quant <1 <5 IU/L           Assessment:   Diagnosis Orders   1.  Dysmenorrhea       Chief Complaint   Patient presents with    Contraception     Counseling Hormonal Based Birth Control: The patient was seen and counseled on all forms of birth control both male and female  reversible and non. She is aware that hormonal based birth control may increase her risk of developing a blood clot which may increase her morbidity and or mortality. She was counseled on alternate non hormonal based contraception options. We discussed that smoking and any hormonal based contraception may increase the patients risks of developing these life threatening blood clots. All patients are encouraged to stop smoking at the time of contraceptive counseling. Cessation programs were reviewed. The patient was instructed to use barrier contraception for sexually transmitted disease prevention. The patient was also informed of antibiotics decreasing contraceptive efficacy and the need for barrier contraception from the onset of her antibiotic dosing and through a minimum of thirty days from antibiotic cessation. The life threatening side effect profile was reviewed in detail this includes but is not limited to shortness of breath, chest pain, severe or persistent headaches, or calf pain. If any of these occur the patient has been instructed to stop using her hormonal based contraception, notify the office, and go to the emergency department or call 911. The patient denied any personal history of blood clots in her leg, lung, or heart and denied any family history of stroke, TIA, sudden cardiac death < 36 y.o.,pulmonary embolism, or deep venous thrombosis.     Patient Active Problem List    Diagnosis Date Noted    SVT (supraventricular tachycardia) ([facility] 01/09/2019     Priority: High     1/25/2019 Referral to cardiology      Family history of clotting disorder 01/09/2019     Priority: High     1/9/2018 patient's maternal aunt with hx of Protein S and C deficiency      Family history of diabetes mellitus 01/09/2019     Priority: Medium     1/9/2019 early one hour GTT ordered  Severe recurrent major depression without psychotic features ([facility] [facility] 2020    Seizures ([facility] 03/15/2020     19 M Apg 8/9 Wt 7#11 2019    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 Dina Baltazar MD. See note in media section on 2019.  Family history of blood clots 2019     Denies first degree relative. Maternal aunt.  ADD (attention deficit disorder)      2019 stopped taking focalin 8 months ago         PLAN:  Return in about 3 months (around 3/16/2021) for annual.  Rx OCP  Denies a personal or family hx of a blood clot to the leg/lung/brain  Return to the office in 12 weeks. Counseled on preventative health maintenance follow-up. No orders of the defined types were placed in this encounter. Kiana Sanderson is a 21 y.o. female female was evaluated by a Virtual Visit (video visit) encounter to address concerns as mentioned above. A caregiver was present when appropriate. Due to this being a TeleHealth encounter (During [facility]), evaluation of the following organ systems was limited: Vitals/Constitutional/EENT/Resp/CV/GI//MS/Neuro/Skin/Heme-Lymph-Imm. Pursuant to the emergency declaration under the [address] [place] [address], [address] [place] [address] and the [employer] and [employer] Act, this Virtual Visit was conducted with patient's (and/or legal guardian's) consent, to reduce the patient's risk of exposure to COVID-19 and provide necessary medical care. The patient (and/or legal guardian) has also been advised to contact this office for worsening conditions or problems, and seek emergency medical treatment and/or call 911 if deemed necessary. Services were provided through a video synchronous discussion virtually to substitute for in-person clinic visit. Patient and provider were located at their individual homes. Electronically signed by DARRIAN Avila NP on 12/16/20 at 4:58 PM EST     An electronic signature was used to authenticate this note. The Virtual Visit time of 15 minutes. More than 50% of this visit was counseling and education regarding The encounter diagnosis was Dysmenorrhea. and Contraception   as well as  counseling on preventative health maintenance follow-up.

## 2021-01-07 ENCOUNTER — NURSE TRIAGE (OUTPATIENT)
Dept: OTHER | Facility: CLINIC | Age: 21
End: 2021-01-07

## 2021-01-07 NOTE — TELEPHONE ENCOUNTER
Reason for Disposition   All other earaches (Exceptions: earache lasting < 1 hour, and earache from air travel)    Answer Assessment - Initial Assessment Questions  1. LOCATION: \"Which ear is involved? \"      Both    2. ONSET: \"When did the ear start hurting\"       A couple days    3. SEVERITY: \"How bad is the pain? \"  (Scale 1-10; mild, moderate or severe)    - MILD (1-3): doesn't interfere with normal activities     - MODERATE (4-7): interferes with normal activities or awakens from sleep     - SEVERE (8-10): excruciating pain, unable to do any normal activities       6/10    4. URI SYMPTOMS: \"Do you have a runny nose or cough? \"      Congestion    5. FEVER: \"Do you have a fever? \" If so, ask: \"What is your temperature, how was it measured, and when did it start? \"      Denies    6. CAUSE: \"Have you been swimming recently? \", \"How often do you use Q-TIPS? \", \"Have you had any recent air travel or scuba diving? \"      Denies    7. OTHER SYMPTOMS: \"Do you have any other symptoms? \" (e.g., headache, stiff neck, dizziness, vomiting, runny nose, decreased hearing)      SOB (has been happening her whole life)    8. PREGNANCY: \"Is there any chance you are pregnant? \" \"When was your last menstrual period? \"      On birth control    Protocols used: EARACHE-ADULT-[place]    Patient called pre-service center [facility] with red flag complaint. Brief description of triage: seen today or tomorrow     Triage indicates for patient to be seen today or tomorrow for earache in both ears    Care advice provided, patient verbalizes understanding; denies any other questions or concerns; instructed to call back for any new or worsening symptoms. Writer provided warm transfer to [facility] at [facility] for appointment scheduling. Attention Provider: Thank you for allowing me to participate in the care of your patient. The patient was connected to triage in response to information provided to the [facility].   Please do not respond through this encounter as the response is not directed to a shared pool.

## 2021-01-17 ENCOUNTER — HOSPITAL ENCOUNTER (EMERGENCY)
Age: 21
Discharge: HOME | End: 2021-01-17
Attending: EMERGENCY MEDICINE
Payer: MEDICAID

## 2021-01-17 VITALS
SYSTOLIC BLOOD PRESSURE: 115 MMHG | DIASTOLIC BLOOD PRESSURE: 80 MMHG | RESPIRATION RATE: 16 BRPM | TEMPERATURE: 98.8 F | HEART RATE: 95 BPM | OXYGEN SATURATION: 98 %

## 2021-01-17 DIAGNOSIS — H65.93 BILATERAL NON-SUPPURATIVE OTITIS MEDIA: Primary | ICD-10-CM

## 2021-01-17 PROCEDURE — 99283 EMERGENCY DEPT VISIT LOW MDM: CPT

## 2021-01-17 PROCEDURE — 6370000000 HC RX 637 (ALT 250 FOR IP): Performed by: STUDENT IN AN ORGANIZED HEALTH CARE EDUCATION/TRAINING PROGRAM

## 2021-01-17 PROCEDURE — 69209 REMOVE IMPACTED EAR WAX UNI: CPT

## 2021-01-17 RX ORDER — ACETAMINOPHEN 500 MG
500 TABLET ORAL EVERY 6 HOURS PRN
Qty: 540 TABLET | Refills: 0 | Status: SHIPPED | OUTPATIENT
Start: 2021-01-17 | End: 2021-03-02

## 2021-01-17 RX ORDER — AMOXICILLIN AND CLAVULANATE POTASSIUM 875; 125 MG/1; MG/1
1 TABLET, FILM COATED ORAL EVERY 12 HOURS
Qty: 14 TABLET | Refills: 0 | Status: SHIPPED | OUTPATIENT
Start: 2021-01-17 | End: 2021-01-24

## 2021-01-17 RX ORDER — AMOXICILLIN AND CLAVULANATE POTASSIUM 875; 125 MG/1; MG/1
1 TABLET, FILM COATED ORAL ONCE
Status: COMPLETED | OUTPATIENT
Start: 2021-01-17 | End: 2021-01-17

## 2021-01-17 RX ORDER — ACETAMINOPHEN 500 MG
500 TABLET ORAL ONCE
Status: COMPLETED | OUTPATIENT
Start: 2021-01-17 | End: 2021-01-17

## 2021-01-17 RX ADMIN — AMOXICILLIN AND CLAVULANATE POTASSIUM 1 TABLET: 875; 125 TABLET, FILM COATED ORAL at 17:04

## 2021-01-17 RX ADMIN — ACETAMINOPHEN 500 MG: 500 TABLET ORAL at 17:04

## 2021-01-17 ASSESSMENT — PAIN SCALES - GENERAL: PAINLEVEL_OUTOF10: 10

## 2021-01-17 ASSESSMENT — ENCOUNTER SYMPTOMS
EYES NEGATIVE: 1
VOICE CHANGE: 0
RESPIRATORY NEGATIVE: 1
TROUBLE SWALLOWING: 0
GASTROINTESTINAL NEGATIVE: 1
RHINORRHEA: 0
SINUS PRESSURE: 0
SINUS PAIN: 0
ALLERGIC/IMMUNOLOGIC NEGATIVE: 1
FACIAL SWELLING: 0
SORE THROAT: 0

## 2021-01-17 ASSESSMENT — PAIN DESCRIPTION - ORIENTATION: ORIENTATION: LEFT;RIGHT

## 2021-01-17 NOTE — ED PROVIDER NOTES
[facility] ED  Emergency Department Encounter  EmergencyMedicine Resident     Pt Ibis Mccray  MRN: [id number]  [place] 2000  Date of evaluation: 1/17/21  PCP:  DARRIAN Miranda CNP    CHIEF COMPLAINT       Chief Complaint   Patient presents with    Otalgia     bilateral, RT is worse        HISTORY OF PRESENT ILLNESS  (Location/Symptom, Timing/Onset, Context/Setting, Quality, Duration, Modifying Factors, Severity.)      Kristina Wells is a 21 y.o. female with past medical history otitis media when she was a child, here bilateral ear pain, started a week ago, patient described her pain as Sharp, 8 of 10 in severity.  Patient went to [facility] a week ago and she was discharged home antibiotic.  Today this morning patient wake up feeling dizzy, had hearing problem, feel popping and fullness in ears.  Patient came to the ED for further management. Patient denied any LOC, fever, cough, difficulty swallowing, sore throat, runny nose, chest pain. Patient reported history of left ear tube surgery 2 years ago. PAST MEDICAL / SURGICAL / SOCIAL / FAMILY HISTORY      has a past medical history of ADD (attention deficit disorder), Anxiety, Asthma, CMV (cytomegalovirus infection) ([address] Depression, Gestational diabetes mellitus (GDM), antepartum, Hearing loss in left ear, Hypothyroidism, Immunizations up to date in pediatric patient, Neurocardiogenic syncope, Pseudoseizures, Raynaud disease, Seizures ([address] SVT (supraventricular tachycardia) ([address] Tachycardia, Traumatic injury during pregnancy, third trimester, and Wears glasses. has a past surgical history that includes REMOVAL FOREIGN BODY EAR (Left) and Tympanoplasty (Left, 06/24/2016).       Social History     Socioeconomic History    Marital status: Single     Spouse name: Not on file    Number of children: Not on file    Years of education: Not on file    Highest education level: Not on file   Occupational History    Not on file   Social Needs    Financial resource strain: Not on file    Food insecurity     Worry: Not on file     Inability: Not on file    Transportation needs     Medical: Not on file     Non-medical: Not on file   Tobacco Use    Smoking status: Current Every Day Smoker     Packs/day: 0.50     Types: Cigars    Smokeless tobacco: Never Used    Tobacco comment: 1-2 black and milds daily    Substance and Sexual Activity    Alcohol use: No    Drug use: Yes     Types: Marijuana     Comment: monthly     Sexual activity: Yes     Partners: Male     Birth control/protection: Condom   Lifestyle    Physical activity     Days per week: Not on file     Minutes per session: Not on file    Stress: Not on file   Relationships    Social connections     Talks on phone: Not on file     Gets together: Not on file     Attends [employer] service: Not on file     Active member of club or organization: Not on file     Attends meetings of clubs or organizations: Not on file     Relationship status: Not on file    Intimate partner violence     Fear of current or ex partner: Not on file     Emotionally abused: Not on file     Physically abused: Not on file     Forced sexual activity: Not on file   Other Topics Concern    Not on file   Social History Narrative    Not on file       Family History   Adopted: Yes   Problem Relation Age of Onset    Diabetes Mother         borderline diet controlled    Atrial Fibrillation Mother     Anxiety Disorder Mother     Depression Mother     Heart Disease Mother     Seizures Maternal Grandmother     COPD Maternal Grandmother     Hypertension Maternal Grandmother     Cancer Maternal Grandmother         ovarian    Heart Disease Maternal Grandmother     Diabetes Type 1  Maternal Grandfather     Diabetes Type 1  Other         m uncle    Bleeding Prob Other         m aunt protein S&C deficiency       Allergies:  Sulfa antibiotics    Home Medications:  Prior to Admission medications    Medication Sig Start Date End Date Taking? Authorizing Provider   acetaminophen (TYLENOL) 500 MG tablet Take 1 tablet by mouth every 6 hours as needed for Pain 1/17/21  Yes Reynaldo Jenkins MD   amoxicillin-clavulanate (AUGMENTIN) 875-125 MG per tablet Take 1 tablet by mouth every 12 hours for 7 days 1/17/21 1/24/21 Yes Reynaldo Jenkins MD   norethindrone-ethinyl estradiol-Fe (LO LOESTRIN FE) 1 MG-10 MCG / 10 MCG tablet Take 1 tablet by mouth daily 12/16/20   Court Curling, APRN - NP   hydrOXYzine (ATARAX) 25 MG tablet take 1 tablet by mouth three times a day 6/17/20   Historical Provider, MD       REVIEW OF SYSTEMS    (2-9 systems for level 4, 10 or more for level 5)      Review of Systems   Constitutional: Negative. HENT: Positive for ear pain, hearing loss and tinnitus. Negative for congestion, dental problem, drooling, ear discharge, facial swelling, mouth sores, nosebleeds, rhinorrhea, sinus pressure, sinus pain, sneezing, sore throat, trouble swallowing and voice change. Eyes: Negative. Respiratory: Negative. Cardiovascular: Negative. Gastrointestinal: Negative. Endocrine: Negative. Musculoskeletal: Negative. Allergic/Immunologic: Negative. Neurological: Positive for dizziness and headaches. Negative for tremors, seizures, syncope, facial asymmetry, speech difficulty, weakness and numbness. Psychiatric/Behavioral: Negative. PHYSICAL EXAM   (up to 7 for level 4, 8 or more for level 5)      INITIAL VITALS:   /80   Pulse 95   Temp 98.8 °F (37.1 °C)   Resp 16   SpO2 98%     Physical Exam  Constitutional:       Appearance: Normal appearance. HENT:      Head: Normocephalic and atraumatic. Right Ear: Decreased hearing noted. Tenderness present. No laceration. A middle ear effusion is present. There is no impacted cerumen. No foreign body. No mastoid tenderness. No PE tube. No hemotympanum. Tympanic membrane is erythematous and bulging.       Left Ear: Decreased hearing noted. Tenderness present. No laceration. A middle ear effusion is present. There is impacted cerumen. No foreign body. No mastoid tenderness. No PE tube. No hemotympanum. Tympanic membrane is erythematous and bulging. Nose: Nose normal. No congestion or rhinorrhea. Mouth/Throat:      Mouth: Mucous membranes are moist.   Eyes:      General: No scleral icterus. Right eye: No discharge. Left eye: No discharge. Extraocular Movements: Extraocular movements intact. Conjunctiva/sclera: Conjunctivae normal.      Pupils: Pupils are equal, round, and reactive to light. Neck:      Musculoskeletal: Normal range of motion. Cardiovascular:      Rate and Rhythm: Normal rate and regular rhythm. Pulses: Normal pulses. Heart sounds: Normal heart sounds. Pulmonary:      Effort: Pulmonary effort is normal.      Breath sounds: Normal breath sounds. Abdominal:      General: Abdomen is flat. Palpations: Abdomen is soft. Genitourinary:     General: Normal vulva. Musculoskeletal: Normal range of motion. Skin:     General: Skin is warm. Capillary Refill: Capillary refill takes less than 2 seconds. Neurological:      Mental Status: She is alert. DIFFERENTIAL  DIAGNOSIS     PLAN (LABS / IMAGING / EKG):  No orders of the defined types were placed in this encounter.       MEDICATIONS ORDERED:  Orders Placed This Encounter   Medications    amoxicillin-clavulanate (AUGMENTIN) 875-125 MG per tablet 1 tablet    acetaminophen (TYLENOL) tablet 500 mg    acetaminophen (TYLENOL) 500 MG tablet     Sig: Take 1 tablet by mouth every 6 hours as needed for Pain     Dispense:  540 tablet     Refill:  0    amoxicillin-clavulanate (AUGMENTIN) 875-125 MG per tablet     Sig: Take 1 tablet by mouth every 12 hours for 7 days     Dispense:  14 tablet     Refill:  0       DDX: otitis media, otitis externa     DIAGNOSTIC RESULTS / EMERGENCY DEPARTMENT COURSE / MDM LAB RESULTS:  No results found for this visit on 01/17/21. IMPRESSION: 21years old female who presented with bilateral ear pain. Patient symptoms started a week ago. Patient did not take any medication. Patient physical exam found right tympanic membrane bulging with exudates. Left ear cerumen impaction. Patient symptoms most likely from otitis media. Patient will get cerumen removal and discharged home with antibiotic and follow-up with PCP  RADIOLOGY:      EKG      All EKG's are interpreted by the Emergency Department Physician who either signs or Co-signs this chart in the absence of a cardiologist.    EMERGENCY DEPARTMENT COURSE:  Patient will get cerumen removal.    PROCEDURES:      CONSULTS:  None    CRITICAL CARE:  Please see attending note    FINAL IMPRESSION      1.  Bilateral non-suppurative otitis media          DISPOSITION / PLAN     DISPOSITION Decision To Discharge 01/17/2021 05:14:48 PM      PATIENT REFERRED TO:  DARRIAN Capps - [facility]  [address] [place] [phone]    Schedule an appointment as soon as possible for a visit in 1 week      [facility]  [address] [place] [address]  [phone]    If symptoms worsen      DISCHARGE MEDICATIONS:  Discharge Medication List as of 1/17/2021  4:58 PM      START taking these medications    Details   acetaminophen (TYLENOL) 500 MG tablet Take 1 tablet by mouth every 6 hours as needed for Pain, Disp-540 tablet, R-0Print      amoxicillin-clavulanate (AUGMENTIN) 875-125 MG per tablet Take 1 tablet by mouth every 12 hours for 7 days, Disp-14 tablet, R-0Print             Atiya Alfaro MD  Emergency Medicine Resident    (Please note that portions of thisnote were completed with a voice recognition program.  Efforts were made to edit the dictations but occasionally words are mis-transcribed.)        Atiya Alfaro MD  Resident  01/17/21 0571

## 2021-01-17 NOTE — ED PROVIDER NOTES
[facility] [facility]     Emergency Department     Faculty Note/ Attestation      Pt Name: Ekaterina Patel                                       MRN: [id number]  [place] 2000  Date of evaluation: 1/17/2021    Patients PCP:    DARRIAN Calles - [facility]      Attestation  I performed a history and physical examination of the patient and discussed management with the resident. I reviewed the residents note and agree with the documented findings and plan of care. Any areas of disagreement are noted on the chart. I was personally present for the key portions of any procedures. I have documented in the chart those procedures where I was not present during the key portions. I have reviewed the emergency nurses triage note. I agree with the chief complaint, past medical history, past surgical history, allergies, medications, social and family history as documented unless otherwise noted below. For Physician Assistant/ Nurse Practitioner cases/documentation I have personally evaluated this patient and have completed at least one if not all key elements of the E/M (history, physical exam, and MDM). Additional findings are as noted.       Initial Screens:             Vitals:    Vitals:    01/17/21 1522 01/17/21 1545   BP:  115/80   Pulse:  95   Resp:  16   Temp: 98.8 °F (37.1 °C)    SpO2:  98%       CHIEF COMPLAINT       Chief Complaint   Patient presents with    Otalgia     bilateral, RT is worse              DIAGNOSTIC RESULTS             RADIOLOGY:   No orders to display         LABS:  Labs Reviewed - No data to display      EMERGENCY DEPARTMENT COURSE:     -------------------------  BP: 115/80, Temp: 98.8 °F (37.1 °C), Pulse: 95, Resp: 16      Comments    Prior OM as a child  Tubes placed 5 yrs bilat, have since fallen out  Here for bilat ear pain x1 week  Seen at [facility], d/c without ABX  Dizziness and bilat ear pain  No other URI sx    Does have fluid behind the right TM, left TM occluded by cerumen. Left canal irrigated, will discharge with Augmentin and follow-up with PCP.     (Please note that portions of this note were completed with a voice recognition program.  Efforts were made to edit the dictations but occasionally words are mis-transcribed.)      Lemus MD  Attending Emergency Physician         Lia Fraga MD  01/17/21 4701

## 2021-03-02 ENCOUNTER — OFFICE VISIT (OUTPATIENT)
Dept: OBGYN CLINIC | Age: 21
End: 2021-03-02
Payer: MEDICAID

## 2021-03-02 ENCOUNTER — HOSPITAL ENCOUNTER (OUTPATIENT)
Age: 21
Setting detail: SPECIMEN
Discharge: HOME | End: 2021-03-02
Payer: MEDICAID

## 2021-03-02 VITALS
HEIGHT: 63 IN | SYSTOLIC BLOOD PRESSURE: 105 MMHG | HEART RATE: 85 BPM | BODY MASS INDEX: 26.36 KG/M2 | TEMPERATURE: 98 F | DIASTOLIC BLOOD PRESSURE: 85 MMHG | WEIGHT: 148.8 LBS

## 2021-03-02 DIAGNOSIS — O36.80X0 PREGNANCY WITH INCONCLUSIVE FETAL VIABILITY, SINGLE OR UNSPECIFIED FETUS: ICD-10-CM

## 2021-03-02 DIAGNOSIS — Z32.01 POSITIVE PREGNANCY TEST: ICD-10-CM

## 2021-03-02 DIAGNOSIS — N92.6 MISSED MENSES: Primary | ICD-10-CM

## 2021-03-02 DIAGNOSIS — N92.6 MISSED MENSES: ICD-10-CM

## 2021-03-02 LAB
CONTROL: ABNORMAL
HCG QUANTITATIVE: 2961 IU/L
PREGNANCY TEST URINE, POC: POSITIVE

## 2021-03-02 PROCEDURE — 99203 OFFICE O/P NEW LOW 30 MIN: CPT | Performed by: CLINICAL NURSE SPECIALIST

## 2021-03-02 PROCEDURE — 36415 COLL VENOUS BLD VENIPUNCTURE: CPT | Performed by: CLINICAL NURSE SPECIALIST

## 2021-03-02 PROCEDURE — 81025 URINE PREGNANCY TEST: CPT | Performed by: CLINICAL NURSE SPECIALIST

## 2021-03-02 RX ORDER — VITAMIN A ACETATE, .BETA.-CAROTENE, ASCORBIC ACID, CHOLECALCIFEROL, .ALPHA.-TOCOPHEROL ACETATE, DL-, THIAMINE MONONITRATE, RIBOFLAVIN, NIACINAMIDE, PYRIDOXINE HYDROCHLORIDE, FOLIC ACID, CYANOCOBALAMIN, CALCIUM CARBONATE, FERROUS FUMARATE, ZINC OXIDE, AND CUPRIC OXIDE 2000; 2000; 120; 400; 22; 1.84; 3; 20; 10; 1; 12; 200; 27; 25; 2 [IU]/1; [IU]/1; MG/1; [IU]/1; MG/1; MG/1; MG/1; MG/1; MG/1; MG/1; UG/1; MG/1; MG/1; MG/1; MG/1
1 TABLET ORAL DAILY
Qty: 30 TABLET | Refills: 11 | Status: SHIPPED | OUTPATIENT
Start: 2021-03-02 | End: 2021-06-14 | Stop reason: SDUPTHER

## 2021-03-02 NOTE — PROGRESS NOTES
Patient was in the office today for a QB HCG. Draw per physician order using sterile technique. Drawn from the Right AC.

## 2021-03-02 NOTE — PROGRESS NOTES
DATE OF VISIT:  3/2/21  PATIENT NAME:  Remberto Booth     YOB: 2000    SUBJECTIVE:    Chief Complaint:  Chief Complaint   Patient presents with    Amenorrhea       HPI:  Benito Lyon  is being seen today for missed menses. She reports a  positive pregnancy test on 21. This  is a planned pregnancy. She is  accepting at this time. LMP: Nov./Dec.       Sure and definite: No     28 day cycle: No    She was not on a contraceptive at the time of conception. Estimated weeks gestation today : unknown  Tentative JENNIFER: unknown    Relationship with FOB: involved    OBJECTIVE:    Vitals:    21 1503   BP: 105/85   Site: Right Upper Arm   Position: Sitting   Cuff Size: Medium Adult   Pulse: 85   Temp: 98 °F (36.7 °C)   TempSrc: Temporal   Weight: 148 lb 12.8 oz (67.5 kg)   Height: 5' 3\" (1.6 m)     Body mass index is 26.36 kg/m². No LMP recorded (lmp unknown). Mother's ethnicity:    Father's ethnicity:      She is complaining today of:   Pain: No  Cramping: Yes intermittently described as light and lingers for approx. 1 min. Bleeding or spotting: No    Nausea: No  Vomiting: No    Breast enlargement/tenderness: Yes  Fatigue: Yes  Frequency of urination: Yes    Previous high risk obstetrical history: yes GDM  OB History    Para Term  AB Living   1 1 1 0 0 1   SAB TAB Ectopic Molar Multiple Live Births   0 0 0   0 1      # Outcome Date GA Lbr South/2nd Weight Sex Delivery Anes PTL Lv   1 Term 19 38w6d 02::22 7 lb 11.3 oz (3.495 kg) M Vag-Spont EPI N TERESA       ROS was done and is negative except as documented in HPI. History was reviewed as documented on Epic Navigator. ASSESSMENT:  Missed menses with + UCG  1. Missed menses    2. Positive pregnancy test    3. Pregnancy with inconclusive fetal viability, single or unspecified fetus        PLAN:  UCG was done and noted as positive  Prenatal vitamins were ordered:  Yes Ultrasound for dating and viability was ordered: Yes  She was instructed to call with any concerns or worsening of any symptoms  She will return for New OB intake visit    Patient was seen with total face to face time of 30 minutes. More than 50% of this visit was spent face to face coordinating plan of care and answering questions . She was also counseled on her preventative health maintenance recommendations and follow-up. Return for call in am for quant level if 5000 or above will schedule dating US.     Electronically signed by: Wanda Gilbert CNP

## 2021-03-02 NOTE — PATIENT INSTRUCTIONS
Patient Education        Learning About Pregnancy  Your Care Instructions     Your health in the early weeks of your pregnancy is particularly important for your baby's health. Take good care of yourself. Anything you do that harms your body can also harm your baby. Make sure to go to all of your doctor appointments. Regular checkups will help keep you and your baby healthy. How can you care for yourself at home? Diet    · Eat a balanced diet. Make sure your diet includes plenty of beans, peas, and leafy green vegetables.     · Do not skip meals or go for many hours without eating. If you are nauseated, try to eat a small, healthy snack every 2 to 3 hours.     · Do not eat fish that has a high level of mercury, such as shark, swordfish, or mackerel. Do not eat more than one can of tuna each week.     · Drink plenty of fluids, enough so that your urine is light yellow or clear like water. If you have kidney, heart, or liver disease and have to limit fluids, talk with your doctor before you increase the amount of fluids you drink.     · Cut down on caffeine, such as coffee, tea, and cola.     · Do not drink alcohol, such as beer, wine, or hard liquor.     · Take a multivitamin that contains at least 400 micrograms (mcg) of folic acid to help prevent birth defects. Fortified cereal and whole wheat bread are good additional sources of folic acid.     · Increase the calcium in your diet. Try to drink a quart of skim milk each day. You may also take calcium supplements and choose foods such as cheese and yogurt. Lifestyle    · Make sure you go to your follow-up appointments.     · Get plenty of rest. You may be unusually tired while you are pregnant.     · Get at least 30 minutes of exercise on most days of the week. Walking is a good choice. If you have not exercised in the past, start out slowly. Take several short walks each day.   · Eat foods that are high in protein but low in fat.     · If you are taking iron supplements, ask your doctor if they are necessary. Iron can make nausea worse.     · Avoid any smells, such as coffee, that make you feel sick.     · Get lots of rest. Morning sickness may be worse when you are tired. Follow-up care is a key part of your treatment and safety. Be sure to make and go to all appointments, and call your doctor if you are having problems. It's also a good idea to know your test results and keep a list of the medicines you take. Where can you learn more? Go to https://Viewpointpepiceweb.Opalis [email or web address] org and sign in to your SinglePlatform account. Enter D289 in the [employer] box to learn more about \"Learning About Pregnancy. \"     If you do not have an account, please click on the \"Sign Up Now\" link. Current as of: February 11, 2020               Content Version: 12.6  © 2006-2020 [employer]. Care instructions adapted under license by [facility] ([facility]). If you have questions about a medical condition or this instruction, always ask your healthcare professional. [place] [address] any warranty or liability for your use of this information. Patient Education        Nutrition During Pregnancy: Care Instructions  Your Care Instructions     Healthy eating when you are pregnant is important for you and your baby. It can help you feel well and have a successful pregnancy and delivery. During pregnancy your nutrition needs increase. Even if you have excellent eating habits, your doctor may recommend a multivitamin to make sure you get enough iron and folic acid. Many pregnant women wonder how much weight they should gain. In general, women who were at a healthy weight before they became pregnant should gain between 25 and 35 pounds. Women who were overweight before pregnancy are usually advised to gain 15 to 25 pounds. Women who were underweight before pregnancy are usually advised to gain 28 to 40 pounds. Your doctor will work with you to set a weight goal that is right for you. Gaining a healthy amount of weight helps you have a healthy baby. Follow-up care is a key part of your treatment and safety. Be sure to make and go to all appointments, and call your doctor if you are having problems. It's also a good idea to know your test results and keep a list of the medicines you take. How can you care for yourself at home? · Eat plenty of fruits and vegetables. Include a variety of orange, yellow, and leafy dark-green vegetables every day. · Choose whole-grain bread, cereal, and pasta. Good choices include whole wheat bread, whole wheat pasta, brown rice, and oatmeal.  · Get 4 or more servings of milk and milk products each day. Good choices include nonfat or low-fat milk, yogurt, and cheese. If you cannot eat milk products, you can get calcium from calcium-fortified products such as orange juice, soy milk, and tofu. Other non-milk sources of calcium include leafy green vegetables, such as broccoli, kale, mustard greens, turnip greens, bok jamin, and brussels sprouts. · If you eat meat, pick lower-fat types. Good choices include lean cuts of meat and chicken or turkey without the skin. · Do not eat shark, swordfish, tarik mackerel, or tilefish. They have high levels of mercury, which is dangerous to your baby. You can eat up to 12 ounces a week of fish or shellfish that have low mercury levels. Good choices include shrimp, wild salmon, pollock, and catfish. Do not eat more than 6 ounces of tuna each week. · Heat lunch meats (such as turkey, ham, or bologna) to 165°F before you eat them. This reduces your risk of getting sick from a kind of bacteria that can be found in lunch meats. · Do not eat unpasteurized soft cheeses, such as brie, feta, fresh mozzarella, and blue cheese. They have a bacteria that could harm your baby. · Limit caffeine. If you drink coffee or tea, have no more than 1 cup a day. Caffeine is also found in hannah. · Do not drink any alcohol. No amount of alcohol has been found to be safe during pregnancy. · Do not diet or try to lose weight. For example, do not follow a low-carbohydrate diet. If you are overweight at the start of your pregnancy, your doctor will work with you to manage your weight gain. · Tell your doctor about all vitamins and supplements you take. When should you call for help? Watch closely for changes in your health, and be sure to contact your doctor if you have any problems. Where can you learn more? Go to https://Hakiakristeneb.Synqera. org and sign in to your [employer] account. Enter Y785 in the SeeMore Interactive box to learn more about \"Nutrition During Pregnancy: Care Instructions. \"     If you do not have an account, please click on the \"Sign Up Now\" link. Current as of: February 11, 2020               Content Version: 12.6  © 6712-1319 [employer]. Care instructions adapted under license by [facility] [place] ([facility]). If you have questions about a medical condition or this instruction, always ask your healthcare professional. [place] [address] any warranty or liability for your use of this information.          Patient Education        Learning About Prenatal Visits  Your Care Instructions · Use your visits to discuss with your doctor any concerns you have. How can you care for yourself at home? · Get plenty of rest.  · Exercise every day, if your doctor says it is okay. If you have not exercised in the past, start out slowly. Take many short walks each day. · Eat a balanced diet. Make sure your diet includes plenty of beans, peas, and leafy green vegetables. · Drink plenty of fluids, enough so that your urine is light yellow or clear like water. Drink water. Cut down on drinks with caffeine, such as coffee, tea, and cola. If you have kidney, heart, or liver disease and have to limit fluids, talk with your doctor before you increase the amount of fluids you drink. · Avoid tobacco smoke, alcohol and drugs, chemical fumes, paint fumes, and poisons. Do not smoke or use tobacco. If you need help quitting, talk to your doctor about stop-smoking programs and medicines. These can increase your chances of quitting for good. · Review all of your medicines with your doctor. Some of your routine medicines may need to be changed to protect your baby. Do not stop or start taking any medicines without talking to your doctor first.  Follow-up care is a key part of your treatment and safety. Be sure to make and go to all appointments, and call your doctor if you are having problems. It's also a good idea to know your test results and keep a list of the medicines you take. Where can you learn more? Go to https://olga.healthnooked. org and sign in to your mobileo account. Enter J502 in the [facility] box to learn more about \"Learning About Prenatal Visits. \"     If you do not have an account, please click on the \"Sign Up Now\" link. Current as of: February 11, 2020               Content Version: 12.6  © 4998-8837 [employer]. Care instructions adapted under license by [facility] ([facility]). If you have questions about a medical condition or this instruction, always ask your healthcare professional. [address] any warranty or liability for your use of this information.

## 2021-03-03 DIAGNOSIS — N92.6 MISSED MENSES: Primary | ICD-10-CM

## 2021-03-03 PROCEDURE — 36415 COLL VENOUS BLD VENIPUNCTURE: CPT | Performed by: CLINICAL NURSE SPECIALIST

## 2021-03-04 ENCOUNTER — NURSE ONLY (OUTPATIENT)
Dept: OBGYN CLINIC | Age: 21
End: 2021-03-04
Payer: MEDICAID

## 2021-03-04 ENCOUNTER — HOSPITAL ENCOUNTER (OUTPATIENT)
Age: 21
Setting detail: SPECIMEN
Discharge: HOME | End: 2021-03-04
Payer: MEDICAID

## 2021-03-04 DIAGNOSIS — N92.6 MISSED MENSES: ICD-10-CM

## 2021-03-04 DIAGNOSIS — N92.6 MISSED MENSES: Primary | ICD-10-CM

## 2021-03-04 PROCEDURE — 36415 COLL VENOUS BLD VENIPUNCTURE: CPT | Performed by: SPECIALIST

## 2021-03-05 ENCOUNTER — TELEPHONE (OUTPATIENT)
Dept: OBGYN CLINIC | Age: 21
End: 2021-03-05

## 2021-03-05 LAB — HCG QUANTITATIVE: 6030 IU/L

## 2021-03-05 NOTE — TELEPHONE ENCOUNTER
Patient called in seen her results via [employer] and wanted to schedule an US.  Looked in her chart and seen that her Bhcg was 6030 so scheduled her an US fr Wed also per Patty's note Jordyn Arceo RN notified

## 2021-04-20 ENCOUNTER — HOSPITAL ENCOUNTER (OUTPATIENT)
Age: 21
Setting detail: SPECIMEN
Discharge: HOME | End: 2021-04-20
Payer: COMMERCIAL

## 2021-04-20 ENCOUNTER — INITIAL PRENATAL (OUTPATIENT)
Dept: OBGYN CLINIC | Age: 21
End: 2021-04-20
Payer: MEDICAID

## 2021-04-20 VITALS
DIASTOLIC BLOOD PRESSURE: 76 MMHG | BODY MASS INDEX: 26.93 KG/M2 | WEIGHT: 152 LBS | HEART RATE: 82 BPM | SYSTOLIC BLOOD PRESSURE: 138 MMHG

## 2021-04-20 DIAGNOSIS — R55 NEUROCARDIOGENIC SYNCOPE: ICD-10-CM

## 2021-04-20 DIAGNOSIS — Z11.3 SCREEN FOR STD (SEXUALLY TRANSMITTED DISEASE): ICD-10-CM

## 2021-04-20 DIAGNOSIS — Z34.81 ENCOUNTER FOR SUPERVISION OF OTHER NORMAL PREGNANCY IN FIRST TRIMESTER: Primary | ICD-10-CM

## 2021-04-20 DIAGNOSIS — Z3A.11 11 WEEKS GESTATION OF PREGNANCY: ICD-10-CM

## 2021-04-20 DIAGNOSIS — N92.6 MISSED MENSES: ICD-10-CM

## 2021-04-20 DIAGNOSIS — Z12.4 CERVICAL CANCER SCREENING: ICD-10-CM

## 2021-04-20 DIAGNOSIS — Z13.79 GENETIC SCREENING: ICD-10-CM

## 2021-04-20 DIAGNOSIS — Z32.01 POSITIVE PREGNANCY TEST: ICD-10-CM

## 2021-04-20 LAB
ABO/RH: NORMAL
ABSOLUTE EOS #: 0.06 K/UL (ref 0–0.44)
ABSOLUTE IMMATURE GRANULOCYTE: 0.04 K/UL (ref 0–0.3)
ABSOLUTE LYMPH #: 1.13 K/UL (ref 1.2–5.2)
ABSOLUTE MONO #: 0.51 K/UL (ref 0.1–1.4)
AMPHETAMINE SCREEN URINE: NEGATIVE
ANTIBODY SCREEN: NEGATIVE
BARBITURATE SCREEN URINE: NEGATIVE
BASOPHILS # BLD: 0 % (ref 0–2)
BASOPHILS ABSOLUTE: 0.03 K/UL (ref 0–0.2)
BENZODIAZEPINE SCREEN, URINE: NEGATIVE
BILIRUBIN URINE: NEGATIVE
BUPRENORPHINE URINE: NORMAL
CANNABINOID SCREEN URINE: NEGATIVE
COCAINE METABOLITE, URINE: NEGATIVE
COLOR: ABNORMAL
COMMENT UA: ABNORMAL
DIFFERENTIAL TYPE: ABNORMAL
EOSINOPHILS RELATIVE PERCENT: 1 % (ref 1–4)
GLUCOSE ADMINISTRATION: ABNORMAL
GLUCOSE TOLERANCE SCREEN 50G: 144 MG/DL (ref 70–135)
GLUCOSE URINE: ABNORMAL
HCT VFR BLD CALC: 37.3 % (ref 36.3–47.1)
HEMOGLOBIN: 12.6 G/DL (ref 11.9–15.1)
HEPATITIS B SURFACE ANTIGEN: NONREACTIVE
HIV AG/AB: NONREACTIVE
IMMATURE GRANULOCYTES: 0 %
KETONES, URINE: ABNORMAL
LEUKOCYTE ESTERASE, URINE: NEGATIVE
LYMPHOCYTES # BLD: 12 % (ref 25–45)
MCH RBC QN AUTO: 33 PG (ref 25.2–33.5)
MCHC RBC AUTO-ENTMCNC: 33.8 G/DL (ref 28.4–34.8)
MCV RBC AUTO: 97.6 FL (ref 82.6–102.9)
MDMA URINE: NORMAL
METHADONE SCREEN, URINE: NEGATIVE
METHAMPHETAMINE, URINE: NORMAL
MONOCYTES # BLD: 6 % (ref 2–8)
NITRITE, URINE: NEGATIVE
NRBC AUTOMATED: 0 PER 100 WBC
OPIATES, URINE: NEGATIVE
OXYCODONE SCREEN URINE: NEGATIVE
PDW BLD-RTO: 13.5 % (ref 11.8–14.4)
PH UA: 5.5 (ref 5–8)
PHENCYCLIDINE, URINE: NEGATIVE
PLATELET # BLD: 321 K/UL (ref 138–453)
PLATELET ESTIMATE: ABNORMAL
PMV BLD AUTO: 9.6 FL (ref 8.1–13.5)
PROPOXYPHENE, URINE: NORMAL
PROTEIN UA: NEGATIVE
RBC # BLD: 3.82 M/UL (ref 3.95–5.11)
RBC # BLD: ABNORMAL 10*6/UL
RUBV IGG SER QL: 73 IU/ML
SEG NEUTROPHILS: 81 % (ref 34–64)
SEGMENTED NEUTROPHILS ABSOLUTE COUNT: 7.41 K/UL (ref 1.8–8)
SPECIFIC GRAVITY UA: 1.03 (ref 1–1.03)
T. PALLIDUM, IGG: NONREACTIVE
TEST INFORMATION: NORMAL
TRICYCLIC ANTIDEPRESSANTS, UR: NORMAL
TSH SERPL DL<=0.05 MIU/L-ACNC: 2.44 MIU/L (ref 0.3–5)
TURBIDITY: CLEAR
URINE HGB: NEGATIVE
UROBILINOGEN, URINE: NORMAL
WBC # BLD: 9.2 K/UL (ref 4.5–13.5)
WBC # BLD: ABNORMAL 10*3/UL

## 2021-04-20 PROCEDURE — 99213 OFFICE O/P EST LOW 20 MIN: CPT | Performed by: CLINICAL NURSE SPECIALIST

## 2021-04-20 PROCEDURE — 36415 COLL VENOUS BLD VENIPUNCTURE: CPT | Performed by: CLINICAL NURSE SPECIALIST

## 2021-04-20 PROCEDURE — H1000 PRENATAL CARE ATRISK ASSESSM: HCPCS | Performed by: CLINICAL NURSE SPECIALIST

## 2021-04-20 NOTE — PROGRESS NOTES
Мария Munguia is a 21 y.o. female 11w5d        OB History    Para Term  AB Living   2 1 1 0 0 1   SAB TAB Ectopic Molar Multiple Live Births   0 0 0   0 1      # Outcome Date GA Lbr South/2nd Weight Sex Delivery Anes PTL Lv   2 Current            1 Term 19 38w6d 02:25 / 00:22 7 lb 11.3 oz (3.495 kg) M Vag-Spont EPI N TERESA       Blood pressure 138/76, pulse 82, weight 152 lb (68.9 kg), not currently breastfeeding. The patient was seen and evaluated. There was N/A fetal movements. No contractions or leakage of fluid. Signs and symptoms of  labor as well as labor were reviewed. Dates were reviewed with the patient. The physical exam will completed along with pap and cultures in 4 weeks. Prenatal labs were drawn today. The patient will return to the office for her next visit in 4 weeks. See antepartum flow sheet. Patient Active Problem List    Diagnosis Date Noted    SVT (supraventricular tachycardia) ([facility] [address] 2019     Priority: High     2019 Referral to cardiology      Family history of clotting disorder 2019     Priority: High     2018 patient's maternal aunt with hx of Protein S and C deficiency      Family history of diabetes mellitus 2019     Priority: Medium     2019 early one hour GTT ordered      Severe recurrent major depression without psychotic features ([facility] [address] 2020    Seizures ([facility] [address] 03/15/2020     19 M Apg 8/9 Wt 7#11 2019    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 Severa Lanius, MD. See note in media section on 2019.  Family history of blood clots 2019     Denies first degree relative. Maternal aunt.  ADD (attention deficit disorder)      2019 stopped taking focalin 8 months ago          Diagnosis Orders   1.  Encounter for supervision of other normal pregnancy in first trimester     2. 11 weeks gestation of pregnancy  PAP SMEAR C.trachomatis N.gonorrhoeae DNA    Glucose tolerance, 1 hour    Maternal Serum Screen Quad   3. Neurocardiogenic syncope     Continue prenatal vitamins, increase water intake and frequent rest periods as needed. 11w5d Prenatal history and OB education, including milestones throughout the pregnancy, vaccinations recommended while pregnant, any risk factors that may cause the patient to become high risk requiring  testing, and any viruses that may cause complications or fetal anomalies was completed. Total time spent with patient was 20 minutes face-to-face.     Electronically signed by: Claudia Zapata CNP

## 2021-04-20 NOTE — PATIENT INSTRUCTIONS
routine. Your first feeding  It's best to start breastfeeding within 1 hour of birth. For each feeding, you go through these basic steps:  · Get ready for the feeding. Be calm and relaxed, and try not to be distracted. Get some water or juice for yourself. Use two or three pillows to help support your baby while he or she is nursing. · Find a breastfeeding position that is comfortable for you and your baby. Examples are the cradle and the football positions. Make sure the baby's head and chest are lined up straight and facing your breast. It's best to switch which breast you start with each time. · Get the baby latched on well. Your baby's mouth needs to be wide open, like a yawn, so you may need to gently touch the middle of your baby's lower lip. When your baby's mouth is open wide, quickly bring the baby onto your nipple and areola. The areola is the dark [place] around your nipple. · Provide a complete feeding. Let your baby decide how long to nurse. Be sure to burp your baby after each breast.  In the first days after birth, your breasts make a thick, yellow liquid called colostrum. This liquid gives your baby nutrients and antibodies against infection. It is all that babies need at first. Your breasts will fill with milk a few days after the birth. Talk to your doctor, midwife, or lactation specialist right away if you are having problems and aren't sure what to do. How often to breastfeed  Plan to breastfeed your baby on demand rather than setting a strict schedule. For the first 2 weeks, be prepared to breastfeed at least 8 times in a 24-hour period. In the first few days, you may need to wake a sleeping baby to feed. If you breastfeed more often, it will help your breasts to produce more milk. After you go home  After you're home, don't be afraid to call your doctor, midwife, or lactation specialist with questions. That's true even if you don't know what's bothering you.  They are used to parents of treatment and safety. Be sure to make and go to all appointments, and call your doctor if you are having problems. It's also a good idea to know your test results and keep a list of the medicines you take. How can you care for yourself at home? Making good choices about what you eat and drink when you are breastfeeding can help you stay healthy. It can also help your baby stay healthy. Here are some things you can do. Eat a variety of healthy foods. This includes vegetables, fruits, milk products, whole grains, and protein. Drink plenty of fluids. Make water your first choice. If you have kidney, heart, or liver disease and have to limit fluids, talk with your doctor before you increase your fluids. Avoid fish with high mercury. This includes shark, swordfish, tarik mackerel, and marlin. It also includes orange roughy, bigeye tuna, and tilefish from the [facility]. Instead, eat fish that is low in mercury. Choose canned light tuna, salmon, pollock, catfish, or shrimp. Limit caffeine. Things like coffee, tea, chocolate, and some sodas can contain caffeine. Caffeine can pass through breast milk to your baby. It may cause fussiness and sleep problems. Talk to your doctor about how much caffeine is safe for you. Limit alcohol. Alcohol can pass through breast milk to your baby. Talk to your doctor if you have questions about drinking alcohol while breastfeeding. Be safe with supplements. Talk with your doctor before taking any vitamins, minerals, and herbal or other dietary supplements. When should you call for help? Watch closely for changes in your health, and be sure to contact your doctor if you have any problems. Where can you learn more? Go to https://olga.healthVolar Video. org and sign in to your mPortico account. Enter P234 in the [employer] box to learn more about \"Nutrition for Breastfeeding Mothers: Care Instructions. \"     If you do not have an account, please click on the \"Sign Up Now\" link. Current as of: October 8, 2020               Content Version: 12.8  © 2006-2021 [employer]. Care instructions adapted under license by [facility] ([facility]). If you have questions about a medical condition or this instruction, always ask your healthcare professional. [address] any warranty or liability for your use of this information. Patient Education   Coronavirus (KWACW-87): Pregnancy, Birth and Baby Care    What do you need know if you are pregnant regarding coronavirus (COVID-19)?  From what experts know so far, pregnant people do NOT seem more likely than others to get COVID-19 and do NOT have a higher risk of severe complications. What can you do to protect yourself from COVID-19?  Practice social distancing.   When you need to leave the home try to stay at least 6 feet away from other people. Avoid large crowds.  When you leave the house to prevent spreading sickness to others - Wear a facemask covering your mouth and nose. Remove by folding outside of mask together and place in container, wash daily or as soiled.  Wash your hands often by rubbing your hands with soap and water for at least 20 seconds, rinse, and dry with a paper towel that can be thrown away or may use hand  of at least 60% alcohol. Covering all surfaces of your hands by rubbing them together until dry.  Avoid touching your face with unwashed hands, especially your mouth, nose and eyes.  Avoid traveling. What should you do if you have or think you may have COVID-19?  For most infected, this is a mild illness and you will be able to recover at home.  To avoid spreading to others:  o Stay home except to get medical care. o Call a medical facility before you show up. This will help the staff prevent others from being exposed. o Stay separate from others in your home, at least 6 feet.  Use a separate bathroom, if possible.  o Wear a cloth facemask to cover your nose and mouth when you are around other people including at home. o Clean your hands often. Wash your hands with soap and water for at least 20 seconds. o Cough or sneeze into a tissue or your arm. Wash your hands immediately after.  o Dont share personal household items including towels and bedding. o Clean and disinfect objects in your isolation area every day.  If you are in labor and you have, or think you may have COVID-19, call your OB care provider and the hospital birthing unit before you go, so the staff can properly prepare and protect you, your baby and others from being infected. Wear a mask when you leave your home, as you will be asked to continue to wear a mask during your time in the hospital.   Mother-to-child transmission of COVID-19 during pregnancy is unlikely, but after birth a baby is susceptible to person-to-person spread. o The determination of whether or not to separate you and your baby at the time of birth will be decided using shared decision-making between you and your clinical team.  o After your baby is born, your health care provider may recommend you not hold your baby and/or that you stay in a separate room from your baby until you get better.  o If you and your baby are not , wear a facemask at all times and wash your hands thoroughly before touching, holding or feeding your baby. Baby Care & Feeding    Breastfeeding has many benefits for you and your baby and is the best food for your baby. From what experts know so far, COVID-19 has not been found in breastmilk.  Having a healthy adult who can assist with baby care until you get better is important, you may want to have a healthy adult feed your baby your expressed breast milk or formula if you chose to not breastfeed.  You may use a breast pump to express your breast milk.   Wear a face mask, wash your breasts, then wash your hands thoroughly before touching the breast pump and bottle parts. Clean all pump parts after each use.  If you choose to breastfeed from your breast, wear a face mask, wash your breasts, wash your hands thoroughly before feeding your baby. Ways to [place] with Anxiety & Stress   It is normal to feel anxious or worried about COVID-19. You might feel sad about canceling celebrations and staying away from family and friends.  Keep in mind that most people do not get severely ill from COVID-19. It is important to have a plan in case you get sick to prevent spreading the disease to others including an [address] (communicating and documenting your desired health care plan with family and healthcare team).  You can take care of yourself by:  o Taking a break from watching the news  o Take deep breaths, stretch or meditate  o Getting exercise, eating healthy foods, and drinking plenty of water  o Finding activities you can enjoy inside your home  o Staying in touch with your family and friends. Tell your partner, family, and friends how you are feeling. When should you call for help? Call 911 anytime you think you may need emergency care. These post-birth warning signs can become life-threatening if you dont receive medical care right away:     Pain in chest, obstructed breathing or shortness of breath (trouble catching your breath) may mean you have a blood clot in your lung or a heart problem or COVID-19     Seizures may mean you have a condition called eclampsia     Thoughts or feelings of wanting to hurt yourself or someone else may mean you have postpartum depression    These could be signs that your COVID-19 symptoms are worsening and you may need emergency care:    ·         You are severely dizzy or lightheaded. ·         You are confused or can't think clearly. ·         Your face and lips have a blue color. ·         You are unable to respond to others or are very hard to wake up.     Call your healthcare provider if you have: (If you cant reach your healthcare provider, call 911 or go to an emergency room)     Heavy Bleeding, soaking more than one pad in an hour or passing an egg-sized clot or bigger may mean you have an obstetric hemorrhage     Incision that is not healing, increased redness or any pus from episiotomy or  site may mean you have an infection     Redness, swelling, warmth, or pain in the calf area of your leg may mean you have a blood clot     Temperature of 100.4°F or higher, bad smelling vaginal blood or discharge may mean you have an infection      Headache (very painful), vision changes, or pain in the upper right area of your belly may mean you have high blood pressure or post birth preeclampsia    Call your provider before your next appointment if you develop any of the following symptoms:  ·     fever, cough, fatigue, anorexia, shortness of breath, sputum production, and muscle pains. Headache, confusion, rhinorrhea, sore throat, hemoptysis, vomiting, and diarrhea have been reported but are less common. Some persons with COVID-19 have experienced gastrointestinal symptoms such as diarrhea and nausea prior to developing fever and lower respiratory tract signs and symptoms. Center for Disease Control and Prevention. Coronavirus Disease 2019 (COVID-19) Inpatient Obstetric Healthcare Guidance. Interim Considerations for Infection Prevention and Control of Coronavirus Disease 2019 (COVID-19) in [place] [facility]. DrAvailable.co.uk  Association of Womens Health, Obstetric, and  Nurses. [facility] Birth Warning Signs, 2018. Patient Education        Learning About Prenatal Visits  Your Care Instructions     Regular prenatal visits are very important during any pregnancy. These quick office visits may seem simple and routine.  But they can help you and your baby stay healthy. Your doctor is watching for problems that can only be found by regularly checking you and your baby. The visits also give you and your doctor time to build a good relationship. Many women have prenatal visits every 4 to 6 weeks until week 28 of pregnancy. Then the visits become more frequent. This is often every 2 to 3 weeks through week 36 of pregnancy. In the final month of pregnancy, you likely will see your doctor every week. You may have a different schedule if you have a medical problem or are a teen. At different times in your pregnancy, you will have exams and tests. Some are routine. Others are done only when there is a chance of a problem. Everything healthy you do for your body helps your growing baby. Rest when you need it. Eat well, drink plenty of water, and exercise regularly. What happens during a prenatal visit? · You will have blood pressure checks, along with urine tests. You also may have blood tests. If you need to go to the bathroom while waiting for the doctor, tell the nurse. He or she will give you a sample cup so your urine can be tested. · You will be weighed and have your belly measured. · Your doctor may listen to your baby's heartbeat with a special stethoscope. · In your second trimester, your doctor will check your blood sugar (glucose tolerance test) for diabetes that can occur during pregnancy. This is gestational diabetes, which can harm your baby. · You will have tests to check for infections that could harm your . These include group B streptococcus and hepatitis B.  · Your doctor may do ultrasounds to check for problems. This also checks your baby's position. An ultrasound uses sound waves to produce a picture of your baby. · You may have other tests at any time during your pregnancy. · Use your visits to discuss with your doctor any concerns you have. How can you care for yourself at home?   · Get plenty of rest.  · Exercise every day, your pregnancy, the placenta has formed inside your uterus. The placenta's main job is to give your baby oxygen and nutrients through the umbilical cord. It's possible to hear your baby's heartbeat with a special ultrasound device. Your baby's organs are developing. The arms and legs can bend. This is a good time to think about testing for birth defects. There are two types of tests: screening and diagnostic. Screening tests show the chance that a baby has a certain birth defect. They can't tell you for sure that your baby has a problem. Diagnostic tests show if a baby has a certain birth defect. It's your choice whether to have these tests. You and your partner can talk to your doctor or midwife about tests for birth defects. Follow-up care is a key part of your treatment and safety. Be sure to make and go to all appointments, and call your doctor if you are having problems. It's also a good idea to know your test results and keep a list of the medicines you take. How can you care for yourself at home? Decide about tests  · You can have screening tests and diagnostic tests to check for birth defects. The decision to have a test for birth defects is personal. Think about your age, your chance of passing on a family disease, your need to know about any problems, and what you might do after you have the test results. ? Quadruple (quad) blood test. This screening test can be done between 15 and 22 weeks of pregnancy. It checks the amount of four substances in your blood. The doctor looks at these test results, along with your age and other factors, to find out the chance that your baby may have certain problems. ? Amniocentesis. This diagnostic test is used to look for chromosomal problems in the baby's cells. It can be done between 15 and 20 weeks of pregnancy, usually around week 16.  ? Nuchal translucency test. This test uses ultrasound to measure the thickness of the area at the back of the baby's neck.  An increase in the thickness can be an early sign of Down syndrome. ? Chorionic villus sampling ([facility]). This is a test that looks for certain genetic problems with your baby. The same genes that are in your baby are in the placenta. A small piece of the placenta is taken out and tested. This test is done when you are 10 to 13 weeks pregnant. Ease discomfort  · Slow down and take naps when you feel tired. · If your emotions swing, talk to someone. · If your gums bleed, try a softer toothbrush. If your gums are puffy and bleed a lot, see your dentist.  · If you feel dizzy:  ? Get up slowly after sitting or lying down. ? Drink plenty of fluids. ? Eat small snacks to keep your blood sugar stable. ? Put your head between your legs as though you were tying your shoelaces. ? Lie down with your legs higher than your head. Use pillows to prop up your feet. · If you have a headache:  ? Lie down. ? Ask your partner or a good friend for a neck massage. ? Try cool cloths over your forehead or across the back of your neck. ? Use acetaminophen (Tylenol) for pain relief. Do not use nonsteroidal anti-inflammatory drugs (NSAIDs), such as ibuprofen (Advil, Motrin) or naproxen (Aleve), unless your doctor says it is okay. · If you have a nosebleed, pinch your nose gently, and hold it for a short while. To prevent nosebleeds, try massaging a small dab of petroleum jelly, such as Vaseline, in your nostrils. · If your nose is stuffed up, try saline (saltwater) nose sprays. Do not use decongestant sprays. Care for your breasts  · Wear a bra that gives you good support. · Know that changes in your breasts are normal.  ? Your breasts may get larger and more tender. Tenderness usually gets better by 12 weeks. ? Your nipples may get darker and larger, and small bumps around your nipples may show more. ? The veins in your chest and breasts may show more. Where can you learn more? Go to https://chaudrey.health-partners. org and

## 2021-04-21 ENCOUNTER — TELEPHONE (OUTPATIENT)
Dept: OBGYN CLINIC | Age: 21
End: 2021-04-21

## 2021-04-21 DIAGNOSIS — R73.09 ELEVATED GLUCOSE TOLERANCE TEST: Primary | ICD-10-CM

## 2021-04-21 LAB — SICKLE CELL SCREEN: NEGATIVE

## 2021-04-21 NOTE — TELEPHONE ENCOUNTER
----- Message from DARRIAN Quan CNP sent at 4/21/2021  8:05 AM EDT -----  Patient failed her 1 hr, a 3 hr GTT order was placed, please let the patient know that she will needs to complete 3 hr

## 2021-04-22 ENCOUNTER — HOSPITAL ENCOUNTER (EMERGENCY)
Age: 21
Discharge: HOME | End: 2021-04-22
Attending: EMERGENCY MEDICINE
Payer: MEDICAID

## 2021-04-22 ENCOUNTER — HOSPITAL ENCOUNTER (OUTPATIENT)
Age: 21
Discharge: HOME | End: 2021-04-22
Payer: MEDICAID

## 2021-04-22 VITALS
SYSTOLIC BLOOD PRESSURE: 119 MMHG | HEART RATE: 99 BPM | RESPIRATION RATE: 20 BRPM | TEMPERATURE: 98.1 F | OXYGEN SATURATION: 99 % | DIASTOLIC BLOOD PRESSURE: 76 MMHG

## 2021-04-22 DIAGNOSIS — R10.9 ABDOMINAL CRAMPING: Primary | ICD-10-CM

## 2021-04-22 DIAGNOSIS — R73.09 ELEVATED GLUCOSE TOLERANCE TEST: ICD-10-CM

## 2021-04-22 LAB
-: NORMAL
AMORPHOUS: NORMAL
BACTERIA: NORMAL
BILIRUBIN URINE: NEGATIVE
CASTS UA: NORMAL /LPF (ref 0–8)
COLOR: YELLOW
COMMENT UA: ABNORMAL
CRYSTALS, UA: NORMAL /HPF
DIRECT EXAM: NORMAL
EPITHELIAL CELLS UA: NORMAL /HPF (ref 0–5)
GLUCOSE BLD-MCNC: 116 MG/DL (ref 70–99)
GLUCOSE BLD-MCNC: 119 MG/DL (ref 70–99)
GLUCOSE BLD-MCNC: 96 MG/DL (ref 70–99)
GLUCOSE URINE: ABNORMAL
KETONES, URINE: NEGATIVE
LEUKOCYTE ESTERASE, URINE: NEGATIVE
Lab: NORMAL
MUCUS: NORMAL
NITRITE, URINE: NEGATIVE
OTHER OBSERVATIONS UA: NORMAL
PH UA: 5.5 (ref 5–8)
PROTEIN UA: NEGATIVE
RBC UA: NORMAL /HPF (ref 0–4)
RENAL EPITHELIAL, UA: NORMAL /HPF
SPECIFIC GRAVITY UA: 1.03 (ref 1–1.03)
SPECIMEN DESCRIPTION: NORMAL
TRICHOMONAS: NORMAL
TURBIDITY: CLEAR
URINE HGB: ABNORMAL
UROBILINOGEN, URINE: NORMAL
WBC UA: NORMAL /HPF (ref 0–5)
YEAST: NORMAL

## 2021-04-22 PROCEDURE — 87491 CHLMYD TRACH DNA AMP PROBE: CPT

## 2021-04-22 PROCEDURE — 87480 CANDIDA DNA DIR PROBE: CPT

## 2021-04-22 PROCEDURE — 87660 TRICHOMONAS VAGIN DIR PROBE: CPT

## 2021-04-22 PROCEDURE — 81001 URINALYSIS AUTO W/SCOPE: CPT

## 2021-04-22 PROCEDURE — 87591 N.GONORRHOEAE DNA AMP PROB: CPT

## 2021-04-22 PROCEDURE — 87510 GARDNER VAG DNA DIR PROBE: CPT

## 2021-04-22 PROCEDURE — 82952 GTT-ADDED SAMPLES: CPT

## 2021-04-22 PROCEDURE — 87086 URINE CULTURE/COLONY COUNT: CPT

## 2021-04-22 PROCEDURE — 82951 GLUCOSE TOLERANCE TEST (GTT): CPT

## 2021-04-22 PROCEDURE — 82947 ASSAY GLUCOSE BLOOD QUANT: CPT

## 2021-04-22 PROCEDURE — 36415 COLL VENOUS BLD VENIPUNCTURE: CPT

## 2021-04-22 PROCEDURE — 99284 EMERGENCY DEPT VISIT MOD MDM: CPT

## 2021-04-22 RX ORDER — ACETAMINOPHEN 325 MG/1
325 TABLET ORAL EVERY 6 HOURS PRN
Qty: 30 TABLET | Refills: 0 | Status: SHIPPED | OUTPATIENT
Start: 2021-04-22 | End: 2021-07-30

## 2021-04-22 ASSESSMENT — ENCOUNTER SYMPTOMS
TROUBLE SWALLOWING: 0
RHINORRHEA: 0
SORE THROAT: 0
VOMITING: 0
CHEST TIGHTNESS: 0
DIARRHEA: 0
ABDOMINAL PAIN: 1
SHORTNESS OF BREATH: 0
NAUSEA: 0
CONSTIPATION: 0
ABDOMINAL DISTENTION: 0
BACK PAIN: 0
WHEEZING: 0

## 2021-04-22 NOTE — ED NOTES
The following labs labeled with pt sticker and tubed:     [] Jessica Em   [] on ice   [] Blue   [x] Green/yellow  [] Green/black [] on ice  [] Pink  [] Red  [] Yellow     [] COVID-19 swab    [] Rapid     [] Urine Sample  [] Pelvic Cultures    [] Blood Cultures          Oz De Leon RN  04/22/21 3797

## 2021-04-22 NOTE — ED PROVIDER NOTES
[facility]  Emergency Department  Faculty Attestation     I performed a history and physical examination of the patient and discussed management with the resident. I reviewed the residents note and agree with the documented findings and plan of care. Any areas of disagreement are noted on the chart. I was personally present for the key portions of any procedures. I have documented in the chart those procedures where I was not present during the key portions. I have reviewed the emergency nurses triage note. I agree with the chief complaint, past medical history, past surgical history, allergies, medications, social and family history as documented unless otherwise noted below. For Physician Assistant/ Nurse Practitioner cases/documentation I have personally evaluated this patient and have completed at least one if not all key elements of the E/M (history, physical exam, and MDM). Additional findings are as noted. Primary Care Physician:  DARRIAN Marie - CNP    Screenings:  [unfilled]    CHIEF COMPLAINT       Chief Complaint   Patient presents with    Abdominal Pain    Labs Only       RECENT VITALS:   Temp: 98.1 °F (36.7 °C),  Pulse: 99, Resp: 20, BP: 119/76    LABS:  Labs Reviewed   VAGINITIS DNA PROBE   C.TRACHOMATIS N.GONORRHOEAE DNA   GLUCOSE, RANDOM   URINE RT REFLEX TO CULTURE       Radiology  No orders to display         Attending Physician Additional  Notes    Patient was a rapid response in the hospital.  She was here to get lab testing. She had sudden severe lower abdomen pain. She does not feel wet such as loss of water or bleeding. The pain is better when she lies down. She is approximately 12 weeks pregnant Rh+. No recent vomiting or fevers. On exam she appears nontoxic afebrile vital signs are normal.  Abdomen has no peritoneal findings. Minimal suprapubic tenderness.   Plan is urinalysis, pelvic exam, bedside ultrasound, simple analgesics, reassess. [place].  Don Hughes MD, [facility]  Attending Emergency  Physician               Mehreen Yen MD  04/22/21 3759

## 2021-04-22 NOTE — ED NOTES
0205 pt complaints of right leg pain 10/10 even though pt received [place] @0105. Dr Nivia Paget notified via tiger text order received for morphine 2mg IV x 1.  0730 Verbal  Bedside shift change report given to [address] [place] [address] (oncoming nurse) by me (offgoing nurse). Report included the following information SBAR, Kardex, ED Summary, Intake/Output, MAR and Med Rec Status. The following labs labeled with pt sticker and tubed:     [] Lavender   [] on ice   [] Blue   [x] Green/yellow  [] Green/black [] on ice  [] Pink  [] Red  [] Yellow     [] COVID-19 swab    [] Rapid     [] Urine Sample  [] Pelvic Cultures    [] Blood Cultures          Lord Dena RN  04/22/21 1257

## 2021-04-22 NOTE — ED NOTES
The following labs labeled with pt sticker and tubed:     [x] Lavender   [] on ice   [x] Blue   [x] Green/yellow  [] Green/black [] on ice  [] Pink  [] Red  [x] Yellow     [] COVID-19 swab    [] Rapid     [x] Urine Sample  [x] Pelvic Cultures    [] Blood Cultures          Don Bauer RN  04/22/21 1025

## 2021-04-22 NOTE — ED NOTES
Patient was a rapid response in lab. Patient was complaining of sharp abdomen pain.  Patient is 12 weeks pregnant     Morgan Colby RN  04/22/21 1127

## 2021-04-22 NOTE — ED PROVIDER NOTES
[facility]  ED  Emergency Department Encounter  Emergency Medicine Resident     Pt Name: Scott Bailey  MRN: [id number]  [place] [address]  Date of evaluation: 4/22/21  PCP:  DARRIAN Anderson CNP    CHIEF COMPLAINT       Chief Complaint   Patient presents with    Abdominal Pain    Labs Only       HISTORY OFPRESENT ILLNESS  (Location/Symptom, Timing/Onset, Context/Setting, Quality, Duration, Modifying Thomasmile Xiong.)      Scott Bailey is a 21 y.o. female with history of SVT, clotting disorder, diabetes mellitus, ADD as well as 12-week pregnancy presents with concerns for lower abdominal pain. Patient was rapid response, brought to the emergency department when she was getting her glucose checked for pregnancy with concerns for worsening suprapubic abdominal pain. Patient states that the pain has been going on since this morning, states that she had an acute worsening of her pain, denies vaginal bleeding or discharge, denies change in urination, regular reports pain is about 10 intensity, states that pain is relieved by lying down, made worse by sitting forward. Patient abdomen soft nontender, patient is notably a positive blood type. Patient describes the pain as cramping in intensity    PAST MEDICAL / SURGICAL / SOCIAL / FAMILY HISTORY      has a past medical history of ADD (attention deficit disorder), Anxiety, Asthma, CMV (cytomegalovirus infection) ([address] Depression, Gestational diabetes mellitus (GDM), antepartum, Hearing loss in left ear, Heart disease, Hypothyroidism, Immunizations up to date in pediatric patient, Neurocardiogenic syncope, Pseudoseizures, Raynaud disease, Seizures ([address] SVT (supraventricular tachycardia) ([address] Tachycardia, Traumatic injury during pregnancy, third trimester, and Wears glasses. has a past surgical history that includes REMOVAL FOREIGN BODY EAR (Left) and Tympanoplasty (Left, 06/24/2016).      Social History     Socioeconomic History    Marital status: Single     Spouse name: Not on file    Number of children: Not on file    Years of education: Not on file    Highest education level: Not on file   Occupational History    Not on file   Social Needs    Financial resource strain: Not on file    Food insecurity     Worry: Not on file     Inability: Not on file    Transportation needs     Medical: Not on file     Non-medical: Not on file   Tobacco Use    Smoking status: Current Every Day Smoker     Packs/day: 0.50     Types: Cigars    Smokeless tobacco: Never Used    Tobacco comment: 1-2 black and milds daily    Substance and Sexual Activity    Alcohol use: No    Drug use: Yes     Types: Marijuana     Comment: monthly     Sexual activity: Yes     Partners: Male     Birth control/protection: Condom   Lifestyle    Physical activity     Days per week: Not on file     Minutes per session: Not on file    Stress: Not on file   Relationships    Social connections     Talks on phone: Not on file     Gets together: Not on file     Attends Nondenominational service: Not on file     Active member of club or organization: Not on file     Attends meetings of clubs or organizations: Not on file     Relationship status: Not on file    Intimate partner violence     Fear of current or ex partner: Not on file     Emotionally abused: Not on file     Physically abused: Not on file     Forced sexual activity: Not on file   Other Topics Concern    Not on file   Social History Narrative    Not on file       Family History   Adopted: Yes   Problem Relation Age of Onset    Diabetes Mother         borderline diet controlled    Atrial Fibrillation Mother     Anxiety Disorder Mother     Depression Mother     Heart Disease Mother     Seizures Maternal Grandmother     COPD Maternal Grandmother     Hypertension Maternal Grandmother     Cancer Maternal Grandmother         ovarian    Heart Disease Maternal Grandmother     Diabetes Type 1  Maternal Grandfather     Diabetes Type 1  Other         m uncle    Bleeding Prob Other         m aunt protein S&C deficiency        Allergies:  Sulfa antibiotics    Home Medications:  Prior to Admission medications    Medication Sig Start Date End Date Taking? Authorizing Provider   acetaminophen (TYLENOL) 325 MG tablet Take 1 tablet by mouth every 6 hours as needed for Pain 4/22/21  Yes Amber Pro MD   Prenatal Vit-DSS-Fe Cbn-FA (PRENATAL AD PO) Take by mouth    Historical Provider, MD   Prenatal Vit-Fe Fumarate-FA (PNV PRENATAL PLUS MULTIVITAMIN) 27-1 MG TABS Take 1 tablet by mouth daily 3/2/21 2/25/22  Osorio Carpio, APRN - CNP       REVIEW OFSYSTEMS    (2-9 systems for level 4, 10 or more for level 5)      Review of Systems   Constitutional: Negative for chills, fatigue and fever. HENT: Negative for hearing loss, rhinorrhea, sneezing, sore throat, tinnitus and trouble swallowing. Respiratory: Negative for chest tightness, shortness of breath and wheezing. Cardiovascular: Negative for chest pain and palpitations. Gastrointestinal: Positive for abdominal pain. Negative for abdominal distention, constipation, diarrhea, nausea and vomiting. Genitourinary: Negative for dysuria, flank pain, frequency, vaginal bleeding and vaginal discharge. Musculoskeletal: Negative for arthralgias, back pain, gait problem and neck pain. Neurological: Negative for dizziness, tremors, seizures, syncope, facial asymmetry, numbness and headaches. Psychiatric/Behavioral: Negative for self-injury and suicidal ideas. PHYSICAL EXAM   (up to 7 for level 4, 8 or more forlevel 5)      INITIAL VITALS:   ED Triage Vitals [04/22/21 1057]   BP Temp Temp src Pulse Resp SpO2 Height Weight   119/76 98.1 °F (36.7 °C) -- 99 20 99 % -- --       Physical Exam  Constitutional:       Appearance: She is not ill-appearing or diaphoretic. HENT:      Head: Normocephalic and atraumatic.       Right Ear: External ear normal.      Left Ear: External ear normal.      Nose: No rhinorrhea. Mouth/Throat:      Mouth: Mucous membranes are moist.   Eyes:      Extraocular Movements: Extraocular movements intact. Conjunctiva/sclera: Conjunctivae normal.   Cardiovascular:      Rate and Rhythm: Normal rate and regular rhythm. Pulmonary:      Effort: Pulmonary effort is normal. No respiratory distress. Abdominal:      General: Abdomen is flat. Palpations: Abdomen is soft. There is no fluid wave. Tenderness: There is abdominal tenderness in the suprapubic area. There is no guarding or rebound. Musculoskeletal: Normal range of motion. General: No tenderness or signs of injury. Skin:     General: Skin is warm. Capillary Refill: Capillary refill takes less than 2 seconds. Neurological:      Mental Status: She is alert and oriented to person, place, and time. Mental status is at baseline. DIFFERENTIAL  DIAGNOSIS     PLAN (LABS / IMAGING / EKG):  Orders Placed This Encounter   Procedures    VAGINITIS DNA PROBE    C.trachomatis N.gonorrhoeae DNA    GLUCOSE, RANDOM    Urinalysis Reflex to Culture    GLUCOSE, RANDOM    Microscopic Urinalysis    GLUCOSE, RANDOM       MEDICATIONS ORDERED:  Orders Placed This Encounter   Medications    acetaminophen (TYLENOL) 325 MG tablet     Sig: Take 1 tablet by mouth every 6 hours as needed for Pain     Dispense:  30 tablet     Refill:  0       DDX: Urinary tract infection, abdominal pain, suprapubic pain, gonorrhea, chlamydia, vaginitis    Initial MDM/Plan/ED COURSE:    21 y.o. female who presents with concerns for glucose challenge test as well as worsening suprapubic abdominal pain which is predominantly return to baseline at this time. Patient stated that its worst patient's pain is at 7 out of 10 intensity, has had no redness vaginal bleeding vaginal discharge since the onset of pain.   Pelvic exam showed scant vaginal discharge\" with no bleeding, adnexal tenderness or tablet, R-0Print             Shaq Stanton MD  Emergency Medicine Resident    (Please note that portions of this note were completed with a voice recognition program.Efforts were made to edit the dictations but occasionally words are mis-transcribed.)        Shaq Stanton MD  Resident  04/23/21 8480

## 2021-04-23 LAB
3 HR GLUCOSE: NORMAL MG/DL (ref 65–139)
AMOUNT GLUCOSE GIVEN: NORMAL G
C TRACH DNA GENITAL QL NAA+PROBE: ABNORMAL
CULTURE: NORMAL
GLUCOSE FASTING: 93 MG/DL (ref 65–94)
GLUCOSE TOLERANCE TEST 1 HOUR: NORMAL MG/DL (ref 65–179)
GLUCOSE TOLERANCE TEST 2 HOUR: NORMAL MG/DL (ref 65–154)
Lab: NORMAL
N. GONORRHOEAE DNA: NEGATIVE
SPECIMEN DESCRIPTION: ABNORMAL
SPECIMEN DESCRIPTION: NORMAL

## 2021-04-26 ENCOUNTER — TELEPHONE (OUTPATIENT)
Dept: OBGYN CLINIC | Age: 21
End: 2021-04-26

## 2021-04-26 ENCOUNTER — TELEPHONE (OUTPATIENT)
Dept: PHARMACY | Age: 21
End: 2021-04-26

## 2021-04-26 DIAGNOSIS — A74.9 CHLAMYDIA: Primary | ICD-10-CM

## 2021-04-26 RX ORDER — AZITHROMYCIN 500 MG/1
1000 TABLET, FILM COATED ORAL ONCE
Qty: 2 TABLET | Refills: 0 | Status: SHIPPED | OUTPATIENT
Start: 2021-04-26 | End: 2021-04-26

## 2021-04-26 NOTE — TELEPHONE ENCOUNTER
----- Message from DARRIAN Quan CNP sent at 4/23/2021 10:08 AM EDT -----  Patient was unable to complete 3 hr, does not indicate why, I am not sure if she got sick?

## 2021-04-26 NOTE — TELEPHONE ENCOUNTER
CLINICAL PHARMACY NOTE:  Telephone Follow-up for Positive STD Test    At the time of Miko Oviedo's visit to [facility] Emergency Department on 4/22/21 STD testing was performed. DNA testing was positive for Chlamydia. Attempt made to reach patient by telephone to review results and need to start antibiotic therapy. Phone number is not working. Unable to reach patient by phone. Sent letter to patient on 4/26/21 regarding need to start antibiotic therapy. Patient is pregnant so azithromycin will be sent in. Instructed patient to abstain from sexual intercourse until receiving the antibiotic and then for 7 days after treatment. Patient also advised to inform any partners that they will need to be tested as well. Per protocol, prescription for Azithromycin  500 mg x 2 tablets. sent to [facility]. on behalf of Dr. Britney Shaw. [address] [place] [address]  Ph., CACP, Clinical Pharmacist  Anticoagulation Services, [facility]  4/26/2021  3:40 PM    CLINICAL PHARMACY CONSULT: MED RECONCILIATION/REVIEW ADDENDUM    For Pharmacy Admin Tracking Only    PHSO: Yes  Total # of Interventions Recommended: 1  - New Order #: 1 New Medication Order Reason(s): Needs Additional Therapy  - Maintenance Safety Lab Monitoring #: 1  Total Interventions Accepted: 1  Time Spent (min): 15    Electronically signed by Lenice Duverney, [address] [place] [address] on 4/26/21 at 3:40 PM EDT

## 2021-04-29 ENCOUNTER — TELEPHONE (OUTPATIENT)
Dept: OBGYN CLINIC | Age: 21
End: 2021-04-29

## 2021-04-29 NOTE — TELEPHONE ENCOUNTER
Attempted to call patient  Phone not working.   Pt was positive for chlamydia in ER  They sent her a letter with instructions for taking medication sent to [address] [facility]  Please have patient speak to nurse if she calls office

## 2021-05-03 ENCOUNTER — TELEPHONE (OUTPATIENT)
Dept: OBGYN CLINIC | Age: 21
End: 2021-05-03

## 2021-05-03 NOTE — TELEPHONE ENCOUNTER
Pt notified of +chlamydia and where rx was sent along with instructions  Pt passed 3 hr gtt  Pt phone is off but can be reached through boyfriend Tiny Can

## 2021-05-05 ENCOUNTER — IMMUNIZATION (OUTPATIENT)
Dept: FAMILY MEDICINE CLINIC | Age: 21
End: 2021-05-05
Payer: MEDICAID

## 2021-05-05 PROCEDURE — 91300 COVID-19, PFIZER VACCINE 30MCG/0.3ML DOSE: CPT | Performed by: INTERNAL MEDICINE

## 2021-05-05 PROCEDURE — 0001A COVID-19, PFIZER VACCINE 30MCG/0.3ML DOSE: CPT | Performed by: INTERNAL MEDICINE

## 2021-05-31 ENCOUNTER — HOSPITAL ENCOUNTER (EMERGENCY)
Age: 21
Discharge: HOME | End: 2021-05-31
Attending: EMERGENCY MEDICINE
Payer: MEDICAID

## 2021-05-31 VITALS
WEIGHT: 152 LBS | TEMPERATURE: 97.3 F | OXYGEN SATURATION: 96 % | RESPIRATION RATE: 16 BRPM | BODY MASS INDEX: 26.93 KG/M2 | DIASTOLIC BLOOD PRESSURE: 75 MMHG | SYSTOLIC BLOOD PRESSURE: 110 MMHG | HEART RATE: 63 BPM

## 2021-05-31 DIAGNOSIS — R82.71 ASYMPTOMATIC BACTERIURIA DURING PREGNANCY: ICD-10-CM

## 2021-05-31 DIAGNOSIS — O99.891 ASYMPTOMATIC BACTERIURIA DURING PREGNANCY: ICD-10-CM
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
## 2021-05-31 LAB
-: ABNORMAL
AMORPHOUS: ABNORMAL
BACTERIA: ABNORMAL
BILIRUBIN URINE: NEGATIVE
CASTS UA: ABNORMAL /LPF (ref 0–8)
COLOR: YELLOW
COMMENT UA: ABNORMAL
CRYSTALS, UA: ABNORMAL /HPF
DIRECT EXAM: NORMAL
EPITHELIAL CELLS UA: ABNORMAL /HPF (ref 0–5)
GLUCOSE URINE: NEGATIVE
KETONES, URINE: NEGATIVE
LEUKOCYTE ESTERASE, URINE: NEGATIVE
Lab: NORMAL
MUCUS: ABNORMAL
NITRITE, URINE: NEGATIVE
OTHER OBSERVATIONS UA: ABNORMAL
PH UA: 6 (ref 5–8)
PROTEIN UA: NEGATIVE
RBC UA: ABNORMAL /HPF (ref 0–4)
RENAL EPITHELIAL, UA: ABNORMAL /HPF
SPECIFIC GRAVITY UA: 1.02 (ref 1–1.03)
SPECIMEN DESCRIPTION: NORMAL
TRICHOMONAS: ABNORMAL
TURBIDITY: CLEAR
URINE HGB: ABNORMAL
UROBILINOGEN, URINE: NORMAL
WBC UA: ABNORMAL /HPF (ref 0–5)
YEAST: ABNORMAL

## 2021-05-31 PROCEDURE — 99285 EMERGENCY DEPT VISIT HI MDM: CPT

## 2021-05-31 PROCEDURE — 6370000000 HC RX 637 (ALT 250 FOR IP): Performed by: STUDENT IN AN ORGANIZED HEALTH CARE EDUCATION/TRAINING PROGRAM

## 2021-05-31 PROCEDURE — 87660 TRICHOMONAS VAGIN DIR PROBE: CPT

## 2021-05-31 PROCEDURE — 87480 CANDIDA DNA DIR PROBE: CPT

## 2021-05-31 PROCEDURE — 87510 GARDNER VAG DNA DIR PROBE: CPT

## 2021-05-31 PROCEDURE — 87491 CHLMYD TRACH DNA AMP PROBE: CPT

## 2021-05-31 PROCEDURE — 81001 URINALYSIS AUTO W/SCOPE: CPT

## 2021-05-31 PROCEDURE — 87591 N.GONORRHOEAE DNA AMP PROB: CPT

## 2021-05-31 RX ORDER — CEPHALEXIN 500 MG/1
500 CAPSULE ORAL 4 TIMES DAILY
Qty: 12 CAPSULE | Refills: 0 | Status: SHIPPED | OUTPATIENT
Start: 2021-05-31 | End: 2021-06-03

## 2021-05-31 RX ORDER — CEPHALEXIN 500 MG/1
500 CAPSULE ORAL ONCE
Status: COMPLETED | OUTPATIENT
Start: 2021-05-31 | End: 2021-05-31

## 2021-05-31 RX ADMIN — CEPHALEXIN 500 MG: 500 CAPSULE ORAL at 21:21

## 2021-05-31 ASSESSMENT — ENCOUNTER SYMPTOMS
SHORTNESS OF BREATH: 0
VOMITING: 0
COUGH: 0
WHEEZING: 0
ABDOMINAL PAIN: 1
NAUSEA: 0

## 2021-05-31 ASSESSMENT — PAIN SCALES - GENERAL: PAINLEVEL_OUTOF10: 5

## 2021-05-31 ASSESSMENT — PAIN DESCRIPTION - DESCRIPTORS: DESCRIPTORS: SHARP

## 2021-05-31 ASSESSMENT — PAIN DESCRIPTION - LOCATION: LOCATION: ABDOMEN

## 2021-05-31 ASSESSMENT — PAIN DESCRIPTION - ORIENTATION: ORIENTATION: LOWER

## 2021-05-31 ASSESSMENT — PAIN DESCRIPTION - FREQUENCY: FREQUENCY: INTERMITTENT

## 2021-05-31 ASSESSMENT — PAIN DESCRIPTION - PAIN TYPE: TYPE: ACUTE PAIN

## 2021-05-31 NOTE — ED NOTES
Pt to ED via triage c/o fall. Pt states she was in the shower 30 minutes pta when she fell in the shower onto her stomach. Pt reports being 17 weeks pregnant (G2) and c/o abdominal pain s/p fall. Pt denies head injury and denies LOC. Pt reports gestational diabetes with pregnancy and no other complications. No vaginal bleeding.  Dr. Osborne at bedside for evaluation     Tamara Michaels RN  05/31/21 1955

## 2021-05-31 NOTE — ED PROVIDER NOTES
[facility] ED  Emergency Department Encounter  Emergency Medicine Resident     Pt Name: Andra Ruvalcaba  MRN: [id number]  [place] 2000  Date of evaluation: 5/31/21  PCP:  DARRIAN White - CNP    CHIEF COMPLAINT       Chief Complaint   Patient presents with    Fall     Onto stomach in shower, 17w pregnant, -LOC       HISTORY OFPRESENT ILLNESS  (Location/Symptom, Timing/Onset, Context/Setting, Quality, Duration, Modifying Thurmon Loud.)      Andra Ruvalcaba is a 23 yo female who presents with fall while pregnant. Patient states that she is 17 weeks pregnant and had a slip and fall in the bathroom due to wet floor onto her stomach but denies any head trauma or loss of consciousness. Denies any other injuries, chest pain, shortness of breath, leg swelling, joint injuries. Denies any vaginal bleeding or urinary symptoms. States that this is her second pregnancy. First pregnancy was only complicated by gestational diabetes. No complications to this pregnancy thus far and does follow with [address] PAST MEDICAL / SURGICAL / SOCIAL / FAMILY HISTORY      has a past medical history of ADD (attention deficit disorder), Anxiety, Asthma, CMV (cytomegalovirus infection) ([address] Depression, Gestational diabetes mellitus (GDM), antepartum, Hearing loss in left ear, Heart disease, Hypothyroidism, Immunizations up to date in pediatric patient, Neurocardiogenic syncope, Pseudoseizures, Raynaud disease, Seizures ([address] SVT (supraventricular tachycardia) ([address] Tachycardia, Traumatic injury during pregnancy, third trimester, and Wears glasses. has a past surgical history that includes REMOVAL FOREIGN BODY EAR (Left) and Tympanoplasty (Left, 06/24/2016).      Social History     Socioeconomic History    Marital status: Single     Spouse name: Not on file    Number of children: Not on file    Years of education: Not on file    Highest education level: Not on file Occupational History    Not on file   Tobacco Use    Smoking status: Current Every Day Smoker     Packs/day: 0.50     Types: Cigars    Smokeless tobacco: Never Used    Tobacco comment: 1-2 black and milds daily    Vaping Use    Vaping Use: Never used   Substance and Sexual Activity    Alcohol use: No    Drug use: Not Currently     Types: Marijuana     Comment: monthly     Sexual activity: Yes     Partners: Male     Birth control/protection: Condom   Other Topics Concern    Not on file   Social History Narrative    Not on file     Social Determinants of Health     Financial Resource Strain:     Difficulty of Paying Living Expenses:    Food Insecurity:     Worried About Running Out of Food in the Last Year:     Ran Out of Food in the Last Year:    Transportation Needs:     Lack of Transportation (Medical):      Lack of Transportation (Non-Medical):    Physical Activity:     Days of Exercise per Week:     Minutes of Exercise per Session:    Stress:     Feeling of Stress :    Social Connections:     Frequency of Communication with Friends and Family:     Frequency of Social Gatherings with Friends and Family:     Attends Yazdanism Services:     Active Member of Clubs or Organizations:     Attends Club or Organization Meetings:     Marital Status:    Intimate Partner Violence:     Fear of Current or Ex-Partner:     Emotionally Abused:     Physically Abused:     Sexually Abused:        Family History   Adopted: Yes   Problem Relation Age of Onset    Diabetes Mother         borderline diet controlled    Atrial Fibrillation Mother     Anxiety Disorder Mother     Depression Mother     Heart Disease Mother     Seizures Maternal Grandmother     COPD Maternal Grandmother     Hypertension Maternal Grandmother     Cancer Maternal Grandmother         ovarian    Heart Disease Maternal Grandmother     Diabetes Type 1  Maternal Grandfather     Diabetes Type 1  Other         m uncle    Bleeding Prob Other         m aunt protein S&C deficiency        Allergies:  Sulfa antibiotics    Home Medications:  Prior to Admission medications    Medication Sig Start Date End Date Taking? Authorizing Provider   cephALEXin (KEFLEX) 500 MG capsule Take 1 capsule by mouth 4 times daily for 3 days 5/31/21 6/3/21 Yes Anne Holland DO   acetaminophen (TYLENOL) 325 MG tablet Take 1 tablet by mouth every 6 hours as needed for Pain 4/22/21   Esha Vela MD   Prenatal Vit-DSS-Fe Cbn-FA (PRENATAL AD PO) Take by mouth    Historical Provider, MD   Prenatal Vit-Fe Fumarate-FA (PNV PRENATAL PLUS MULTIVITAMIN) 27-1 MG TABS Take 1 tablet by mouth daily 3/2/21 2/25/22  Justice Keller, APRN - CNP       REVIEW OFSYSTEMS    (2-9 systems for level 4, 10 or more for level 5)      Review of Systems   Constitutional: Negative for chills and fever. Respiratory: Negative for cough, shortness of breath and wheezing. Cardiovascular: Negative for chest pain, palpitations and leg swelling. Gastrointestinal: Positive for abdominal pain. Negative for nausea and vomiting. Genitourinary: Positive for vaginal discharge. Negative for dysuria, hematuria and vaginal bleeding. Musculoskeletal:        Positive for mechanical slip and fall onto abdomen. Skin: Negative for rash. Neurological: Negative for dizziness, syncope, weakness, light-headedness, numbness and headaches. Hematological: Does not bruise/bleed easily. PHYSICAL EXAM   (up to 7 for level 4, 8 or more forlevel 5)      INITIAL VITALS:   ED Triage Vitals [05/31/21 1902]   BP Temp Temp src Pulse Resp SpO2 Height Weight   110/75 97.3 °F (36.3 °C) -- 63 16 96 % -- 152 lb (68.9 kg)       Physical Exam  Vitals and nursing note reviewed. Constitutional:       General: She is not in acute distress. Appearance: Normal appearance. She is not ill-appearing, toxic-appearing or diaphoretic. Cardiovascular:      Rate and Rhythm: Normal rate and regular rhythm. Heart sounds: No murmur heard. No gallop. Pulmonary:      Effort: Pulmonary effort is normal.      Breath sounds: Normal breath sounds. Abdominal:      General: There is no distension. Palpations: Abdomen is soft. Tenderness: There is no abdominal tenderness. There is no guarding. Comments: Point-of-care fetal ultrasound showing a normal fetal heart rate of 152 and good fetal movement. Genitourinary:     Comments: No external lesions or inguinal lymphadenopathy.  +vaginal discharge, no vaginal bleeding, no cervical lesions. Cervical os closed. No cervical motion tenderness or adnexal tenderness/masses. Neurological:      Mental Status: She is alert. DIFFERENTIAL  DIAGNOSIS     PLAN (LABS / IMAGING / EKG):  Orders Placed This Encounter   Procedures    VAGINITIS DNA PROBE    C.trachomatis N.gonorrhoeae DNA    Urinalysis Reflex to Culture    Microscopic Urinalysis       MEDICATIONS ORDERED:  Orders Placed This Encounter   Medications    cephALEXin (KEFLEX) capsule 500 mg     Order Specific Question:   Antimicrobial Indications     Answer: Other     Order Specific Question:   Other Abx Indication     Answer:   Asymptomatic bacteriuria in pregnancy    cephALEXin (KEFLEX) 500 MG capsule     Sig: Take 1 capsule by mouth 4 times daily for 3 days     Dispense:  12 capsule     Refill:  0         Initial MDM/Plan/ED course: 24 y.o. female who presents with mechanical slip and fall onto her abdomen during the pregnancy of 17 weeks. On exam vitals normal patient is no acute distress. Physical exam reveals a soft nontender abdomen. Point-of-care fetal ultrasound was performed showing a normal fetal heart rate of 152 and good fetal movement. No outward signs of trauma on exam.  Patient denying any vaginal bleeding and patient has an Rh+ status. Patient complaining that she is having vaginal discharge and has a history of STDs in the past and therefore pelvic exam was performed. Jono De Leon DO  Resident  05/31/21 2116

## 2021-06-01 NOTE — ED NOTES
Pt ambulated to restroom with steady gait  Urine sample obtained, labeled, sent to lab via tube system     Tashi Jordan RN  05/31/21 2004

## 2021-06-01 NOTE — ED PROVIDER NOTES
[facility]     Emergency Department     Faculty Attestation    I performed a history and physical examination of the patient and discussed management with the resident. I have reviewed and agree with the residents findings including all diagnostic interpretations, and treatment plans as written at the time of my review. Any areas of disagreement are noted on the chart. I was personally present for the key portions of any procedures. I have documented in the chart those procedures where I was not present during the key portions. For Physician Assistant/ Nurse Practitioner cases/documentation I have personally evaluated this patient and have completed at least one if not all key elements of the E/M (history, physical exam, and MDM). Additional findings are as noted. This patient was evaluated in the Emergency Department for symptoms described in the history of present illness. The patient was evaluated in the context of the global COVID-19 pandemic, which necessitated consideration that the patient might be at risk for infection with the SARS-CoV-2 virus that causes COVID-19. Institutional protocols and algorithms that pertain to the evaluation of patients at risk for COVID-19 are in a state of rapid change based on information released by regulatory bodies including the CDC and federal and state organizations. These policies and algorithms were followed during the patient's care in the ED. Primary Care Physician: Kacey Renee, DARRIAN - CNP    History: This is a 24 y.o. female who presents to the Emergency Department with complaint of fall and abdominal pain. The patient is proximately 17 weeks pregnant when she slipped and fell onto her lower abdomen. She denies any head trauma loss of consciousness. Patient is . She denies any vaginal bleeding. The patient is Rh+. Physical:   weight is 152 lb (68.9 kg).  Her temperature is 97.3 °F (36.3 °C). Her blood pressure is 110/75 and her pulse is 63. Her respiration is 16 and oxygen saturation is 96%. Abdomen is soft she has some minimal lower abdominal tenderness is no guarding no rebound,    Impression: Abdominal pain, 17 weeks pregnant, fall    Plan: Bedside ultrasound shows a fetal heart rate in the 150s. Will discuss with OB/GYN        (Please note that portions of this note were completed with a voice recognition program.  Efforts were made to edit the dictations but occasionally words are mis-transcribed.)    Cherry Cardoso.  Facundo James MD, [address] [facility]  Attending Emergency Medicine Physician        Farooq Liriano MD  05/31/21 2002

## 2021-06-02 ENCOUNTER — TELEPHONE (OUTPATIENT)
Dept: PHARMACY | Age: 21
End: 2021-06-02

## 2021-06-02 NOTE — TELEPHONE ENCOUNTER
Patient called back and I reviewed the test results with her. She indicated that she had chlamydia before (April 26, 2021) and that she took azithromycin and it did not go away. I explained that her partner could have the infection and be asymptomatic. They could be passing it back and forth. I advised going back to the ED with her partner so that they can both get treated. Furthermore, her infection could also be resist to  treatment with azithromycin therapy. She needs to let the doctor in the ED know the medication did not work last time. Patient is pregnant. I also explained that up until treatment a nd then for seven days after she needs to refrain from sexual activity. I emphasized importance of going today and getting treated. This is very important especially since she is pregnant. Patient verbalized understanding of the plan and agrees to return today with her boyfriend for treatment. [address] [place] [address]  Ph., CACP, Clinical Pharmacist  Anticoagulation Services, [facility]  6/2/2021  4:28 PM

## 2021-06-02 NOTE — TELEPHONE ENCOUNTER
CLINICAL PHARMACY NOTE:  Telephone Follow-up for Positive STD Test    At the time of Barry Oviedo's visit to [facility] Emergency Department on 5/31/2021 STD testing was performed. DNA testing was positive for Chlamydia. Attempt made to reach patient by telephone to review results and need to start antibiotic therapy. Phone number in chart is no longer in service. Called her boyfriend and asked hm to have her call me back. Provided him with my number. [address] [place] [address]  Ph., CACP, Clinical Pharmacist  [facility]ticoagulation Services, [facility]  6/2/2021  11:58 AM

## 2021-06-02 NOTE — TELEPHONE ENCOUNTER
CLINICAL PHARMACY NOTE:  Telephone Follow-up for Positive STD Test    At the time of Kelly Oviedo's visit to [facility] Emergency Department on 2021 STD testing was performed. DNA testing was positive for Chlamydia. Unable to reach patient by phone. Sent letter to patient on 2021 regarding need to start antibiotic therapy, refrain from sexual activity and tell all partners about the results. Advised patient to go to her OB GYN for treatment. She tested positive on 21 for Chlamydia as well. Unclear if she took the antibiotic that was sent in for her at that time. Infection may be resistant to treatment with azithromycin or she is getting reinfected from her partner. [address] [place] [address]  Ph., CACP, Clinical Pharmacist  Anticoagulation Services, [facility]  2021  1:31 PM    For Pharmacy Admin Tracking Only     Intervention Detail: Adherence Monitorin   Total # of Interventions Recommended: 2   Total # of Interventions Accepted: 2   Time Spent (min): 45

## 2021-06-07 ENCOUNTER — HOSPITAL ENCOUNTER (EMERGENCY)
Age: 21
Discharge: LEFT BEFORE DISCHARGE | End: 2021-06-07
Payer: COMMERCIAL

## 2021-06-07 ENCOUNTER — HOSPITAL ENCOUNTER (EMERGENCY)
Age: 21
Discharge: HOME | End: 2021-06-07
Attending: EMERGENCY MEDICINE
Payer: COMMERCIAL

## 2021-06-07 VITALS
HEART RATE: 88 BPM | DIASTOLIC BLOOD PRESSURE: 66 MMHG | OXYGEN SATURATION: 100 % | TEMPERATURE: 97.7 F | SYSTOLIC BLOOD PRESSURE: 96 MMHG | RESPIRATION RATE: 20 BRPM

## 2021-06-07 VITALS
SYSTOLIC BLOOD PRESSURE: 116 MMHG | OXYGEN SATURATION: 100 % | HEART RATE: 128 BPM | RESPIRATION RATE: 20 BRPM | DIASTOLIC BLOOD PRESSURE: 83 MMHG | TEMPERATURE: 98 F

## 2021-06-07 DIAGNOSIS — A59.01 TRICHOMONAS VAGINITIS: ICD-10-CM

## 2021-06-07 DIAGNOSIS — A74.9 CHLAMYDIA INFECTION: Primary | ICD-10-CM

## 2021-06-07 DIAGNOSIS — Z3A.19 19 WEEKS GESTATION OF PREGNANCY: ICD-10-CM

## 2021-06-07 PROCEDURE — 6370000000 HC RX 637 (ALT 250 FOR IP): Performed by: STUDENT IN AN ORGANIZED HEALTH CARE EDUCATION/TRAINING PROGRAM

## 2021-06-07 PROCEDURE — 99285 EMERGENCY DEPT VISIT HI MDM: CPT

## 2021-06-07 RX ORDER — AZITHROMYCIN 250 MG/1
1000 TABLET, FILM COATED ORAL ONCE
Status: COMPLETED | OUTPATIENT
Start: 2021-06-07 | End: 2021-06-07

## 2021-06-07 RX ORDER — METRONIDAZOLE 500 MG/1
500 TABLET ORAL ONCE
Status: COMPLETED | OUTPATIENT
Start: 2021-06-07 | End: 2021-06-07

## 2021-06-07 RX ORDER — METRONIDAZOLE 500 MG/1
1500 TABLET ORAL ONCE
Status: COMPLETED | OUTPATIENT
Start: 2021-06-07 | End: 2021-06-07

## 2021-06-07 RX ORDER — METRONIDAZOLE 500 MG/1
500 TABLET ORAL 2 TIMES DAILY
Qty: 14 TABLET | Refills: 0 | Status: SHIPPED | OUTPATIENT
Start: 2021-06-07 | End: 2021-06-07

## 2021-06-07 RX ADMIN — AZITHROMYCIN 1000 MG: 250 TABLET, FILM COATED ORAL at 15:07

## 2021-06-07 RX ADMIN — METRONIDAZOLE 1500 MG: 500 TABLET ORAL at 15:25

## 2021-06-07 RX ADMIN — METRONIDAZOLE 500 MG: 500 TABLET ORAL at 15:07

## 2021-06-07 ASSESSMENT — ENCOUNTER SYMPTOMS
WHEEZING: 0
COUGH: 0
DIARRHEA: 0
ABDOMINAL PAIN: 1
SHORTNESS OF BREATH: 0
SORE THROAT: 0
NAUSEA: 0
VOMITING: 0
CONSTIPATION: 0

## 2021-06-07 ASSESSMENT — PAIN SCALES - GENERAL: PAINLEVEL_OUTOF10: 10

## 2021-06-07 NOTE — ED PROVIDER NOTES
[facility]  ED  Emergency Department Encounter  EmergencyMedicine Resident     Pt Graciela Jaime  MRN: [id number]  [place] 2000  Date of evaluation: 21  PCP:  DARRIAN Jernigan CNP    CHIEF COMPLAINT       Chief Complaint   Patient presents with    Exposure to STD       HISTORY OF PRESENT ILLNESS  (Location/Symptom, Timing/Onset, Context/Setting, Quality, Duration, Modifying Factors, Severity.)    This patient was evaluated in the Emergency Department for symptoms described in the history of present illness. He/she was evaluated in the context of the global COVID-19 pandemic, which necessitated consideration that the patient might be at risk for infection with the SARS-CoV-2 virus that causes COVID-19. Institutional protocols and algorithms that pertain to the evaluation of patients at risk for COVID-19 are in a state of rapid change based on information released by regulatory bodies including the CDC and federal and state organizations. These policies and algorithms were followed during the patient's care in the ED. Kandace Martinez is a 24 y.o. female who presents to the ED today with complaints of STD.  A0 currently 19 weeks gestation. Patient states that she was recently tested on the  of last month after having some vaginal discharge. Was called by her doctor and told that she has chlamydia infection she goes up get treated. At the time previous testing also had bacteriuria was treated with Keflex, has since finished dose of Keflex. Had one episode of left lower quadrant abdominal pain 2 days ago that was present for several minutes but spontaneously resolved. Has not had any additional abdominal pain. No associated hematuria, dysuria, vaginal bleeding, vaginal discharge, nausea, vomiting, constipation, diarrhea, blood in stool. Denies any vaginal discharge at this time. No external lesions or rash.     PAST MEDICAL / SURGICAL / SOCIAL / FAMILY HISTORY      has a past medical history of ADD (attention deficit disorder), Anxiety, Asthma, CMV (cytomegalovirus infection) ([facility] [address] Depression, Gestational diabetes mellitus (GDM), antepartum, Hearing loss in left ear, Heart disease, Hypothyroidism, Immunizations up to date in pediatric patient, Neurocardiogenic syncope, Pseudoseizures, Raynaud disease, Seizures ([facility] [address] SVT (supraventricular tachycardia) ([facility] [address] Tachycardia, Traumatic injury during pregnancy, third trimester, and Wears glasses. has a past surgical history that includes REMOVAL FOREIGN BODY EAR (Left) and Tympanoplasty (Left, 06/24/2016). Social History     Socioeconomic History    Marital status: Single     Spouse name: Not on file    Number of children: Not on file    Years of education: Not on file    Highest education level: Not on file   Occupational History    Not on file   Tobacco Use    Smoking status: Current Every Day Smoker     Packs/day: 0.50     Types: Cigars    Smokeless tobacco: Never Used    Tobacco comment: 1-2 black and milds daily    Vaping Use    Vaping Use: Never used   Substance and Sexual Activity    Alcohol use: No    Drug use: Not Currently     Types: Marijuana     Comment: monthly     Sexual activity: Yes     Partners: Male     Birth control/protection: Condom   Other Topics Concern    Not on file   Social History Narrative    Not on file     Social Determinants of Health     Financial Resource Strain:     Difficulty of Paying Living Expenses:    Food Insecurity:     Worried About Running Out of Food in the Last Year:     Ran Out of Food in the Last Year:    Transportation Needs:     Lack of Transportation (Medical):      Lack of Transportation (Non-Medical):    Physical Activity:     Days of Exercise per Week:     Minutes of Exercise per Session:    Stress:     Feeling of Stress :    Social Connections:     Frequency of Communication with Friends and Family:     Frequency of Social Gatherings with Friends and Family:     Attends Synagogue Services:     Active Member of Clubs or Organizations:     Attends Club or Organization Meetings:     Marital Status:    Intimate Partner Violence:     Fear of Current or Ex-Partner:     Emotionally Abused:     Physically Abused:     Sexually Abused:        Family History   Adopted: Yes   Problem Relation Age of Onset    Diabetes Mother         borderline diet controlled    Atrial Fibrillation Mother     Anxiety Disorder Mother     Depression Mother     Heart Disease Mother     Seizures Maternal Grandmother     COPD Maternal Grandmother     Hypertension Maternal Grandmother     Cancer Maternal Grandmother         ovarian    Heart Disease Maternal Grandmother     Diabetes Type 1  Maternal Grandfather     Diabetes Type 1  Other         m uncle    Bleeding Prob Other         m aunt protein S&C deficiency       Allergies:  Sulfa antibiotics    Home Medications:  Prior to Admission medications    Medication Sig Start Date End Date Taking? Authorizing Provider   acetaminophen (TYLENOL) 325 MG tablet Take 1 tablet by mouth every 6 hours as needed for Pain 4/22/21   Colonel Chris MD   Prenatal Vit-DSS-Fe Cbn-FA (PRENATAL AD PO) Take by mouth    Historical Provider, MD   Prenatal Vit-Fe Fumarate-FA (PNV PRENATAL PLUS MULTIVITAMIN) 27-1 MG TABS Take 1 tablet by mouth daily 3/2/21 2/25/22  Kayley Bravo, APRN - CNP       REVIEW OF SYSTEMS    (2-9 systems for level 4, 10 or more for level 5)      Review of Systems   Constitutional: Negative for chills and fever. HENT: Negative for congestion and sore throat. Eyes: Negative for visual disturbance. Respiratory: Negative for cough, shortness of breath and wheezing. Cardiovascular: Negative for chest pain. Gastrointestinal: Positive for abdominal pain (Resolved). Negative for constipation, diarrhea, nausea and vomiting. Genitourinary: Negative for dysuria and hematuria. 19 weeks gestation. review patient tested positive for chlamydia and trichomonas on 5/31. Negative for gonorrhea. Patient updated on results. Point-of-care ultrasound demonstrated good fetal movements, adequate amount of amniotic fluid, fetal heart tones in the 130s. Discussed with OB team who do not feel any further evaluation or treatment are indicated at this time. They are agreeable with azithromycin and Flagyl for treatment of BV. Given 1 g azithromycin 2 g Flagyl one-time dose in emergency department. Patient is in the process of setting up appointment with a new OB/GYN physician team.  Did give strict return precautions. Also advised patient that her boyfriend needs treated. All questions answered. Discharged home. CONSULTS:  IP CONSULT TO OB GYN    FINAL IMPRESSION      1. Chlamydia infection    2.  Trichomonas vaginitis    3. 19 weeks gestation of pregnancy          DISPOSITION / PLAN     DISPOSITION Decision To Discharge 06/07/2021 03:03:34 PM      PATIENT REFERRED TO:  DARRIAN Marie - CNP  [address] [facility] [address] [facility]  [phone]    Schedule an appointment as soon as possible for a visit       [facility] ED  [address] [place] [address]  [phone]    As needed, If symptoms worsen      DISCHARGE MEDICATIONS:  Discharge Medication List as of 6/7/2021  3:10 PM          Nazario Lemon DO  Emergency Medicine Resident    (Please note that portions of thisnote were completed with a voice recognition program.  Efforts were made to edit the dictations but occasionally words are mis-transcribed.)       Nazario Lemon DO  Resident  06/08/21 [id number]

## 2021-06-07 NOTE — ED NOTES
Patient state that it feels like contractions 19 weeks pregnant,  Seen earlier for an std     Soyal DEEPIKA Lehman  06/07/21 [id number]

## 2021-06-07 NOTE — CONSULTS
early one hour GTT ordered      Severe recurrent major depression without psychotic features ([facility] [address] 2020    Seizures ([facility] [address] 03/15/2020     19 M Apg / Wt 7#11 2019    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 Homar Leung MD. See note in media section on 2019.  Family history of blood clots 2019     Denies first degree relative. Maternal aunt.  ADD (attention deficit disorder)      2019 stopped taking focalin 8 months ago         Plan discussed with Dr. Frandy Bills, who is agreeable.        Mulu Cline DO  Ob/Gyn Resident  Pager: [phone]  2021, 3:02 PM

## 2021-06-07 NOTE — ED PROVIDER NOTES
[facility]  Emergency Department  Faculty Attestation     I performed a history and physical examination of the patient and discussed management with the resident. I reviewed the residents note and agree with the documented findings and plan of care. Any areas of disagreement are noted on the chart. I was personally present for the key portions of any procedures. I have documented in the chart those procedures where I was not present during the key portions. I have reviewed the emergency nurses triage note. I agree with the chief complaint, past medical history, past surgical history, allergies, medications, social and family history as documented unless otherwise noted below. For Physician Assistant/ Nurse Practitioner cases/documentation I have personally evaluated this patient and have completed at least one if not all key elements of the E/M (history, physical exam, and MDM). Additional findings are as noted. Primary Care Physician:  DARRIAN Vázquez - CNP    Screenings:  [unfilled]    CHIEF COMPLAINT       Chief Complaint   Patient presents with    Exposure to STD       RECENT VITALS:   Temp: 97.7 °F (36.5 °C),  Pulse: 88, Resp: 20, BP: 96/66    LABS:  Labs Reviewed - No data to display    Radiology  No orders to display           Attending Physician Additional  Notes    Patient is approximate 19 weeks pregnant, has positive testing on recent pelvic exam this showing chlamydia and trichomonas. Her partner has not yet been treated since he does not have  Insurance. She said if intermittent pelvic pain and previous discharge which is improved. No UTI symptoms. She was treated with a course of Keflex for asymptomatic bacteriuria. She is planning on changing OB doctors. On exam she is nontoxic afebrile vital signs normal.  Abdomen is gravid appropriate for dates. No peritoneal findings. Bedside ultrasound confirms normal heart activity.   Impression is cervicitis/vaginitis. Plan is Flagyl, Zithromax, discussion with OB, follow-up. Brittney Guthrie.  Anne Epps MD, [facility]  Attending Emergency  Physician               Priscilla Colunga MD  06/07/21 [phone]

## 2021-06-14 ENCOUNTER — HOSPITAL ENCOUNTER (OUTPATIENT)
Age: 21
Discharge: HOME | End: 2021-06-14
Payer: COMMERCIAL

## 2021-06-14 ENCOUNTER — ROUTINE PRENATAL (OUTPATIENT)
Dept: OBGYN CLINIC | Age: 21
End: 2021-06-14
Payer: COMMERCIAL

## 2021-06-14 ENCOUNTER — HOSPITAL ENCOUNTER (OUTPATIENT)
Age: 21
Setting detail: SPECIMEN
Discharge: HOME | End: 2021-06-14
Payer: COMMERCIAL

## 2021-06-14 VITALS
HEART RATE: 97 BPM | DIASTOLIC BLOOD PRESSURE: 60 MMHG | WEIGHT: 154 LBS | SYSTOLIC BLOOD PRESSURE: 102 MMHG | BODY MASS INDEX: 27.28 KG/M2

## 2021-06-14 DIAGNOSIS — Z3A.19 19 WEEKS GESTATION OF PREGNANCY: ICD-10-CM

## 2021-06-14 DIAGNOSIS — O09.30 LATE PRENATAL CARE: ICD-10-CM

## 2021-06-14 DIAGNOSIS — N92.6 MISSED MENSES: ICD-10-CM

## 2021-06-14 DIAGNOSIS — Z83.3 FAMILY HISTORY OF DIABETES MELLITUS: ICD-10-CM

## 2021-06-14 DIAGNOSIS — Z20.2 CHLAMYDIA CONTACT, TREATED: ICD-10-CM

## 2021-06-14 DIAGNOSIS — O09.92 HIGH-RISK PREGNANCY IN SECOND TRIMESTER: Primary | ICD-10-CM

## 2021-06-14 DIAGNOSIS — R55 NEUROCARDIOGENIC SYNCOPE: ICD-10-CM

## 2021-06-14 DIAGNOSIS — I47.1 SVT (SUPRAVENTRICULAR TACHYCARDIA) (HCC): ICD-10-CM

## 2021-06-14 DIAGNOSIS — O09.92 HIGH-RISK PREGNANCY IN SECOND TRIMESTER: ICD-10-CM

## 2021-06-14 DIAGNOSIS — Z32.01 POSITIVE PREGNANCY TEST: ICD-10-CM

## 2021-06-14 DIAGNOSIS — F90.9 ATTENTION DEFICIT HYPERACTIVITY DISORDER (ADHD), UNSPECIFIED ADHD TYPE: ICD-10-CM

## 2021-06-14 DIAGNOSIS — R56.9 SEIZURES (HCC): ICD-10-CM

## 2021-06-14 LAB
DIRECT EXAM: NORMAL
Lab: NORMAL
SPECIMEN DESCRIPTION: NORMAL

## 2021-06-14 PROCEDURE — 87510 GARDNER VAG DNA DIR PROBE: CPT

## 2021-06-14 PROCEDURE — 87660 TRICHOMONAS VAGIN DIR PROBE: CPT

## 2021-06-14 PROCEDURE — 86336 INHIBIN A: CPT

## 2021-06-14 PROCEDURE — 87491 CHLMYD TRACH DNA AMP PROBE: CPT

## 2021-06-14 PROCEDURE — 87480 CANDIDA DNA DIR PROBE: CPT

## 2021-06-14 PROCEDURE — G0145 SCR C/V CYTO,THINLAYER,RESCR: HCPCS

## 2021-06-14 PROCEDURE — 36415 COLL VENOUS BLD VENIPUNCTURE: CPT

## 2021-06-14 PROCEDURE — 1036F TOBACCO NON-USER: CPT | Performed by: NURSE PRACTITIONER

## 2021-06-14 PROCEDURE — 82677 ASSAY OF ESTRIOL: CPT

## 2021-06-14 PROCEDURE — 87081 CULTURE SCREEN ONLY: CPT

## 2021-06-14 PROCEDURE — H1000 PRENATAL CARE ATRISK ASSESSM: HCPCS | Performed by: NURSE PRACTITIONER

## 2021-06-14 PROCEDURE — 87591 N.GONORRHOEAE DNA AMP PROB: CPT

## 2021-06-14 PROCEDURE — 99214 OFFICE O/P EST MOD 30 MIN: CPT | Performed by: NURSE PRACTITIONER

## 2021-06-14 PROCEDURE — 82105 ALPHA-FETOPROTEIN SERUM: CPT

## 2021-06-14 PROCEDURE — G8419 CALC BMI OUT NRM PARAM NOF/U: HCPCS | Performed by: NURSE PRACTITIONER

## 2021-06-14 PROCEDURE — 84702 CHORIONIC GONADOTROPIN TEST: CPT

## 2021-06-14 PROCEDURE — G8427 DOCREV CUR MEDS BY ELIG CLIN: HCPCS | Performed by: NURSE PRACTITIONER

## 2021-06-14 RX ORDER — VITAMIN A ACETATE, .BETA.-CAROTENE, ASCORBIC ACID, CHOLECALCIFEROL, .ALPHA.-TOCOPHEROL ACETATE, DL-, THIAMINE MONONITRATE, RIBOFLAVIN, NIACINAMIDE, PYRIDOXINE HYDROCHLORIDE, FOLIC ACID, CYANOCOBALAMIN, CALCIUM CARBONATE, FERROUS FUMARATE, ZINC OXIDE, AND CUPRIC OXIDE 2000; 2000; 120; 400; 22; 1.84; 3; 20; 10; 1; 12; 200; 27; 25; 2 [IU]/1; [IU]/1; MG/1; [IU]/1; MG/1; MG/1; MG/1; MG/1; MG/1; MG/1; UG/1; MG/1; MG/1; MG/1; MG/1
1 TABLET ORAL DAILY
Qty: 30 TABLET | Refills: 11 | Status: SHIPPED | OUTPATIENT
Start: 2021-06-14 | End: 2021-07-14 | Stop reason: SDUPTHER

## 2021-06-14 NOTE — PROGRESS NOTES
\"about 5 yrs ago\". Echo and holter monitor done, neg results    Traumatic injury during pregnancy, third trimester     Wears glasses      Past Surgical History:   Procedure Laterality Date    REMOVAL FOREIGN BODY EAR Left     \"insect laid eggs\"    TYMPANOPLASTY Left 06/24/2016      Social History     Socioeconomic History    Marital status: Single     Spouse name: Not on file    Number of children: Not on file    Years of education: Not on file    Highest education level: Not on file   Occupational History    Not on file   Tobacco Use    Smoking status: Former Smoker     Packs/day: 0.50     Types: Cigars    Smokeless tobacco: Never Used    Tobacco comment: 1-2 black and milds daily    Vaping Use    Vaping Use: Never used   Substance and Sexual Activity    Alcohol use: No    Drug use: Not Currently     Types: Marijuana     Comment: monthly     Sexual activity: Yes     Partners: Male     Birth control/protection: Condom   Other Topics Concern    Not on file   Social History Narrative    Not on file     Social Determinants of Health     Financial Resource Strain:     Difficulty of Paying Living Expenses:    Food Insecurity:     Worried About Running Out of Food in the Last Year:     Ran Out of Food in the Last Year:    Transportation Needs:     Lack of Transportation (Medical):  Lack of Transportation (Non-Medical):    Physical Activity:     Days of Exercise per Week:     Minutes of Exercise per Session:    Stress:     Feeling of Stress :    Social Connections:     Frequency of Communication with Friends and Family:     Frequency of Social Gatherings with Friends and Family:     Attends Christian Services:     Active Member of Clubs or Organizations:     Attends Club or Organization Meetings:     Marital Status:    Intimate Partner Violence:     Fear of Current or Ex-Partner:     Emotionally Abused:     Physically Abused:     Sexually Abused:      Family History   Adopted:  Yes Problem Relation Age of Onset    Diabetes Mother         borderline diet controlled    Atrial Fibrillation Mother     Anxiety Disorder Mother     Depression Mother     Heart Disease Mother     Seizures Maternal Grandmother     COPD Maternal Grandmother     Hypertension Maternal Grandmother     Cancer Maternal Grandmother         ovarian    Heart Disease Maternal Grandmother     Diabetes Type 1  Maternal Grandfather     Diabetes Type 1  Other         m uncle    Bleeding Prob Other         m aunt protein S&C deficiency       MEDICATIONS:  Current Outpatient Medications   Medication Sig Dispense Refill    Prenatal Vit-Fe Fumarate-FA (PNV PRENATAL PLUS MULTIVITAMIN) 27-1 MG TABS Take 1 tablet by mouth daily 30 tablet 11    acetaminophen (TYLENOL) 325 MG tablet Take 1 tablet by mouth every 6 hours as needed for Pain (Patient not taking: Reported on 6/14/2021) 30 tablet 0    Prenatal Vit-DSS-Fe Cbn-FA (PRENATAL AD PO) Take by mouth (Patient not taking: Reported on 6/14/2021)       No current facility-administered medications for this visit. ALLERGIES:  Allergies as of 06/14/2021 - Fully Reviewed 06/14/2021   Allergen Reaction Noted    Sulfa antibiotics  04/08/2015           Physical Exam Completed-See Epic Navigator    Chaperone for Intimate Exam   Chaperone was offered and accepted as part of the rooming process.  Chaperone: BINH LEGGETT             Assessment:      Pregnancy at 19 and 4/7 weeks    Diagnosis Orders   1. High-risk pregnancy in second trimester  C.trachomatis N.gonorrhoeae DNA    Culture, Strep B Screen, Vaginal/Rectal    VAGINITIS DNA PROBE    PAP SMEAR   2. 19 weeks gestation of pregnancy  C.trachomatis N.gonorrhoeae DNA    Culture, Strep B Screen, Vaginal/Rectal    VAGINITIS DNA PROBE    PAP SMEAR   3.  Chlamydia contact, treated  C.trachomatis N.gonorrhoeae DNA    Culture, Strep B Screen, Vaginal/Rectal    VAGINITIS DNA PROBE    PAP SMEAR           Patient Active Problem List Diagnosis Date Noted    SVT (supraventricular tachycardia) ([facility] [address] 2019     Priority: High     2019 Referral to cardiology      Family history of clotting disorder 2019     Priority: High     2018 patient's maternal aunt with hx of Protein S and C deficiency      Family history of diabetes mellitus 2019     Priority: Medium     2019 early one hour GTT ordered      Severe recurrent major depression without psychotic features ([facility] [address] 2020    Seizures ([facility] [address] 03/15/2020     19 M Apg 8/9 Wt 7#11 2019    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 Jeaneth Taveras MD. See note in media section on 2019.  Family history of blood clots 2019     Denies first degree relative. Maternal aunt.  ADD (attention deficit disorder)      2019 stopped taking focalin 8 months ago                      Plan:      Initial lab slip given  Pap smear collected  Vaginal cultures collected. Instructed to have FOB go to the health department for tx for chlamydia   Referred back to cardiologist DR Karimi  Instructed to go to the ER if she has palpations again  Prenatal vitamins. Problem list reviewed and updated. NT Screen/Quad Testing and MSAFP Single Marker: requested  Role of ultrasound in pregnancy discussed; fetal survey: requests  Amniocentesis discussed: Not indicated  NIPT Testing not indicated  Cultures & Wet Prep Collected  Follow-up in 4 weeks  Repeat Annual every 1 year  Cervical Cytology Evaluation begins at 24years old. If Negative Cytology, Follow-up screening per current guidelines. M appointment scheduled      COUNSELING COMPLETED:  INITIAL OBSTETRICAL VISIT EVALUATION:  The patient was seen full history and physical was completed/reviewed. Cytology was collected for patients over 24years of age. Cultures were collected.  The patient was counseled on office policies and she was counseled on termination of pregnancy in the state of [place]. The patient was counseled on Toxoplasmosis, HIV, Tobacco Abuse, Group Beta Strep Infections, Cystic Fibrosis,  Labor precautions and Sickle Cell disease. The patient was counseled on the risks of tobacco abuse. Both maternal and fetal. She was instructed to stop smoking if currently using tobacco. Morbidity, mortality, and cessation programs were reviewed. The risks include but are not limited to increased risks of  labor,  delivery, premature rupture of membranes, intrauterine growth restriction, intrauterine fetal demise and abruptio placenta. Secondary smoke risks were also reviewed. Increases in cancer, respiratory problems, and sudden infant death syndrome were reviewed as well. The patient was informed of a 2-4% risk of congenital anomalies in the general population. She was also informed that karyotyping is the only way to evaluate the fetus for genetic problems and genetic lethal anomalies. Chorionic villous sampling, amniocentesis and NIPT testing were also discussed with morbidity rates in detail. She requested the procedure. Route of delivery and counseling on vaginal, operative vaginal, and  sections were completed with the risks of each to both the patient as well as her baby. The possibility of a blood transfusion was discussed as well. The patient was not opposed to receiving a transfusion if needed. Nuchal translucency/Quad Evaluation and MSAFP single marker testing was reviewed in detail with attention to timing of testing and their windows. For patients beyond the gestational age for Nuchal translucency evaluation Quad testing was recommended. Timing for the Quad test was reviewed. Benefits of the above testing was reviewed. A second trimester amniocentesis was also made available to the patient. Risks, Benefits and non-invasive alternative testing was reviewed.      The literature regarding a questionable link to pitocin augmentation and induction of labor, the assistance of labor contractions and the initiation of contractions to help delivery, have been reviewed with the patient regarding the increased potential of having a  with Attention Deficit Hyperactivity Disorder and or Autism. These two disorders and the ramifications of their impact on a child and the family caring for that child has been reviewed with the patient in detail. She was given the risks, benefits and alternatives of the use of this medication. She has agreed to its use in the delivery of her unborn child if needed at the time of delivery, Yes. The patient was questioned in detail regarding any genetic misnomer history, chromosomal abnormalities, or learning disabilities in  herself, the father of the baby or their families. SHE DENIED ANY HISTORY AS STATED ABOVE: Yes    Upon completion of the visit all questions were answered and the patients follow-up and testing schedule were reviewed. Prenatal vitamins were given. T-dap Vaccine recommendations reviewed with the patient. Patient notified of timing of vaccination 27-36 weeks gestation. Patient aware Vaccine is NOT Live. Yes. The patient, Scott Bailey is a 24 y.o. female, was seen with a total time spent of 30 minutes for the visit on this date of service by the E/M provider. The time component had both face to face and non face to face time spent in determining the total time component. Counseling and education regarding her diagnosis listed below and her options regarding those diagnoses were also included in determining her time component. Diagnosis Orders   1. High-risk pregnancy in second trimester  C.trachomatis N.gonorrhoeae DNA    Culture, Strep B Screen, Vaginal/Rectal    VAGINITIS DNA PROBE    PAP SMEAR   2. 19 weeks gestation of pregnancy  C.trachomatis N.gonorrhoeae DNA    Culture, Strep B Screen, Vaginal/Rectal    VAGINITIS DNA PROBE    PAP SMEAR   3.

## 2021-06-14 NOTE — PROGRESS NOTES
Pt presented to office for OB intake. Pt transfer patient from Dr Radha Can @ 19 wk gest. Pt JENNIFER 11/4/21. Patient presents to intake alone. Pt currently taking PNV. Patient's medica, surgical, obstetrical and family history reviewed. See EHR for details. Pt signed and accepted to have Quad completed. Pt instructed on Quad timeline. OB education material given. Consent's sign accordingly. Pt scheduled for follow up OB appointment 7/7/21 with Kathleen.

## 2021-06-15 DIAGNOSIS — R06.00 DYSPNEA, UNSPECIFIED TYPE: ICD-10-CM

## 2021-06-15 DIAGNOSIS — I47.1 SVT (SUPRAVENTRICULAR TACHYCARDIA) (HCC): Primary | ICD-10-CM

## 2021-06-15 DIAGNOSIS — O09.92 SUPERVISION OF HIGH RISK PREGNANCY IN SECOND TRIMESTER: Primary | ICD-10-CM

## 2021-06-17 LAB
AFP INTERPRETATION: NORMAL
AFP MOM: 1.33
AFP QUAD INTERPRETATION: NORMAL
AFP SPECIMEN: NORMAL
AFP: 73 NG/ML
CULTURE: NORMAL
CYTOLOGY REPORT: NORMAL
DATE OF BIRTH: NORMAL
DATING METHOD: NORMAL
DETERMINED BY: NORMAL
DIABETIC: NO
DIMERIC INHIBIN A: 85 PG/ML
DONOR EGG?: NO
DUE DATE: NORMAL
ESTIMATED DUE DATE: NORMAL
FAMILY HISTORY NTD: NO
GESTATIONAL AGE: NORMAL
HISTORY OF ANEUPLOIDY?: NO
IN VITRO FERTILIZATION: NO
INHIBIN A MOM: 0.55
INSULIN REQ DIABETES: NO
LAST MENSTRUAL PERIOD: NORMAL
Lab: NORMAL
MATERNAL AGE AT EDD: 21.4 YR
MATERNAL WEIGHT: 154
MOM FOR HCG, 2ND TRIMESTER: 0.53
MONOCHORIONIC TWINS: NO
NUMBER OF FETUSES: NORMAL
PATIENT WEIGHT UNITS: NORMAL
PATIENT WEIGHT: NORMAL
PATIENT'S HCG, TRI 2: NORMAL IU/L
PREV TRISOMY PREG: NO
RACE (MATERNAL): NORMAL
RACE: NORMAL
REPEAT SPECIMEN?: NO
SMOKING: NO
SMOKING: NO
SPECIMEN DESCRIPTION: NORMAL
UE3 MOM: 0.94
UE3 VALUE: 2.13 NG/ML
VALPROIC/CARBAMAZEP: NO
ZZ NTE CLEAN UP: HISTORY: NO

## 2021-06-21 ENCOUNTER — TELEPHONE (OUTPATIENT)
Dept: OBGYN CLINIC | Age: 21
End: 2021-06-21

## 2021-06-21 DIAGNOSIS — O09.92 HIGH-RISK PREGNANCY IN SECOND TRIMESTER: Primary | ICD-10-CM

## 2021-06-21 DIAGNOSIS — O99.810 ABNORMAL GLUCOSE AFFECTING PREGNANCY: ICD-10-CM

## 2021-06-21 DIAGNOSIS — Z3A.20 20 WEEKS GESTATION OF PREGNANCY: ICD-10-CM

## 2021-07-04 ENCOUNTER — HOSPITAL ENCOUNTER (EMERGENCY)
Age: 21
Discharge: HOME | End: 2021-07-04
Attending: EMERGENCY MEDICINE
Payer: COMMERCIAL

## 2021-07-04 VITALS
RESPIRATION RATE: 20 BRPM | TEMPERATURE: 98.3 F | BODY MASS INDEX: 27.29 KG/M2 | HEART RATE: 117 BPM | WEIGHT: 154 LBS | SYSTOLIC BLOOD PRESSURE: 118 MMHG | DIASTOLIC BLOOD PRESSURE: 68 MMHG | HEIGHT: 63 IN | OXYGEN SATURATION: 98 %

## 2021-07-04 DIAGNOSIS — N94.9 ROUND LIGAMENT PAIN: Primary | ICD-10-CM

## 2021-07-04 PROCEDURE — 6370000000 HC RX 637 (ALT 250 FOR IP): Performed by: EMERGENCY MEDICINE

## 2021-07-04 PROCEDURE — 99283 EMERGENCY DEPT VISIT LOW MDM: CPT

## 2021-07-04 RX ORDER — ACETAMINOPHEN 500 MG
1000 TABLET ORAL ONCE
Status: COMPLETED | OUTPATIENT
Start: 2021-07-04 | End: 2021-07-04

## 2021-07-04 RX ADMIN — ACETAMINOPHEN 1000 MG: 500 TABLET ORAL at 02:35

## 2021-07-04 ASSESSMENT — PAIN SCALES - GENERAL
PAINLEVEL_OUTOF10: 7
PAINLEVEL_OUTOF10: 7

## 2021-07-04 NOTE — ED NOTES
Pt presents to ED via private auto with c/o lower  abdominal pain. Pt states she is 20 weeks pregnant. Pt denies vaginal bleeding. Pt alert and oriented x4. Pt able to ambulate without assist. Family at bedside.        Sherly Barclay RN  07/04/21 [address] [place] [address]  07/04/21 [id number]

## 2021-07-04 NOTE — ED PROVIDER NOTES
EMERGENCY DEPARTMENT ENCOUNTER    Pt Name: Ledy Jenkins  MRN: [id number]  [place] [address]  Date of evaluation: 7/4/21  CHIEF COMPLAINT       Chief Complaint   Patient presents with    Abdominal Pain     22 weeks pregnant     HISTORY OF PRESENT ILLNESS   Patient is a 22-year-old female, G2, P1, 22 weeks pregnant by ultrasound, who presents to the ED tonight for evaluation of pregnancy. Patient was watching fireworks earlier this evening and developed lower abdominal pain. Pain described as generalized, crampy, nonradiating, no aggravating factors, no alleviating factors. She does not have Tylenol because they are camping. She is feeling the baby move. No vaginal discharge, vaginal bleeding. No other issues at this time. No fever, cough, shortness breath, chest pain, nausea vomiting, changes in urine or stool. Bedside ultrasound shows good fetal movement with . Abdominal pain coincided with movement of baby during ultrasound exam.    REVIEW OF SYSTEMS     Review of Systems   All other systems reviewed and are negative. PASTMEDICAL HISTORY     Past Medical History:   Diagnosis Date    ADD (attention deficit disorder)     Anxiety     Asthma     CMV (cytomegalovirus infection) ([address] 2019    Depression     Gestational diabetes mellitus (GDM), antepartum 6/24/2019    Hearing loss in left ear     Heart disease 2018    SVT    Hypothyroidism 1/16/2019    Immunizations up to date in pediatric patient     Neurocardiogenic syncope     Pseudoseizures     Raynaud disease     Seizures ([address] 3/15/2020    SVT (supraventricular tachycardia) ([address]     Tachycardia     Saw Dr. Vignesh Estevez 5 yrs ago\".   Echo and holter monitor done, neg results    Traumatic injury during pregnancy, third trimester     Wears glasses      SURGICAL HISTORY       Past Surgical History:   Procedure Laterality Date    REMOVAL FOREIGN BODY EAR Left     \"insect laid eggs\"    TYMPANOPLASTY Left 06/24/2016 CURRENT MEDICATIONS       Previous Medications    ACETAMINOPHEN (TYLENOL) 325 MG TABLET    Take 1 tablet by mouth every 6 hours as needed for Pain    PRENATAL VIT-DSS-FE CBN-FA (PRENATAL AD PO)    Take by mouth     PRENATAL VIT-FE FUMARATE-FA (PNV PRENATAL PLUS MULTIVITAMIN) 27-1 MG TABS    Take 1 tablet by mouth daily     ALLERGIES     is allergic to sulfa antibiotics. FAMILY HISTORY     is adopted. SOCIAL HISTORY       Social History     Tobacco Use    Smoking status: Former Smoker     Packs/day: 0.50     Types: Cigars    Smokeless tobacco: Never Used    Tobacco comment: 1-2 black and milds daily    Vaping Use    Vaping Use: Never used   Substance Use Topics    Alcohol use: No    Drug use: Not Currently     Types: Marijuana     Comment: monthly      PHYSICAL EXAM     INITIAL VITALS: /68   Pulse 117   Temp 98.3 °F (36.8 °C) (Oral)   Resp 20   Ht 5' 3\" (1.6 m)   Wt 154 lb (69.9 kg)   LMP  (LMP Unknown)   SpO2 98%   BMI 27.28 kg/m²    Physical Exam  HENT:      Head: Normocephalic. Right Ear: External ear normal.      Left Ear: External ear normal.      Nose: Nose normal.   Eyes:      Conjunctiva/sclera: Conjunctivae normal.   Cardiovascular:      Rate and Rhythm: Normal rate. Pulmonary:      Effort: Pulmonary effort is normal.   Abdominal:      General: Abdomen is flat. Skin:     General: Skin is dry. Neurological:      Mental Status: She is alert. Mental status is at baseline. Psychiatric:         Mood and Affect: Mood normal.         Behavior: Behavior normal.         MEDICAL DECISION MAKING:   The patient is hemodynamically stable, afebrile, nontoxic-appearing. Physical exam unremarkable. ED plan for reassurance, discharge.              DIAGNOSTIC RESULTS   EKG:All EKG's are interpreted by the Emergency Department Physician who either signs or Co-signs this chart in the absence of a cardiologist.        RADIOLOGY:All plain film, CT, MRI, and formal ultrasound images (except ED bedside ultrasound) are read by the radiologist, see reports below, unless otherwisenoted in MDM or here. No orders to display     LABS: All lab results were reviewed by myself, and all abnormals are listed below. Labs Reviewed - No data to display    EMERGENCY DEPARTMENTCOURSE:         Vitals:    Vitals:    07/04/21 0154   BP: 118/68   Pulse: 117   Resp: 20   Temp: 98.3 °F (36.8 °C)   TempSrc: Oral   SpO2: 98%   Weight: 154 lb (69.9 kg)   Height: 5' 3\" (1.6 m)       The patient was given the following medications while in the emergency department:  Orders Placed This Encounter   Medications    acetaminophen (TYLENOL) tablet 1,000 mg     CONSULTS:  None    FINAL IMPRESSION      1.  Round ligament pain          DISPOSITION/PLAN   DISPOSITION Decision To Discharge 07/04/2021 02:30:59 AM      PATIENT REFERRED TO:  Anders Goodwin, DARRIAN - CNP  [address] [place] [phone]    In 2 days      DISCHARGE MEDICATIONS:  New Prescriptions    No medications on file     Chito Bolivar MD  Attending Emergency Physician                    Jos Diaz MD  07/04/21 0236       Jos Diaz MD  07/04/21 4525

## 2021-07-08 ENCOUNTER — TELEPHONE (OUTPATIENT)
Dept: OBGYN CLINIC | Age: 21
End: 2021-07-08

## 2021-07-14 DIAGNOSIS — N92.6 MISSED MENSES: ICD-10-CM

## 2021-07-14 DIAGNOSIS — Z32.01 POSITIVE PREGNANCY TEST: ICD-10-CM

## 2021-07-14 RX ORDER — VITAMIN A ACETATE, .BETA.-CAROTENE, ASCORBIC ACID, CHOLECALCIFEROL, .ALPHA.-TOCOPHEROL ACETATE, DL-, THIAMINE MONONITRATE, RIBOFLAVIN, NIACINAMIDE, PYRIDOXINE HYDROCHLORIDE, FOLIC ACID, CYANOCOBALAMIN, CALCIUM CARBONATE, FERROUS FUMARATE, ZINC OXIDE, AND CUPRIC OXIDE 2000; 2000; 120; 400; 22; 1.84; 3; 20; 10; 1; 12; 200; 27; 25; 2 [IU]/1; [IU]/1; MG/1; [IU]/1; MG/1; MG/1; MG/1; MG/1; MG/1; MG/1; UG/1; MG/1; MG/1; MG/1; MG/1
1 TABLET ORAL DAILY
Qty: 30 TABLET | Refills: 11 | Status: SHIPPED | OUTPATIENT
Start: 2021-07-14 | End: 2022-01-26

## 2021-07-19 ENCOUNTER — ROUTINE PRENATAL (OUTPATIENT)
Dept: PERINATAL CARE | Age: 21
End: 2021-07-19
Payer: COMMERCIAL

## 2021-07-19 VITALS
SYSTOLIC BLOOD PRESSURE: 111 MMHG | HEART RATE: 83 BPM | DIASTOLIC BLOOD PRESSURE: 66 MMHG | HEIGHT: 63 IN | TEMPERATURE: 97.2 F | WEIGHT: 161 LBS | RESPIRATION RATE: 16 BRPM | BODY MASS INDEX: 28.53 KG/M2

## 2021-07-19 DIAGNOSIS — O99.412 MATERNAL CARDIOVASCULAR DISEASE AFFECTING PREGNANCY IN SECOND TRIMESTER: Primary | ICD-10-CM

## 2021-07-19 DIAGNOSIS — O99.810 ABNORMAL MATERNAL GLUCOSE TOLERANCE, ANTEPARTUM: ICD-10-CM

## 2021-07-19 DIAGNOSIS — Z86.32 HISTORY OF GESTATIONAL DIABETES IN PRIOR PREGNANCY, CURRENTLY PREGNANT, SECOND TRIMESTER: ICD-10-CM

## 2021-07-19 DIAGNOSIS — Z3A.24 24 WEEKS GESTATION OF PREGNANCY: ICD-10-CM

## 2021-07-19 DIAGNOSIS — O09.292 HISTORY OF GESTATIONAL DIABETES IN PRIOR PREGNANCY, CURRENTLY PREGNANT, SECOND TRIMESTER: ICD-10-CM

## 2021-07-19 LAB
ABDOMINAL CIRCUMFERENCE: NORMAL
BIPARIETAL DIAMETER: NORMAL
ESTIMATED FETAL WEIGHT: NORMAL
FEMORAL DIAMETER: NORMAL
HC/AC: NORMAL
HEAD CIRCUMFERENCE: NORMAL

## 2021-07-19 PROCEDURE — 76811 OB US DETAILED SNGL FETUS: CPT | Performed by: OBSTETRICS & GYNECOLOGY

## 2021-07-19 NOTE — PATIENT INSTRUCTIONS
Patient advised to follow up with referring physician.
Eyes with no visual disturbances.  Ears clean and dry and no hearing difficulties. Nose with pink mucosa and no drainage.  Mouth mucous membranes moist and pink.  No tenderness or swelling to throat or neck.

## 2021-07-28 ENCOUNTER — ROUTINE PRENATAL (OUTPATIENT)
Dept: OBGYN CLINIC | Age: 21
End: 2021-07-28
Payer: COMMERCIAL

## 2021-07-28 VITALS — WEIGHT: 161 LBS | SYSTOLIC BLOOD PRESSURE: 106 MMHG | BODY MASS INDEX: 28.52 KG/M2 | DIASTOLIC BLOOD PRESSURE: 74 MMHG

## 2021-07-28 DIAGNOSIS — M54.30 SCIATIC NERVE PAIN, UNSPECIFIED LATERALITY: ICD-10-CM

## 2021-07-28 DIAGNOSIS — Z3A.25 25 WEEKS GESTATION OF PREGNANCY: Primary | ICD-10-CM

## 2021-07-28 DIAGNOSIS — O99.891 BACK PAIN AFFECTING PREGNANCY IN SECOND TRIMESTER: Primary | ICD-10-CM

## 2021-07-28 DIAGNOSIS — M54.9 BACK PAIN AFFECTING PREGNANCY IN SECOND TRIMESTER: Primary | ICD-10-CM

## 2021-07-28 PROCEDURE — 99213 OFFICE O/P EST LOW 20 MIN: CPT | Performed by: NURSE PRACTITIONER

## 2021-07-28 PROCEDURE — G8427 DOCREV CUR MEDS BY ELIG CLIN: HCPCS | Performed by: NURSE PRACTITIONER

## 2021-07-28 PROCEDURE — G8419 CALC BMI OUT NRM PARAM NOF/U: HCPCS | Performed by: NURSE PRACTITIONER

## 2021-07-28 PROCEDURE — 1036F TOBACCO NON-USER: CPT | Performed by: NURSE PRACTITIONER

## 2021-07-28 NOTE — PROGRESS NOTES
Maritza Alcantar is a 24 y.o. female 23w6d        OB History    Para Term  AB Living   2 1 1 0 0 1   SAB TAB Ectopic Molar Multiple Live Births   0 0 0   0 1      # Outcome Date GA Lbr South/2nd Weight Sex Delivery Anes PTL Lv   2 Current            1 Term 19 38w6d 02:25  00:22 7 lb 11.3 oz (3.495 kg) M Vag-Spont EPI N TERESA       Vitals  BP: 106/74  Weight: 161 lb (73 kg)  Patient Position: Sitting  Albumin: Negative  Glucose: Negative    The patient was seen and evaluated. There was positive fetal movements. No contractions or leakage of fluid. Signs and symptoms of  labor as well as labor were reviewed. The Nuchal Translucency testing was reviewed and found to be not completed. A single marker MSAFP was reviewed and found to be not completed. The patients anatomy ultrasound has been completed and reviewed with patient. TOP ST OH Reviewed. A 28 week lab panel was ordered. This includes a (HH, 1 hr GTT, U/A C&S). The patient is to complete this in the next two to four weeks. Complaining of pain radiating from back down both legs. Has had complaints of sciatic nerve pain since beginning of pregnancy. Denies complaints of cramping, contractions, vaginal bleeding.  + fetal movement. The S/S of Pre-Eclampsia were reviewed with the patient in detail. She is to report any of these if they occur. She currently denies any of these. The patient is RH positive Rhogam Ordered no    The patient was instructed on fetal kick counts and was given a kick sheet to complete every 8 hours. This is to begin at 28 weeks gestation. She was instructed that the baby should move at a minimum of ten times within one hour after a meal. The patient was instructed to lay down on her left side twenty minutes after eating and count movements for up to one hour with a target value of ten movements.   She was instructed to notify the office if she did not make that target after two attempts or if after any attempt there was less than four movements. PT PHONE NUMBER IS INVALID     FOB phone number: [phone]            Assessment:  Verna Connelly is a 24 y.o. female  2.   3. 25w6d    Patient Active Problem List    Diagnosis Date Noted    SVT (supraventricular tachycardia) ([facility] 2019     Priority: High     2019 Referral to cardiology      Family history of clotting disorder 2019     Priority: High     2018 patient's maternal aunt with hx of Protein S and C deficiency      ADD (attention deficit disorder)      Priority: High     2019 stopped taking focalin 8 months ago      Family history of diabetes mellitus 2019     Priority: Medium     2019 early one hour GTT ordered      Late prenatal care 2021    Severe recurrent major depression without psychotic features ([facility] 2020    Seizures ([facility] 03/15/2020     19 M Apg / Wt 7#11 2019    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 Candida Valentin MD. See note in media section on 2019.  Family history of blood clots 2019     Denies first degree relative. Maternal aunt. Diagnosis Orders   1. 25 weeks gestation of pregnancy  CBC Auto Differential    Urinalysis Reflex to Culture    Glucose Tolerance, 3 Hrs    Hemoglobin A1C         Plan:  The patient will return to the office for her next visit in 2-3 weeks. See antepartum flow sheet. Lab slips given for 28 week labs. Referral to physical therapy for bilateral sciatic nerve pain. Patient was seen with total face to face time of 20 minutes. More than 50% of this visit was on counseling and education regarding her    Diagnosis Orders   1. 25 weeks gestation of pregnancy  CBC Auto Differential    Urinalysis Reflex to Culture    Glucose Tolerance, 3 Hrs    Hemoglobin A1C    and her options.  She was also counseled on her preventative health maintenance recommendations and follow-up.

## 2021-07-29 ENCOUNTER — HOSPITAL ENCOUNTER (EMERGENCY)
Age: 21
Discharge: HOME | End: 2021-07-29
Attending: EMERGENCY MEDICINE
Payer: COMMERCIAL

## 2021-07-29 VITALS
SYSTOLIC BLOOD PRESSURE: 104 MMHG | OXYGEN SATURATION: 98 % | WEIGHT: 161 LBS | BODY MASS INDEX: 28.53 KG/M2 | DIASTOLIC BLOOD PRESSURE: 64 MMHG | HEART RATE: 90 BPM | HEIGHT: 63 IN | TEMPERATURE: 98 F | RESPIRATION RATE: 20 BRPM

## 2021-07-29 DIAGNOSIS — Z20.2 STD EXPOSURE: Primary | ICD-10-CM

## 2021-07-29 LAB
-: ABNORMAL
AMORPHOUS: ABNORMAL
BACTERIA: ABNORMAL
BILIRUBIN URINE: NEGATIVE
CASTS UA: ABNORMAL /LPF (ref 0–8)
COLOR: YELLOW
COMMENT UA: ABNORMAL
CRYSTALS, UA: ABNORMAL /HPF
DIRECT EXAM: NORMAL
EPITHELIAL CELLS UA: ABNORMAL /HPF (ref 0–5)
GLUCOSE URINE: NEGATIVE
KETONES, URINE: NEGATIVE
LEUKOCYTE ESTERASE, URINE: ABNORMAL
Lab: NORMAL
MUCUS: ABNORMAL
NITRITE, URINE: NEGATIVE
OTHER OBSERVATIONS UA: ABNORMAL
PH UA: 6.5 (ref 5–8)
PROTEIN UA: NEGATIVE
RBC UA: ABNORMAL /HPF (ref 0–4)
RENAL EPITHELIAL, UA: ABNORMAL /HPF
SPECIFIC GRAVITY UA: 1.02 (ref 1–1.03)
SPECIMEN DESCRIPTION: NORMAL
TRICHOMONAS: ABNORMAL
TURBIDITY: CLEAR
URINE HGB: NEGATIVE
UROBILINOGEN, URINE: NORMAL
WBC UA: ABNORMAL /HPF (ref 0–5)
YEAST: ABNORMAL

## 2021-07-29 PROCEDURE — 87086 URINE CULTURE/COLONY COUNT: CPT

## 2021-07-29 PROCEDURE — 6360000002 HC RX W HCPCS: Performed by: STUDENT IN AN ORGANIZED HEALTH CARE EDUCATION/TRAINING PROGRAM

## 2021-07-29 PROCEDURE — 87510 GARDNER VAG DNA DIR PROBE: CPT

## 2021-07-29 PROCEDURE — 87491 CHLMYD TRACH DNA AMP PROBE: CPT

## 2021-07-29 PROCEDURE — 87591 N.GONORRHOEAE DNA AMP PROB: CPT

## 2021-07-29 PROCEDURE — 96372 THER/PROPH/DIAG INJ SC/IM: CPT

## 2021-07-29 PROCEDURE — 81001 URINALYSIS AUTO W/SCOPE: CPT

## 2021-07-29 PROCEDURE — 99282 EMERGENCY DEPT VISIT SF MDM: CPT

## 2021-07-29 PROCEDURE — 87480 CANDIDA DNA DIR PROBE: CPT

## 2021-07-29 PROCEDURE — 6370000000 HC RX 637 (ALT 250 FOR IP): Performed by: STUDENT IN AN ORGANIZED HEALTH CARE EDUCATION/TRAINING PROGRAM

## 2021-07-29 PROCEDURE — 87660 TRICHOMONAS VAGIN DIR PROBE: CPT

## 2021-07-29 RX ORDER — AZITHROMYCIN 250 MG/1
1000 TABLET, FILM COATED ORAL ONCE
Status: COMPLETED | OUTPATIENT
Start: 2021-07-29 | End: 2021-07-29

## 2021-07-29 RX ORDER — CEFTRIAXONE 500 MG/1
500 INJECTION, POWDER, FOR SOLUTION INTRAMUSCULAR; INTRAVENOUS ONCE
Status: COMPLETED | OUTPATIENT
Start: 2021-07-29 | End: 2021-07-29

## 2021-07-29 RX ADMIN — CEFTRIAXONE SODIUM 500 MG: 500 INJECTION, POWDER, FOR SOLUTION INTRAMUSCULAR; INTRAVENOUS at 14:30

## 2021-07-29 RX ADMIN — AZITHROMYCIN 1000 MG: 250 TABLET, FILM COATED ORAL at 14:31

## 2021-07-29 ASSESSMENT — ENCOUNTER SYMPTOMS
CHEST TIGHTNESS: 0
PHOTOPHOBIA: 0
VOMITING: 0
SINUS PAIN: 0
DIARRHEA: 0
EYE REDNESS: 0
SORE THROAT: 0
SHORTNESS OF BREATH: 0
BACK PAIN: 0
TROUBLE SWALLOWING: 0
ABDOMINAL PAIN: 0
COLOR CHANGE: 0
SINUS PRESSURE: 0
CONSTIPATION: 0
NAUSEA: 0
COUGH: 0

## 2021-07-29 NOTE — ED PROVIDER NOTES
[facility]  ED  Emergency Department Encounter  Emergency Medicine Resident     Pt Name: Chey Sagastume  MRN: [id number]  [place] 2000  Date of evaluation: 7/29/21  PCP:  DARRIAN Jeong CNP    CHIEF COMPLAINT       Chief Complaint   Patient presents with    Exposure to STD     states she needs checked for chlamydia       HISTORY OFPRESENT ILLNESS  (Location/Symptom, Timing/Onset, Context/Setting, Quality, Duration, Modifying Factors,Severity.)      Chey Sagastume is a 24 y. o.yo female who presents with concerns for chlamydia exposure. Patient is 26 weeks pregnant and states that her significant other has a known chlamydia infection. They state that they are not able to afford the medication to treat his chlamydia infection but they have still continue to have unprotected intercourse. Patient denies any vaginal discharge, denies any loss of fluid abdominal cramping or bleeding. Patient does follow with OB fairly closely, this is her second pregnancy, had gestational diabetes with her first pregnancy denies any concerns with this pregnancy. PAST MEDICAL / SURGICAL / SOCIAL / FAMILY HISTORY      has a past medical history of ADD (attention deficit disorder), Anxiety, Asthma, CMV (cytomegalovirus infection) ([address] Depression, Gestational diabetes mellitus (GDM), antepartum, Hearing loss in left ear, Heart disease, Hypothyroidism, Immunizations up to date in pediatric patient, Neurocardiogenic syncope, Pseudoseizures, Raynaud disease, Seizures ([address] SVT (supraventricular tachycardia) ([address] Tachycardia, Traumatic injury during pregnancy, third trimester, and Wears glasses. has a past surgical history that includes REMOVAL FOREIGN BODY EAR (Left) and Tympanoplasty (Left, 06/24/2016).      Social History     Socioeconomic History    Marital status: Single     Spouse name: Not on file    Number of children: Not on file    Years of education: Not on file    Highest education level: Not on file   Occupational History    Not on file   Tobacco Use    Smoking status: Former Smoker     Packs/day: 0.50     Types: Cigars    Smokeless tobacco: Never Used    Tobacco comment: 1-2 black and milds daily    Vaping Use    Vaping Use: Never used   Substance and Sexual Activity    Alcohol use: No    Drug use: Not Currently     Types: Marijuana     Comment: monthly     Sexual activity: Yes     Partners: Male     Birth control/protection: Condom   Other Topics Concern    Not on file   Social History Narrative    Not on file     Social Determinants of Health     Financial Resource Strain:     Difficulty of Paying Living Expenses:    Food Insecurity:     Worried About Running Out of Food in the Last Year:     Ran Out of Food in the Last Year:    Transportation Needs:     Lack of Transportation (Medical):      Lack of Transportation (Non-Medical):    Physical Activity:     Days of Exercise per Week:     Minutes of Exercise per Session:    Stress:     Feeling of Stress :    Social Connections:     Frequency of Communication with Friends and Family:     Frequency of Social Gatherings with Friends and Family:     Attends Anabaptist Services:     Active Member of Clubs or Organizations:     Attends Club or Organization Meetings:     Marital Status:    Intimate Partner Violence:     Fear of Current or Ex-Partner:     Emotionally Abused:     Physically Abused:     Sexually Abused:        Family History   Adopted: Yes   Problem Relation Age of Onset    Diabetes Mother         borderline diet controlled    Atrial Fibrillation Mother     Anxiety Disorder Mother     Depression Mother     Heart Disease Mother     Seizures Maternal Grandmother     COPD Maternal Grandmother     Hypertension Maternal Grandmother     Cancer Maternal Grandmother         ovarian    Heart Disease Maternal Grandmother     Diabetes Type 1  Maternal Grandfather     Diabetes Type 1  Other         m uncle    Bleeding Prob Other         m aunt protein S&C deficiency        Allergies:  Sulfa antibiotics    Home Medications:  Prior to Admission medications    Medication Sig Start Date End Date Taking? Authorizing Provider   Prenatal Vit-Fe Fumarate-FA (PNV PRENATAL PLUS MULTIVITAMIN) 27-1 MG TABS Take 1 tablet by mouth daily 7/14/21 7/9/22  Rosalinda Gonzalez, APRN - NP   acetaminophen (TYLENOL) 325 MG tablet Take 1 tablet by mouth every 6 hours as needed for Pain 4/22/21   Cherry Barahona MD   Prenatal Vit-DSS-Fe Cbn-FA (PRENATAL AD PO) Take by mouth   Patient not taking: Reported on 7/19/2021    Historical Provider, MD       REVIEW OFSYSTEMS    (2-9 systems for level 4, 10 or more for level 5)      Review of Systems   Constitutional: Negative for chills, diaphoresis, fatigue and fever. HENT: Negative for congestion, sinus pressure, sinus pain, sore throat and trouble swallowing. Eyes: Negative for photophobia, redness and visual disturbance. Respiratory: Negative for cough, chest tightness and shortness of breath. Cardiovascular: Negative for chest pain, palpitations and leg swelling. Gastrointestinal: Negative for abdominal pain, constipation, diarrhea, nausea and vomiting. Genitourinary: Negative for difficulty urinating, dysuria, flank pain, urgency, vaginal bleeding, vaginal discharge and vaginal pain. Musculoskeletal: Negative for back pain, neck pain and neck stiffness. Skin: Negative for color change, pallor and rash. Neurological: Negative for dizziness, tremors, speech difficulty, weakness, light-headedness and headaches. PHYSICAL EXAM   (up to 7 for level 4, 8 or more forlevel 5)      INITIAL VITALS:   ED Triage Vitals [07/29/21 1212]   BP Temp Temp src Pulse Resp SpO2 Height Weight   104/64 98 °F (36.7 °C) -- 90 20 98 % 5' 3\" (1.6 m) 161 lb (73 kg)       Physical Exam  Constitutional:       General: She is not in acute distress.   HENT:      Head: Normocephalic and atraumatic. Right Ear: External ear normal.      Left Ear: External ear normal.      Nose: Nose normal. No rhinorrhea. Eyes:      Extraocular Movements: Extraocular movements intact. Pupils: Pupils are equal, round, and reactive to light. Cardiovascular:      Rate and Rhythm: Normal rate and regular rhythm. Pulses: Normal pulses. Heart sounds: No murmur heard. Pulmonary:      Effort: Pulmonary effort is normal.      Breath sounds: Normal breath sounds. Abdominal:      Palpations: Abdomen is soft. Tenderness: There is no abdominal tenderness. Comments: Gravid uterus, nontender to palpation   Genitourinary:     Comments: Milky white discharge noted in the vaginal vault, cervix is closed  Musculoskeletal:         General: No swelling. Normal range of motion. Cervical back: Normal range of motion and neck supple. Skin:     General: Skin is warm and dry. Neurological:      General: No focal deficit present. Mental Status: She is alert and oriented to person, place, and time. DIFFERENTIAL  DIAGNOSIS     PLAN (LABS / IMAGING / EKG):  Orders Placed This Encounter   Procedures    Culture, Urine    C.trachomatis N.gonorrhoeae DNA    VAGINITIS DNA PROBE    Urinalysis, reflex to microscopic    Microscopic Urinalysis    Inpatient consult to Obstetrics / Gynecology       MEDICATIONS ORDERED:  Orders Placed This Encounter   Medications    cefTRIAXone (ROCEPHIN) injection 500 mg     Order Specific Question:   Antimicrobial Indications     Answer:   STD infection    azithromycin (ZITHROMAX) tablet 1,000 mg     Order Specific Question:   Antimicrobial Indications     Answer:   STD infection       DDX: STD exposure, chlamydia, gonorrhea, urinary tract infection, bacterial vaginosis    Initial MDM/Plan: 24 y.o. female who presents with complaints of chlamydia exposure.   Patient significant other has known chlamydial infection and patient had unprotected intercourse with him. We will plan for pelvic exam and swabs and consult OB as patient is 26 weeks pregnant. DIAGNOSTIC RESULTS / EMERGENCYDEPARTMENT COURSE / MDM     LABS:  Labs Reviewed   URINALYSIS - Abnormal; Notable for the following components:       Result Value    Leukocyte Esterase, Urine TRACE (*)     All other components within normal limits   MICROSCOPIC URINALYSIS - Abnormal; Notable for the following components:    Bacteria, UA FEW (*)     All other components within normal limits   VAGINITIS DNA PROBE   CULTURE, URINE   C.TRACHOMATIS N.GONORRHOEAE DNA         RADIOLOGY:  No results found. EKG      All EKG's are interpreted by the Emergency Department Physicianwho either signs or Co-signs this chart in the absence of a cardiologist.    EMERGENCY DEPARTMENT COURSE:  ED Course as of Jul 29 1613   Thu Jul 29, 2021   1309 Exam performed, significant amount of milky white vaginal fluid, cervix is closed    [CD]   1317 Ultrasound did reveal fetal heart tones of 145. [CD]   4692 I did speak to OB, they recommended azithromycin and Rocephin for empiric treatment. Once. On that work-up will discharge patient up to L&D to have a 20-minute tracing.    [CD]   1345 Leukocyte Esterase, Urine(!): TRACE [CD]   1408 Negative vaginitis probe. Will treat and discharge upstairs for OB evaluation. [CD]      ED Course User Index  [CD] Carmelita Azul,           PROCEDURES:  None    CONSULTS:  IP CONSULT TO OB GYN    CRITICAL CARE:  Please see attending documentation    FINAL IMPRESSION      1.  STD exposure          DISPOSITION / PLAN     DISPOSITION Decision To Discharge 07/29/2021 02:35:29 PM      PATIENT REFERRED TO:  DARRIAN Albarado - CNP  [address] [facility]  [phone]    Schedule an appointment as soon as possible for a visit in 3 days  For ER follow up    [facility] ED  [address] [place] [address]  [phone]  Go to   If symptoms worsen      DISCHARGE MEDICATIONS:  Discharge Medication List as of 7/29/2021  2:36 PM          Monica Mosley DO  Emergency Medicine Resident    (Please note that portions of this note were completed with a voice recognition program.Efforts were made to edit the dictations but occasionally words are mis-transcribed.)        Monica Mosley DO  Resident  07/29/21 4170

## 2021-07-29 NOTE — ED PROVIDER NOTES
[facility] [address] [address] [facility]     Emergency Department     Faculty Attestation    I performed a history and physical examination of the patient and discussed management with the resident. I reviewed the residents note and agree with the documented findings and plan of care. Any areas of disagreement are noted on the chart. I was personally present for the key portions of any procedures. I have documented in the chart those procedures where I was not present during the key portions. I have reviewed the emergency nurses triage note. I agree with the chief complaint, past medical history, past surgical history, allergies, medications, social and family history as documented unless otherwise noted below. For Physician Assistant/ Nurse Practitioner cases/documentation I have personally evaluated this patient and have completed at least one if not all key elements of the E/M (history, physical exam, and MDM). Additional findings are as noted. This patient was evaluated in the Emergency Department for symptoms described in the history of present illness. He/she was evaluated in the context of the global COVID-19 pandemic, which necessitated consideration that the patient might be at risk for infection with the SARS-CoV-2 virus that causes COVID-19. Institutional protocols and algorithms that pertain to the evaluation of patients at risk for COVID-19 are in a state of rapid change based on information released by regulatory bodies including the CDC and federal and state organizations. These policies and algorithms were followed during the patient's care in the ED. Patient here with concerns for chlamydia significant other tested positive. Of note patient is 26 weeks pregnant. Does complain of vaginal discharge sore throat as well as rectal pain. She is concerned that \"I have chlamydia everywhere. \"  Is feeling baby move. No vaginal bleeding. On exam nontoxic well-appearing.   Throat no purulence no exudate, no nodes. Abdomen is probably gravid consistent with dates soft nontender. Will discuss with OB given over 23 weeks per protocol, if stays down here proceed with pelvic exam treatment, discussed with pharmacist options given pregnant for chlamydia.       Critical Care     none    Mike Dumont MD, [place]essa [place]  Attending Emergency  Physician             Mike Dumont MD  07/29/21 9154

## 2021-07-29 NOTE — PROGRESS NOTES
OB/GYN Resident Interval Note      FHT reviewed by myself and senior resident.     BASELINE: 130 bpm  VARIABILITY: moderate  ACCELERATIONS: present, appropriate for GA  DECELERATIONS: absent  TOCO: no contractions      Vitaly Weems DO  Ob/Gyn Resident  7/29/2021, 3:05 PM

## 2021-07-30 ENCOUNTER — HOSPITAL ENCOUNTER (EMERGENCY)
Age: 21
Discharge: HOME | End: 2021-07-30
Attending: EMERGENCY MEDICINE
Payer: COMMERCIAL

## 2021-07-30 VITALS
WEIGHT: 161 LBS | HEART RATE: 94 BPM | RESPIRATION RATE: 18 BRPM | TEMPERATURE: 98.2 F | DIASTOLIC BLOOD PRESSURE: 66 MMHG | BODY MASS INDEX: 28.53 KG/M2 | SYSTOLIC BLOOD PRESSURE: 114 MMHG | HEIGHT: 63 IN | OXYGEN SATURATION: 99 %

## 2021-07-30 DIAGNOSIS — J02.9 ACUTE PHARYNGITIS, UNSPECIFIED ETIOLOGY: Primary | ICD-10-CM

## 2021-07-30 PROCEDURE — 99283 EMERGENCY DEPT VISIT LOW MDM: CPT

## 2021-07-30 RX ORDER — ACETAMINOPHEN 325 MG/1
325 TABLET ORAL EVERY 6 HOURS PRN
Qty: 30 TABLET | Refills: 0 | Status: SHIPPED | OUTPATIENT
Start: 2021-07-30 | End: 2022-01-26

## 2021-07-30 RX ORDER — CETIRIZINE HYDROCHLORIDE 10 MG/1
10 TABLET ORAL DAILY
Qty: 30 TABLET | Refills: 0 | Status: SHIPPED | OUTPATIENT
Start: 2021-07-30 | End: 2021-08-16

## 2021-07-30 ASSESSMENT — PAIN SCALES - GENERAL: PAINLEVEL_OUTOF10: 6

## 2021-07-30 NOTE — ED PROVIDER NOTES
[facility]  ED  Emergency Department Encounter  Emergency Medicine Resident     Pt Name: Chey Sagastume  MRN: [id number]  [address] 2000  Date of evaluation: 7/30/21  PCP:  DARRIAN Jeong [id number]       Chief Complaint   Patient presents with    Pharyngitis     X 2 DAYS    Cough     PRODUCTIVE COUGH WITH CLEAR SPUTUM       HISTORY OF PRESENT ILLNESS  (Location/Symptom, Timing/Onset, Context/Setting, Quality, Duration, Modifying Factors, Severity.)      Chey Sagastume is a 24 y.o. female who presented to the emergency department with concern a sore throat for 1 days. The patient denies abdominal pain, nausea, vomiting, or diarrhea. Denies fevers and chills. Patient endorses cough, denies swelling, pain with breathing, difficulty breathing. Patient states that she is also here in order to go over her results of her urinalysis. Patient also would like the results of her gonorrhea chlamydia study from yesterday. PAST MEDICAL / SURGICAL / SOCIAL / FAMILY HISTORY     Patient has no medical problems and has had no surgeries in the past.      has a past medical history of ADD (attention deficit disorder), Anxiety, Asthma, CMV (cytomegalovirus infection) ([address] Depression, Gestational diabetes mellitus (GDM), antepartum, Hearing loss in left ear, Heart disease, Hypothyroidism, Immunizations up to date in pediatric patient, Neurocardiogenic syncope, Pseudoseizures, Raynaud disease, Seizures ([address] SVT (supraventricular tachycardia) ([address] Tachycardia, Traumatic injury during pregnancy, third trimester, and Wears glasses. has a past surgical history that includes REMOVAL FOREIGN BODY EAR (Left) and Tympanoplasty (Left, 06/24/2016).     Social History     Socioeconomic History    Marital status: Single     Spouse name: Not on file    Number of children: Not on file    Years of education: Not on file    Highest education level: Not on file   Occupational History    Not on file   Tobacco Use    Smoking status: Former Smoker     Packs/day: 0.50     Types: Cigars    Smokeless tobacco: Never Used    Tobacco comment: 1-2 black and milds daily    Vaping Use    Vaping Use: Never used   Substance and Sexual Activity    Alcohol use: No    Drug use: Not Currently     Types: Marijuana     Comment: monthly     Sexual activity: Yes     Partners: Male     Birth control/protection: Condom   Other Topics Concern    Not on file   Social History Narrative    Not on file     Social Determinants of Health     Financial Resource Strain:     Difficulty of Paying Living Expenses:    Food Insecurity:     Worried About Running Out of Food in the Last Year:     Ran Out of Food in the Last Year:    Transportation Needs:     Lack of Transportation (Medical):      Lack of Transportation (Non-Medical):    Physical Activity:     Days of Exercise per Week:     Minutes of Exercise per Session:    Stress:     Feeling of Stress :    Social Connections:     Frequency of Communication with Friends and Family:     Frequency of Social Gatherings with Friends and Family:     Attends Jain Services:     Active Member of Clubs or Organizations:     Attends Club or Organization Meetings:     Marital Status:    Intimate Partner Violence:     Fear of Current or Ex-Partner:     Emotionally Abused:     Physically Abused:     Sexually Abused:        Family History   Adopted: Yes   Problem Relation Age of Onset    Diabetes Mother         borderline diet controlled    Atrial Fibrillation Mother     Anxiety Disorder Mother     Depression Mother     Heart Disease Mother     Seizures Maternal Grandmother     COPD Maternal Grandmother     Hypertension Maternal Grandmother     Cancer Maternal Grandmother         ovarian    Heart Disease Maternal Grandmother     Diabetes Type 1  Maternal Grandfather     Diabetes Type 1  Other         m uncle    Bleeding Prob Other         m aunt protein S&C deficiency       Allergies:    Sulfa antibiotics    Home Medications:  Prior to Admission medications    Medication Sig Start Date End Date Taking? Authorizing Provider   acetaminophen (TYLENOL) 325 MG tablet Take 1 tablet by mouth every 6 hours as needed for Pain 7/30/21  Yes Michael Bass MD   cetirizine (ZYRTEC) 10 MG tablet Take 1 tablet by mouth daily 7/30/21  Yes Michael Bass MD   Prenatal Vit-Fe Fumarate-FA (PNV PRENATAL PLUS MULTIVITAMIN) 27-1 MG TABS Take 1 tablet by mouth daily 7/14/21 7/9/22  DARRIAN Garcia - NP   Prenatal Vit-DSS-Fe Cbn-FA (PRENATAL AD PO) Take by mouth   Patient not taking: Reported on 7/19/2021    Historical Provider, MD       REVIEW OF SYSTEMS    (2-9 systems for level 4, 10 or more for level 5)      Constitutional: Denies recent fever, chills. Eyes: No visual changes. Neck: No midline neck pain but does complain sore throat  Respiratory: Denies shortness of breath and dyspnea. Cardiac:  Denies recent chest pain. GI: denies any recent abdominal pain nausea or vomiting. Denies Blood in the stool or black tarry stools. Musculoskeletal: Denies focal weakness. Neurologic: denies headache or focal weakness. Skin:  Denies any rash. PHYSICAL EXAM   (up to 7 for level 4, 8 or more for level 5)      /66   Pulse 94   Temp 98.2 °F (36.8 °C) (Oral)   Resp 18   Ht 5' 3\" (1.6 m)   Wt 161 lb (73 kg)   LMP  (LMP Unknown)   SpO2 99%   BMI 28.52 kg/m²     GENERAL APPEARANCE: AxOx4, generally well-appearing. no acute distress. HEENT: Normocephalic and atraumatic. Mucus membranes moist. EOMI, clear conjunctiva, oropharynx clear without exudates  NECK: Supple without lymphadenopathy. No stiffness or restricted ROM. HEART: Normal rate and regular rhythm, normal S1/S1, no m/r/g   LUNGS: clear to auscultation without wheezes or rales, decreased air movement  ABDOMEN: Soft, nontender, nondistended with good bowel sounds heard. BACK: No CVAT, no obvious deformity. EXTREMITIES: Without cyanosis, clubbing or edema. NEUROLOGICAL: Grossly nonfocal. Alert and oriented, moving all 4 extremities. CN not formally tested but appear grossly intact. SKIN: Warm and dry without any rash. DIFFERENTIAL  DIAGNOSIS     PLAN (LABS / IMAGING / EKG):  No orders of the defined types were placed in this encounter. MEDICATIONS ORDERED:  Orders Placed This Encounter   Medications    acetaminophen (TYLENOL) 325 MG tablet     Sig: Take 1 tablet by mouth every 6 hours as needed for Pain     Dispense:  30 tablet     Refill:  0    cetirizine (ZYRTEC) 10 MG tablet     Sig: Take 1 tablet by mouth daily     Dispense:  30 tablet     Refill:  0       Sore throat DDX:   epiglottitis, PTA, strep, GC/ Chl, viral, pharyngitis, retropharyngeal abscess, kait's, peritonsillar abscess    CENTOR SCORE   Age Range Exudate or tonsillar swelling Tender/swollen anterior cervical  nodes Temp > 38°C (100.4°F) Cough   0 15-44 yrs No No No Present   1 3-14 yrs Yes Yes Yes Absent   -1 45 or older  -   -   -     Total 0 0 0 0 0     TOTAL: 0    Percent Risk for Step positive infection  -1, 0, or 1 indicates low risk for Strep Infection (no culture)  <10%   2 pts indicates moderate risk for Strep Infection (culture)    15%  3 pts indicates moderate risk for Strep Infection (culture)    32%  4-5 pts indicates high risk risk for Strep Infection (culture)    56%     Common cold, seasonal flu, Allergies, Food poisoning, toxoplasmosis, fungal pneumonia, Trichinellosis, Influenza, ARDS, Arenavirus, CMV, Hantavirus, legionnaires disease, HIV/AIDS, Echovirus, Dengue, Adenovirus, Parainfluenza    Diagnostic Results     LABS:  Not indicated  No results found for this visit on 07/30/21.     RADIOLOGY:  Not indicated  No orders to display       Medical decision making  (MDM) / ED Course     This is a 24 y.o. female presenting with worsening of sore throat with reassuring exam. No history of immunocompromise. Pt euvolemic w no trismus and no airway compromise. Able to tolerate PO. Unlikely PTA, RPA, Ludwigs, epiglottitis, acute HIV, or EBV. Centor negative for strep. Nontoxic appearance. - Unlikely strep throat: No LAD, cough present, afebrile, no pharyngeal exudate  - Unlikely EBV/Mono: No prolonged course, no posterior LAD, no splenomegaly  - Unlikely acute HIV: No LAD, No GI upset, no skin rash, not sexually active, no IVDU  - No PTA: No LAD, no hot potato voice, no uvular deviation, no erythema or swelling in tonsillar    area, afebrile  - No retropharyngeal abscess: No neck pain, no dysphagia, No LAD, no croup like cough, afebrile  - No obstructive processes such as obstructive goiter or ludwigs angina    Plan: DC home;  return precautions discussed. IMPRESSION:   Sore throat - most likely viral    PROCEDURES:  None    CONSULTS:  None    FINAL IMPRESSION      1.  Acute pharyngitis, unspecified etiology          DISPOSITION / PLAN     Disposition: Home    PATIENT REFERRED TO:  [facility] ED  [address] [address]  [phone]    As needed    DARRIAN Garcia - [address] [phone]      As needed      DISCHARGE MEDICATIONS:  Discharge Medication List as of 7/30/2021  7:01 PM      START taking these medications    Details   cetirizine (ZYRTEC) 10 MG tablet Take 1 tablet by mouth daily, Disp-30 tablet, R-0Print               Caren Hines MD  PGY-3 Emergency Medicine  [facility]       (Please note that portions of this note were completed with a voice recognition program.  Efforts were made to edit the dictations but occasionally words are mis-transcribed.)              Deena Salmeron MD  Resident  08/03/21 0994

## 2021-08-10 ENCOUNTER — HOSPITAL ENCOUNTER (OUTPATIENT)
Age: 21
Discharge: HOME | End: 2021-08-10
Payer: COMMERCIAL

## 2021-08-10 ENCOUNTER — TELEPHONE (OUTPATIENT)
Dept: OBGYN CLINIC | Age: 21
End: 2021-08-10

## 2021-08-10 DIAGNOSIS — Z3A.25 25 WEEKS GESTATION OF PREGNANCY: ICD-10-CM

## 2021-08-10 PROBLEM — O24.419 GESTATIONAL DIABETES MELLITUS (GDM), ANTEPARTUM: Status: ACTIVE | Noted: 2021-08-10

## 2021-08-10 LAB
ABSOLUTE EOS #: 0.19 K/UL (ref 0–0.44)
ABSOLUTE IMMATURE GRANULOCYTE: 0.09 K/UL (ref 0–0.3)
ABSOLUTE LYMPH #: 2.29 K/UL (ref 1.1–3.7)
ABSOLUTE MONO #: 0.82 K/UL (ref 0.1–1.4)
AMOUNT GLUCOSE GIVEN: ABNORMAL G
BASOPHILS # BLD: 0 % (ref 0–2)
BASOPHILS ABSOLUTE: 0.03 K/UL (ref 0–0.2)
BILIRUBIN URINE: NEGATIVE
COLOR: YELLOW
COMMENT UA: NORMAL
DIFFERENTIAL TYPE: ABNORMAL
EOSINOPHILS RELATIVE PERCENT: 2 % (ref 1–4)
ESTIMATED AVERAGE GLUCOSE: 94 MG/DL
GLUCOSE FASTING: 101 MG/DL (ref 65–99)
GLUCOSE TOLERANCE TEST 1 HOUR: 267 MG/DL (ref 65–184)
GLUCOSE TOLERANCE TEST 2 HOUR: 237 MG/DL (ref 65–139)
GLUCOSE TOLERANCE TEST 3 HOUR: 147 MG/DL (ref 65–130)
GLUCOSE URINE: NEGATIVE
HBA1C MFR BLD: 4.9 % (ref 4–6)
HCT VFR BLD CALC: 35.4 % (ref 36.3–47.1)
HEMOGLOBIN: 11.7 G/DL (ref 11.9–15.1)
IMMATURE GRANULOCYTES: 1 %
KETONES, URINE: NEGATIVE
LEUKOCYTE ESTERASE, URINE: NEGATIVE
LYMPHOCYTES # BLD: 21 % (ref 25–45)
MCH RBC QN AUTO: 33.2 PG (ref 25.2–33.5)
MCHC RBC AUTO-ENTMCNC: 33.1 G/DL (ref 28.4–34.8)
MCV RBC AUTO: 100.6 FL (ref 82.6–102.9)
MONOCYTES # BLD: 7 % (ref 2–8)
NITRITE, URINE: NEGATIVE
NRBC AUTOMATED: 0 PER 100 WBC
PDW BLD-RTO: 13.4 % (ref 11.8–14.4)
PH UA: 6.5 (ref 5–8)
PLATELET # BLD: 306 K/UL (ref 138–453)
PLATELET ESTIMATE: ABNORMAL
PMV BLD AUTO: 8.7 FL (ref 8.1–13.5)
PROTEIN UA: NEGATIVE
RBC # BLD: 3.52 M/UL (ref 3.95–5.11)
RBC # BLD: ABNORMAL 10*6/UL
SEG NEUTROPHILS: 69 % (ref 34–64)
SEGMENTED NEUTROPHILS ABSOLUTE COUNT: 7.67 K/UL (ref 1.5–8.1)
SPECIFIC GRAVITY UA: 1.01 (ref 1–1.03)
TURBIDITY: CLEAR
URINE HGB: NEGATIVE
UROBILINOGEN, URINE: NORMAL
WBC # BLD: 11.1 K/UL (ref 4.5–13.5)
WBC # BLD: ABNORMAL 10*3/UL

## 2021-08-10 PROCEDURE — 81003 URINALYSIS AUTO W/O SCOPE: CPT

## 2021-08-10 PROCEDURE — 83036 HEMOGLOBIN GLYCOSYLATED A1C: CPT

## 2021-08-10 PROCEDURE — 82952 GTT-ADDED SAMPLES: CPT

## 2021-08-10 PROCEDURE — 82951 GLUCOSE TOLERANCE TEST (GTT): CPT

## 2021-08-10 PROCEDURE — 85025 COMPLETE CBC W/AUTO DIFF WBC: CPT

## 2021-08-10 PROCEDURE — 36415 COLL VENOUS BLD VENIPUNCTURE: CPT

## 2021-08-10 NOTE — TELEPHONE ENCOUNTER
----- Message from DARRIAN Graham CNP sent at 8/10/2021  1:21 PM EDT -----  Gestational diabetes  Please let patient know  Consult MFM for glucose regulation and diabetic education

## 2021-08-16 ENCOUNTER — TELEPHONE (OUTPATIENT)
Dept: PERINATAL CARE | Age: 21
End: 2021-08-16

## 2021-08-16 ENCOUNTER — HOSPITAL ENCOUNTER (EMERGENCY)
Age: 21
Discharge: HOME | End: 2021-08-16
Attending: EMERGENCY MEDICINE
Payer: COMMERCIAL

## 2021-08-16 VITALS
RESPIRATION RATE: 18 BRPM | WEIGHT: 161 LBS | TEMPERATURE: 99 F | HEART RATE: 99 BPM | SYSTOLIC BLOOD PRESSURE: 121 MMHG | DIASTOLIC BLOOD PRESSURE: 74 MMHG | HEIGHT: 63 IN | OXYGEN SATURATION: 97 % | BODY MASS INDEX: 28.53 KG/M2

## 2021-08-16 DIAGNOSIS — Z3A.25 25 WEEKS GESTATION OF PREGNANCY: ICD-10-CM

## 2021-08-16 DIAGNOSIS — O24.419 GESTATIONAL DIABETES MELLITUS (GDM), ANTEPARTUM, GESTATIONAL DIABETES METHOD OF CONTROL UNSPECIFIED: Primary | ICD-10-CM

## 2021-08-16 DIAGNOSIS — H92.02 LEFT EAR PAIN: Primary | ICD-10-CM

## 2021-08-16 DIAGNOSIS — R42 DIZZINESS: ICD-10-CM

## 2021-08-16 PROCEDURE — 99283 EMERGENCY DEPT VISIT LOW MDM: CPT

## 2021-08-16 PROCEDURE — 6370000000 HC RX 637 (ALT 250 FOR IP): Performed by: STUDENT IN AN ORGANIZED HEALTH CARE EDUCATION/TRAINING PROGRAM

## 2021-08-16 RX ORDER — AMOXICILLIN 500 MG/1
500 CAPSULE ORAL 2 TIMES DAILY
Qty: 14 CAPSULE | Refills: 0 | Status: SHIPPED | OUTPATIENT
Start: 2021-08-16 | End: 2021-08-23

## 2021-08-16 RX ORDER — AMOXICILLIN 250 MG/1
500 CAPSULE ORAL ONCE
Status: COMPLETED | OUTPATIENT
Start: 2021-08-16 | End: 2021-08-16

## 2021-08-16 RX ORDER — MECLIZINE HCL 12.5 MG/1
12.5 TABLET ORAL 3 TIMES DAILY PRN
Qty: 9 TABLET | Refills: 0 | Status: SHIPPED | OUTPATIENT
Start: 2021-08-16 | End: 2021-08-19

## 2021-08-16 RX ORDER — MECLIZINE HCL 12.5 MG/1
12.5 TABLET ORAL ONCE
Status: COMPLETED | OUTPATIENT
Start: 2021-08-16 | End: 2021-08-16

## 2021-08-16 RX ADMIN — AMOXICILLIN 500 MG: 250 CAPSULE ORAL at 11:09

## 2021-08-16 RX ADMIN — MECLIZINE HCL 12.5 MG: 12.5 TABLET ORAL at 11:08

## 2021-08-16 NOTE — ED PROVIDER NOTES
occasionally words are mis-transcribed.  Whenever words are used in this note in any gender, they shall be construed as though they were used in the gender appropriate to the circumstances; and whenever words are used in this note in the singular or plural form, they shall be construed as though they were used in the form appropriate to the circumstances.)    MD [facility]  Attending Emergency Medicine Physician             Loraine Lopez MD  08/16/21 8700

## 2021-08-16 NOTE — TELEPHONE ENCOUNTER
Rx called into the pharmacy for a glucometer, and supplies pt is to check her sugars 4 x a day with a month supply and 5 refills. Rx called in by DEBBIE GARCES

## 2021-08-16 NOTE — ED NOTES
Pt to room , resident at bedside  Pt complaining of left ear pressure change  Pt states strange noise in left ear  Pt denies dizziness  Pt states ear feels full.        Francesco Raygoza LPN  45/88/02 6448

## 2021-08-16 NOTE — TELEPHONE ENCOUNTER
Per Calleen Dejuan patient returned phone call, notified of results and referral to [facility] was placed.

## 2021-08-16 NOTE — ED NOTES
Pt complains of itching scalp, Resident at bedside examing scalp     Jackie Catherine LPN  66/16/28 1257

## 2021-08-17 ASSESSMENT — ENCOUNTER SYMPTOMS
VOMITING: 0
COUGH: 0
ABDOMINAL PAIN: 0
NAUSEA: 0
SHORTNESS OF BREATH: 0
RHINORRHEA: 0

## 2021-08-17 NOTE — ED PROVIDER NOTES
[address] [facility]  Emergency Department Encounter  Emergency Medicine Resident     Pt Name: Allyson Mahoney  MRN: [id number]  [address] 2000  Date of evaluation: 8/17/21  PCP:  DARRIAN Mays 0470       Chief Complaint   Patient presents with    Ear Fullness     Pt to ED with c/o decreased hearing in her left ear and scalp itching, pt reports she is getting over a URI but her hearing in her left ear keeps going in and out, pt also states she removed a bug from her hair last night       HISTORY OF PRESENT ILLNESS  (Location/Symptom, Timing/Onset, Context/Setting, Quality, Duration, Modifying Factors, Severity.)    Allyson Mahoney is a 24 y.o. female who presents with left ear pain and intermittent dizziness. Patient states she has had some mild left ear pain with some \"popping\" noises associated with decreased hearing. Started 2 days ago. Denies vertigo, but feels off balance slightly with rapid head turning or position adjustment. Recently had URI, treated with zyrtec which she has been taking. Is currently pregnant. Denies any headache or head trauma. States she has had multiple procedures on her TMs as a child with several sets of tubes     PPE Worn:  Gloves: Yes  Eye Protection: Goggles  Mask: Surgical Mask  Gown: NO    PAST MEDICAL / SURGICAL / SOCIAL / FAMILY HISTORY    has a past medical history of ADD (attention deficit disorder), Anxiety, Asthma, CMV (cytomegalovirus infection) ([address] Depression, Gestational diabetes mellitus (GDM), antepartum, Hearing loss in left ear, Heart disease, Hypothyroidism, Immunizations up to date in pediatric patient, Neurocardiogenic syncope, Pseudoseizures, Raynaud disease, Seizures ([address] SVT (supraventricular tachycardia) ([address] Tachycardia, Traumatic injury during pregnancy, third trimester, and Wears glasses.      has a past surgical history that includes REMOVAL FOREIGN BODY EAR (Left) and Tympanoplasty (Left, 06/24/2016). Social History     Socioeconomic History    Marital status: Single     Spouse name: Not on file    Number of children: Not on file    Years of education: Not on file    Highest education level: Not on file   Occupational History    Not on file   Tobacco Use    Smoking status: Former Smoker     Packs/day: 0.50     Types: Cigars    Smokeless tobacco: Never Used    Tobacco comment: 1-2 black and milds daily    Vaping Use    Vaping Use: Never used   Substance and Sexual Activity    Alcohol use: No    Drug use: Not Currently     Types: Marijuana     Comment: monthly     Sexual activity: Yes     Partners: Male     Birth control/protection: Condom   Other Topics Concern    Not on file   Social History Narrative    Not on file     Social Determinants of Health     Financial Resource Strain:     Difficulty of Paying Living Expenses:    Food Insecurity:     Worried About Running Out of Food in the Last Year:     Ran Out of Food in the Last Year:    Transportation Needs:     Lack of Transportation (Medical):      Lack of Transportation (Non-Medical):    Physical Activity:     Days of Exercise per Week:     Minutes of Exercise per Session:    Stress:     Feeling of Stress :    Social Connections:     Frequency of Communication with Friends and Family:     Frequency of Social Gatherings with Friends and Family:     Attends Gnosticism Services:     Active Member of Clubs or Organizations:     Attends Club or Organization Meetings:     Marital Status:    Intimate Partner Violence:     Fear of Current or Ex-Partner:     Emotionally Abused:     Physically Abused:     Sexually Abused:        Family History   Adopted: Yes   Problem Relation Age of Onset    Diabetes Mother         borderline diet controlled    Atrial Fibrillation Mother     Anxiety Disorder Mother     Depression Mother     Heart Disease Mother     Seizures Maternal Grandmother     COPD Maternal Grandmother     (AMOXIL) capsule 500 mg    meclizine (ANTIVERT) tablet 12.5 mg    meclizine (ANTIVERT) 12.5 MG tablet     Sig: Take 1 tablet by mouth 3 times daily as needed for Dizziness     Dispense:  9 tablet     Refill:  0    amoxicillin (AMOXIL) 500 MG capsule     Sig: Take 1 capsule by mouth 2 times daily for 7 days     Dispense:  14 capsule     Refill:  0         DIAGNOSTIC RESULTS / EMERGENCYDEPARTMENT COURSE / MDM   LABS:  Labs Reviewed - No data to display    RADIOLOGY:  No results found. Impression:  Left ear pain, intermittent decreased hearing. Hx tympanoplasty with tubes bilaterally. Left TM has area of possible perforation, however due to heavy scarring, I am unable to tell if this is acute perforation of if this represents area of previous perforation and scarring. No effusion seen bilaterally. Retracted TM on right. Hearing normal on right. Appears normal in room on left but patient states shes not having a \"decreased hearing\" episode currently. Did elicit minor dizziness with rapid head movement but no nausea, vomiting, or syncope. EMERGENCY DEPARTMENT COURSE:   After attending eval, will switch patient from zyrtec to meclizine. Short duration per pharmacist recommendation due to pregnancy. If the TM is perforated, higher concern for infection therefore will start on amoxicillin since patient has not been on any recent antibiotics. Stressed to patient that she needed ENT eval. Gave her name on ENT on call but she states she will likely call her own ENT. Stressed to patient that she needs to follow up as soon as possible and return to ER with any worsening or new symptoms.     MDM  Number of Diagnoses or Management Options  Dizziness: new, no workup  Left ear pain: new, no workup     Amount and/or Complexity of Data Reviewed  Review and summarize past medical records: yes  Discuss the patient with other providers: yes    Risk of Complications, Morbidity, and/or Mortality  Presenting problems: low  Diagnostic procedures: minimal  Management options: low    Patient Progress  Patient progress: stable      PROCEDURES:  none    CONSULTS:  None    CRITICAL CARE:  Please see attending note    FINAL IMPRESSION     1. Left ear pain    2. Dizziness          DISPOSITION / PLAN   DISPOSITION Decision To Discharge 08/16/2021 11:00:56 AM      Evaluation and treatment course in the ED, and plan of care upon discharge was discussed in length with the patient. Patient had no further questions prior to being discharged and was instructed to return to the ED for new or worsening symptoms. Any changes to existing medications or new prescriptions were reviewed with patient and they expressed understanding of how to correctly take their medications and the possible side effects.     PATIENT REFERRED TO:  Pieter Kuo MD  [address]  [phone]    Schedule an appointment as soon as possible for a visit         DISCHARGE MEDICATIONS:  Discharge Medication List as of 8/16/2021 11:13 AM      START taking these medications    Details   meclizine (ANTIVERT) 12.5 MG tablet Take 1 tablet by mouth 3 times daily as needed for Dizziness, Disp-9 tablet, R-0Print      amoxicillin (AMOXIL) 500 MG capsule Take 1 capsule by mouth 2 times daily for 7 days, Disp-14 capsule, R-0Print             Chan Francis DO  Emergency Medicine Resident Physician, PGY-3    (Please note that portions of this note were completed with a voice recognition program.  Efforts were made to edit the dictations but occasionally words are mis-transcribed.)          Chan Francis MD  08/17/21 8067

## 2021-08-25 ENCOUNTER — TELEPHONE (OUTPATIENT)
Dept: PERINATAL CARE | Age: 21
End: 2021-08-25

## 2021-08-25 ENCOUNTER — ROUTINE PRENATAL (OUTPATIENT)
Dept: PERINATAL CARE | Age: 21
End: 2021-08-25
Payer: COMMERCIAL

## 2021-08-25 ENCOUNTER — HOSPITAL ENCOUNTER (OUTPATIENT)
Dept: DIABETES SERVICES | Age: 21
Setting detail: THERAPIES SERIES
Discharge: HOME | End: 2021-08-25
Payer: COMMERCIAL

## 2021-08-25 VITALS
TEMPERATURE: 97.1 F | HEART RATE: 100 BPM | BODY MASS INDEX: 29.23 KG/M2 | SYSTOLIC BLOOD PRESSURE: 103 MMHG | DIASTOLIC BLOOD PRESSURE: 65 MMHG | HEIGHT: 63 IN | RESPIRATION RATE: 16 BRPM | WEIGHT: 165 LBS

## 2021-08-25 DIAGNOSIS — O99.413 MATERNAL CARDIOVASCULAR DISEASE AFFECTING PREGNANCY IN THIRD TRIMESTER: ICD-10-CM

## 2021-08-25 DIAGNOSIS — Z3A.29 29 WEEKS GESTATION OF PREGNANCY: ICD-10-CM

## 2021-08-25 DIAGNOSIS — Z36.4 ANTENATAL SCREENING FOR FETAL GROWTH RETARDATION USING ULTRASONICS: ICD-10-CM

## 2021-08-25 DIAGNOSIS — O24.414 INSULIN CONTROLLED GESTATIONAL DIABETES MELLITUS (GDM) DURING PREGNANCY, ANTEPARTUM: Primary | ICD-10-CM

## 2021-08-25 PROCEDURE — G8427 DOCREV CUR MEDS BY ELIG CLIN: HCPCS | Performed by: OBSTETRICS & GYNECOLOGY

## 2021-08-25 PROCEDURE — G0108 DIAB MANAGE TRN  PER INDIV: HCPCS

## 2021-08-25 PROCEDURE — 76819 FETAL BIOPHYS PROFIL W/O NST: CPT | Performed by: OBSTETRICS & GYNECOLOGY

## 2021-08-25 PROCEDURE — G8419 CALC BMI OUT NRM PARAM NOF/U: HCPCS | Performed by: OBSTETRICS & GYNECOLOGY

## 2021-08-25 PROCEDURE — 76816 OB US FOLLOW-UP PER FETUS: CPT | Performed by: OBSTETRICS & GYNECOLOGY

## 2021-08-25 PROCEDURE — 99243 OFF/OP CNSLTJ NEW/EST LOW 30: CPT | Performed by: OBSTETRICS & GYNECOLOGY

## 2021-08-25 NOTE — PROGRESS NOTES
Diabetes Education Progress Note    Franco Pickering here for education about Diabetes and Pregnancy. Pt present independently. Was sent here from [facility] so that she can learn to use insulin. Insulin teach done. Patient is planning to  both Novolog and NPH from [facility] later today. Teaching provided at this session included:   _X__ Definition of gestational diabetes    _X_ Risk factors   _X__ Effects on pregnancy       _ X_ Management   _X__ Self-monitoring of blood sugar (FBS and 2 hrs postprandial)   _X__ Blood sugar targets FBS 65-90 and <120 2 hrs postprandial)   _X__ Insulin   _x__ Pen   __x_ Vial --  Novolog and NPH role and action times   _x___Hyperglycemia  _x__ Hypoglycemia and treatment           Meal planning:   _X_  Avoid fruit juice and sugary beverages. _X_  Aim to eat small frequent meals and snacks. (ie., 3 + 3)                _X__  Eat balanced meals according to divided dinner plate sample            Planned follow-up:    _X_  Another appointment has been scheduled for RD visit on 8-31-21               __  Patient defers scheduling another appointment at this time               __   Follow up planned for prn.               _X_ Patient is to report blood glucose test results to physician as directed by  prescribing physician. Resource materials provided today include: RN class -Resource materials sent out : care booklet - \" Gestational Diabetes Mellitus ( GDM) toolkit form [place] gestational diabetes postpartum care learning collaborative 2018. \"Simple Guidelines for meal planning with gestational diabetes. SMBG sheets to fax back to [facility] weekly. BD  healthy injection site selection and rotation with 6 mm insulin syringe and 4 mm pen needle. [facility], Did you have gestational diabetes when you were pregnant?  Handout from [address] [place] [address]  April 2014    [place] Gestational Diabetes Postpartum Care Learning Collaborative: GDM meal Toolkit, Blood Glucose monitoring sheets, Articles, \"Benefits of Breast feeding\" and NDEP, \"Preventing Type 2 Diabetes,\" and Insulin Instruction Sheets. Set diabetes self management goals of SMBG- check fasting and 2 hours PP, eat 3 meals and 3 snacks/day, avoid sugary beverages.       POLY YIN RN

## 2021-08-31 ENCOUNTER — HOSPITAL ENCOUNTER (OUTPATIENT)
Dept: DIABETES SERVICES | Age: 21
Setting detail: THERAPIES SERIES
Discharge: HOME | End: 2021-08-31
Payer: COMMERCIAL

## 2021-08-31 DIAGNOSIS — O24.414 INSULIN CONTROLLED GESTATIONAL DIABETES MELLITUS (GDM) DURING PREGNANCY, ANTEPARTUM: Primary | ICD-10-CM

## 2021-08-31 PROCEDURE — G0108 DIAB MANAGE TRN  PER INDIV: HCPCS

## 2021-08-31 NOTE — PROGRESS NOTES
8/31/21 AKOSUA This visit is a TeleHealth encounter (During SKRLW-95 public health emergency). Pursuant to the emergency declaration under the [facility], [address] [place] [address] authority and the [employer] Act, this Virtual Visit was conducted with patient's (and/or legal guardian's) consent, to reduce the patient's risk of exposure to COVID-19 and provide necessary medical care. The patient (and/or legal guardian) has also been advised to contact this office for worsening conditions or problems, and seek emergency medical treatment and/or call 911 if deemed necessary. Patient identification was verified at the start of the visit: Yes    Total time spent for this encounter: 30 mins - short per pt request    - this encounter was done as one on one virtual /  phone visit as no group sessions being offered for 2 months due to covid - 19 pandemic      Services were provided through a video synchronous discussion virtually to substitute for in-person clinic visit. Patient and provider were located at their individual home/office. Diabetes Education Progress Note    Allyson Mahoney here for education about Diabetes and Pregnancy. Pt present independently. Was sent here from [facility] so that she can learn to use insulin. Insulin teach done. Patient is planning to  both Novolog and NPH from [employer] later today. Teaching provided at this session included:8/31/21 AKOSUA reviewed below. Pt states insulin going fine.  States takes 6 units novolog ac supper and 8 units NPH @ HS.   _X__ Definition of gestational diabetes    _X_ Risk factors   _X__ Effects on pregnancy       _ X_ Management   _X__ Self-monitoring of blood sugar (FBS and 2 hrs postprandial)   _X__ Blood sugar targets FBS 65-90 and <120 2 hrs postprandial)   _X__ Insulin   _x__ Pen   __x_ Vial --  Novolog and NPH role and action times   _x___Hyperglycemia  _x__ Hypoglycemia and treatment           Meal plannin21 AKOSUA denies questions except how many cho she should eat per day. Has gained 13# so far. Gave plan 2,1,3-4,1,3-4,1 cho's/timepoint. Provided sample menu, simple suggestions. _X_  Avoid fruit juice and sugary beverages. _X_  Aim to eat small frequent meals and snacks. (ie., 3 + 3)                _X__  Eat balanced meals according to divided dinner plate sample            Planned follow-up:    _X_  Another appointment has been scheduled for RD visit on 21               __  Patient defers scheduling another appointment at this time               __   Follow up planned for prn.               _X_ Patient is to report blood glucose test results to physician as directed by  prescribing physician. Resource materials provided today include: RN class -Resource materials sent out : care booklet - \" Gestational Diabetes Mellitus ( GDM) toolkit form [place] gestational diabetes postpartum care learning collaborative 2018. \"Simple Guidelines for meal planning with gestational diabetes. SMBG sheets to fax back to [facility] weekly. BD  healthy injection site selection and rotation with 6 mm insulin syringe and 4 mm pen needle. Gestational diabetes handout from [facility]-[id number], Did you have gestational diabetes when you were pregnant? Handout from [facility]  2014    [place] Gestational Diabetes Postpartum Care Learning Collaborative: GDM meal Toolkit, Blood Glucose monitoring sheets, Articles, \"Benefits of Breast feeding\" and NDEP, \"Preventing Type 2 Diabetes,\" and Insulin Instruction Sheets. Set diabetes self management goals of SMBG- check fasting and 2 hours PP, eat 3 meals and 3 snacks/day, avoid sugary beverages.       Shilpi Johnson, DIDI, LD

## 2021-09-02 ENCOUNTER — HOSPITAL ENCOUNTER (EMERGENCY)
Age: 21
Discharge: LEFT BEFORE DISCHARGE | End: 2021-09-02
Payer: COMMERCIAL

## 2021-09-02 ENCOUNTER — HOSPITAL ENCOUNTER (OUTPATIENT)
Age: 21
Discharge: HOME | End: 2021-09-02
Attending: OBSTETRICS & GYNECOLOGY | Admitting: OBSTETRICS & GYNECOLOGY
Payer: COMMERCIAL

## 2021-09-02 VITALS
DIASTOLIC BLOOD PRESSURE: 68 MMHG | RESPIRATION RATE: 20 BRPM | OXYGEN SATURATION: 97 % | HEART RATE: 110 BPM | BODY MASS INDEX: 29.23 KG/M2 | HEIGHT: 63 IN | TEMPERATURE: 98 F | SYSTOLIC BLOOD PRESSURE: 120 MMHG | WEIGHT: 165 LBS

## 2021-09-02 PROBLEM — Z3A.31 31 WEEKS GESTATION OF PREGNANCY: Status: ACTIVE | Noted: 2021-09-02

## 2021-09-02 LAB
DIRECT EXAM: NORMAL
Lab: NORMAL
SPECIMEN DESCRIPTION: NORMAL

## 2021-09-02 PROCEDURE — 87480 CANDIDA DNA DIR PROBE: CPT

## 2021-09-02 PROCEDURE — 87491 CHLMYD TRACH DNA AMP PROBE: CPT

## 2021-09-02 PROCEDURE — 87510 GARDNER VAG DNA DIR PROBE: CPT

## 2021-09-02 PROCEDURE — 87660 TRICHOMONAS VAGIN DIR PROBE: CPT

## 2021-09-02 PROCEDURE — 87591 N.GONORRHOEAE DNA AMP PROB: CPT

## 2021-09-02 PROCEDURE — 99213 OFFICE O/P EST LOW 20 MIN: CPT

## 2021-09-02 RX ORDER — ACETAMINOPHEN 325 MG/1
650 TABLET ORAL EVERY 4 HOURS PRN
Status: DISCONTINUED | OUTPATIENT
Start: 2021-09-02 | End: 2021-09-02 | Stop reason: HOSPADM

## 2021-09-02 RX ORDER — ONDANSETRON 2 MG/ML
4 INJECTION INTRAMUSCULAR; INTRAVENOUS EVERY 6 HOURS PRN
Status: DISCONTINUED | OUTPATIENT
Start: 2021-09-02 | End: 2021-09-02 | Stop reason: HOSPADM

## 2021-09-02 RX ORDER — ONDANSETRON 4 MG/1
4 TABLET, ORALLY DISINTEGRATING ORAL EVERY 8 HOURS PRN
Status: DISCONTINUED | OUTPATIENT
Start: 2021-09-02 | End: 2021-09-02 | Stop reason: HOSPADM

## 2021-09-02 NOTE — H&P
OBSTETRICAL HISTORY [facility]    Date: 2021       Time: 5:04 PM   Patient Name: Anisha Huitron     Patient : 2000  Room/Bed: [facility]/0703-01    Admission Date/Time: 2021  2:55 PM      CC: shortness of breath, contractions/cramping     HPI: Anisha Huitron is a 24 y.o.  at 31w0d who presents to the ED complaining of shortness of breath and contractions/cramping. She endorses cramping for the past week and denies taking any pain medications. The patient reports fetal movement is present, complains of contractions, denies loss of fluid, denies vaginal bleeding. DATING:  LMP: No LMP recorded (lmp unknown). Patient is pregnant.   Estimated Date of Delivery: 21   Based on: early ultrasound, at 8 4/7 weeks GA    PREGNANCY RISK FACTORS:  Patient Active Problem List   Diagnosis    ADD (attention deficit disorder)    SVT (supraventricular tachycardia) ([address]    Family history of clotting disorder    Family history of diabetes mellitus    Family history of blood clots    Neurocardiogenic syncope    CMV IgM+     19 M Apg 8/9 Wt 7#11    Seizures ([address]    Severe recurrent major depression without psychotic features ([address]    Late prenatal care    Gestational diabetes mellitus (GDM), antepartum    31 weeks gestation of pregnancy        Steroids Given In This Pregnancy:  no     REVIEW OF SYSTEMS:   Constitutional: negative fever, negative chills, negative weight changes   HEENT: negative visual disturbances, negative headaches, negative dizziness, negative hearing loss  Breast: Negative breast abnormalities, negative breast lumps, negative nipple discharge  Respiratory: negative dyspnea, negative cough, +SOB  Cardiovascular: negative chest pain,  negative palpitations, negative arrhythmia, negative syncope   Gastrointestinal: + abdominal pain, negative RUQ pain, negative N/V, negative diarrhea, negative constipation, negative bowel changes, negative heartburn   Genitourinary: negative dysuria, negative hematuria, negative urinary incontinence, negative vaginal discharge, negative vaginal bleeding or spotting  Dermatological: negative rash, negative pruritis, negative mole or other skin changes  Hematologic: negative bruising  Immunologic/Lymphatic: negative recent illness, negative recent sick contact  Musculoskeletal: negative back pain, negative myalgias, negative arthralgias  Neurological:  negative dizziness, negative migraines, negative seizures, negative weakness  Behavior/Psych: negative depression, negative anxiety, negative SI, negative HI    OBSTETRICAL HISTORY:   OB History    Para Term  AB Living   2 1 1 0 0 1   SAB TAB Ectopic Molar Multiple Live Births   0 0 0 0 0 1      # Outcome Date GA Lbr South/2nd Weight Sex Delivery Anes PTL Lv   2 Current            1 Term 19 38w6d 02: 00:22 7 lb 11.3 oz (3.495 kg) M Vag-Spont EPI N TERESA      Name: Sg Mars: 8  Apgar5: 9       PAST MEDICAL HISTORY:   has a past medical history of ADD (attention deficit disorder), Anxiety, Asthma, CMV (cytomegalovirus infection) ([address] Depression, Gestational diabetes mellitus (GDM), antepartum, Hearing loss in left ear, Heart disease, Hypothyroidism, Immunizations up to date in pediatric patient, Neurocardiogenic syncope, Pseudoseizures, Raynaud disease, Seizures ([address] SVT (supraventricular tachycardia) ([address] Tachycardia, Traumatic injury during pregnancy, third trimester, and Wears glasses. PAST SURGICAL HISTORY:   has a past surgical history that includes REMOVAL FOREIGN BODY EAR (Left) and Tympanoplasty (Left, 2016). ALLERGIES:  is allergic to sulfa antibiotics. MEDICATIONS:  Prior to Admission medications    Medication Sig Start Date End Date Taking?  Authorizing Provider   Prenatal Vit-Fe Fumarate-FA (PNV PRENATAL PLUS MULTIVITAMIN) 27-1 MG TABS Take 1 tablet by mouth daily 7/14/21 7/9/22 Yes Rosalinda Gonzalez, APRN - NP   Prenatal Vit-DSS-Fe Cbn-FA (PRENATAL AD PO) Take by mouth    Yes Historical Provider, MD   acetaminophen (TYLENOL) 325 MG tablet Take 1 tablet by mouth every 6 hours as needed for Pain 7/30/21   Cherry Barahona MD       FAMILY HISTORY:  family history includes Anxiety Disorder in her mother; Atrial Fibrillation in her mother; Bleeding Prob in an other family member; COPD in her maternal grandmother; Cancer in her maternal grandmother; Depression in her mother; Diabetes in her mother; Diabetes Type 1  in her maternal grandfather and another family member; Heart Disease in her maternal grandmother and mother; Hypertension in her maternal grandmother; Seizures in her maternal grandmother. She was adopted. SOCIAL HISTORY:   reports that she has quit smoking. Her smoking use included cigars. She smoked 0.50 packs per day. She has never used smokeless tobacco. She reports previous drug use. Drug: Marijuana. She reports that she does not drink alcohol.     VITALS:  Vitals:    09/02/21 1541 09/02/21 1601 09/02/21 1606 09/02/21 1621   BP:       Pulse:       Resp:       Temp:       SpO2: 99% 97% 97% 97%   Weight:       Height:           PHYSICAL EXAM:  Fetal Heart Monitor:  Baseline Heart Rate 130, moderate variability, present accelerations, absent decelerations  Barnhart: irritability    General appearance:  no apparent distress, alert and cooperative  HEENT: head atraumatic, normocephalic, moist mucous membranes, trachea midline  Neurologic:  alert, oriented, normal speech, no focal findings or movement disorder noted  Lungs:  No increased work of breathing, good air exchange, clear to auscultation bilaterally, no crackles or wheezing  Heart:  regular rate and rhythm and no murmur, rubs, gallops  Abdomen:  soft, gravid, non-tender, no rebound, guarding, or rigidity, no RUQ or epigastric tenderness, no signs or symptoms of abruption, no signs or symptoms of chorioamnionitis  Extremities:  no calf tenderness, non edematous, no varicosities, full range of motion in all four extremities  Musculoskeletal: Gross strength equal and intact throughout, no gross abnormalities, range of motion normal in hips, knees, shoulders and spine  Psychiatric: Mood appropriate, normal affect   Rectal Exam: not indicated  Pelvic Exam: deferred  Chaperone for Intimate Exam: Chaperone was present for entire exam, Chaperone Name: Shyann Ferguson RN  Sterile Speculum Exam:   Urethral meatus: normal appearing   Vulva: Normal hair distribution, normal appearing vulva, no masses, tenderness or lesions, normal clitoris, small left vulvar varicosity noted   Vagina: Normal appearing vaginal mucosa without lesions, vaginal discharge noted in the posterior vault, no lacerations   Cervix: red, irritated cervix without lesions, external os visibly closed, no lacerations or abnormal lesions visualized    PRENATAL LAB RESULTS:   Blood Type/Rh: A pos  Antibody Screen: negative  Hemoglobin, Hematocrit, Platelets: Hgb 46.6/SXR 37.3/Plt 321  Rubella: immune  T.  Pallidum, IgG: non-reactive  Hepatitis B Surface Antigen: non-reactive   HIV: non-reactive   Sickle Cell Screen: negative  Gonorrhea: negative  Chlamydia: positive 21, negative 21  Urine culture: negative, date: 21    Early 1 hour Glucose Tolerance Test: 144  3 hour Glucose Tolerance Test: Fastin; 1 hour: 267; 2 hour  237; 3 hour: 147    Group B Strep: negative RV culture on 21  Cystic Fibrosis Screen: negative  First Trimester Screen: not available  MSAFP/Multiple Markers: not available  Non-Invasive Prenatal Testing: not available  Anatomy US: anterior placenta, 3VC, male gender, normal anatomy    ASSESSMENT & PLAN:  Tila Vanegas is a 24 y.o. female  at 31w0d IUP   - GBS unknown / Rh positive / R immune   - No indication for GBS prophylaxis    Contractions/Cramping   - VSS   - cEFM and TOCO showing Category 1 FHT and no contractions   - SSE: external os visibly closed, cervix visibly red and irritated. Gc/C and Vaginitis swabs collected   - Patient declined Tylenol for pain control   - Low concern for  labor    - Patient is stable for an OB standpoint    SOB   - No labored breathing noted, clear to auscultation bilaterally   - O2 saturation 97%   - Plan to send down to ED for workup. Discussed with patient that she will need to wait in waiting room in ED for evaluation but that it is important she stays because she has been cleared only from an obstetric stand point. Patient vocalized understanding. ED called to make aware of patient and that she will need SOB work up. Labor and delivery nurses aware of plan and will escort patient down to ED.      Hx Asthma   - Patient reports last use of inhalers was years ago    GDMA2   - Following with MFM   - 3hr GTT: Fasting 101/ 1 hr 267 / 2 hr 237 / 3 hr 147   - Patient reports compliance with insulin: NPH 8U, Novolog 0/0/6    Hx Chlamydia   - Positive 21, 21, positive, neg TOR 21, 21   - Gc/C collected today, will follow     SVT   - Cardiology referral placed 2019   - Patient denies any complaints or medications    Neurocardiogenic syncope    - Diagnosed 2019 by Dr. Libby Smith     FHx of clotting disorder   - Patient's maternal aunt     FHx DM   - Failed Early 1 hr     - 3hr GTT: Fasting 101/ 1 hr 267 / 2 hr 237 / 3 hr 147    FHx blood clots   - Patient's maternal aunt has hx of Protein C and S deficiency    Seizures   - Stable on no meds   - Denies any complaints   - [facility] recommends weekly NST at 32 weeks    Depression   - Stable on no meds   - Denies HI/SI    Late prenatal care    BMI 29      Patient Active Problem List    Diagnosis Date Noted    Gestational diabetes mellitus (GDM), antepartum 08/10/2021     Priority: High     8/10/2021 consult [facility]      [facility]T ([facility]ra[facility]ricular [facility]) [facility] 2019     Priority: High     2019 Referral to cardiology      Family history of clotting disorder 2019     Priority: High     2018 patient's maternal aunt with hx of Protein S and C deficiency      Family history of diabetes mellitus 2019     Priority: Medium     2019 early one hour GTT ordered      31 weeks gestation of pregnancy 2021    Late prenatal care 2021    Severe recurrent major depression without psychotic features ([facility] 2020    Seizures ([facility] 03/15/2020      testing with weekly NST per MF starting at 28 weeks       19 M Apg 8/ Wt 7#11 2019    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 Viki Post MD. See note in media section on 2019.  Family history of blood clots 2019     Denies first degree relative. Maternal aunt.  ADD (attention deficit disorder)      2019 stopped taking focalin 8 months ago         Plan discussed with Dr. Stephanie Ruvalcaba, who is agreeable. Steroids given this admission: No    Risks, benefits, alternatives and possible complications have been discussed in detail with the patient. Admission, and post admission procedures and expectations were discussed in detail. All questions were answered.     Attending's Name: Dr. Samir Griffiths DO  Ob/Gyn Resident  2021, 5:04 PM     Senior Residents Attestation Statement  I was present with the resident physician during the history and exam. I discussed the findings and plans with the resident physician and agree as documented in her note     Flower Ochoa DO   OB/GYN Resident, PGY3  [facility], [address] [place] [address]

## 2021-09-10 ENCOUNTER — HOSPITAL ENCOUNTER (OUTPATIENT)
Age: 21
Setting detail: SPECIMEN
Discharge: HOME | End: 2021-09-10
Payer: COMMERCIAL

## 2021-09-10 ENCOUNTER — ROUTINE PRENATAL (OUTPATIENT)
Dept: OBGYN CLINIC | Age: 21
End: 2021-09-10
Payer: COMMERCIAL

## 2021-09-10 VITALS
SYSTOLIC BLOOD PRESSURE: 96 MMHG | HEART RATE: 97 BPM | DIASTOLIC BLOOD PRESSURE: 58 MMHG | WEIGHT: 164 LBS | BODY MASS INDEX: 29.05 KG/M2

## 2021-09-10 DIAGNOSIS — O09.30 LATE PRENATAL CARE: ICD-10-CM

## 2021-09-10 DIAGNOSIS — Z23 NEED FOR PROPHYLACTIC VACCINATION WITH DIPHTHERIA-TETANUS-PERTUSSIS WITH TYPHOID-PARATYPHOID (DTP + TAB) VACCINE: ICD-10-CM

## 2021-09-10 DIAGNOSIS — O09.93 HRP (HIGH RISK PREGNANCY), THIRD TRIMESTER: Primary | ICD-10-CM

## 2021-09-10 DIAGNOSIS — O24.410 DIET CONTROLLED GESTATIONAL DIABETES MELLITUS (GDM), ANTEPARTUM: ICD-10-CM

## 2021-09-10 DIAGNOSIS — N89.8 VAGINAL DISCHARGE: ICD-10-CM

## 2021-09-10 DIAGNOSIS — Z3A.32 32 WEEKS GESTATION OF PREGNANCY: ICD-10-CM

## 2021-09-10 PROCEDURE — G8419 CALC BMI OUT NRM PARAM NOF/U: HCPCS | Performed by: NURSE PRACTITIONER

## 2021-09-10 PROCEDURE — G8427 DOCREV CUR MEDS BY ELIG CLIN: HCPCS | Performed by: NURSE PRACTITIONER

## 2021-09-10 PROCEDURE — 1036F TOBACCO NON-USER: CPT | Performed by: NURSE PRACTITIONER

## 2021-09-10 PROCEDURE — 90715 TDAP VACCINE 7 YRS/> IM: CPT | Performed by: NURSE PRACTITIONER

## 2021-09-10 PROCEDURE — 99213 OFFICE O/P EST LOW 20 MIN: CPT | Performed by: NURSE PRACTITIONER

## 2021-09-10 RX ORDER — SYRINGE AND NEEDLE,INSULIN,1ML 30 G X1/2"
SYRINGE, EMPTY DISPOSABLE MISCELLANEOUS
Status: ON HOLD | COMMUNITY
Start: 2021-08-25 | End: 2021-10-28 | Stop reason: HOSPADM

## 2021-09-10 RX ORDER — PEN NEEDLE, DIABETIC 32GX 5/32"
NEEDLE, DISPOSABLE MISCELLANEOUS
Status: ON HOLD | COMMUNITY
Start: 2021-08-25 | End: 2021-10-28 | Stop reason: HOSPADM

## 2021-09-10 RX ORDER — INSULIN ASPART 100 [IU]/ML
INJECTION, SOLUTION INTRAVENOUS; SUBCUTANEOUS
Status: ON HOLD | COMMUNITY
Start: 2021-08-25 | End: 2021-10-28 | Stop reason: HOSPADM

## 2021-09-10 RX ORDER — HUMAN INSULIN 100 [IU]/ML
INJECTION, SUSPENSION SUBCUTANEOUS
Status: ON HOLD | COMMUNITY
Start: 2021-08-26 | End: 2021-10-28 | Stop reason: HOSPADM

## 2021-09-10 NOTE — PROGRESS NOTES
certain contexts, culture may be required to meet applicable laws and regulations for   diagnosis of C. trachomatis and N. gonorrhoeae infections. Per 2014  CDC recommendations, this test does not include confirmation of positive results   by an alternative nucleic acid target.  Specimen Description 09/02/2021 . VAGINA   Final    Special Requests 09/02/2021 NOT REPORTED   Final    Direct Exam 09/02/2021 NEGATIVE for Gardnerella vaginalis   Final    Direct Exam 09/02/2021 NEGATIVE for Candida sp. Final    Direct Exam 09/02/2021 NEGATIVE for Trichomonas vaginalis   Final    Direct Exam 09/02/2021 Method of testing is a DNA probe intended for detection and identification of Candida species, Gardnerella vaginalis, and Trichomonas vaginalis nucleic acid in vaginal fluid specimens from patients with symptoms of vaginitis/vaginosis.    Final   Hospital Outpatient Visit on 08/10/2021   Component Date Value Ref Range Status    WBC 08/10/2021 11.1  4.5 - 13.5 k/uL Final    RBC 08/10/2021 3.52* 3.95 - 5.11 m/uL Final    Hemoglobin 08/10/2021 11.7* 11.9 - 15.1 g/dL Final    Hematocrit 08/10/2021 35.4* 36.3 - 47.1 % Final    MCV 08/10/2021 100.6  82.6 - 102.9 fL Final    MCH 08/10/2021 33.2  25.2 - 33.5 pg Final    MCHC 08/10/2021 33.1  28.4 - 34.8 g/dL Final    RDW 08/10/2021 13.4  11.8 - 14.4 % Final    Platelets 81/76/9106 306  138 - 453 k/uL Final    MPV 08/10/2021 8.7  8.1 - 13.5 fL Final    NRBC Automated 08/10/2021 0.0  0.0 per 100 WBC Final    Differential Type 08/10/2021 NOT REPORTED   Final    Seg Neutrophils 08/10/2021 69* 34 - 64 % Final    Lymphocytes 08/10/2021 21* 25 - 45 % Final    Monocytes 08/10/2021 7  2 - 8 % Final    Eosinophils % 08/10/2021 2  1 - 4 % Final    Basophils 08/10/2021 0  0 - 2 % Final    Immature Granulocytes 08/10/2021 1* 0 % Final    Segs Absolute 08/10/2021 7.67  1.50 - 8.10 k/uL Final    Absolute Lymph # 08/10/2021 2.29  1.10 - 3.70 k/uL Final    Absolute Mono # 08/10/2021 0.82  0.10 - 1.40 k/uL Final    Absolute Eos # 08/10/2021 0.19  0.00 - 0.44 k/uL Final    Basophils Absolute 08/10/2021 0.03  0.00 - 0.20 k/uL Final    Absolute Immature Granulocyte 08/10/2021 0.09  0.00 - 0.30 k/uL Final    WBC Morphology 08/10/2021 NOT REPORTED   Final    RBC Morphology 08/10/2021 NOT REPORTED   Final    Platelet Estimate  NOT REPORTED   Final    Hemoglobin A1C 08/10/2021 4.9  4.0 - 6.0 % Final    Estimated Avg Glucose 08/10/2021 94  mg/dL Final    Comment: The ADA and AACC recommend providing the estimated average glucose result to permit better   patient understanding of their HBA1c result.  Amount Glucose Given 08/10/2021 100G COLA  g Final    Glucose, Fasting 08/10/2021 101* 65 - 99 mg/dL Final    Glucose, GTT - 1 Hour 08/10/2021 267* 65 - 184 mg/dL Final    Glucose, GTT - 2 Hour 08/10/2021 237* 65 - 139 mg/dL Final    Glucose, GTT - 3 Hour 08/10/2021 147* 65 - 130 mg/dL Final    Color, UA 08/10/2021 YELLOW  YELLOW Final    Turbidity UA 08/10/2021 CLEAR  CLEAR Final    Glucose, Ur 08/10/2021 NEGATIVE  NEGATIVE Final    Bilirubin Urine 08/10/2021 NEGATIVE  NEGATIVE Final    Ketones, Urine 08/10/2021 NEGATIVE  NEGATIVE Final    Specific Gravity, UA 08/10/2021 1.012  1.005 - 1.030 Final    Urine Hgb 08/10/2021 NEGATIVE  NEGATIVE Final    pH, UA 08/10/2021 6.5  5.0 - 8.0 Final    Protein, UA 08/10/2021 NEGATIVE  NEGATIVE Final    Urobilinogen, Urine 08/10/2021 Normal  Normal Final    Nitrite, Urine 08/10/2021 NEGATIVE  NEGATIVE Final    Leukocyte Esterase, Urine 08/10/2021 NEGATIVE  NEGATIVE Final    Urinalysis Comments 08/10/2021 Microscopic exam not performed based on chemical results unless requested in original order.    Final   ]    9/10/21 Tdap given    testing with weekly NST per MFM starting at 32 weeks    PT PHONE [address]     FOB phone number: [phone] (21 DON'T CALL THIS # per FOB per [facility] ED)            T-Dap Vaccine (27-36 weeks): completed    The patient was instructed on fetal kick counts and was given a kick sheet to complete every 8 hours. She was instructed that the baby should move at a minimum of ten times within one hour after a meal. The patient was instructed to lay down on her left side twenty minutes after eating and count movements for up to one hour with a target value of ten movements. She was instructed to notify the office if she did not make that target after two attempts or if after any attempt there was less than four movements. The patient reports that the targets have been made Yes.  Testing: To start    Assessment:  Verna Martinez is a 24 y.o. female  2.   3. 32w1d    Patient Active Problem List    Diagnosis Date Noted    Gestational diabetes mellitus (GDM), antepartum 08/10/2021     Priority: High     8/10/2021 consult MFM      SVT (supraventricular tachycardia) ([facility] 2019     Priority: High     2019 Referral to cardiology      Family history of clotting disorder 2019     Priority: High     2018 patient's maternal aunt with hx of Protein S and C deficiency      Family history of diabetes mellitus 2019     Priority: Medium     2019 early one hour GTT ordered      31 weeks gestation of pregnancy 2021    Late prenatal care 2021    Severe recurrent major depression without psychotic features ([address] 2020    Seizures ([facility] 03/15/2020      testing with weekly NST per [facility] starting at 28 weeks       19 M Apg 8/ Wt 7#11 2019    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 Sapna Becerra MD. See note in media section on 2019.  Family history of blood clots 2019     Denies first degree relative. Maternal aunt.        ADD (attention deficit disorder)      2019 stopped taking focalin 8 months ago          Diagnosis Orders

## 2021-09-11 DIAGNOSIS — N89.8 VAGINAL DISCHARGE: ICD-10-CM

## 2021-09-11 LAB
DIRECT EXAM: NORMAL
Lab: NORMAL
SPECIMEN DESCRIPTION: NORMAL

## 2021-09-14 ENCOUNTER — ROUTINE PRENATAL (OUTPATIENT)
Dept: OBGYN CLINIC | Age: 21
End: 2021-09-14
Payer: COMMERCIAL

## 2021-09-14 VITALS
DIASTOLIC BLOOD PRESSURE: 57 MMHG | SYSTOLIC BLOOD PRESSURE: 94 MMHG | BODY MASS INDEX: 29.05 KG/M2 | HEART RATE: 83 BPM | WEIGHT: 164 LBS

## 2021-09-14 DIAGNOSIS — O24.410 DIET CONTROLLED GESTATIONAL DIABETES MELLITUS (GDM), ANTEPARTUM: ICD-10-CM

## 2021-09-14 DIAGNOSIS — Z3A.32 32 WEEKS GESTATION OF PREGNANCY: ICD-10-CM

## 2021-09-14 DIAGNOSIS — R56.9 SEIZURES (HCC): ICD-10-CM

## 2021-09-14 DIAGNOSIS — O09.93 HIGH-RISK PREGNANCY IN THIRD TRIMESTER: Primary | ICD-10-CM

## 2021-09-14 PROCEDURE — 59025 FETAL NON-STRESS TEST: CPT | Performed by: OBSTETRICS & GYNECOLOGY

## 2021-09-14 PROCEDURE — G8427 DOCREV CUR MEDS BY ELIG CLIN: HCPCS | Performed by: OBSTETRICS & GYNECOLOGY

## 2021-09-14 PROCEDURE — G8419 CALC BMI OUT NRM PARAM NOF/U: HCPCS | Performed by: OBSTETRICS & GYNECOLOGY

## 2021-09-14 PROCEDURE — 1036F TOBACCO NON-USER: CPT | Performed by: OBSTETRICS & GYNECOLOGY

## 2021-09-14 PROCEDURE — 99214 OFFICE O/P EST MOD 30 MIN: CPT | Performed by: OBSTETRICS & GYNECOLOGY

## 2021-09-14 RX ORDER — FAMOTIDINE 20 MG/1
20 TABLET, FILM COATED ORAL 2 TIMES DAILY
Qty: 60 TABLET | Refills: 3 | Status: SHIPPED | OUTPATIENT
Start: 2021-09-14 | End: 2022-01-26

## 2021-09-14 RX ORDER — ALBUTEROL SULFATE 90 UG/1
2 AEROSOL, METERED RESPIRATORY (INHALATION) 4 TIMES DAILY PRN
Qty: 54 G | Refills: 1 | Status: SHIPPED | OUTPATIENT
Start: 2021-09-14 | End: 2022-01-26

## 2021-09-14 NOTE — PROGRESS NOTES
Claus Robison is a 24 y.o. female 32w5d        OB History    Para Term  AB Living   2 1 1 0 0 1   SAB TAB Ectopic Molar Multiple Live Births   0 0 0   0 1      # Outcome Date GA Lbr South/2nd Weight Sex Delivery Anes PTL Lv   2 Current            1 Term 19 38w6d 02:25 / 00:22 7 lb 11.3 oz (3.495 kg) M Vag-Spont EPI N TERESA       Vitals  BP: (!) 94/57  Pulse: 83  Patient Position: Sitting  Albumin: Negative  Glucose: Negative      The patient was seen and evaluated. There was positive fetal movements. No contractions or leakage of fluid. Signs and symptoms of  labor as well as labor were reviewed. The S/S of Pre-Eclampsia were reviewed with the patient in detail. She is to report any of these if they occur. She currently denies any of these. The patient had her 28 week labs completed. Hospital Outpatient Visit on 09/10/2021   Component Date Value Ref Range Status    Specimen Description 09/10/2021 . CERVIX   Final    C. trachomatis DNA 09/10/2021 NEGATIVE  NEGATIVE Final    Comment: CHLAMYDIA TRACHOMATIS DNA not detected by nucleic acid amplification. This test is intended for medical purposes only and is not valid for the evaluation of   suspected sexual abuse or for other forensic purposes. In certain contexts, culture may be required to meet applicable laws and regulations for   diagnosis of C. trachomatis and N. gonorrhoeae infections. Per 2014  CDC recommendations, this test does not include confirmation of positive results   by an alternative nucleic acid target.  N. gonorrhoeae DNA 09/10/2021 NEGATIVE  NEGATIVE Final    Comment: NEISSERIA GONORRHOEAE DNA not detected by nucleic acid amplification. This test is intended for medical purposes only and is not valid for the evaluation of   suspected sexual abuse or for other forensic purposes.   In certain contexts, culture may be required to meet applicable laws and regulations for   diagnosis recommendations, this test does not include confirmation of positive results   by an alternative nucleic acid target.  Specimen Description 2021 . VAGINA   Final    Special Requests 2021 NOT REPORTED   Final    Direct Exam 2021 NEGATIVE for Gardnerella vaginalis   Final    Direct Exam 2021 NEGATIVE for Candida sp. Final    Direct Exam 2021 NEGATIVE for Trichomonas vaginalis   Final    Direct Exam 2021 Method of testing is a DNA probe intended for detection and identification of Candida species, Gardnerella vaginalis, and Trichomonas vaginalis nucleic acid in vaginal fluid specimens from patients with symptoms of vaginitis/vaginosis. Final   ]    9/10/21 Tdap given    testing with weekly NST per MFM starting at 32 weeks    PT PHONE [address]     FOB phone number: [phone] (21 DON'T CALL THIS # per FOB per Estefany merrill DM ED)            T-Dap Vaccine (27-36 weeks): completed    The patient was instructed on fetal kick counts and was given a kick sheet to complete every 8 hours. She was instructed that the baby should move at a minimum of ten times within one hour after a meal. The patient was instructed to lay down on her left side twenty minutes after eating and count movements for up to one hour with a target value of ten movements. She was instructed to notify the office if she did not make that target after two attempts or if after any attempt there was less than four movements. The patient reports that the targets have been made Yes.  Testing:  Weekly per MFM    Assessment:  1Krzysztof Saldaña is a 24 y.o. female  2.    3. 32w5d    Patient Active Problem List    Diagnosis Date Noted    Gestational diabetes mellitus (GDM), antepartum 08/10/2021     Priority: High     8/10/2021 consult MFM      SVT (supraventricular tachycardia) ([facility] [address] 2019     Priority: High     2019 Referral to cardiology      Family history of clotting disorder 2019     Priority: High     2018 patient's maternal aunt with hx of Protein S and C deficiency      Family history of diabetes mellitus 2019     Priority: Medium     2019 early one hour GTT ordered      31 weeks gestation of pregnancy 2021    Late prenatal care 2021    Severe recurrent major depression without psychotic features ([address] 2020    Seizures ([address] 03/15/2020      testing with weekly NST per MFM starting at 28 weeks       19 M Apg 8/ Wt 7#11 2019    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 Rosemary Flores MD. See note in media section on 2019.  Family history of blood clots 2019     Denies first degree relative. Maternal aunt.  ADD (attention deficit disorder)      2019 stopped taking focalin 8 months ago          Diagnosis Orders   1. High-risk pregnancy in third trimester  OR FETAL NON-STRESS TEST   2. Diet controlled gestational diabetes mellitus (GDM), antepartum  OR FETAL NON-STRESS TEST   3. 32 weeks gestation of pregnancy  OR FETAL NON-STRESS TEST   4. Seizures ([address]  OR FETAL NON-STRESS TEST             Plan:  The patient will return to the office for her next visit in 1 weeks. See antepartum flow sheet.  Testing Indicated: Yes  Scheduled with Nursing-Pt notified: Yes      Patient was seen with total face to face time of 30 minutes. More than 50% of this visit was on counseling and education regarding her    Diagnosis Orders   1. High-risk pregnancy in third trimester  OR FETAL NON-STRESS TEST   2. Diet controlled gestational diabetes mellitus (GDM), antepartum  OR FETAL NON-STRESS TEST   3. 32 weeks gestation of pregnancy  OR FETAL NON-STRESS TEST   4. Seizures (HCC)  OR FETAL NON-STRESS TEST    and her options. She was also counseled on her preventative health maintenance recommendations and follow-up.

## 2021-09-22 ENCOUNTER — ROUTINE PRENATAL (OUTPATIENT)
Dept: PERINATAL CARE | Age: 21
End: 2021-09-22
Payer: COMMERCIAL

## 2021-09-22 ENCOUNTER — HOSPITAL ENCOUNTER (OUTPATIENT)
Age: 21
Discharge: HOME | End: 2021-09-22
Payer: COMMERCIAL

## 2021-09-22 VITALS
HEART RATE: 122 BPM | HEIGHT: 63 IN | DIASTOLIC BLOOD PRESSURE: 64 MMHG | BODY MASS INDEX: 29.77 KG/M2 | WEIGHT: 168 LBS | TEMPERATURE: 97.1 F | SYSTOLIC BLOOD PRESSURE: 102 MMHG | RESPIRATION RATE: 16 BRPM

## 2021-09-22 DIAGNOSIS — Z13.89 ENCOUNTER FOR ROUTINE SCREENING FOR MALFORMATION USING ULTRASONICS: ICD-10-CM

## 2021-09-22 DIAGNOSIS — O99.413 MATERNAL CARDIOVASCULAR DISEASE AFFECTING PREGNANCY IN THIRD TRIMESTER: ICD-10-CM

## 2021-09-22 DIAGNOSIS — O24.414 INSULIN CONTROLLED GESTATIONAL DIABETES MELLITUS (GDM) DURING PREGNANCY, ANTEPARTUM: Primary | ICD-10-CM

## 2021-09-22 DIAGNOSIS — Z36.4 ANTENATAL SCREENING FOR FETAL GROWTH RETARDATION USING ULTRASONICS: ICD-10-CM

## 2021-09-22 DIAGNOSIS — Z3A.33 33 WEEKS GESTATION OF PREGNANCY: ICD-10-CM

## 2021-09-22 DIAGNOSIS — O35.HXX0 SHORT FEMUR OF FETUS ON PRENATAL ULTRASOUND: ICD-10-CM

## 2021-09-22 PROCEDURE — 76805 OB US >/= 14 WKS SNGL FETUS: CPT | Performed by: OBSTETRICS & GYNECOLOGY

## 2021-09-22 PROCEDURE — 76819 FETAL BIOPHYS PROFIL W/O NST: CPT | Performed by: OBSTETRICS & GYNECOLOGY

## 2021-09-23 ENCOUNTER — ROUTINE PRENATAL (OUTPATIENT)
Dept: OBGYN CLINIC | Age: 21
End: 2021-09-23
Payer: COMMERCIAL

## 2021-09-23 VITALS
DIASTOLIC BLOOD PRESSURE: 59 MMHG | BODY MASS INDEX: 29.41 KG/M2 | SYSTOLIC BLOOD PRESSURE: 98 MMHG | HEART RATE: 79 BPM | WEIGHT: 166 LBS

## 2021-09-23 DIAGNOSIS — O09.93 HIGH-RISK PREGNANCY IN THIRD TRIMESTER: Primary | ICD-10-CM

## 2021-09-23 DIAGNOSIS — O24.410 DIET CONTROLLED GESTATIONAL DIABETES MELLITUS (GDM), ANTEPARTUM: ICD-10-CM

## 2021-09-23 DIAGNOSIS — R56.9 SEIZURES (HCC): ICD-10-CM

## 2021-09-23 DIAGNOSIS — Z3A.34 34 WEEKS GESTATION OF PREGNANCY: ICD-10-CM

## 2021-09-23 DIAGNOSIS — O35.HXX0 SHORT FEMUR OF FETUS ON PRENATAL ULTRASOUND: ICD-10-CM

## 2021-09-23 DIAGNOSIS — O24.414 INSULIN CONTROLLED GESTATIONAL DIABETES MELLITUS (GDM) IN THIRD TRIMESTER: ICD-10-CM

## 2021-09-23 LAB
SEND OUT REPORT: NORMAL
SEND OUT REPORT: NORMAL
TEST NAME: NORMAL
TEST NAME: NORMAL

## 2021-09-23 PROCEDURE — 59025 FETAL NON-STRESS TEST: CPT | Performed by: NURSE PRACTITIONER

## 2021-09-23 PROCEDURE — 99213 OFFICE O/P EST LOW 20 MIN: CPT | Performed by: NURSE PRACTITIONER

## 2021-09-23 PROCEDURE — G8419 CALC BMI OUT NRM PARAM NOF/U: HCPCS | Performed by: NURSE PRACTITIONER

## 2021-09-23 PROCEDURE — 1036F TOBACCO NON-USER: CPT | Performed by: NURSE PRACTITIONER

## 2021-09-23 PROCEDURE — G8427 DOCREV CUR MEDS BY ELIG CLIN: HCPCS | Performed by: NURSE PRACTITIONER

## 2021-09-23 NOTE — PROGRESS NOTES
REACTIVE NST  CATEGORY 1 TRACING  Moderate Variability  Baseline of 120 bpm  SEE SCANNED DOCUMENT  RTO 2-5 DAYS FOR A FOLLOW UP APPOINTMENT WITH  TESTING. Dany Villa is a 24 y.o. female 34w0d        OB History    Para Term  AB Living   2 1 1 0 0 1   SAB TAB Ectopic Molar Multiple Live Births   0 0 0   0 1      # Outcome Date GA Lbr South/2nd Weight Sex Delivery Anes PTL Lv   2 Current            1 Term 19 38w6d 02: 00:22 7 lb 11.3 oz (3.495 kg) M Vag-Spont EPI N TERESA       Vitals  BP: (!) 98/59  Weight: 166 lb (75.3 kg)  Pulse: 79  Patient Position: Sitting  Albumin: Negative  Glucose: Negative      The patient was seen and evaluated. There was positive fetal movements. No contractions or leakage of fluid. Signs and symptoms of  labor as well as labor were reviewed. The S/S of Pre-Eclampsia were reviewed with the patient in detail. She is to report any of these if they occur. She currently denies any of these. The patient had her 28 week labs completed. Hospital Outpatient Visit on 2021   Component Date Value Ref Range Status    Test Name 2021 HORIZON   Final    Send Out Report 2021 FORWARD TO HUBERT 2021   Final    Test Name 2021 Conte Divine   Final    Send Out Report 2021 FORWARD TO Justine [facility] 2021   Final   Hospital Outpatient Visit on 09/10/2021   Component Date Value Ref Range Status    Specimen Description 09/10/2021 . CERVIX   Final    C. trachomatis DNA 09/10/2021 NEGATIVE  NEGATIVE Final    Comment: CHLAMYDIA TRACHOMATIS DNA not detected by nucleic acid amplification. This test is intended for medical purposes only and is not valid for the evaluation of   suspected sexual abuse or for other forensic purposes. In certain contexts, culture may be required to meet applicable laws and regulations for   diagnosis of C. trachomatis and N. gonorrhoeae infections.   Per 2014  CDC recommendations, this test does not include confirmation of positive results   by an alternative nucleic acid target.  N. gonorrhoeae DNA 09/10/2021 NEGATIVE  NEGATIVE Final    Comment: NEISSERIA GONORRHOEAE DNA not detected by nucleic acid amplification. This test is intended for medical purposes only and is not valid for the evaluation of   suspected sexual abuse or for other forensic purposes. In certain contexts, culture may be required to meet applicable laws and regulations for   diagnosis of C. trachomatis and N. gonorrhoeae infections. Per 2014  CDC recommendations, this test does not include confirmation of positive results   by an alternative nucleic acid target.  Specimen Description 09/10/2021 . VAGINA   Final    Special Requests 09/10/2021 NOT REPORTED   Final    Direct Exam 09/10/2021 NEGATIVE for Gardnerella vaginalis   Final    Direct Exam 09/10/2021 NEGATIVE for Candida sp. Final    Direct Exam 09/10/2021 NEGATIVE for Trichomonas vaginalis   Final    Direct Exam 09/10/2021 Method of testing is a DNA probe intended for detection and identification of Candida species, Gardnerella vaginalis, and Trichomonas vaginalis nucleic acid in vaginal fluid specimens from patients with symptoms of vaginitis/vaginosis. Final   Admission on 09/02/2021, Discharged on 09/02/2021   Component Date Value Ref Range Status    Specimen Description 09/02/2021 . GENITAL SWAB   Final    C. trachomatis DNA 09/02/2021 NEGATIVE  NEGATIVE Final    Comment: CHLAMYDIA TRACHOMATIS DNA not detected by nucleic acid amplification. This test is intended for medical purposes only and is not valid for the evaluation of   suspected sexual abuse or for other forensic purposes. In certain contexts, culture may be required to meet applicable laws and regulations for   diagnosis of C. trachomatis and N. gonorrhoeae infections.   Per 2014  CDC recommendations, this test does not include confirmation of positive results   by an alternative nucleic acid target.  N. gonorrhoeae DNA 2021 NEGATIVE  NEGATIVE Final    Comment: NEISSERIA GONORRHOEAE DNA not detected by nucleic acid amplification. This test is intended for medical purposes only and is not valid for the evaluation of   suspected sexual abuse or for other forensic purposes. In certain contexts, culture may be required to meet applicable laws and regulations for   diagnosis of C. trachomatis and N. gonorrhoeae infections. Per 2014  CDC recommendations, this test does not include confirmation of positive results   by an alternative nucleic acid target.  Specimen Description 2021 . VAGINA   Final    Special Requests 2021 NOT REPORTED   Final    Direct Exam 2021 NEGATIVE for Gardnerella vaginalis   Final    Direct Exam 2021 NEGATIVE for Candida sp. Final    Direct Exam 2021 NEGATIVE for Trichomonas vaginalis   Final    Direct Exam 2021 Method of testing is a DNA probe intended for detection and identification of Candida species, Gardnerella vaginalis, and Trichomonas vaginalis nucleic acid in vaginal fluid specimens from patients with symptoms of vaginitis/vaginosis. Final   ]    9/10/21 Tdap given    testing with weekly NST per MFM starting at 32 weeks    PT PHONE [address]     FOB phone number: [phone] (21 DON'T CALL THIS # per FOB per Linda merrill DM ED)            T-Dap Vaccine (27-36 weeks): completed    The patient was instructed on fetal kick counts and was given a kick sheet to complete every 8 hours. She was instructed that the baby should move at a minimum of ten times within one hour after a meal. The patient was instructed to lay down on her left side twenty minutes after eating and count movements for up to one hour with a target value of ten movements.   She was instructed to notify the office if she did not make that target after two attempts or if after any attempt there was less than four movements. The patient reports that the targets have been made Yes.  Testing: To continue    Assessment:  1. Rabia Early is a 24 y.o. female  2.   3. 34w0d    Patient Active Problem List    Diagnosis Date Noted    Gestational diabetes mellitus (GDM), antepartum 08/10/2021     Priority: High     8/10/2021 consult MFM      SVT (supraventricular tachycardia) ([facility] [address] 2019     Priority: High     2019 Referral to cardiology      Family history of clotting disorder 2019     Priority: High     2018 patient's maternal aunt with hx of Protein S and C deficiency      Family history of diabetes mellitus 2019     Priority: Medium     2019 early one hour GTT ordered      31 weeks gestation of pregnancy 2021    Late prenatal care 2021    Severe recurrent major depression without psychotic features ([facility] [address] 2020    Seizures ([facility] [address] 03/15/2020      testing with weekly NST per [facility] starting at 28 weeks       19 M Apg  Wt 7#11 2019    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 Warden Sekou MD. See note in media section on 2019.  Family history of blood clots 2019     Denies first degree relative. Maternal aunt.  ADD (attention deficit disorder)      2019 stopped taking focalin 8 months ago          Diagnosis Orders   1. High-risk pregnancy in third trimester  VA FETAL NON-STRESS TEST   2. 34 weeks gestation of pregnancy  VA FETAL NON-STRESS TEST   3. Seizures (HCC)  VA FETAL NON-STRESS TEST   4. Insulin controlled gestational diabetes mellitus (GDM) in third trimester  VA FETAL NON-STRESS TEST   5. Diet controlled gestational diabetes mellitus (GDM), antepartum               Plan:  The patient will return to the office for her next visit in 1 weeks. See antepartum flow sheet.       Testing Indicated: Yes  Scheduled with Nursing-Pt notified: Yes      Patient was seen with total face to face time of 15 minutes. More than 50% of this visit was on counseling and education regarding her    Diagnosis Orders   1. High-risk pregnancy in third trimester  [place] FETAL NON-STRESS TEST   2. 34 weeks gestation of pregnancy  MO FETAL NON-STRESS TEST   3. Seizures (HCC)  MO FETAL NON-STRESS TEST   4. Insulin controlled gestational diabetes mellitus (GDM) in third trimester  MO FETAL NON-STRESS TEST   5. Diet controlled gestational diabetes mellitus (GDM), antepartum      and her options. She was also counseled on her preventative health maintenance recommendations and follow-up.

## 2021-09-30 ENCOUNTER — ROUTINE PRENATAL (OUTPATIENT)
Dept: OBGYN CLINIC | Age: 21
End: 2021-09-30
Payer: COMMERCIAL

## 2021-09-30 VITALS
WEIGHT: 167 LBS | BODY MASS INDEX: 29.58 KG/M2 | DIASTOLIC BLOOD PRESSURE: 66 MMHG | SYSTOLIC BLOOD PRESSURE: 95 MMHG | HEART RATE: 107 BPM

## 2021-09-30 DIAGNOSIS — Z87.898 HISTORY OF SEIZURES: ICD-10-CM

## 2021-09-30 DIAGNOSIS — O24.419 GESTATIONAL DIABETES MELLITUS (GDM), ANTEPARTUM, GESTATIONAL DIABETES METHOD OF CONTROL UNSPECIFIED: ICD-10-CM

## 2021-09-30 DIAGNOSIS — O09.93 HRP (HIGH RISK PREGNANCY), THIRD TRIMESTER: Primary | ICD-10-CM

## 2021-09-30 DIAGNOSIS — E03.9 HYPOTHYROIDISM AFFECTING PREGNANCY IN THIRD TRIMESTER: ICD-10-CM

## 2021-09-30 DIAGNOSIS — O99.283 HYPOTHYROIDISM AFFECTING PREGNANCY IN THIRD TRIMESTER: ICD-10-CM

## 2021-09-30 DIAGNOSIS — Z3A.35 35 WEEKS GESTATION OF PREGNANCY: ICD-10-CM

## 2021-09-30 DIAGNOSIS — O24.419 GESTATIONAL DIABETES MELLITUS (GDM) AFFECTING PREGNANCY: ICD-10-CM

## 2021-09-30 PROCEDURE — G8427 DOCREV CUR MEDS BY ELIG CLIN: HCPCS | Performed by: NURSE PRACTITIONER

## 2021-09-30 PROCEDURE — 1036F TOBACCO NON-USER: CPT | Performed by: NURSE PRACTITIONER

## 2021-09-30 PROCEDURE — G8419 CALC BMI OUT NRM PARAM NOF/U: HCPCS | Performed by: NURSE PRACTITIONER

## 2021-09-30 PROCEDURE — 59025 FETAL NON-STRESS TEST: CPT | Performed by: NURSE PRACTITIONER

## 2021-09-30 PROCEDURE — 99213 OFFICE O/P EST LOW 20 MIN: CPT | Performed by: NURSE PRACTITIONER

## 2021-09-30 RX ORDER — BLOOD SUGAR DIAGNOSTIC
STRIP MISCELLANEOUS
Qty: 100 EACH | Refills: 3 | Status: ON HOLD | OUTPATIENT
Start: 2021-09-30 | End: 2021-10-28 | Stop reason: HOSPADM

## 2021-09-30 NOTE — PROGRESS NOTES
REACTIVE NST  CATEGORY 1 TRACING  Moderate Variability  Baseline of 150 bpm  SEE SCANNED DOCUMENT  RTO 2-5 DAYS FOR A FOLLOW UP APPOINTMENT WITH  TESTING.

## 2021-10-07 ENCOUNTER — HOSPITAL ENCOUNTER (OUTPATIENT)
Age: 21
Discharge: HOME | End: 2021-10-08
Attending: OBSTETRICS & GYNECOLOGY | Admitting: OBSTETRICS & GYNECOLOGY
Payer: COMMERCIAL

## 2021-10-07 VITALS
SYSTOLIC BLOOD PRESSURE: 119 MMHG | RESPIRATION RATE: 16 BRPM | HEART RATE: 99 BPM | DIASTOLIC BLOOD PRESSURE: 66 MMHG | TEMPERATURE: 97.2 F

## 2021-10-07 PROBLEM — Z3A.36 36 WEEKS GESTATION OF PREGNANCY: Status: ACTIVE | Noted: 2021-10-07

## 2021-10-07 LAB
-: ABNORMAL
AMORPHOUS: ABNORMAL
BACTERIA: ABNORMAL
BILIRUBIN URINE: NEGATIVE
CASTS UA: ABNORMAL /LPF (ref 0–2)
CASTS UA: ABNORMAL /LPF (ref 0–2)
COLOR: YELLOW
CRYSTALS, UA: ABNORMAL /HPF
CRYSTALS, UA: ABNORMAL /HPF
EPITHELIAL CELLS UA: ABNORMAL /HPF (ref 0–5)
GLUCOSE URINE: NEGATIVE
KETONES, URINE: ABNORMAL
LEUKOCYTE ESTERASE, URINE: ABNORMAL
MUCUS: ABNORMAL
NITRITE, URINE: NEGATIVE
OTHER OBSERVATIONS UA: ABNORMAL
PH UA: 6 (ref 5–8)
PROTEIN UA: NEGATIVE
RBC UA: ABNORMAL /HPF (ref 0–2)
RENAL EPITHELIAL, UA: ABNORMAL /HPF
SPECIFIC GRAVITY UA: 1.02 (ref 1–1.03)
TRICHOMONAS: ABNORMAL
TURBIDITY: ABNORMAL
URINE HGB: NEGATIVE
UROBILINOGEN, URINE: NORMAL
WBC UA: ABNORMAL /HPF (ref 0–5)
YEAST: ABNORMAL

## 2021-10-07 PROCEDURE — 83986 ASSAY PH BODY FLUID NOS: CPT

## 2021-10-07 PROCEDURE — 81001 URINALYSIS AUTO W/SCOPE: CPT

## 2021-10-07 PROCEDURE — 87491 CHLMYD TRACH DNA AMP PROBE: CPT

## 2021-10-07 PROCEDURE — 87510 GARDNER VAG DNA DIR PROBE: CPT

## 2021-10-07 PROCEDURE — 87480 CANDIDA DNA DIR PROBE: CPT

## 2021-10-07 PROCEDURE — 87591 N.GONORRHOEAE DNA AMP PROB: CPT

## 2021-10-07 PROCEDURE — 87660 TRICHOMONAS VAGIN DIR PROBE: CPT

## 2021-10-07 PROCEDURE — 87081 CULTURE SCREEN ONLY: CPT

## 2021-10-07 RX ORDER — ACETAMINOPHEN 325 MG/1
650 TABLET ORAL EVERY 4 HOURS PRN
Status: DISCONTINUED | OUTPATIENT
Start: 2021-10-07 | End: 2021-10-08 | Stop reason: HOSPADM

## 2021-10-07 RX ORDER — ONDANSETRON 2 MG/ML
4 INJECTION INTRAMUSCULAR; INTRAVENOUS EVERY 6 HOURS PRN
Status: DISCONTINUED | OUTPATIENT
Start: 2021-10-07 | End: 2021-10-08 | Stop reason: HOSPADM

## 2021-10-07 RX ORDER — ONDANSETRON 4 MG/1
4 TABLET, ORALLY DISINTEGRATING ORAL EVERY 8 HOURS PRN
Status: DISCONTINUED | OUTPATIENT
Start: 2021-10-07 | End: 2021-10-08 | Stop reason: HOSPADM

## 2021-10-08 LAB
C TRACH DNA GENITAL QL NAA+PROBE: NEGATIVE
DIRECT EXAM: NORMAL
FERN TESTING (POC): NORMAL
Lab: NORMAL
N. GONORRHOEAE DNA: NEGATIVE
NITRAZINE PH (POC): NEGATIVE
SPECIMEN DESCRIPTION: NORMAL
SPECIMEN DESCRIPTION: NORMAL

## 2021-10-08 PROCEDURE — 99213 OFFICE O/P EST LOW 20 MIN: CPT

## 2021-10-08 PROCEDURE — 87086 URINE CULTURE/COLONY COUNT: CPT

## 2021-10-08 RX ORDER — NITROFURANTOIN 25; 75 MG/1; MG/1
100 CAPSULE ORAL 2 TIMES DAILY
Qty: 14 CAPSULE | Refills: 0 | Status: SHIPPED | OUTPATIENT
Start: 2021-10-08 | End: 2021-10-15

## 2021-10-08 NOTE — FLOWSHEET NOTE
Discharge instructions given. Oral fluids encouraged. To keep scheduled appt.  Verbalizes understanding

## 2021-10-08 NOTE — FLOWSHEET NOTE
Presents per wc with c/o Cy Sherri been leaking all daY\" states she woke about about 11 today and has been leaking since. Denies any bleeding. + fetal movement. Appears comfortable. To triage.  Instructed to obtain urine specimen

## 2021-10-08 NOTE — H&P
OBSTETRICAL HISTORY [facility]    Date: 10/7/2021       Time: 10:50 PM   Patient Name: Radha Bee     Patient : 2000  Room/Bed: [place] [address]    Admission Date/Time: 10/7/2021 10:19 PM      CC: leakage of fluid     HPI: Radha Bee is a 24 y.o.  at 36w0d who presents with two episodes of watery leakage today, she described it as clear, watery, small amount with no blood or foul smell. She denies any recent intercourse or abnormal discharge. She also report cande garcia contractions for over the past 3 days. Patient reports fetal movement is present. She denies any vaginal bleeding. Patient denies any headache, visual changes, RUQ pain, N/V, F/C, and pain/swelling in lower extremities. Denies any dysuria or vaginal discharge. DATING:  LMP: No LMP recorded (lmp unknown). Patient is pregnant.   Estimated Date of Delivery: 21   Based on: early ultrasound, at 8 4/7 weeks GA    PREGNANCY RISK FACTORS:  Patient Active Problem List   Diagnosis    ADD (attention deficit disorder)    SVT (supraventricular tachycardia) ([facility] [place] [address]    Family history of clotting disorder    Family history of diabetes mellitus    Family history of blood clots    Neurocardiogenic syncope    CMV IgM+     19 M Apg 8/9 Wt 7#11    Seizures ([address]    Severe recurrent major depression without psychotic features ([address]    Late prenatal care    Gestational diabetes mellitus (GDM), antepartum    Short femur of fetus on prenatal ultrasound        Steroids Given In This Pregnancy:  no     REVIEW OF SYSTEMS:   Constitutional: negative fever, negative chills, negative weight changes   HEENT: negative visual disturbances, negative headaches, negative dizziness, negative hearing loss  Breast: Negative breast abnormalities, negative breast lumps, negative nipple discharge  Respiratory: negative dyspnea, negative cough, negative SOB  Cardiovascular: negative antibiotics. MEDICATIONS:  Prior to Admission medications    Medication Sig Start Date End Date Taking?  Authorizing Provider   famotidine (PEPCID) 20 MG tablet Take 1 tablet by mouth 2 times daily 9/14/21  Yes Loetta Baumgarten, DO   albuterol sulfate HFA (VENTOLIN HFA) 108 (90 Base) MCG/ACT inhaler Inhale 2 puffs into the lungs 4 times daily as needed for Wheezing 9/14/21  Yes Loetta Baumgarten, DO   Insulin Aspart FlexPen 100 UNIT/ML SOPN inject 6 units UNDER THE SKIN before dinner for 10 WEEKS 8/25/21  Yes Historical Provider, MD   NOVOLIN N 100 UNIT/ML injection vial inject 8 units UNDER THE SKIN at bedtime FOR 10 WEEKS 8/26/21  Yes Historical Provider, MD   acetaminophen (TYLENOL) 325 MG tablet Take 1 tablet by mouth every 6 hours as needed for Pain 7/30/21  Yes Christy Terrell MD   Prenatal Vit-Fe Fumarate-FA (PNV PRENATAL PLUS MULTIVITAMIN) 27-1 MG TABS Take 1 tablet by mouth daily 7/14/21 7/9/22 Yes Blanco Done, APRN - NP   ONETOUCH ULTRA strip use 1 TEST STRIP to TEST BLOOD SUGAR four times a day 9/30/21   Leila Wiley, APRN - CNP   EASY TOUCH PEN NEEDLES 32G X 4 MM MISC use 1 PEN NEEDLE to inject MEDICATION subcutaneously once daily 8/25/21   Historical Provider, MD GORDON INS SYR MICROFINE 1CC/28G 28G X 1/2\" 1 ML MISC use to inject insert once daily 8/25/21   Historical Provider, MD   Lancets ([facility]) [address] [facility] use 1 LANCET to TEST BLOOD SUGAR four times a day 8/17/21   Historical Provider, MD   Blood Glucose Monitoring Suppl (ONE TOUCH ULTRA 2) w/Device KIT use as directed four times a day 8/17/21   Historical Provider, MD   RA Alcohol Swabs 70 % PADS USE 4 TIMES DAILY 8/16/21   Historical Provider, MD       FAMILY HISTORY:  family history includes Anxiety Disorder in her mother; Atrial Fibrillation in her mother; Bleeding Prob in an other family member; COPD in her maternal grandmother; Cancer in her maternal grandmother; Depression in her mother; Diabetes in her mother; Diabetes Type 1  in her maternal grandfather and another family member; Heart Disease in her maternal grandmother and mother; Hypertension in her maternal grandmother; Seizures in her maternal grandmother. She was adopted. SOCIAL HISTORY:   reports that she has quit smoking. Her smoking use included cigars. She smoked 0.50 packs per day. She has never used smokeless tobacco. She reports previous drug use. Drug: Marijuana. She reports that she does not drink alcohol.     VITALS:  Vitals:    10/07/21 2229 10/07/21 2232   BP:  119/66   Pulse:  99   Resp: 16    Temp: 97.2 °F (36.2 °C)          PHYSICAL EXAM:  Fetal Heart Monitor:  Baseline Heart Rate 120, moderate variability, present accelerations, absent decelerations  [place]: rare contractions    General appearance:  no apparent distress, alert and cooperative  HEENT: head atraumatic, normocephalic, moist mucous membranes, trachea midline  Neurologic:  alert, oriented, normal speech, no focal findings or movement disorder noted  Lungs:  No increased work of breathing, good air exchange, clear to auscultation bilaterally, no crackles or wheezing  Heart:  regular rate and rhythm and no murmur, rubs, gallops  Abdomen:  soft, gravid, non-tender, no rebound, guarding, or rigidity, no RUQ or epigastric tenderness, no signs or symptoms of abruption, no signs or symptoms of chorioamnionitis  Extremities:  no calf tenderness, non edematous, no varicosities, full range of motion in all four extremities  Musculoskeletal: Gross strength equal and intact throughout, no gross abnormalities, range of motion normal in hips, knees, shoulders and spine  Psychiatric: Mood appropriate, normal affect   Rectal Exam: not indicated  Pelvic Exam:   Chaperone for Intimate Exam: Chaperone was present for entire exam, Chaperone Name: June Sharp RN  Sterile Speculum Exam:   Urethral meatus: normal appearing   Vulva: Normal hair distribution, normal appearing vulva, no masses, tenderness or lesions, normal clitoris   Vagina: Normal appearing vaginal mucosa without lesions, vaginal discharge noted in the posterior vault, no lacerations   Cervix: Normal appearing cervix without lesions, external os visibly closed, no lacerations or abnormal lesions visualized    DATA:  Membranes Ruptured: No  Fern: negative  Nitrazine: Negative  Valsalva/Pooling: absent  Vaginal Bleeding: absent    PRENATAL LAB RESULTS:   Blood Type/Rh: A pos  Antibody Screen: negative  Hemoglobin, Hematocrit, Platelets: Hgb 10.6/DGX 37.3/Plt 321  Rubella: immune  T. Pallidum, IgG: non-reactive  Hepatitis B Surface Antigen: non-reactive   Hepatitis C Antibody: non-reactive   HIV: non-reactive   Sickle Cell Screen: negative  Gonorrhea: negative  Chlamydia: positive 21, negative 21, 21, 9/10/21  Urine culture: negative, date: 21    Early 1 hour Glucose Tolerance Test: 144  3 hour Glucose Tolerance Test: Fastin; 1 hour: 267; 2 hour  237; 3 hour: 147    Group B Strep: pending  Cystic Fibrosis Screen: negative  First Trimester Screen: not available  MSAFP/Multiple Markers: not available  Non-Invasive Prenatal Testing: low risk for aneuploidy  Anatomy US: anterior placenta, 3VC, male gender, normal anatomy    ASSESSMENT & PLAN:  Dillon Santos is a 24 y.o. female  at 36w0d    - GBS pending / Rh positive / R immune   - No indication for GBS prophylaxis   - COVID vaccinated    Leakage of Fluid   - VSS, afebrile   - cEFM and TOCO showing Category 1 FHT and rare contractions   - SSE: external os visibly closed, moderate amount of white discharge noted, negative pooling, negative nitrazine, negative ferning   - Vaginitis collected and negative   - Gc/C collected and pending   - GBS collected today   - UA significant for moderate bacteria, small leukocyte esterase, WBC 10-20. Suspicious for UTI.  Will treat with macrobid and follow urine culture   - Low concern for prelabor rupture of membranes given negative exam. Leakage of fluid likely related to UTI. - Stable for discharge home today. Given return precautions for contractions, vaginal bleeding, gush of fluid, decreased fetal movement. All questions answered.  Patient voiced understanding.   - Next prenatal appt 10/8    Hx Asthma              - Denies shortness of breath    - Stable on albuterol inhaler      GDMA2              - Following with MFM   - Most recent blood sugar log 8/25              - 3hr GTT: Fasting 101/ 1 hr 267 / 2 hr 237 / 3 hr 147              - Compliant with insulin: NPH 8U, Novolog 0/0/6     Hx Chlamydia              - Positive 4/22/21, 5/31/21   - Negative TOR 6/14/21, 7/29/21, 9/10/21              - Gc/C collected today, will follow results     Hx SVT              - Cardiology referral placed 1/2019              - Denies any complaints or medications     Neurocardiogenic syncope              - Diagnosed 1/2019 by Dr. Adrian Brock      FHx of clotting disorder              - Patient's maternal aunt with Protein C and S deficiency        FHx DM              - Failed Early 1 hr                - 3hr GTT: Fasting 101, 1 hr 267, 2 hr 237, 3 hr 147     FHx blood clots              - Patient's maternal aunt has history of Protein C and S deficiency     Seizures              - Stable on no meds              - Denies any complaints              - [facility] recommends weekly NST at 32 weeks     Depression              - Clinically asymptomatic              - Denies HI/SI     Late prenatal care     BMI 29    Patient Active Problem List    Diagnosis Date Noted    Gestational diabetes mellitus (GDM), antepartum 08/10/2021     Priority: High     8/10/2021 consult M      SVT (supraventricular tachycardia) ([facility] [address] 01/09/2019     Priority: High     1/25/2019 Referral to cardiology      Family history of clotting disorder 01/09/2019     Priority: High     1/9/2018 patient's maternal aunt with hx of Protein S and C deficiency      Family history of diabetes mellitus 2019     Priority: Medium     2019 early one hour GTT ordered      Short femur of fetus on prenatal ultrasound 2021    Late prenatal care 2021    Severe recurrent major depression without psychotic features ([facility] [address] 2020    [facility] 03/15/2020      testing with weekly NST per [facility] starting at 28 weeks       19 M Apg 8/9 Wt 7#11 2019    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 Lisa Grajeda MD. See note in media section on 2019.  Family history of blood clots 2019     Denies first degree relative. Maternal aunt.  ADD (attention deficit disorder)      2019 stopped taking focalin 8 months ago         Plan discussed with Dr. Pilar Foote, who is agreeable. Steroids given this admission: No    Risks, benefits, alternatives and possible complications have been discussed in detail with the patient. Admission, and post admission procedures and expectations were discussed in detail. All questions were answered.     Attending's Name: Dr. Zara Hilliard DO  Ob/Gyn Resident  10/7/2021, 10:50 PM

## 2021-10-09 LAB
CULTURE: NORMAL
Lab: NORMAL
SPECIMEN DESCRIPTION: NORMAL

## 2021-10-10 LAB
CULTURE: NORMAL
Lab: NORMAL
SPECIMEN DESCRIPTION: NORMAL

## 2021-10-13 ENCOUNTER — TELEPHONE (OUTPATIENT)
Dept: OBGYN CLINIC | Age: 21
End: 2021-10-13

## 2021-10-13 ENCOUNTER — HOSPITAL ENCOUNTER (OUTPATIENT)
Age: 21
Setting detail: SPECIMEN
Discharge: HOME | End: 2021-10-13
Payer: COMMERCIAL

## 2021-10-13 ENCOUNTER — ROUTINE PRENATAL (OUTPATIENT)
Dept: OBGYN CLINIC | Age: 21
End: 2021-10-13
Payer: COMMERCIAL

## 2021-10-13 VITALS
WEIGHT: 171 LBS | SYSTOLIC BLOOD PRESSURE: 116 MMHG | BODY MASS INDEX: 30.29 KG/M2 | HEART RATE: 86 BPM | DIASTOLIC BLOOD PRESSURE: 74 MMHG

## 2021-10-13 DIAGNOSIS — Z3A.36 36 WEEKS GESTATION OF PREGNANCY: ICD-10-CM

## 2021-10-13 DIAGNOSIS — R10.2 VAGINAL PAIN: ICD-10-CM

## 2021-10-13 DIAGNOSIS — Z3A.36 36 WEEKS GESTATION OF PREGNANCY: Primary | ICD-10-CM

## 2021-10-13 DIAGNOSIS — O24.410 DIET CONTROLLED GESTATIONAL DIABETES MELLITUS (GDM), ANTEPARTUM: ICD-10-CM

## 2021-10-13 PROCEDURE — G8484 FLU IMMUNIZE NO ADMIN: HCPCS | Performed by: NURSE PRACTITIONER

## 2021-10-13 PROCEDURE — G8419 CALC BMI OUT NRM PARAM NOF/U: HCPCS | Performed by: NURSE PRACTITIONER

## 2021-10-13 PROCEDURE — G8427 DOCREV CUR MEDS BY ELIG CLIN: HCPCS | Performed by: NURSE PRACTITIONER

## 2021-10-13 PROCEDURE — 1036F TOBACCO NON-USER: CPT | Performed by: NURSE PRACTITIONER

## 2021-10-13 PROCEDURE — 99213 OFFICE O/P EST LOW 20 MIN: CPT | Performed by: NURSE PRACTITIONER

## 2021-10-13 NOTE — TELEPHONE ENCOUNTER
Pt sent message via [employer] stating she feels as if something has fallen out of her vagina. Pt stating she was unable to visualize what may have fallen into toilet. Pt also stating she is having sharp stabbing pain in her vagina and groin area with cramping. Pt stating baby is moving fine and she is not experiencing any vaginal bleeding, headaches or blurred vision. Pt offered appointment but declined due to transportation but states she is in walking distance to [facility] and she will be heading there for evaluation for her pain/discomfort.

## 2021-10-14 ENCOUNTER — ROUTINE PRENATAL (OUTPATIENT)
Dept: OBGYN CLINIC | Age: 21
End: 2021-10-14
Payer: COMMERCIAL

## 2021-10-14 VITALS
DIASTOLIC BLOOD PRESSURE: 63 MMHG | BODY MASS INDEX: 30.29 KG/M2 | WEIGHT: 171 LBS | HEART RATE: 81 BPM | SYSTOLIC BLOOD PRESSURE: 97 MMHG

## 2021-10-14 DIAGNOSIS — Z87.898 HISTORY OF SEIZURES: ICD-10-CM

## 2021-10-14 DIAGNOSIS — O24.410 DIET CONTROLLED GESTATIONAL DIABETES MELLITUS (GDM), ANTEPARTUM: Primary | ICD-10-CM

## 2021-10-14 DIAGNOSIS — Z3A.37 37 WEEKS GESTATION OF PREGNANCY: ICD-10-CM

## 2021-10-14 LAB
C TRACH DNA GENITAL QL NAA+PROBE: NEGATIVE
DIRECT EXAM: NORMAL
Lab: NORMAL
N. GONORRHOEAE DNA: NEGATIVE
SPECIMEN DESCRIPTION: NORMAL
SPECIMEN DESCRIPTION: NORMAL

## 2021-10-14 PROCEDURE — 59025 FETAL NON-STRESS TEST: CPT | Performed by: NURSE PRACTITIONER

## 2021-10-16 LAB
CULTURE: NORMAL
Lab: NORMAL
SPECIMEN DESCRIPTION: NORMAL

## 2021-10-20 ENCOUNTER — ROUTINE PRENATAL (OUTPATIENT)
Dept: OBGYN CLINIC | Age: 21
End: 2021-10-20
Payer: COMMERCIAL

## 2021-10-20 ENCOUNTER — ROUTINE PRENATAL (OUTPATIENT)
Dept: PERINATAL CARE | Age: 21
End: 2021-10-20
Payer: COMMERCIAL

## 2021-10-20 ENCOUNTER — HOSPITAL ENCOUNTER (OUTPATIENT)
Age: 21
Discharge: HOME | End: 2021-10-20
Attending: OBSTETRICS & GYNECOLOGY | Admitting: OBSTETRICS & GYNECOLOGY
Payer: COMMERCIAL

## 2021-10-20 VITALS
DIASTOLIC BLOOD PRESSURE: 64 MMHG | RESPIRATION RATE: 16 BRPM | TEMPERATURE: 97.2 F | SYSTOLIC BLOOD PRESSURE: 96 MMHG | BODY MASS INDEX: 30.12 KG/M2 | HEART RATE: 94 BPM | WEIGHT: 170 LBS | HEIGHT: 63 IN

## 2021-10-20 VITALS
WEIGHT: 171 LBS | DIASTOLIC BLOOD PRESSURE: 61 MMHG | SYSTOLIC BLOOD PRESSURE: 92 MMHG | BODY MASS INDEX: 30.29 KG/M2 | HEART RATE: 88 BPM

## 2021-10-20 VITALS
RESPIRATION RATE: 18 BRPM | WEIGHT: 170 LBS | SYSTOLIC BLOOD PRESSURE: 121 MMHG | HEART RATE: 100 BPM | BODY MASS INDEX: 30.12 KG/M2 | OXYGEN SATURATION: 100 % | TEMPERATURE: 97.7 F | DIASTOLIC BLOOD PRESSURE: 74 MMHG | HEIGHT: 63 IN

## 2021-10-20 DIAGNOSIS — Z13.89 ENCOUNTER FOR ROUTINE SCREENING FOR MALFORMATION USING ULTRASONICS: ICD-10-CM

## 2021-10-20 DIAGNOSIS — O99.413 MATERNAL CARDIOVASCULAR DISEASE AFFECTING PREGNANCY IN THIRD TRIMESTER: ICD-10-CM

## 2021-10-20 DIAGNOSIS — O09.93 HIGH-RISK PREGNANCY IN THIRD TRIMESTER: Primary | ICD-10-CM

## 2021-10-20 DIAGNOSIS — O24.414 INSULIN CONTROLLED GESTATIONAL DIABETES MELLITUS (GDM) DURING PREGNANCY, ANTEPARTUM: Primary | ICD-10-CM

## 2021-10-20 DIAGNOSIS — Z3A.37 37 WEEKS GESTATION OF PREGNANCY: ICD-10-CM

## 2021-10-20 DIAGNOSIS — I47.1 SVT (SUPRAVENTRICULAR TACHYCARDIA) (HCC): ICD-10-CM

## 2021-10-20 DIAGNOSIS — R56.9 SEIZURES (HCC): ICD-10-CM

## 2021-10-20 DIAGNOSIS — O35.HXX0 SHORT FEMUR OF FETUS ON PRENATAL ULTRASOUND: ICD-10-CM

## 2021-10-20 DIAGNOSIS — Z36.4 ANTENATAL SCREENING FOR FETAL GROWTH RETARDATION USING ULTRASONICS: ICD-10-CM

## 2021-10-20 DIAGNOSIS — O09.30 LATE PRENATAL CARE: ICD-10-CM

## 2021-10-20 DIAGNOSIS — O24.414 INSULIN CONTROLLED GESTATIONAL DIABETES MELLITUS (GDM) DURING PREGNANCY, ANTEPARTUM: ICD-10-CM

## 2021-10-20 PROBLEM — Z82.49 FAMILY HISTORY OF BLOOD CLOTS: Status: RESOLVED | Noted: 2019-01-24 | Resolved: 2021-10-20

## 2021-10-20 PROBLEM — Z3A.36 36 WEEKS GESTATION OF PREGNANCY: Status: RESOLVED | Noted: 2021-10-07 | Resolved: 2021-10-20

## 2021-10-20 LAB
-: ABNORMAL
ABDOMINAL CIRCUMFERENCE: NORMAL
AMORPHOUS: ABNORMAL
BACTERIA: ABNORMAL
BILIRUBIN URINE: ABNORMAL
BIPARIETAL DIAMETER: NORMAL
CASTS UA: ABNORMAL /LPF (ref 0–8)
COLOR: ABNORMAL
CRYSTALS, UA: ABNORMAL /HPF
EPITHELIAL CELLS UA: ABNORMAL /HPF (ref 0–5)
ESTIMATED FETAL WEIGHT: NORMAL
FEMORAL DIAMETER: NORMAL
GLUCOSE URINE: NEGATIVE
HC/AC: NORMAL
HEAD CIRCUMFERENCE: NORMAL
KETONES, URINE: ABNORMAL
LEUKOCYTE ESTERASE, URINE: ABNORMAL
MUCUS: ABNORMAL
NITRITE, URINE: NEGATIVE
OTHER OBSERVATIONS UA: ABNORMAL
PH UA: 6.5 (ref 5–8)
PROTEIN UA: ABNORMAL
RBC UA: ABNORMAL /HPF (ref 0–4)
RENAL EPITHELIAL, UA: ABNORMAL /HPF
SPECIFIC GRAVITY UA: 1.03 (ref 1–1.03)
TRICHOMONAS: ABNORMAL
TURBIDITY: CLEAR
URINE HGB: NEGATIVE
UROBILINOGEN, URINE: NORMAL
WBC UA: ABNORMAL /HPF (ref 0–5)
YEAST: ABNORMAL

## 2021-10-20 PROCEDURE — 87086 URINE CULTURE/COLONY COUNT: CPT

## 2021-10-20 PROCEDURE — 76819 FETAL BIOPHYS PROFIL W/O NST: CPT | Performed by: OBSTETRICS & GYNECOLOGY

## 2021-10-20 PROCEDURE — 76816 OB US FOLLOW-UP PER FETUS: CPT | Performed by: OBSTETRICS & GYNECOLOGY

## 2021-10-20 PROCEDURE — 81001 URINALYSIS AUTO W/SCOPE: CPT

## 2021-10-20 PROCEDURE — 59025 FETAL NON-STRESS TEST: CPT | Performed by: NURSE PRACTITIONER

## 2021-10-20 PROCEDURE — 99213 OFFICE O/P EST LOW 20 MIN: CPT

## 2021-10-20 RX ORDER — ACETAMINOPHEN 500 MG
1000 TABLET ORAL EVERY 6 HOURS PRN
Qty: 540 TABLET | Refills: 1 | Status: SHIPPED | OUTPATIENT
Start: 2021-10-20 | End: 2022-01-26

## 2021-10-20 RX ORDER — ACETAMINOPHEN 500 MG
1000 TABLET ORAL EVERY 6 HOURS PRN
Status: DISCONTINUED | OUTPATIENT
Start: 2021-10-20 | End: 2021-10-20 | Stop reason: HOSPADM

## 2021-10-20 NOTE — PROGRESS NOTES
Blood sugars reviewed (adjustments made in insulin regimen as below). Insulin regimen adjusted: Increase NPH Insulin subcutaneously to 16 units before bedtime. Insulin regimen adjusted: Start 6 units subcutaneous (SQ) Novolog before lunch and increase Novolog to 10 units before dinner.

## 2021-10-20 NOTE — FLOWSHEET NOTE
Patient received discharge instructions. All questions answered at this time. Patient instructed to Notify Physician for:    *  Regular contractions that are  5 minutes apart or less lasting longer than 1 hr for 1st baby, 10 mins apart or less if 2nd baby or greater. *  Leaking or gush of fluid from vagina. *  Any vaginal bleeding that is heavier than a menstrual period. *  Decrease or absence of baby movement. *  Vaginal pressure, low backache.

## 2021-10-20 NOTE — PROGRESS NOTES
REACTIVE NST  CATEGORY 1 TRACING  Moderate Variability  Baseline of 120 bpm  SEE SCANNED DOCUMENT  RTO 2-5 DAYS FOR A FOLLOW UP APPOINTMENT WITH  TESTING. Contractions every 7-8 minutes noted. Patient stated she is starting to feel them. Only drank two small glasses of water int he office today. Instructed to drink 2 large glasses of water, go to the bathroom and see if contractions go away. States she is scheduled with MFM right after visit today.  Instructed to go to [facility] but if contractions continue to go to labor and delivery for evaluation

## 2021-10-20 NOTE — H&P
OBSTETRICAL HISTORY [facility]    Date: 10/20/2021       Time: 5:58 PM   Patient Name: Yoon Linn     Patient : 2000  Room/Bed: [facility]3-01    Admission Date/Time: 10/20/2021  4:41 PM      CC: Contractions     HPI: Yoon Linn is a 24 y.o.  at 37w6d who presents from home complaining of contractions since noon. She states that she has had nothing to eat or drink today. She feels her contractions about every 6-8 minutes. The patient reports fetal movement is present, complains contractions, denies loss of fluid, denies vaginal bleeding. DATING:  LMP: No LMP recorded (lmp unknown). Patient is pregnant.   Estimated Date of Delivery: 21   Based on: early ultrasound, at 8 4/7 weeks GA    PREGNANCY RISK FACTORS:  Patient Active Problem List   Diagnosis    ADD (attention deficit disorder)    [address] [facility]ra[facility]) [address] [address]    Family history of clotting disorder    Family history of diabetes mellitus    Family history of blood clots    Neurocardiogenic syncope    CMV IgM+     19 M Apg 8/9 Wt 7#11    Seizures ([address]    Severe recurrent major depression without psychotic features ([address]    Late prenatal care    Gestational diabetes mellitus (GDM), antepartum    Short femur of fetus on prenatal ultrasound    37 weeks gestation of pregnancy        Steroids Given In This Pregnancy:  no     REVIEW OF SYSTEMS:   Constitutional: negative fever, negative chills, negative weight changes   HEENT: negative visual disturbances, negative headaches, negative dizziness, negative hearing loss  Breast: Negative breast abnormalities, negative breast lumps, negative nipple discharge  Respiratory: negative dyspnea, negative cough, negative SOB  Cardiovascular: negative chest pain,  negative palpitations, negative arrhythmia, negative syncope   Gastrointestinal: positive abdominal pain, negative RUQ pain, negative N/V, negative diarrhea, negative constipation, negative bowel changes, negative heartburn   Genitourinary: negative dysuria, negative hematuria, negative urinary incontinence, negative vaginal discharge, negative vaginal bleeding or spotting  Dermatological: negative rash, negative pruritis, negative mole or other skin changes  Hematologic: negative bruising  Immunologic/Lymphatic: negative recent illness, negative recent sick contact  Musculoskeletal: negative back pain, negative myalgias, negative arthralgias  Neurological:  negative dizziness, negative migraines, negative seizures, negative weakness  Behavior/Psych: negative depression, negative anxiety, negative SI, negative HI    OBSTETRICAL HISTORY:   OB History    Para Term  AB Living   2 1 1 0 0 1   SAB TAB Ectopic Molar Multiple Live Births   0 0 0 0 0 1      # Outcome Date GA Lbr South/2nd Weight Sex Delivery Anes PTL Lv   2 Current            1 Term 19 38w6d 02:25  00:22 7 lb 11.3 oz (3.495 kg) M Vag-Spont EPI N TERESA      Name: Frieda Mate: 8  Apgar5: 9       PAST MEDICAL HISTORY:   has a past medical history of ADD (attention deficit disorder), Anxiety, Asthma, CMV (cytomegalovirus infection) ([address] Depression, Gestational diabetes mellitus (GDM), antepartum, Hearing loss in left ear, Heart disease, Hypothyroidism, Immunizations up to date in pediatric patient, Neurocardiogenic syncope, Pseudoseizures ([address] Raynaud disease, Seizures ([address] SVT (supraventricular tachycardia) ([address] Tachycardia, Traumatic injury during pregnancy, third trimester, and Wears glasses. PAST SURGICAL HISTORY:   has a past surgical history that includes REMOVAL FOREIGN BODY EAR (Left) and Tympanoplasty (Left, 2016). ALLERGIES:  is allergic to sulfa antibiotics. MEDICATIONS:  Prior to Admission medications    Medication Sig Start Date End Date Taking?  Authorizing Provider   ONETOUCH ULTRA strip use 1 TEST STRIP to TEST BLOOD SUGAR four times a day 9/30/21   [facility] DARRIAN Jaeger CNP   famotidine (PEPCID) 20 MG tablet Take 1 tablet by mouth 2 times daily 9/14/21   Lida Christensens, DO   albuterol sulfate HFA (VENTOLIN HFA) 108 (90 Base) MCG/ACT inhaler Inhale 2 puffs into the lungs 4 times daily as needed for Wheezing 9/14/21   Lida Junior, DO   Insulin Aspart FlexPen 100 UNIT/ML SOPN inject 6 units UNDER THE SKIN before dinner for 10 WEEKS 8/25/21   Historical Provider, MD Johan Guerrero N 100 UNIT/ML injection vial inject 8 units UNDER THE SKIN at bedtime FOR 10 WEEKS 8/26/21   Historical Provider, MD   EASY TOUCH PEN NEEDLES 32G X 4 MM MISC use 1 PEN NEEDLE to inject MEDICATION subcutaneously once daily 8/25/21   Historical Provider, MD GORDON INS SYR MICROFINE 1CC/28G 28G X 1/2\" 1 ML MISC use to inject insert once daily 8/25/21   Historical Provider, MD   Lancets ([address]) [address] [facility] use 1 LANCET to TEST BLOOD SUGAR four times a day 8/17/21   Historical Provider, MD   Blood Glucose Monitoring Suppl (ONE TOUCH ULTRA 2) w/Device KIT use as directed four times a day 8/17/21   Historical Provider, MD   RA Alcohol Swabs 70 % PADS USE 4 TIMES DAILY 8/16/21   Historical Provider, MD   acetaminophen (TYLENOL) 325 MG tablet Take 1 tablet by mouth every 6 hours as needed for Pain 7/30/21   Caron Cantu MD   Prenatal Vit-Fe Fumarate-FA (PNV PRENATAL PLUS MULTIVITAMIN) 27-1 MG TABS Take 1 tablet by mouth daily 7/14/21 7/9/22  DARRIAN Nuñez NP       FAMILY HISTORY:  family history includes Anxiety Disorder in her mother; Atrial Fibrillation in her mother; Bleeding Prob in an other family member; COPD in her maternal grandmother; Cancer in her maternal grandmother; Depression in her mother; Diabetes in her mother; Diabetes Type 1  in her maternal grandfather and another family member; Heart Disease in her maternal grandmother and mother; Hypertension in her maternal grandmother; Seizures in her maternal grandmother. She was adopted. SOCIAL HISTORY:   reports that she has quit smoking. Her smoking use included cigars. She smoked 0.50 packs per day. She has never used smokeless tobacco. She reports previous drug use. Drug: Marijuana. She reports that she does not drink alcohol.     VITALS:  Vitals:    10/20/21 1657   BP: 104/65   Pulse: 95   Resp: 16   Temp: 97.5 °F (36.4 °C)   TempSrc: Oral   Weight: 170 lb (77.1 kg)   Height: 5' 3\" (1.6 m)         PHYSICAL EXAM:  Fetal Heart Monitor:  Baseline Heart Rate 120, moderate variability, present accelerations, absent decelerations  [place]: contractions, irregular, every 10 minutes    General appearance:  no apparent distress alert and cooperative  HEENT: head atraumatic, normocephalic, moist mucous membranes, trachea midline  Neurologic:  alert, oriented, normal speech, no focal findings or movement disorder noted  Lungs:  No increased work of breathing, good air exchange, clear to auscultation bilaterally, no crackles or wheezing  Heart:  regular rate and rhythm and no murmur, rubs, gallops  Abdomen:  soft, gravid, non-tender, no rebound, guarding, or rigidity, no RUQ or epigastric tenderness, no signs or symptoms of abruption, no signs or symptoms of chorioamnionitis  Extremities:  no calf tenderness, non edematous, no varicosities, full range of motion in all four extremities  Musculoskeletal: Gross strength equal and intact throughout, no gross abnormalities, range of motion normal in hips, knees, shoulders and spine, CVA tenderness: none  Psychiatric: Mood appropriate, normal affect   Rectal Exam: not indicated    Pelvic Exam:   Chaperone for Intimate Exam: Chaperone was present for entire exam, Chaperone Name: Vale POE    Sterile Vaginal Exam:  Cervix: 1 cm dilated, 30 % effaced, -3 station, posterior position (out of 3 station), soft consistency    ,PRENATAL LAB RESULTS:   Blood Type/Rh: A pos  Antibody Screen: negative  Hemoglobin, Hematocrit, Platelets: Hgb 12.6/Hct 37.3/Plt 321  Rubella: immune  T. Pallidum, IgG: non-reactive  Hepatitis B Surface Antigen: non-reactive   Hepatitis C Antibody: not done   HIV: non-reactive   Sickle Cell Screen: negative  Gonorrhea: negative  Chlamydia: negative  Urine culture: negative, date: 10/8/21, 21      1 hour Glucose Tolerance Test: 144  3 hour Glucose Tolerance Test: Fastin; 1 hour: 267; 2 hour  237; 3 hour: 147    Group B Strep: negative  Cystic Fibrosis Screen: negative  First Trimester Screen: not done  MSAFP/Multiple Markers: not done  Non-Invasive Prenatal Testing: low risk for aneuploidy  Anatomy US: anterior placenta, 3VC, male gender, normal anatomy    ASSESSMENT & PLAN:  Radha Ramsey is a 24 y.o. female  at 41w10d    - GBS negative / Rh positive / R immune   - No indication for GBS prophylaxis   - VSS, afebrile   - cEFM/TOCO- Cat 1     Contractions   - Patient complaining of contractions since noon   - She has not had anything to eat or drink today    - Regular diet ordered   - Feels them every 6-8 minutes   - TOCO quiet   - Patient is resting comfortably   - SVE /-3   - Will recheck in 2 hours    Hx SVT   - Denies any s/s    FHx Clotting Disorder   - Patient maternal aunt with hx of Protein S and C deficiency   - No personal history of any clots    GDMA2   - States that her sugars are elevated   - Dr. Dominick Rodriguez increased her insulin regiment today    ADD   - Was previously on Focalin    CMV IgM+    Hx Depression   - Denies HI/SI    Late Prenatal Care    Short fetal Femur   - NIPT wnl    Seizure   - [facility] recommended  testing     BMI 30        Patient Active Problem List    Diagnosis Date Noted    Gestational diabetes mellitus (GDM), antepartum 08/10/2021     Priority: High     8/10/2021 consult MFM  2021 NPH 8 units before bedtime, 6 units Novolog before dinner.       SVT (supraventricular tachycardia) ([facility] [address] 2019     Priority: High     2019 Referral to cardiology      Family history of clotting disorder 2019     Priority: High     2018 patient's maternal aunt with hx of Protein S and C deficiency      Family history of diabetes mellitus 2019     Priority: Medium     2019 early one hour GTT ordered      37 weeks gestation of pregnancy 10/20/2021    Short femur of fetus on prenatal ultrasound 2021    Late prenatal care 2021    Severe recurrent major depression without psychotic features ([facility] [address] 2020    Seizures ([facility] [address] 03/15/2020      testing with weekly NST per [facility] starting at 28 weeks       19 M Apg  Wt 7#11 2019    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 Kiana Nolan MD. See note in media section on 2019.  Family history of blood clots 2019     Denies first degree relative. Maternal aunt.  ADD (attention deficit disorder)      2019 stopped taking focalin 8 months ago         Plan discussed with Dr. Yojana Greene, who is agreeable. Steroids given this admission: No    Risks, benefits, alternatives and possible complications have been discussed in detail with the patient. Admission, and post admission procedures and expectations were discussed in detail. All questions were answered.     Attending's Name: Dr. Beny Pimentel DO  Ob/Gyn Resident  10/20/2021, 5:58 PM

## 2021-10-21 LAB
CULTURE: NORMAL
Lab: NORMAL
SPECIMEN DESCRIPTION: NORMAL

## 2021-10-22 ENCOUNTER — HOSPITAL ENCOUNTER (OUTPATIENT)
Age: 21
Discharge: HOME | End: 2021-10-22
Attending: OBSTETRICS & GYNECOLOGY | Admitting: OBSTETRICS & GYNECOLOGY
Payer: COMMERCIAL

## 2021-10-22 VITALS
DIASTOLIC BLOOD PRESSURE: 72 MMHG | SYSTOLIC BLOOD PRESSURE: 103 MMHG | RESPIRATION RATE: 18 BRPM | TEMPERATURE: 97.7 F | HEART RATE: 106 BPM | OXYGEN SATURATION: 97 %

## 2021-10-22 PROBLEM — O09.90 HIGH-RISK PREGNANCY, UNSPECIFIED TRIMESTER: Status: ACTIVE | Noted: 2021-10-22

## 2021-10-22 PROCEDURE — 6370000000 HC RX 637 (ALT 250 FOR IP): Performed by: STUDENT IN AN ORGANIZED HEALTH CARE EDUCATION/TRAINING PROGRAM

## 2021-10-22 PROCEDURE — 99213 OFFICE O/P EST LOW 20 MIN: CPT

## 2021-10-22 RX ORDER — ACETAMINOPHEN 500 MG
1000 TABLET ORAL EVERY 6 HOURS PRN
Status: DISCONTINUED | OUTPATIENT
Start: 2021-10-22 | End: 2021-10-22 | Stop reason: HOSPADM

## 2021-10-22 RX ORDER — ACETAMINOPHEN 500 MG
1000 TABLET ORAL EVERY 6 HOURS PRN
Status: DISCONTINUED | OUTPATIENT
Start: 2021-10-22 | End: 2021-10-22

## 2021-10-22 RX ORDER — ONDANSETRON 4 MG/1
4 TABLET, FILM COATED ORAL EVERY 8 HOURS PRN
Status: DISCONTINUED | OUTPATIENT
Start: 2021-10-22 | End: 2021-10-22 | Stop reason: HOSPADM

## 2021-10-22 RX ADMIN — ACETAMINOPHEN 1000 MG: 500 TABLET, FILM COATED ORAL at 00:48

## 2021-10-22 RX ADMIN — ONDANSETRON HYDROCHLORIDE 4 MG: 4 TABLET, FILM COATED ORAL at 00:48

## 2021-10-22 ASSESSMENT — PAIN SCALES - GENERAL: PAINLEVEL_OUTOF10: 10

## 2021-10-22 NOTE — FLOWSHEET NOTE
Patient to L&D with c/o contractions since yesterday. Patient states they are more intense than when she was here yesterday and is having all over abdominal pain. No LOF or vaginal bleeding. +FM. [place] last night.

## 2021-10-22 NOTE — FLOWSHEET NOTE
Patient discharged ambulatory to home with discharge instructions. Labor precautions reviewed. Patient verbalizes understanding.

## 2021-10-22 NOTE — H&P
fetus on prenatal ultrasound    High-risk pregnancy, unspecified trimester        Steroids Given In This Pregnancy:  no     REVIEW OF SYSTEMS:   Constitutional: negative fever, negative chills, negative weight changes   HEENT: negative visual disturbances, negative headaches, negative dizziness, negative hearing loss  Breast: Negative breast abnormalities, negative breast lumps, negative nipple discharge  Respiratory: negative dyspnea, negative cough, negative SOB  Cardiovascular: negative chest pain,  negative palpitations, negative arrhythmia, negative syncope   Gastrointestinal: negative diarrhea, negative constipation, negative bowel changes, negative heartburn   Genitourinary: negative dysuria, negative hematuria, negative urinary incontinence, negative vaginal discharge, negative vaginal bleeding or spotting  Hematologic: negative bruising  Immunologic/Lymphatic: negative recent illness, negative recent sick contact  Musculoskeletal: negative myalgias, negative arthralgias  Neurological:  negative dizziness, negative migraines, negative seizures, negative weakness  Behavior/Psych: negative depression, negative anxiety, negative SI, negative HI    OBSTETRICAL HISTORY:   OB History    Para Term  AB Living   2 1 1 0 0 1   SAB TAB Ectopic Molar Multiple Live Births   0 0 0 0 0 1      # Outcome Date GA Lbr South/2nd Weight Sex Delivery Anes PTL Lv   2 Current            1 Term 19 38w6d 02:25 / 00:22 7 lb 11.3 oz (3.495 kg) M Vag-Spont EPI N TERESA      Name: Kwame Traylorumb: 8  Apgar5: 9       PAST MEDICAL HISTORY:   has a past medical history of ADD (attention deficit disorder), Anxiety, Asthma, CMV (cytomegalovirus infection) ([facility] [address] Depression, Gestational diabetes mellitus (GDM), antepartum, Hearing loss in left ear, Heart disease, Hypothyroidism, Immunizations up to date in pediatric patient, Neurocardiogenic syncope, Pseudoseizures ([facility] [address] Raynaud disease, Seizures ([address] SVT (supraventricular tachycardia) ([facility] [address] Tachycardia, Traumatic injury during pregnancy, third trimester, and Wears glasses. PAST SURGICAL HISTORY:   has a past surgical history that includes REMOVAL FOREIGN BODY EAR (Left) and Tympanoplasty (Left, 06/24/2016). ALLERGIES:  is allergic to sulfa antibiotics. MEDICATIONS:  Prior to Admission medications    Medication Sig Start Date End Date Taking?  Authorizing Provider   acetaminophen (TYLENOL) 500 MG tablet Take 2 tablets by mouth every 6 hours as needed for Pain 10/20/21  Yes Antonia Corrales, DO   ONETOUCH ULTRA strip use 1 TEST STRIP to TEST BLOOD SUGAR four times a day 9/30/21  Yes DARRIAN Potter CNP   famotidine (PEPCID) 20 MG tablet Take 1 tablet by mouth 2 times daily 9/14/21  Yes Gerhard Castaneda DO   albuterol sulfate HFA (VENTOLIN HFA) 108 (90 Base) MCG/ACT inhaler Inhale 2 puffs into the lungs 4 times daily as needed for Wheezing 9/14/21  Yes Gerhard Castaneda DO   Insulin Aspart FlexPen 100 UNIT/ML SOPN inject 6 units UNDER THE SKIN before dinner for 10 WEEKS 8/25/21  Yes Historical Provider, MD   NOVOLIN N 100 UNIT/ML injection vial inject 8 units UNDER THE SKIN at bedtime FOR 10 WEEKS 8/26/21  Yes Historical Provider, MD   EASY TOUCH PEN NEEDLES 32G X 4 MM MISC use 1 PEN NEEDLE to inject MEDICATION subcutaneously once daily 8/25/21  Yes Historical Provider, MD   B-D INS SYR MICROFINE 1CC/28G 28G X 1/2\" 1 ML MISC use to inject insert once daily 8/25/21  Yes Historical Provider, MD   Lancets (Sharad Eugenio) [address] use 1 LANCET to TEST BLOOD SUGAR four times a day 8/17/21  Yes Historical Provider, MD   Blood Glucose Monitoring Suppl (ONE TOUCH ULTRA 2) w/Device KIT use as directed four times a day 8/17/21  Yes Historical Provider, MD SZYMANSKI Alcohol Swabs 70 % PADS USE 4 TIMES DAILY 8/16/21  Yes Historical Provider, MD   acetaminophen (TYLENOL) 325 MG tablet Take 1 tablet by mouth every 6 hours as needed for Pain 7/30/21  Yes Leslie Richardson MD   Prenatal Vit-Fe Fumarate-FA (PNV PRENATAL PLUS MULTIVITAMIN) 27-1 MG TABS Take 1 tablet by mouth daily 7/14/21 7/9/22 Yes DARRIAN Mejia NP       FAMILY HISTORY:  family history includes Anxiety Disorder in her mother; Atrial Fibrillation in her mother; Bleeding Prob in an other family member; COPD in her maternal grandmother; Cancer in her maternal grandmother; Depression in her mother; Diabetes in her mother; Diabetes Type 1  in her maternal grandfather and another family member; Heart Disease in her maternal grandmother and mother; Hypertension in her maternal grandmother; Seizures in her maternal grandmother. She was adopted. SOCIAL HISTORY:   reports that she has quit smoking. Her smoking use included cigars. She smoked 0.50 packs per day. She has never used smokeless tobacco. She reports previous drug use. Drug: Marijuana. She reports that she does not drink alcohol.     VITALS:  Vitals:    10/22/21 0022   BP: 103/72   Pulse: 106   Resp: 18   Temp: 97.7 °F (36.5 °C)   TempSrc: Oral   SpO2: 97%       PHYSICAL EXAM:  Fetal Heart Monitor:  Baseline Heart Rate 135, moderate variability, present accelerations, absent decelerations  [place]: contractions, none    General appearance:  She is uncomfortable appearing but not in acute distress, alert and cooperative  HEENT: head atraumatic, normocephalic, moist mucous membranes, trachea midline  Neurologic:  alert, oriented, normal speech, no focal findings or movement disorder noted  Lungs:  No increased work of breathing, good air exchange, clear to auscultation bilaterally, no crackles or wheezing  Heart:  regular rate and rhythm and no murmur, rubs, gallops  Abdomen:  soft, gravid, non-tender, no rebound, guarding, or rigidity, no RUQ or epigastric tenderness, no signs or symptoms of abruption, no signs or symptoms of chorioamnionitis  Extremities:  no calf tenderness, non edematous, no varicosities, full range of motion in all four extremities  Musculoskeletal: Gross strength equal and intact throughout, no gross abnormalities, range of motion normal in hips, knees, shoulders and spine, CVA tenderness: none  Psychiatric: Mood appropriate, normal affect   Rectal Exam: not indicated  Pelvic Exam:   Chaperone for Intimate Exam: Chaperone was present for entire exam, Chaperone Name: Dipti Cleveland RN    Sterile Vaginal Exam:  Cervix: No cervical motion tenderness   Uterus: Is gravid, Normal size, shape, consistency and non-tender    Cervix: 1 cm dilated, 30 % effaced, -3 station, posterior position (out of 3 station), firm consistency, FETAL POSITION: Cephalic, Membranes intact,       PRENATAL LAB RESULTS:   Blood Type/Rh: A pos  Antibody Screen: negative  Hemoglobin, Hematocrit, Platelets: Hgb 64.0/EEQ 37.3/Plt 321  Rubella: immune  T.  Pallidum, IgG: non-reactive  Hepatitis B Surface Antigen: non-reactive   Hepatitis C Antibody: not done   HIV: non-reactive   Sickle Cell Screen: negative  Gonorrhea: negative  Chlamydia: negative  Urine culture: negative, date: 10/8/21, and 21    1 hour Glucose Tolerance Test: 144  3 hour Glucose Tolerance Test: Fastin; 1 hour: 267; 2 hour  237; 3 hour: 147    Group B Strep: negative RV culture on 10/13/21  Cystic Fibrosis Screen: negative  First Trimester Screen: not done  MSAFP/Multiple Markers: not done  Non-Invasive Prenatal Testing: low risk for aneuploidy  Anatomy US: anterior placenta, 3 VC, male gender, normal anatomy    ASSESSMENT & PLAN:  Kianna Fetch is a 24 y.o. female  at 43w4d    - GBS negative / Rh positive / R immune   - No indication for GBS prophylaxis   - VSS, afebrile     Abdominal Pain/Contractions    - Patient seen yesterday in triage with negative vaginitis, UA and UCx    - SVE 10/20/21 was -3   - SVE on admission -3; recheck 2 hours later unchanged    - Tylenol for pain    - Zofran for nausea    - Cat 1 FHT, contractions irregular   - Plan to discharge patient at this time, low suspicion for active labor. Given return precautions, labor precautions, preE signs and symptoms. Hx SVT   - Denies s/s     FHx Clotting Disorder    - Maternal aunt w/ Protein C and S deficiency    - No personal hx of blood clots     GDMA2   - Patient follows with M    - Dr. Christina Molina recently increased her insulin   - Patient reports compliance with monitoring and medications   ADD   - Patient previously on Focalin     CMV IgM+    Hx Depression    - Denies SI/HI    Late Prenatal Care   - Initial prenatal visit in second trimester     Short fetal Femur     Seizure disorder    - [facility] recommended  testing   BMI 30    Patient Active Problem List    Diagnosis Date Noted    Gestational diabetes mellitus (GDM), antepartum 08/10/2021     Priority: High     8/10/2021 consult MFM  2021 NPH 8 units before bedtime, 6 units Novolog before dinner.  SVT (supraventricular tachycardia) ([facility] [address] 2019     Priority: High     2019 Referral to cardiology      Family history of clotting disorder 2019     Priority: High     2018 patient's maternal aunt with hx of Protein S and C deficiency      Family history of diabetes mellitus 2019     Priority: Medium     2019 early one hour GTT ordered      High-risk pregnancy, unspecified trimester 10/22/2021    Short femur of fetus on prenatal ultrasound 2021    Late prenatal care 2021    Severe recurrent major depression without psychotic features ([address] 2020    Seizures ([facility] [address] 03/15/2020      testing with weekly NST per [facility] starting at 28 weeks       19 M Apg 8/9 Wt 7#11 2019    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 Hillary Patel MD. See note in media section on 2019.  ADD (attention deficit disorder)      2019 stopped taking focalin 8 months ago       Patient may be discharged to home.  Patient counseled on labor precautions, preE precautions, adequate PO hydration, and fetal kick counts. All questions answered and concerns addressed. Patient vocalized understanding. Plan discussed with Dr. Irish Martínez, who is agreeable. Steroids given this admission: No    Risks, benefits, alternatives and possible complications have been discussed in detail with the patient. Admission, and post admission procedures and expectations were discussed in detail. All questions were answered.     Attending's Name: Dr. Cj Jordan DO  Ob/Gyn Resident  10/22/2021, 2:13 AM

## 2021-10-26 ENCOUNTER — HOSPITAL ENCOUNTER (OUTPATIENT)
Age: 21
Discharge: HOME | DRG: 560 | End: 2021-10-26
Attending: OBSTETRICS & GYNECOLOGY | Admitting: OBSTETRICS & GYNECOLOGY
Payer: COMMERCIAL

## 2021-10-26 ENCOUNTER — TELEPHONE (OUTPATIENT)
Dept: OBGYN CLINIC | Age: 21
End: 2021-10-26

## 2021-10-26 VITALS
TEMPERATURE: 97.6 F | HEART RATE: 69 BPM | DIASTOLIC BLOOD PRESSURE: 72 MMHG | BODY MASS INDEX: 30.3 KG/M2 | RESPIRATION RATE: 108 BRPM | WEIGHT: 171 LBS | HEIGHT: 63 IN | SYSTOLIC BLOOD PRESSURE: 114 MMHG

## 2021-10-26 PROBLEM — Z3A.38 38 WEEKS GESTATION OF PREGNANCY: Status: ACTIVE | Noted: 2021-10-26

## 2021-10-26 LAB
DIRECT EXAM: NORMAL
Lab: NORMAL
SPECIMEN DESCRIPTION: NORMAL

## 2021-10-26 PROCEDURE — 99213 OFFICE O/P EST LOW 20 MIN: CPT

## 2021-10-26 PROCEDURE — 87510 GARDNER VAG DNA DIR PROBE: CPT

## 2021-10-26 PROCEDURE — 83986 ASSAY PH BODY FLUID NOS: CPT

## 2021-10-26 PROCEDURE — 87660 TRICHOMONAS VAGIN DIR PROBE: CPT

## 2021-10-26 PROCEDURE — 6370000000 HC RX 637 (ALT 250 FOR IP): Performed by: STUDENT IN AN ORGANIZED HEALTH CARE EDUCATION/TRAINING PROGRAM

## 2021-10-26 PROCEDURE — 87591 N.GONORRHOEAE DNA AMP PROB: CPT

## 2021-10-26 PROCEDURE — 87480 CANDIDA DNA DIR PROBE: CPT

## 2021-10-26 PROCEDURE — 87491 CHLMYD TRACH DNA AMP PROBE: CPT

## 2021-10-26 RX ORDER — ONDANSETRON 4 MG/1
4 TABLET, ORALLY DISINTEGRATING ORAL EVERY 8 HOURS PRN
Status: DISCONTINUED | OUTPATIENT
Start: 2021-10-26 | End: 2021-10-26 | Stop reason: HOSPADM

## 2021-10-26 RX ORDER — ACETAMINOPHEN 500 MG
1000 TABLET ORAL EVERY 4 HOURS PRN
Status: DISCONTINUED | OUTPATIENT
Start: 2021-10-26 | End: 2021-10-26 | Stop reason: HOSPADM

## 2021-10-26 RX ORDER — ONDANSETRON 2 MG/ML
4 INJECTION INTRAMUSCULAR; INTRAVENOUS EVERY 6 HOURS PRN
Status: DISCONTINUED | OUTPATIENT
Start: 2021-10-26 | End: 2021-10-26 | Stop reason: HOSPADM

## 2021-10-26 RX ADMIN — ACETAMINOPHEN 1000 MG: 500 TABLET ORAL at 16:46

## 2021-10-26 NOTE — TELEPHONE ENCOUNTER
Patient called in with concerns of vaginal discharge. States she lost her mucus plug a few days ago and now she has \" globs of discharge coming out\" had intercourse yesterday and has been having discharge ever since. Has some cramping and decreased fetal movement. Per Deepti Arreaga, patient to be seen at [facility] L&D.  Patient agreeable to plan

## 2021-10-26 NOTE — PROGRESS NOTES
Obstetric/Gynecology Resident Interval Note    Patient cervix was rechecked and was unchanged from previously: 1/thick/-2. Patient having irregular contractions on TOCO. Patient will be discharged home as there is low concern for labor at this time with unchanged cervical exam and irregular contractions. Patient counseled on labor precautions, pre-eclampsia signs and symptoms, PO hydration, and fetal kick counts. All questions and concerns were addressed and patient expressed understanding. Patient advised to follow up in the office for next appointment on 10/29/21. RR IOL scheduled at [facility] for patient on 10/31/21 at 2200.     Senior resident and attending updated and in agreement with plan    Ava Schumacher MD  OB/GYN Resident, PGY1  [facility]  10/26/2021, 6:38 PM

## 2021-10-26 NOTE — FLOWSHEET NOTE
LATE ENTRY  Pt arrives with c/o cramping, leaking of fluid and decrease fetal movement. Pt to 710, given gown to change into and asked to obtain a urine sample.

## 2021-10-26 NOTE — H&P
OBSTETRICAL HISTORY Formerly [facility]    Date: 10/26/2021       Time: 3:16 PM   Patient Name: Luly Morris     Patient : 2000  Room/Bed: 0710/0710-01    Admission Date/Time: 10/26/2021  3:10 PM      CC: contractions, vaginal discharge, decreased fetal movement     HPI: Luly Morris is a 24 y.o.  at 38w5d who presents with vaginal discharge and decreased fetal movement. She states she has had \"clumpy\" white and clear discharge for the past several days. She denies any recent intercourse, dysuria, urinary frequency, concern for STDs. She endorses positive fetal movement but decreased from baseline since last night. She also endorses irregular contractions since walking to the hospital this afternoon. She reports eating a small meal and drinking minimal fluids today. She reports compliance with her new insulin regimen (Humalog 0/6/10; NPH 16 nightly) and fasting BS around 95 and 2 hr post prandial -140. She denies taking anything for pain. The patient reports fetal movement is present, complains of irregular contractions, denies loss of fluid, denies vaginal bleeding. Patient denies any headache, visual changes, difficulty breathing, RUQ pain, N/V, F/C, and pain/swelling in lower extremities. Denies any dysuria. DATING:  LMP: No LMP recorded (lmp unknown). Patient is pregnant.   Estimated Date of Delivery: 21   Based on: early ultrasound, at 8 4/7 weeks GA    PREGNANCY RISK FACTORS:  Patient Active Problem List   Diagnosis    ADD (attention deficit disorder)    [facility] [address]    Family history of clotting disorder    Family history of diabetes mellitus    Neurocardiogenic syncope    CMV IgM+     19 M Apg 8/9 Wt 7#11    Seizures ([address]    Severe recurrent major depression without psychotic features ([address]    Late prenatal care    Gestational diabetes mellitus (GDM), antepartum    Short femur of fetus on prenatal ultrasound    High-risk pregnancy, unspecified trimester    38 weeks gestation of pregnancy        Steroids Given In This Pregnancy:  no     REVIEW OF SYSTEMS:   Constitutional: negative fever, negative chills, negative weight changes   HEENT: negative visual disturbances, negative headaches, negative dizziness, negative hearing loss  Breast: Negative breast abnormalities, negative breast lumps, negative nipple discharge  Respiratory: negative dyspnea, negative cough, negative SOB  Cardiovascular: negative chest pain,  negative palpitations, negative arrhythmia, negative syncope   Gastrointestinal: +abdominal pain/contractions, negative RUQ pain, negative N/V, negative diarrhea, negative constipation, negative bowel changes, negative heartburn   Genitourinary: negative dysuria, negative hematuria, negative urinary incontinence, +vaginal discharge, negative vaginal bleeding or spotting  Dermatological: negative rash, negative pruritis, negative mole or other skin changes  Hematologic: negative bruising  Immunologic/Lymphatic: negative recent illness, negative recent sick contact  Musculoskeletal: negative back pain, negative myalgias, negative arthralgias  Neurological:  negative dizziness, negative migraines, negative seizures, negative weakness  Behavior/Psych: negative depression, negative anxiety, negative SI, negative HI    OBSTETRICAL HISTORY:   OB History    Para Term  AB Living   2 1 1 0 0 1   SAB TAB Ectopic Molar Multiple Live Births   0 0 0 0 0 1      # Outcome Date GA Lbr South/2nd Weight Sex Delivery Anes PTL Lv   2 Current            1 Term 19 38w6d 02: 00:22 7 lb 11.3 oz (3.495 kg) M Vag-Spont EPI N TERESA      Name: Kari Whitlock: 8  Apgar5: 9       PAST MEDICAL HISTORY:   has a past medical history of ADD (attention deficit disorder), Anxiety, Asthma, CMV (cytomegalovirus infection) ([facility] [address] Depression, Gestational diabetes mellitus (GDM), antepartum, Hearing loss in left ear, Heart disease, Hypothyroidism, Immunizations up to date in pediatric patient, Neurocardiogenic syncope, Pseudoseizures ([address] Raynaud disease, Seizures ([address] SVT (supraventricular tachycardia) ([address] Tachycardia, Traumatic injury during pregnancy, third trimester, and Wears glasses. PAST SURGICAL HISTORY:   has a past surgical history that includes REMOVAL FOREIGN BODY EAR (Left) and Tympanoplasty (Left, 06/24/2016). ALLERGIES:  is allergic to sulfa antibiotics. MEDICATIONS:  Prior to Admission medications    Medication Sig Start Date End Date Taking?  Authorizing Provider   acetaminophen (TYLENOL) 500 MG tablet Take 2 tablets by mouth every 6 hours as needed for Pain 10/20/21   Jaison Marcus,    ONETOUCH ULTRA strip use 1 TEST STRIP to TEST BLOOD SUGAR four times a day 9/30/21   DARRIAN Black - CNP   famotidine (PEPCID) 20 MG tablet Take 1 tablet by mouth 2 times daily 9/14/21   Katelynn Marie DO   albuterol sulfate HFA (VENTOLIN HFA) 108 (90 Base) MCG/ACT inhaler Inhale 2 puffs into the lungs 4 times daily as needed for Wheezing 9/14/21   Katelynn Marie DO   Insulin Aspart FlexPen 100 UNIT/ML SOPN inject 6 units UNDER THE SKIN before dinner for 10 WEEKS 8/25/21   Historical Provider, MD   NOVOLIN N 100 UNIT/ML injection vial inject 8 units UNDER THE SKIN at bedtime FOR 10 WEEKS 8/26/21   Historical Provider, MD   EASY TOUCH PEN NEEDLES 32G X 4 MM MISC use 1 PEN NEEDLE to inject MEDICATION subcutaneously once daily 8/25/21   Historical Provider, MD GORDON INS SYR MICROFINE 1CC/28G 28G X 1/2\" 1 ML MISC use to inject insert once daily 8/25/21   Historical Provider, MD   Lancets (Sy Delacruz) [address] use 1 LANCET to TEST BLOOD SUGAR four times a day 8/17/21   Historical Provider, MD   Blood Glucose Monitoring Suppl (ONE TOUCH ULTRA 2) w/Device KIT use as directed four times a day 8/17/21   Historical Provider, MD SZYMANSKI Alcohol Swabs 70 % PADS USE 4 TIMES DAILY 8/16/21   Historical Provider, MD   acetaminophen (TYLENOL) 325 MG tablet Take 1 tablet by mouth every 6 hours as needed for Pain 7/30/21   Lizet Salinas MD   Prenatal Vit-Fe Fumarate-FA (PNV PRENATAL PLUS MULTIVITAMIN) 27-1 MG TABS Take 1 tablet by mouth daily 7/14/21 7/9/22  DARRIAN Wyman - JESSICA       FAMILY HISTORY:  family history includes Anxiety Disorder in her mother; Atrial Fibrillation in her mother; Bleeding Prob in an other family member; COPD in her maternal grandmother; Cancer in her maternal grandmother; Depression in her mother; Diabetes in her mother; Diabetes Type 1  in her maternal grandfather and another family member; Heart Disease in her maternal grandmother and mother; Hypertension in her maternal grandmother; Seizures in her maternal grandmother. She was adopted. SOCIAL HISTORY:   reports that she has quit smoking. Her smoking use included cigars. She smoked 0.50 packs per day. She has never used smokeless tobacco. She reports previous drug use. Drug: Marijuana. She reports that she does not drink alcohol.     VITALS:  Vitals:    10/26/21 1517 10/26/21 1658   BP: 102/74 114/72   Pulse:  69   Resp: (!) 108    Temp: 98 °F (36.7 °C) 97.6 °F (36.4 °C)   TempSrc: Oral Oral   Weight: 171 lb (77.6 kg)    Height: 5' 3\" (1.6 m)          PHYSICAL EXAM:  Fetal Heart Monitor:  Baseline Heart Rate 130, moderate variability, present accelerations, absent decelerations  Blair: contractions, irregular, every 7-8 minutes     General appearance:  no apparent distress, alert and cooperative  HEENT: head atraumatic, normocephalic, moist mucous membranes, trachea midline  Neurologic:  alert, oriented, normal speech, no focal findings or movement disorder noted  Lungs:  No increased work of breathing, good air exchange, clear to auscultation bilaterally, no crackles or wheezing  Heart:  regular rate and rhythm and no murmur, rubs, gallops  Abdomen:  soft, gravid, non-tender, no rebound, guarding, or rigidity, no RUQ or epigastric tenderness, no signs or symptoms of abruption, no signs or symptoms of chorioamnionitis  Extremities:  no calf tenderness, non edematous, no varicosities, full range of motion in all four extremities  Musculoskeletal: Gross strength equal and intact throughout, no gross abnormalities, range of motion normal in hips, knees, shoulders and spine  Psychiatric: Mood appropriate, normal affect   Rectal Exam: not indicated  Pelvic Exam:   Chaperone for Intimate Exam: Chaperone was present for entire exam, Chaperone Name: Rosmery Snell RN  Sterile Speculum Exam:   Urethral meatus: normal appearing   Vulva: Normal hair distribution, normal appearing vulva, no masses, tenderness or lesions, normal clitoris   Vagina: Normal appearing vaginal mucosa without lesions, copious amounts of white/clear vaginal discharge noted in the posterior vault, no lacerations   Cervix: Normal appearing cervix without lesions, no lacerations or abnormal lesions visualized  Sterile Vaginal Exam:  Cervix: No cervical motion tenderness   Uterus: Is gravid, Normal size, shape, consistency and non-tender   Adnexa: Non-tender, no palpable masses  Cervix: 1 cm dilated, 0 % effaced, -2 station, posterior position (out of 3 station), medium consistency, FETAL POSITION: Cephalic (confirmed by ultrasound), Membranes intact    DATA:  Membranes Ruptured: No  Fern: negative  Nitrazine: Negative  Valsalva/Pooling: absent  Vaginal Bleeding: absent      LIMITED BEDSIDE US:  Position: Cephalic  Placental Location: posterior  Fetal Heart Tones: Present  Fetal Movement: Present  Amniotic Fluid Index/Volume: adequate 2x2 cm fluid pocket    PRENATAL LAB RESULTS:   Blood Type/Rh: A pos  Antibody Screen: negative  Hemoglobin, Hematocrit, Platelets: Hgb 72.6/VMH 37.3/Plt 321  Rubella: immune  T.  Pallidum, IgG: non-reactive  Hepatitis B Surface Antigen: non-reactive   Hepatitis C Antibody: non-reactive Asthma              - Clinically asymptomatic               - Stable on albuterol inhaler prn     Hx Chlamydia during pregnancy              - Positive 21, 21              - Negative TOR 21, 21, 9/10/21, 10/13/21, 10/7/21              - Gc/C collected today, will follow results     Hx SVT   - Cardiology referral placed 2019              - Denies any s/s or medications    Short Fetal Femur   - NIPT wnl     FHx DM              - Failed Early 1 hr                - 3hr GTT: Fasting 101, 1 hr 267, 2 hr 237, 3 hr 147     FHx blood clots              - Patient's maternal aunt has history of Protein C and S deficiency   - Denies any personal hx of clots    Hx Seizures   - Stable on no meds   - [facility] recommended weekly NST at 32 weeks    Depression   - Stable on Zoloft 25mg nightly   - Denies HI/SI    Late Prenatal Care     BMI 29          Patient Active Problem List    Diagnosis Date Noted    Gestational diabetes mellitus (GDM), antepartum 08/10/2021     Priority: High     8/10/2021 consult MFM  2021 NPH 8 units before bedtime, 6 units Novolog before dinner.       SVT (supraventricular tachycardia) ([facility] [address] 2019     Priority: High     2019 Referral to cardiology      Family history of clotting disorder 2019     Priority: High     2018 patient's maternal aunt with hx of Protein S and C deficiency      Family history of diabetes mellitus 2019     Priority: Medium     2019 early one hour GTT ordered      38 weeks gestation of pregnancy 10/26/2021    High-risk pregnancy, unspecified trimester 10/22/2021    Short femur of fetus on prenatal ultrasound 2021    Late prenatal care 2021    Severe recurrent major depression without psychotic features ([address] 2020    Seizures ([facility] [address] 03/15/2020      testing with weekly NST per [facility] starting at 28 weeks       19 M Apg 8/9 Wt 7#11 2019    CMV IgM+ 07/10/2019    Neurocardiogenic syncope 07/08/2019     Diagnosed by Dr. Bradley Moya MD. See note in media section on 1/24/2019.  ADD (attention deficit disorder)      1/9/2019 stopped taking focalin 8 months ago         Plan discussed with Dr. Bia Heard, who is agreeable. Steroids given this admission: No    Risks, benefits, alternatives and possible complications have been discussed in detail with the patient. Admission, and post admission procedures and expectations were discussed in detail. All questions were answered.     Attending's Name: Dr. Jey Cordoba DO  Ob/Gyn Resident  10/26/2021, 3:16 PM

## 2021-10-27 LAB
C TRACH DNA GENITAL QL NAA+PROBE: NEGATIVE
FERN TESTING (POC): NORMAL
N. GONORRHOEAE DNA: NEGATIVE
NITRAZINE PH (POC): NEGATIVE
SPECIMEN DESCRIPTION: NORMAL

## 2021-10-28 ENCOUNTER — ANESTHESIA (OUTPATIENT)
Dept: LABOR AND DELIVERY | Age: 21
DRG: 560 | End: 2021-10-28
Payer: COMMERCIAL

## 2021-10-28 ENCOUNTER — HOSPITAL ENCOUNTER (INPATIENT)
Age: 21
LOS: 2 days | Discharge: HOME | DRG: 560 | End: 2021-10-30
Attending: OBSTETRICS & GYNECOLOGY | Admitting: OBSTETRICS & GYNECOLOGY
Payer: COMMERCIAL

## 2021-10-28 ENCOUNTER — ANESTHESIA EVENT (OUTPATIENT)
Dept: LABOR AND DELIVERY | Age: 21
DRG: 560 | End: 2021-10-28
Payer: COMMERCIAL

## 2021-10-28 PROBLEM — O09.90 HIGH-RISK PREGNANCY, UNSPECIFIED TRIMESTER: Status: RESOLVED | Noted: 2021-10-22 | Resolved: 2021-10-28

## 2021-10-28 PROBLEM — Z3A.39 39 WEEKS GESTATION OF PREGNANCY: Status: ACTIVE | Noted: 2021-10-28

## 2021-10-28 PROBLEM — Z65.3 LOST CUSTODY OF CHILDREN: Status: ACTIVE | Noted: 2021-10-28

## 2021-10-28 PROBLEM — Z3A.38 38 WEEKS GESTATION OF PREGNANCY: Status: RESOLVED | Noted: 2021-10-26 | Resolved: 2021-10-28

## 2021-10-28 LAB
ABSOLUTE EOS #: 0.1 K/UL (ref 0–0.44)
ABSOLUTE IMMATURE GRANULOCYTE: 0.03 K/UL (ref 0–0.3)
ABSOLUTE LYMPH #: 2.61 K/UL (ref 1.1–3.7)
ABSOLUTE MONO #: 0.69 K/UL (ref 0.1–1.4)
AMPHETAMINE SCREEN URINE: NEGATIVE
BARBITURATE SCREEN URINE: NEGATIVE
BASOPHILS # BLD: 0 % (ref 0–2)
BASOPHILS ABSOLUTE: 0.04 K/UL (ref 0–0.2)
BENZODIAZEPINE SCREEN, URINE: NEGATIVE
BUPRENORPHINE URINE: NORMAL
CANNABINOID SCREEN URINE: NEGATIVE
COCAINE METABOLITE, URINE: NEGATIVE
DIFFERENTIAL TYPE: ABNORMAL
EOSINOPHILS RELATIVE PERCENT: 1 % (ref 1–4)
GLUCOSE BLD-MCNC: 66 MG/DL (ref 65–105)
GLUCOSE BLD-MCNC: 75 MG/DL (ref 65–105)
GLUCOSE BLD-MCNC: 81 MG/DL (ref 65–105)
HCT VFR BLD CALC: 40.3 % (ref 36.3–47.1)
HEMOGLOBIN: 13.3 G/DL (ref 11.9–15.1)
IMMATURE GRANULOCYTES: 0 %
LYMPHOCYTES # BLD: 26 % (ref 25–45)
MCH RBC QN AUTO: 32 PG (ref 25.2–33.5)
MCHC RBC AUTO-ENTMCNC: 33 G/DL (ref 28.4–34.8)
MCV RBC AUTO: 96.9 FL (ref 82.6–102.9)
MDMA URINE: NORMAL
METHADONE SCREEN, URINE: NEGATIVE
METHAMPHETAMINE, URINE: NORMAL
MONOCYTES # BLD: 7 % (ref 2–8)
NRBC AUTOMATED: 0 PER 100 WBC
OPIATES, URINE: NEGATIVE
OXYCODONE SCREEN URINE: NEGATIVE
PDW BLD-RTO: 12.9 % (ref 11.8–14.4)
PHENCYCLIDINE, URINE: NEGATIVE
PLATELET # BLD: 422 K/UL (ref 138–453)
PLATELET ESTIMATE: ABNORMAL
PMV BLD AUTO: 9 FL (ref 8.1–13.5)
PROPOXYPHENE, URINE: NORMAL
RBC # BLD: 4.16 M/UL (ref 3.95–5.11)
RBC # BLD: ABNORMAL 10*6/UL
SEG NEUTROPHILS: 66 % (ref 34–64)
SEGMENTED NEUTROPHILS ABSOLUTE COUNT: 6.72 K/UL (ref 1.5–8.1)
T. PALLIDUM, IGG: NONREACTIVE
TEST INFORMATION: NORMAL
TRICYCLIC ANTIDEPRESSANTS, UR: NORMAL
WBC # BLD: 10.2 K/UL (ref 4.5–13.5)
WBC # BLD: ABNORMAL 10*3/UL

## 2021-10-28 PROCEDURE — 88307 TISSUE EXAM BY PATHOLOGIST: CPT

## 2021-10-28 PROCEDURE — 1220000000 HC SEMI PRIVATE OB R&B

## 2021-10-28 PROCEDURE — 3700000025 EPIDURAL BLOCK: Performed by: ANESTHESIOLOGY

## 2021-10-28 PROCEDURE — 6360000002 HC RX W HCPCS

## 2021-10-28 PROCEDURE — 6360000002 HC RX W HCPCS: Performed by: ANESTHESIOLOGY

## 2021-10-28 PROCEDURE — 6360000002 HC RX W HCPCS: Performed by: NURSE ANESTHETIST, CERTIFIED REGISTERED

## 2021-10-28 PROCEDURE — 86920 COMPATIBILITY TEST SPIN: CPT

## 2021-10-28 PROCEDURE — 2580000003 HC RX 258

## 2021-10-28 PROCEDURE — 86900 BLOOD TYPING SEROLOGIC ABO: CPT

## 2021-10-28 PROCEDURE — 82947 ASSAY GLUCOSE BLOOD QUANT: CPT

## 2021-10-28 PROCEDURE — 85025 COMPLETE CBC W/AUTO DIFF WBC: CPT

## 2021-10-28 PROCEDURE — 2500000003 HC RX 250 WO HCPCS: Performed by: NURSE ANESTHETIST, CERTIFIED REGISTERED

## 2021-10-28 PROCEDURE — 6370000000 HC RX 637 (ALT 250 FOR IP)

## 2021-10-28 PROCEDURE — 86850 RBC ANTIBODY SCREEN: CPT

## 2021-10-28 PROCEDURE — 6360000002 HC RX W HCPCS: Performed by: STUDENT IN AN ORGANIZED HEALTH CARE EDUCATION/TRAINING PROGRAM

## 2021-10-28 PROCEDURE — 10907ZC DRAINAGE OF AMNIOTIC FLUID, THERAPEUTIC FROM PRODUCTS OF CONCEPTION, VIA NATURAL OR ARTIFICIAL OPENING: ICD-10-PCS | Performed by: OBSTETRICS & GYNECOLOGY

## 2021-10-28 PROCEDURE — 86780 TREPONEMA PALLIDUM: CPT

## 2021-10-28 PROCEDURE — 59409 OBSTETRICAL CARE: CPT | Performed by: OBSTETRICS & GYNECOLOGY

## 2021-10-28 PROCEDURE — 86901 BLOOD TYPING SEROLOGIC RH(D): CPT

## 2021-10-28 PROCEDURE — 7200000001 HC VAGINAL DELIVERY

## 2021-10-28 PROCEDURE — 6370000000 HC RX 637 (ALT 250 FOR IP): Performed by: STUDENT IN AN ORGANIZED HEALTH CARE EDUCATION/TRAINING PROGRAM

## 2021-10-28 PROCEDURE — 80307 DRUG TEST PRSMV CHEM ANLYZR: CPT

## 2021-10-28 RX ORDER — ROPIVACAINE HYDROCHLORIDE 2 MG/ML
INJECTION, SOLUTION EPIDURAL; INFILTRATION; PERINEURAL PRN
Status: DISCONTINUED | OUTPATIENT
Start: 2021-10-28 | End: 2021-10-28 | Stop reason: SDUPTHER

## 2021-10-28 RX ORDER — ROPIVACAINE HYDROCHLORIDE 2 MG/ML
INJECTION, SOLUTION EPIDURAL; INFILTRATION; PERINEURAL
Status: COMPLETED
Start: 2021-10-28 | End: 2021-10-28

## 2021-10-28 RX ORDER — DEXTROSE MONOHYDRATE 25 G/50ML
12.5 INJECTION, SOLUTION INTRAVENOUS PRN
Status: DISCONTINUED | OUTPATIENT
Start: 2021-10-28 | End: 2021-10-28

## 2021-10-28 RX ORDER — ACETAMINOPHEN 500 MG
1000 TABLET ORAL EVERY 6 HOURS PRN
Status: DISCONTINUED | OUTPATIENT
Start: 2021-10-28 | End: 2021-10-30 | Stop reason: HOSPADM

## 2021-10-28 RX ORDER — ONDANSETRON 2 MG/ML
4 INJECTION INTRAMUSCULAR; INTRAVENOUS EVERY 6 HOURS PRN
Status: DISCONTINUED | OUTPATIENT
Start: 2021-10-28 | End: 2021-10-28

## 2021-10-28 RX ORDER — IBUPROFEN 600 MG/1
600 TABLET ORAL EVERY 6 HOURS PRN
Qty: 60 TABLET | Refills: 1 | Status: SHIPPED | OUTPATIENT
Start: 2021-10-28 | End: 2022-01-26

## 2021-10-28 RX ORDER — LIDOCAINE HYDROCHLORIDE AND EPINEPHRINE 15; 5 MG/ML; UG/ML
INJECTION, SOLUTION EPIDURAL PRN
Status: DISCONTINUED | OUTPATIENT
Start: 2021-10-28 | End: 2021-10-28 | Stop reason: SDUPTHER

## 2021-10-28 RX ORDER — ONDANSETRON 2 MG/ML
4 INJECTION INTRAMUSCULAR; INTRAVENOUS EVERY 4 HOURS PRN
Status: DISCONTINUED | OUTPATIENT
Start: 2021-10-28 | End: 2021-10-30 | Stop reason: HOSPADM

## 2021-10-28 RX ORDER — SODIUM CHLORIDE, SODIUM LACTATE, POTASSIUM CHLORIDE, AND CALCIUM CHLORIDE .6; .31; .03; .02 G/100ML; G/100ML; G/100ML; G/100ML
500 INJECTION, SOLUTION INTRAVENOUS PRN
Status: DISCONTINUED | OUTPATIENT
Start: 2021-10-28 | End: 2021-10-28

## 2021-10-28 RX ORDER — SODIUM CHLORIDE 0.9 % (FLUSH) 0.9 %
5-40 SYRINGE (ML) INJECTION EVERY 12 HOURS SCHEDULED
Status: DISCONTINUED | OUTPATIENT
Start: 2021-10-28 | End: 2021-10-28

## 2021-10-28 RX ORDER — SIMETHICONE 80 MG
80 TABLET,CHEWABLE ORAL EVERY 6 HOURS PRN
Status: DISCONTINUED | OUTPATIENT
Start: 2021-10-28 | End: 2021-10-30 | Stop reason: HOSPADM

## 2021-10-28 RX ORDER — NALBUPHINE HCL 10 MG/ML
10 AMPUL (ML) INJECTION ONCE
Status: COMPLETED | OUTPATIENT
Start: 2021-10-28 | End: 2021-10-28

## 2021-10-28 RX ORDER — SERTRALINE HYDROCHLORIDE 25 MG/1
25 TABLET, FILM COATED ORAL NIGHTLY
Status: DISCONTINUED | OUTPATIENT
Start: 2021-10-28 | End: 2021-10-30 | Stop reason: HOSPADM

## 2021-10-28 RX ORDER — DOCUSATE SODIUM 100 MG/1
100 CAPSULE, LIQUID FILLED ORAL 2 TIMES DAILY
Status: DISCONTINUED | OUTPATIENT
Start: 2021-10-28 | End: 2021-10-28

## 2021-10-28 RX ORDER — IBUPROFEN 600 MG/1
600 TABLET ORAL EVERY 6 HOURS PRN
Status: DISCONTINUED | OUTPATIENT
Start: 2021-10-28 | End: 2021-10-30 | Stop reason: HOSPADM

## 2021-10-28 RX ORDER — ACETAMINOPHEN 500 MG
1000 TABLET ORAL EVERY 6 HOURS PRN
Status: DISCONTINUED | OUTPATIENT
Start: 2021-10-28 | End: 2021-10-28

## 2021-10-28 RX ORDER — NALOXONE HYDROCHLORIDE 0.4 MG/ML
0.4 INJECTION, SOLUTION INTRAMUSCULAR; INTRAVENOUS; SUBCUTANEOUS PRN
Status: DISCONTINUED | OUTPATIENT
Start: 2021-10-28 | End: 2021-10-28

## 2021-10-28 RX ORDER — NALBUPHINE HCL 10 MG/ML
5 AMPUL (ML) INJECTION EVERY 4 HOURS PRN
Status: DISCONTINUED | OUTPATIENT
Start: 2021-10-28 | End: 2021-10-28

## 2021-10-28 RX ORDER — LANOLIN 72 %
OINTMENT (GRAM) TOPICAL PRN
Status: DISCONTINUED | OUTPATIENT
Start: 2021-10-28 | End: 2021-10-30 | Stop reason: HOSPADM

## 2021-10-28 RX ORDER — BISACODYL 10 MG
10 SUPPOSITORY, RECTAL RECTAL DAILY PRN
Status: DISCONTINUED | OUTPATIENT
Start: 2021-10-28 | End: 2021-10-30 | Stop reason: HOSPADM

## 2021-10-28 RX ORDER — DEXTROSE MONOHYDRATE 50 MG/ML
100 INJECTION, SOLUTION INTRAVENOUS PRN
Status: DISCONTINUED | OUTPATIENT
Start: 2021-10-28 | End: 2021-10-28

## 2021-10-28 RX ORDER — SODIUM CHLORIDE, SODIUM LACTATE, POTASSIUM CHLORIDE, CALCIUM CHLORIDE 600; 310; 30; 20 MG/100ML; MG/100ML; MG/100ML; MG/100ML
INJECTION, SOLUTION INTRAVENOUS CONTINUOUS
Status: DISCONTINUED | OUTPATIENT
Start: 2021-10-28 | End: 2021-10-28

## 2021-10-28 RX ORDER — SODIUM CHLORIDE 0.9 % (FLUSH) 0.9 %
5-40 SYRINGE (ML) INJECTION PRN
Status: DISCONTINUED | OUTPATIENT
Start: 2021-10-28 | End: 2021-10-28

## 2021-10-28 RX ORDER — NALBUPHINE HCL 10 MG/ML
AMPUL (ML) INJECTION
Status: COMPLETED
Start: 2021-10-28 | End: 2021-10-28

## 2021-10-28 RX ORDER — SENNA AND DOCUSATE SODIUM 50; 8.6 MG/1; MG/1
2 TABLET, FILM COATED ORAL DAILY
Status: DISCONTINUED | OUTPATIENT
Start: 2021-10-29 | End: 2021-10-30 | Stop reason: HOSPADM

## 2021-10-28 RX ORDER — SODIUM CHLORIDE 9 MG/ML
25 INJECTION, SOLUTION INTRAVENOUS PRN
Status: DISCONTINUED | OUTPATIENT
Start: 2021-10-28 | End: 2021-10-28

## 2021-10-28 RX ORDER — ONDANSETRON 4 MG/1
4 TABLET, ORALLY DISINTEGRATING ORAL EVERY 8 HOURS PRN
Status: DISCONTINUED | OUTPATIENT
Start: 2021-10-28 | End: 2021-10-28

## 2021-10-28 RX ORDER — SODIUM CHLORIDE, SODIUM LACTATE, POTASSIUM CHLORIDE, AND CALCIUM CHLORIDE .6; .31; .03; .02 G/100ML; G/100ML; G/100ML; G/100ML
1000 INJECTION, SOLUTION INTRAVENOUS PRN
Status: DISCONTINUED | OUTPATIENT
Start: 2021-10-28 | End: 2021-10-28

## 2021-10-28 RX ORDER — LIDOCAINE HYDROCHLORIDE 10 MG/ML
INJECTION, SOLUTION INFILTRATION; PERINEURAL PRN
Status: DISCONTINUED | OUTPATIENT
Start: 2021-10-28 | End: 2021-10-28 | Stop reason: SDUPTHER

## 2021-10-28 RX ORDER — LIDOCAINE HYDROCHLORIDE 10 MG/ML
30 INJECTION, SOLUTION EPIDURAL; INFILTRATION; INTRACAUDAL; PERINEURAL PRN
Status: DISCONTINUED | OUTPATIENT
Start: 2021-10-28 | End: 2021-10-28

## 2021-10-28 RX ORDER — SENNA PLUS 8.6 MG/1
1 TABLET ORAL 2 TIMES DAILY
Qty: 60 TABLET | Refills: 0 | Status: SHIPPED | OUTPATIENT
Start: 2021-10-28 | End: 2021-11-27

## 2021-10-28 RX ORDER — NICOTINE POLACRILEX 4 MG
15 LOZENGE BUCCAL PRN
Status: DISCONTINUED | OUTPATIENT
Start: 2021-10-28 | End: 2021-10-28

## 2021-10-28 RX ORDER — ACETAMINOPHEN 500 MG
1000 TABLET ORAL EVERY 6 HOURS PRN
Status: DISCONTINUED | OUTPATIENT
Start: 2021-10-28 | End: 2021-10-28 | Stop reason: SDUPTHER

## 2021-10-28 RX ORDER — INSULIN ASPART 100 [IU]/ML
6 INJECTION, SOLUTION INTRAVENOUS; SUBCUTANEOUS
Status: DISCONTINUED | OUTPATIENT
Start: 2021-10-28 | End: 2021-10-28

## 2021-10-28 RX ORDER — ONDANSETRON 2 MG/ML
4 INJECTION INTRAMUSCULAR; INTRAVENOUS EVERY 6 HOURS PRN
Status: DISCONTINUED | OUTPATIENT
Start: 2021-10-28 | End: 2021-10-28 | Stop reason: SDUPTHER

## 2021-10-28 RX ORDER — GLUCAGON 1 MG/ML
1 KIT INJECTION PRN
Status: DISCONTINUED | OUTPATIENT
Start: 2021-10-28 | End: 2021-10-28

## 2021-10-28 RX ORDER — SODIUM CHLORIDE 9 MG/ML
INJECTION, SOLUTION INTRAVENOUS CONTINUOUS
Status: DISCONTINUED | OUTPATIENT
Start: 2021-10-28 | End: 2021-10-28

## 2021-10-28 RX ADMIN — SODIUM CHLORIDE: 9 INJECTION, SOLUTION INTRAVENOUS at 06:33

## 2021-10-28 RX ADMIN — NALBUPHINE HYDROCHLORIDE 10 MG: 10 INJECTION, SOLUTION INTRAMUSCULAR; INTRAVENOUS; SUBCUTANEOUS at 08:19

## 2021-10-28 RX ADMIN — ACETAMINOPHEN 1000 MG: 500 TABLET ORAL at 16:24

## 2021-10-28 RX ADMIN — Medication 10 MG: at 08:19

## 2021-10-28 RX ADMIN — Medication 1 MILLI-UNITS/MIN: at 10:26

## 2021-10-28 RX ADMIN — IBUPROFEN 600 MG: 600 TABLET, FILM COATED ORAL at 16:26

## 2021-10-28 RX ADMIN — Medication 87.3 MILLI-UNITS/MIN: at 13:46

## 2021-10-28 RX ADMIN — LIDOCAINE HYDROCHLORIDE,EPINEPHRINE BITARTRATE 3 ML: 15; .005 INJECTION, SOLUTION EPIDURAL; INFILTRATION; INTRACAUDAL; PERINEURAL at 10:09

## 2021-10-28 RX ADMIN — SERTRALINE 25 MG: 25 TABLET, FILM COATED ORAL at 21:21

## 2021-10-28 RX ADMIN — SODIUM CHLORIDE: 9 INJECTION, SOLUTION INTRAVENOUS at 11:10

## 2021-10-28 RX ADMIN — LIDOCAINE HYDROCHLORIDE 3 ML: 10 INJECTION, SOLUTION INFILTRATION; PERINEURAL at 10:03

## 2021-10-28 RX ADMIN — ROPIVACAINE HYDROCHLORIDE 4 ML: 2 INJECTION, SOLUTION EPIDURAL; INFILTRATION at 10:11

## 2021-10-28 RX ADMIN — ACETAMINOPHEN 1000 MG: 500 TABLET ORAL at 06:49

## 2021-10-28 RX ADMIN — Medication 10 ML/HR: at 10:17

## 2021-10-28 RX ADMIN — ROPIVACAINE HYDROCHLORIDE 4 ML: 2 INJECTION, SOLUTION EPIDURAL; INFILTRATION at 10:13

## 2021-10-28 RX ADMIN — Medication 166.7 ML: at 13:35

## 2021-10-28 ASSESSMENT — PAIN SCALES - GENERAL
PAINLEVEL_OUTOF10: 10
PAINLEVEL_OUTOF10: 6
PAINLEVEL_OUTOF10: 6

## 2021-10-28 NOTE — ANESTHESIA PROCEDURE NOTES
Epidural Block    Patient location during procedure: OB  Reason for block: labor epidural  Staffing  Performed: resident/CRNA   Anesthesiologist: Juana Medina MD  Resident/CRNA: DARRIAN Nicholas CRNA  Preanesthetic Checklist  Completed: patient identified, IV checked, risks and benefits discussed, monitors and equipment checked, pre-op evaluation, timeout performed, anesthesia consent given, oxygen available and patient being monitored  Epidural  Patient position: sitting  Prep: Betadine  Patient monitoring: continuous pulse ox and frequent blood pressure checks  Approach: midline  Location: lumbar (1-5)  Injection technique: DONELL saline and DONELL air  Provider prep: mask and sterile gloves  Needle  Needle type: Tuohy   Needle gauge: 17 G  Needle length: 3.5 in  Needle insertion depth: 6 cm  Catheter type: side hole  Catheter size: 19 G  Catheter at skin depth: 13 cm  Test dose: negative  Assessment  Sensory level: T10  Hemodynamics: stable  Attempts: 1

## 2021-10-28 NOTE — ANESTHESIA PRE PROCEDURE
Department of Anesthesiology  Preprocedure Note       Name:  Jill Davila   Age:  24 y.o.  :  2000                                          MRN:  [id number]         Date:  10/28/2021      Surgeon: * No surgeons listed *    Procedure: * No procedures listed *    Medications prior to admission:   Prior to Admission medications    Medication Sig Start Date End Date Taking?  Authorizing Provider   acetaminophen (TYLENOL) 325 MG tablet Take 1 tablet by mouth every 6 hours as needed for Pain 21  Yes Yaakov Granados MD   Prenatal Vit-Fe Fumarate-FA (PNV PRENATAL PLUS MULTIVITAMIN) 27-1 MG TABS Take 1 tablet by mouth daily 21 Yes DARRIAN Mcdermott - NP   acetaminophen (TYLENOL) 500 MG tablet Take 2 tablets by mouth every 6 hours as needed for Pain 10/20/21   Meron Harrison,    ONETOUCH ULTRA strip use 1 TEST STRIP to TEST BLOOD SUGAR four times a day 21   Jose E Wiley APRN - CNP   famotidine (PEPCID) 20 MG tablet Take 1 tablet by mouth 2 times daily 21   Arturo Cadena DO   albuterol sulfate HFA (VENTOLIN HFA) 108 (90 Base) MCG/ACT inhaler Inhale 2 puffs into the lungs 4 times daily as needed for Wheezing 21   Arturo Cadena DO   Insulin Aspart FlexPen 100 UNIT/ML SOPN inject 6 units UNDER THE SKIN before dinner for 10 WEEKS 21   Historical Provider, MD   NOVOLIN N 100 UNIT/ML injection vial inject 8 units UNDER THE SKIN at bedtime FOR 10 WEEKS 21   Historical Provider, MD   EASY TOUCH PEN NEEDLES 32G X 4 MM MISC use 1 PEN NEEDLE to inject MEDICATION subcutaneously once daily 21   Historical Provider, MD GORDON INS SYR MICROFINE 1CC/28G 28G X 1/2\" 1 ML MISC use to inject insert once daily 21   Historical Provider, MD   Lancets (ONETOUCH DELICA PLUS [id number]) MISC use 1 LANCET to TEST BLOOD SUGAR four times a day 21   Historical Provider, MD   Blood Glucose Monitoring Suppl (ONE TOUCH ULTRA 2) w/Device KIT use as directed four times a day 8/17/21   Historical Provider, MD   RA Alcohol Swabs 70 % PADS USE 4 TIMES DAILY 8/16/21   Historical Provider, MD       Current medications:    Current Facility-Administered Medications   Medication Dose Route Frequency Provider Last Rate Last Admin    ondansetron (ZOFRAN-ODT) disintegrating tablet 4 mg  4 mg Oral Q8H PRN Renan Toscano MD        Or    ondansetron (ZOFRAN) injection 4 mg  4 mg IntraVENous Q6H PRN Renan Toscano MD        lactated ringers bolus  500 mL IntraVENous PRN Renan Toscano MD        Or    lactated ringers bolus  1,000 mL IntraVENous PRN Renan Toscano MD        sodium chloride flush 0.9 % injection 5-40 mL  5-40 mL IntraVENous 2 times per day Renan Toscano MD        sodium chloride flush 0.9 % injection 5-40 mL  5-40 mL IntraVENous PRN Renan Toscano MD        0.9 % sodium chloride infusion  25 mL IntraVENous PRN Renan Toscano MD        lidocaine PF 1 % injection 30 mL  30 mL Other PRN Renan Toscano MD        acetaminophen (TYLENOL) tablet 1,000 mg  1,000 mg Oral Q6H PRN Renan Toscano MD   1,000 mg at 10/28/21 0649    benzocaine-menthol (DERMOPLAST) 20-0.5 % spray   Topical PRVALE Toscano MD        [Held by provider] insulin NPH (HUMULIN N;NOVOLIN N) injection vial 8 Units  8 Units SubCUTAneous Nightly Vick Jewell MD        glucose (GLUTOSE) 40 % oral gel 15 g  15 g Oral PRN Renan Toscano MD        dextrose 50 % IV solution  12.5 g IntraVENous PRN Renan Toscano MD        glucagon injection 1 mg  1 mg IntraMUSCular PRN Renan Toscano MD        dextrose 5 % solution  100 mL/hr IntraVENous MARY Toscano MD        [Held by provider] insulin lispro (HUMALOG) injection vial 6 Units  6 Units SubCUTAneous Daily before dinner Renan Toscano MD        0.9 % sodium chloride infusion   IntraVENous Continuous Renan Toscano  mL/hr at 10/28/21 0633 New Bag at 10/28/21 0633    sertraline (ZOLOFT) tablet 25 mg  25 mg Oral Nightly Jewel Patience, DO        ropivacaine (NAROPIN) 0.2% injection 0.2%             naloxone (NARCAN) injection 0.4 mg  0.4 mg IntraVENous PRN Kacy Severino MD        nalbuphine (NUBAIN) injection 5 mg  5 mg IntraVENous Q4H PRN Kacy Severino MD        ondansetron [facility]) injection 4 mg  4 mg IntraVENous Q6H PRN Kacy Severino MD        ropivacaine 0.2% in sodium chloride 0.9% (OB) epidural 100 mL  10 mL/hr Epidural Continuous Kacy Severino MD           Allergies: Allergies   Allergen Reactions    Sulfa Antibiotics      Unknown reaction       Problem List:    Patient Active Problem List   Diagnosis Code    ADD (attention deficit disorder) F98.8    [facility] I47.1    Family history of clotting disorder Z83.2    Family history of diabetes mellitus Z83.3    Neurocardiogenic syncope R55    CMV IgM+ B25.9     19 M Apg 8/ Wt 7#11 O80    Seizures ([address] R56.9    Severe recurrent major depression without psychotic features ([address] F33.2    Late prenatal care O09.30    Gestational diabetes mellitus (GDM), antepartum O23.80    Short femur of fetus on prenatal ultrasound O35. 8XX0    High-risk pregnancy, unspecified trimester O09.90    38 weeks gestation of pregnancy Z3A.38    39 weeks gestation of pregnancy Z3A.39       Past Medical History:        Diagnosis Date    ADD (attention deficit disorder)     Anxiety     Asthma     CMV (cytomegalovirus infection) ([address] 2019    Depression     Gestational diabetes mellitus (GDM), antepartum 2019    Hearing loss in left ear     Heart disease 2018    SVT    Hypothyroidism 2019    Immunizations up to date in pediatric patient     Neurocardiogenic syncope     Pseudoseizures ([address]     Raynaud disease     Seizures ([address] 3/15/2020    SVT (supraventricular tachycardia) ([address]     Tachycardia     Saw Dr. Petey Osorio 5 yrs ago\".   Echo and holter monitor done, neg results    Traumatic injury during pregnancy, third trimester     Wears glasses Past Surgical History:        Procedure Laterality Date    REMOVAL FOREIGN BODY EAR Left     \"insect laid eggs\"    TYMPANOPLASTY Left 06/24/2016       Social History:    Social History     Tobacco Use    Smoking status: Former Smoker     Packs/day: 0.50     Types: Cigars    Smokeless tobacco: Never Used    Tobacco comment: 1-2 black and milds daily    Substance Use Topics    Alcohol use: No                                Counseling given: Not Answered  Comment: 1-2 black and milds daily       Vital Signs (Current):   Vitals:    10/28/21 0537 10/28/21 0615 10/28/21 0804   BP: 120/70  118/68   Pulse: 87  68   Resp: 18  20   Temp: 35.9 °C (96.6 °F)  36.4 °C (97.5 °F)   TempSrc: Oral  Oral   SpO2: 99%     Weight:  171 lb (77.6 kg)    Height:  5' 3\" (1.6 m)                                               BP Readings from Last 3 Encounters:   10/28/21 118/68   10/26/21 114/72   10/22/21 103/72       NPO Status:                                                                                 BMI:   Wt Readings from Last 3 Encounters:   10/28/21 171 lb (77.6 kg)   10/26/21 171 lb (77.6 kg)   10/20/21 170 lb (77.1 kg)     Body mass index is 30.29 kg/m².     CBC:   Lab Results   Component Value Date    WBC 10.2 10/28/2021    RBC 4.16 10/28/2021    HGB 13.3 10/28/2021    HCT 40.3 10/28/2021    MCV 96.9 10/28/2021    RDW 12.9 10/28/2021     10/28/2021       CMP:   Lab Results   Component Value Date     06/30/2020    K 4.0 06/30/2020     06/30/2020    CO2 22 06/30/2020    BUN 9 06/30/2020    CREATININE 0.70 06/30/2020    GFRAA >60 06/30/2020    LABGLOM >60 06/30/2020    GLUCOSE 96 04/22/2021    PROT 7.1 06/30/2020    CALCIUM 9.0 06/30/2020    BILITOT 0.25 06/30/2020    ALKPHOS 88 06/30/2020    AST 13 06/30/2020    ALT 10 06/30/2020       POC Tests:   Recent Labs     10/28/21  0648   POCGLU 81       Coags:   Lab Results   Component Value Date    PROTIME 10.3 07/24/2019    INR 1.0 07/24/2019    APTT 23.0 07/24/2019       HCG (If Applicable):   Lab Results   Component Value Date    PREGTESTUR Positive 03/02/2021    HCG NEGATIVE 06/24/2016    HCGQUANT 6,030 (H) 03/04/2021        ABGs: No results found for: PHART, PO2ART, IBO8IDP, DKN1FRM, BEART, A7UNOUEE     Type & Screen (If Applicable):  No results found for: LABABO, LABRH    Drug/Infectious Status (If Applicable):  No results found for: HIV, HEPCAB    COVID-19 Screening (If Applicable):   Lab Results   Component Value Date    COVID19 Not Detected 08/06/2020           Anesthesia Evaluation     Anesthesia Plan        DARRIAN Rae CRNA   10/28/2021

## 2021-10-28 NOTE — FLOWSHEET NOTE
Pt to L&D via wheelchair. Pt states she had some spotting starting early this morning around 2am which has now turned bright red and a few clots. Pt states she is having irregular ctx. +FM. Denies LOF. Will update Dr Truman Toledo.

## 2021-10-28 NOTE — PROGRESS NOTES
[facility] Labor Progress Note    Radha Ramsey is a 24 y.o. female  at 39w0d  The patient was seen and examined. Her pain is well controlled with epidural. She reports fetal movement is present, complains of contractions, complains of loss of fluid, complains of vaginal bleeding. She reports increased pelvic pressure.      Vital Signs:  Vitals:    10/28/21 1215 10/28/21 1230 10/28/21 1245 10/28/21 1300   BP: 104/61 (!) 91/49 (!) 97/51 107/71   Pulse: 66 75 79 103   Resp: 16 16 20 20   Temp:       TempSrc:       SpO2: 100% 99% 100% 100%   Weight:       Height:             FHT: 125, moderate variability, accelerations present, decelerations absent  Contractions: regular, every 3-5 minutes    Examination chaperoned by Buster Corcoran RN    Cervical Exam: 8 cm dilated, 90% effaced, 0 station  Pitocin: @ 6 mu/min    Membranes: Ruptured clear fluid  Scalp Electrode in place: absent  Intrauterine Pressure Catheter in Place: absent    Interventions: SVE    Assessment/Plan:  Radha Ramsey is a 24 y.o. female  at 39w0d IUP   - GBS negative / Rh positive / R immune              - No indication for GBS prophylaxis              - COVID vaccinated     Spontaneous Labor              - VSS, afebrile              - cEFM and TOCO              - IVF:  mL/hr              - S/p Nubain x1              - Epidural in place              - Pitocin per protocol                 - AROM (clear) @ 1100   - Anticipate    - Attending in route     GDMA2              - Insulin regimen held              - Checking BS q2hrs    Attending and senior resident updated and in agreement with plan    DO [facility] Herzog Resident  10/28/2021, 1:14 PM

## 2021-10-28 NOTE — FLOWSHEET NOTE
0957   RADHA Marley CRNA at bedside. 2278 positioned for epidural  1007 catheter placed. 1009  test dose given. 1017 loading dose given. 1015 positioned to low fowlers with left uterine displacement. 1017 pump initiated. Tolerated well.

## 2021-10-28 NOTE — PROGRESS NOTES
Labor Progress Note    Doris Oleary is a 24 y.o. female  at 39w0d  The patient was seen and examined. Her pain is well controlled. She reports vaginal pressure. She reports fetal movement is present, complains of contractions, denies loss of fluid, denies vaginal bleeding. Denies s/s of preeclampsia including headache vision changes, difficulty breathing, chest pain, RUQ pain or worsening swelling of extremities. Vital Signs:  Vitals:    10/28/21 1016 10/28/21 1018 10/28/21 1020 10/28/21 1030   BP: 109/68 114/67 110/63 114/84   Pulse: 76 83 82 100   Resp:   20 16   Temp:       TempSrc:       SpO2:   99% 99%   Weight:       Height:             FHT: 140, moderate variability, accelerations present, decelerations absent  Contractions: not tracing well at this time  Cervical Exam: 4 cm dilated, 70 effaced, 0 station  Pitocin: @ 1 mu/min    Chaperone for Exam   Chaperone was present.  Chaperone DEEPIKA Vazquez      Membranes: Ruptured clear fluid  Scalp Electrode in place: absent  Intrauterine Pressure Catheter in Place: absent    Interventions: AROM (clr) without difficulty     Assessment/Plan:  Doris Oleary is a 24 y.o. female  at 39w0d admitted for spontaneous labor with vaginal bleeding   - GBS negative,   - No indication for GBS prophylaxis   - VSS, Afebrile    - IVF LR @ 125 cc/hr   - AROM (clr) 1100    - Pit @ 1026    - Epidural    - S/p Nubain x 1    - CEFM/ TOCO   - Diet: CLD    GDMA2   - Insulin regimen held at this time   - Blood sugars q 2 hours   - CLD      Attending updated and in agreement with plan    Dash Guaman DO  Ob/Gyn Resident  10/28/2021, 11:03 AM

## 2021-10-28 NOTE — CARE COORDINATION
ANTEPARTUM NOTE    39 weeks gestation of pregnancy [Z3A.39]    Catherine Armstrong was admitted to L&D on 10/28/2021 for active labor w/ possible need for augmentation @ 38w5d    Will meet with patient after delivery to verify name/address/phone/insurance and discuss discharge planning.

## 2021-10-28 NOTE — L&D DELIVERY NOTE
Vaginal Delivery Note  Department of Obstetrics and Gynecology  [address] [facility]       Patient: Dillon Santos   : 2000  MRN: [id number]   Date of delivery: 10/28/21     Pre-operative Diagnosis: Hui Second at 39w0d, IUP   Spontaneous Labor   GDMA2   Late Prenatal Care   FHx DM   Short Femur of Fetus   Neurocardiogenic syncope/SVT    Post-operative Diagnosis:  Living  infant, Male and same as above    Delivering Obstetrician & Assistant(s): Dr. Marilyn Ram DO PGY1    Infant Information:   Information for the patient's :  Sofiya Proctor [[id number]]          Apgar scores: 8 at 1 minute and 9 at 5 minutes. Anesthesia:  epidural anesthesia    Application and Delivery:    She was known to be GBS negative. The patient progressed well, received an epidural, became complete and felt the urge to push. After pushing with contractions the fetal head delivered Cephalic, left occiput anterior over an intact perineum, nuchal cord was not present. The anterior, then posterior shoulder delivered easily and atraumatically followed by the rest of the infant. Nose and mouth suctioned with bulb suction, infant was stimulated and dried. Cord was clamped and cut after one minute delayed cord clamping and infant was placed on maternal abdomen, and attended by RN for evaluation. The delivery of the placenta was spontaneous and appeared intact, whole and that the umbilical cord had three vessels noted. Pitocin was started. The vagina was swept of all clots and debris. The perineum and vagina were evaluated and no lacerations were found. Mother and baby tolerated procedure well. Dr. Perry Monday was present for entire delivery.     Delivery Summary:       Specimen: placenta sent to pathology, cord blood and cord gases  Quantitative blood loss:  150ml immediately following delivery  Condition:  infant stable to general nursery and mother stable  Counts: instrument and sponge counts correct  Blood Type and Rh: A POSITIVE    Rubella Immunity Status: immune    Atiya Mace DO  Ob/Gyn Resident  10/28/2021, 2:12 PM    Mother's Information    Labor Events     labor?: No  Rupture type: Artificial=AROM  Fluid color: Clear  Fluid odor: None     Mother Delivery Information    Surgical or Additional Est. Blood Loss (mL): 0 (View Only): Edit in Flowsheets   Combined Est. Blood Loss (mL): 0        Vivienne Halter Pending Mardell Graft [[id number]]    Labor Events     labor?: No   steroids?: None  Cervical ripening date/time:     Antibiotics received during labor?: No  Rupture Identifier: Sac 1   Rupture date/time: 10/28/21 10:55:00   Rupture type: Artificial=AROM  Fluid color: Clear  Fluid odor: None  Induction: None  Augmentation: Oxytocin  Indications for augmentation: Ineffective Contraction Pattern          Delivery Providers    Delivering clinician:      [place] Measurements       Delivery Information    Surgical or additional est. blood loss (mL): 0 (View Only): Edit in Flowsheets   Combined est. blood loss (mL): 0

## 2021-10-28 NOTE — ANESTHESIA PRE PROCEDURE
Department of Anesthesiology  Preprocedure Note       Name:  Iker George   Age:  24 y.o.  :  2000                                          MRN:  [id number]         Date:  10/28/2021      Surgeon: * No surgeons listed *    Procedure: Labor Analgesia    Medications prior to admission:   Prior to Admission medications    Medication Sig Start Date End Date Taking?  Authorizing Provider   acetaminophen (TYLENOL) 500 MG tablet Take 2 tablets by mouth every 6 hours as needed for Pain 10/20/21   Joao Singer, DO   ONETOUCH ULTRA strip use 1 TEST STRIP to TEST BLOOD SUGAR four times a day 21   Cullen Wiley APRN - CNP   famotidine (PEPCID) 20 MG tablet Take 1 tablet by mouth 2 times daily 21   Sydna Code, DO   albuterol sulfate HFA (VENTOLIN HFA) 108 (90 Base) MCG/ACT inhaler Inhale 2 puffs into the lungs 4 times daily as needed for Wheezing 21   Sydna Code, DO   Insulin Aspart FlexPen 100 UNIT/ML SOPN inject 6 units UNDER THE SKIN before dinner for 10 WEEKS 21   Historical Provider, MD   NOVOLIN N 100 UNIT/ML injection vial inject 8 units UNDER THE SKIN at bedtime FOR 10 WEEKS 21   Historical Provider, MD   EASY TOUCH PEN NEEDLES 32G X 4 MM MISC use 1 PEN NEEDLE to inject MEDICATION subcutaneously once daily 21   Historical Provider, MD GORDON INS SYR MICROFINE 1CC/28G 28G X 1/2\" 1 ML MISC use to inject insert once daily 21   Historical Provider, MD   Lancets ([address] [place]) [address] use 1 LANCET to TEST BLOOD SUGAR four times a day 21   Historical Provider, MD   Blood Glucose Monitoring Suppl (ONE TOUCH ULTRA 2) w/Device KIT use as directed four times a day 21   Historical Provider, MD   RA Alcohol Swabs 70 % PADS USE 4 TIMES DAILY 21   Historical Provider, MD   acetaminophen (TYLENOL) 325 MG tablet Take 1 tablet by mouth every 6 hours as needed for Pain 21   Philly Bolton MD   Prenatal Vit-Fe Fumarate-FA (PNV PRENATAL PLUS MULTIVITAMIN) 27-1 MG TABS Take 1 tablet by mouth daily 7/14/21 7/9/22  DARRIAN North NP       Current medications:    No current facility-administered medications for this visit. No current outpatient medications on file.      Facility-Administered Medications Ordered in Other Visits   Medication Dose Route Frequency Provider Last Rate Last Admin    ondansetron (ZOFRAN-ODT) disintegrating tablet 4 mg  4 mg Oral Q8H PRN Jaison Chauhan MD        Or    ondansetron (ZOFRAN) injection 4 mg  4 mg IntraVENous Q6H PRN Jaison Chauhan MD        lactated ringers bolus  500 mL IntraVENous PRN Jaison Chauhan MD        Or    lactated ringers bolus  1,000 mL IntraVENous PRN Jaison Chauhan MD        sodium chloride flush 0.9 % injection 5-40 mL  5-40 mL IntraVENous 2 times per day Jaison Chauhan MD        sodium chloride flush 0.9 % injection 5-40 mL  5-40 mL IntraVENous PRN Jaison Chauhan MD        0.9 % sodium chloride infusion  25 mL IntraVENous PRN Jaison Chauhan MD        lidocaine PF 1 % injection 30 mL  30 mL Other URIELN Jaison Chauhan MD        acetaminophen (TYLENOL) tablet 1,000 mg  1,000 mg Oral Q6H PRVALE Chauhan MD   1,000 mg at 10/28/21 0649    benzocaine-menthol (DERMOPLAST) 20-0.5 % spray   Topical MARY Chauhan MD        [Held by provider] insulin NPH (HUMULIN N;NOVOLIN N) injection vial 8 Units  8 Units SubCUTAneous Nightly Vick Jewell MD        glucose (GLUTOSE) 40 % oral gel 15 g  15 g Oral PRN Jaison Chauhan MD        dextrose 50 % IV solution  12.5 g IntraVENous PRVALE Chauhan MD        glucagon injection 1 mg  1 mg IntraMUSCular PRVALE Chauhan MD        dextrose 5 % solution  100 mL/hr IntraVENous PRVALE Chauhan MD        [Held by provider] insulin lispro (HUMALOG) injection vial 6 Units  6 Units SubCUTAneous Daily before dinner Jaison Chauhan MD        0.9 % sodium chloride infusion   IntraVENous Continuous Jaison Chauhan  mL/hr at 10/28/21 9507 New Bag at 10/28/21 0633    sertraline (ZOLOFT) tablet 25 mg  25 mg Oral Nightly Tiffanie Bryant DO        ropivacaine (NAROPIN) 0.2% injection 0.2%             naloxone (NARCAN) injection 0.4 mg  0.4 mg IntraVENous PRN Laurel El MD        nalbuphine (NUBAIN) injection 5 mg  5 mg IntraVENous Q4H PRN Laurel El MD        ondansetron [facility]) injection 4 mg  4 mg IntraVENous Q6H PRN Laurel El MD        ropivacaine 0.2% in sodium chloride 0.9% (OB) epidural 100 mL  10 mL/hr Epidural Continuous Laurel El MD           Allergies: Allergies   Allergen Reactions    Sulfa Antibiotics      Unknown reaction       Problem List:    Patient Active Problem List   Diagnosis Code    ADD (attention deficit disorder) F98.8    SVT (supraventricular tachycardia) ([facility]) I47.1    Family history of clotting disorder Z83.2    Family history of diabetes mellitus Z83.3    Neurocardiogenic syncope R55    CMV IgM+ B25.9     19 M Apg 8/9 Wt 7#11 O80    Seizures ([address] R56.9    Severe recurrent major depression without psychotic features ([address] F33.2    Late prenatal care O09.30    Gestational diabetes mellitus (GDM), antepartum O23.80    Short femur of fetus on prenatal ultrasound O35. 8XX0    High-risk pregnancy, unspecified trimester O09.90    38 weeks gestation of pregnancy Z3A.38    39 weeks gestation of pregnancy Z3A.39       Past Medical History:        Diagnosis Date    ADD (attention deficit disorder)     Anxiety     Asthma     CMV (cytomegalovirus infection) ([address] 2019    Depression     Gestational diabetes mellitus (GDM), antepartum 2019    Hearing loss in left ear     Heart disease 2018    SVT    Hypothyroidism 2019    Immunizations up to date in pediatric patient     Neurocardiogenic syncope     Pseudoseizures ([address]     Raynaud disease     Seizures ([address] 3/15/2020    SVT (supraventricular tachycardia) ([address]     Tachycardia     Saw Dr. Arash Hernadnez 5 yrs ago\".   Echo and holter monitor done, neg results    Traumatic injury during pregnancy, third trimester     Wears glasses        Past Surgical History:        Procedure Laterality Date    REMOVAL FOREIGN BODY EAR Left     \"insect laid eggs\"    TYMPANOPLASTY Left 06/24/2016       Social History:    Social History     Tobacco Use    Smoking status: Former Smoker     Packs/day: 0.50     Types: Cigars    Smokeless tobacco: Never Used    Tobacco comment: 1-2 black and milds daily    Substance Use Topics    Alcohol use: No                                Counseling given: Not Answered  Comment: 1-2 black and milds daily       Vital Signs (Current): There were no vitals filed for this visit.                                            BP Readings from Last 3 Encounters:   10/28/21 118/68   10/26/21 114/72   10/22/21 103/72       NPO Status:                                                                                 BMI:   Wt Readings from Last 3 Encounters:   10/28/21 171 lb (77.6 kg)   10/26/21 171 lb (77.6 kg)   10/20/21 170 lb (77.1 kg)     There is no height or weight on file to calculate BMI.    CBC:   Lab Results   Component Value Date    WBC 10.2 10/28/2021    RBC 4.16 10/28/2021    HGB 13.3 10/28/2021    HCT 40.3 10/28/2021    MCV 96.9 10/28/2021    RDW 12.9 10/28/2021     10/28/2021       CMP:   Lab Results   Component Value Date     06/30/2020    K 4.0 06/30/2020     06/30/2020    CO2 22 06/30/2020    BUN 9 06/30/2020    CREATININE 0.70 06/30/2020    GFRAA >60 06/30/2020    LABGLOM >60 06/30/2020    GLUCOSE 96 04/22/2021    PROT 7.1 06/30/2020    CALCIUM 9.0 06/30/2020    BILITOT 0.25 06/30/2020    ALKPHOS 88 06/30/2020    AST 13 06/30/2020    ALT 10 06/30/2020       POC Tests:   Recent Labs     10/28/21  0648   POCGLU 81       Coags:   Lab Results   Component Value Date    PROTIME 10.3 07/24/2019    INR 1.0 07/24/2019    APTT 23.0 07/24/2019       HCG (If Applicable):   Lab Results   Component Value Date    PREGTESTUR Positive 03/02/2021    HCG NEGATIVE 06/24/2016    HCGQUANT 6,030 (H) 03/04/2021        ABGs: No results found for: PHART, PO2ART, KVQ5SGQ, XST4IGM, BEART, I2EIRWAG     Type & Screen (If Applicable):  No results found for: LABABO, LABRH    Anesthesia Evaluation   no history of anesthetic complications:   Airway: Mallampati: II  TM distance: >3 FB   Neck ROM: full  Mouth opening: > = 3 FB Dental: normal exam         Pulmonary:normal exam    (+) asthma:     (-) COPD and sleep apnea                           Cardiovascular:        (-) hypertension and past MI      Rhythm: regular  Rate: normal                 ROS comment: Hx svt, sees cardiologist     Neuro/Psych:   (+) psychiatric history:   (-) seizures and CVA           GI/Hepatic/Renal:   (+) GERD:,           Endo/Other:    (+) Diabetes (gestational ), hypothyroidism::., .                 Abdominal:       Abdomen: soft. Vascular: Other Findings:               Anesthesia Plan      epidural     ASA 3           MIPS: Postoperative opioids intended and Prophylactic antiemetics administered. Anesthetic plan and risks discussed with patient. Use of blood products discussed with patient whom consented to blood products. Plan discussed with attending.     Attending anesthesiologist reviewed and agrees with Preprocedure content              DARRIAN Moyer - CRNA   10/28/2021

## 2021-10-28 NOTE — ANESTHESIA POSTPROCEDURE EVALUATION
Department of Anesthesiology  Postprocedure Note    Patient: Navi Mensah  MRN: [id number]  YOB: 2000  Date of evaluation: 10/28/2021  Time:  5:43 PM     Procedure Summary     Date: 10/28/21 Room / Location:     Anesthesia Start: 0957 Anesthesia Stop: 1330    Procedure: Labor Analgesia Diagnosis:     Scheduled Providers:  Responsible Provider: Mesfin Molina MD    Anesthesia Type: Not recorded ASA Status: Not recorded          Anesthesia Type: No value filed. Shala Phase I: Shala Score: 9    Shala Phase II:      Last vitals: Reviewed and per EMR flowsheets.        Anesthesia Post Evaluation    Patient location during evaluation: bedside  Patient participation: complete - patient participated  Level of consciousness: awake and alert  Pain score: 0  Nausea & Vomiting: no nausea  Cardiovascular status: hemodynamically stable  Respiratory status: room air

## 2021-10-28 NOTE — DISCHARGE SUMMARY
Obstetric Discharge Summary  [facility]    Patient Name: Kianna Anderson  Patient : 2000  Primary Care Physician: DARRIAN Silva - CNP  Admit Date: 10/28/2021    Principal Diagnosis: IUP at 39w0d, admitted for Vaginal Bleeding     Her pregnancy has been complicated by:   Patient Active Problem List   Diagnosis    ADD (attention deficit disorder)    SVT (supraventricular tachycardia) ([address]    Family history of clotting disorder    Family history of diabetes mellitus    Neurocardiogenic syncope    CMV IgM+     19 M Apg 8/9 Wt 7#11    Seizures ([address]    Severe recurrent major depression without psychotic features [address] [address]    Late prenatal care    Gestational diabetes mellitus (GDM), antepartum    Short femur of fetus on prenatal ultrasound    39 weeks gestation of pregnancy     10/28/21 M Apg 8/9 Wt 8#4    Lost custody of children       Infection Present?: No  Hospital Acquired: No    Surgical Operations & Procedures:  Analgesia: epidural  Delivery Type: Spontaneous Vaginal Delivery: See Labor and Delivery Summary   Laceration(s): Absent    Consultations: Social work, NICU and Anesthesia    Pertinent Findings & Procedures:   Kianna Anderson is a 24 y.o. female  at 39w0d admitted for vaginal bleeding with suspected placental abruption; received Nubain x 1, epidural, Pitocin, AROM (clr). She delivered by spontaneous vaginal a Live Born infant on 10/28/21. Information for the patient's :  Milka Calles [[id number]]   male   Birth Weight: 8 lb 4.6 oz (3.759 kg)       Apgars: 8 at 1 minute and 9 at 5 minutes. Postpartum course: normal.      POD#1: Hgb was 12.6.     Course of patient: uncomplicated    Discharge to: Home    Readmission planned: no     Recommendations on Discharge:     Medications:      Medication List      START taking these medications    ibuprofen 600 MG tablet  Commonly known as: ADVIL;MOTRIN  Take 1 tablet by mouth every 6 hours as needed for Pain     senna 8.6 MG tablet  Commonly known as: Senokot  Take 1 tablet by mouth 2 times daily        CONTINUE taking these medications    * acetaminophen 325 MG tablet  Commonly known as: Tylenol  Take 1 tablet by mouth every 6 hours as needed for Pain     * acetaminophen 500 MG tablet  Commonly known as: TYLENOL  Take 2 tablets by mouth every 6 hours as needed for Pain     albuterol sulfate  (90 Base) MCG/ACT inhaler  Commonly known as: Ventolin HFA  Inhale 2 puffs into the lungs 4 times daily as needed for Wheezing     famotidine 20 MG tablet  Commonly known as: PEPCID  Take 1 tablet by mouth 2 times daily     PNV Prenatal Plus Multivitamin 27-1 MG Tabs  Take 1 tablet by mouth daily         * This list has 2 medication(s) that are the same as other medications prescribed for you. Read the directions carefully, and ask your doctor or other care provider to review them with you. STOP taking these medications    B-D INS SYR MICROFINE 1CC/28G 28G X 1/2\" 1 ML Misc  Generic drug: INS SYRINGE/NEEDLE 1CC/28G     Easy Touch Pen Needles 32G X 4 MM Misc  Generic drug: Insulin Pen Needle     Insulin Aspart FlexPen 100 UNIT/ML Sopn     NovoLIN N 100 UNIT/ML injection vial  Generic drug: insulin NPH     ONE TOUCH ULTRA 2 w/Device Kit     OneTouch Delica Plus [id number] Misc     OneTouch Ultra strip  Generic drug: blood glucose test strips     RA Alcohol Swabs 70 % Pads           Where to Get Your Medications      You can get these medications from any pharmacy    Bring a paper prescription for each of these medications  · ibuprofen 600 MG tablet  · senna 8.6 MG tablet          Activity: pelvic rest x 6 weeks, no lifting greater than 15 lbs  Diet: regular diet  Follow up: 2 weeks     Condition on discharge: stable    Discharge date: 10/30/21    Madhu Cody DO  Ob/Gyn Resident    Comments:  Home care and follow-up care were reviewed.   Pelvic rest, and birth control

## 2021-10-28 NOTE — PROGRESS NOTES
[facility] Labor Progress Note    Drew Willson is a 24 y.o. female  at 39w0d  The patient was seen and examined. Her pain is well controlled with epidural. She reports fetal movement is present, complains of contractions, complains of loss of fluid, denies vaginal bleeding. Patient endorses increased pressure.     Vital Signs:  Vitals:    10/28/21 1345 10/28/21 1346 10/28/21 1400 10/28/21 1415   BP:  101/74 104/86 (!) 114/56   Pulse:  110  77   Resp:  20 (P) 20    Temp:  98.2 °F (36.8 °C)     TempSrc:  Oral     SpO2: 100%  100%    Weight:       Height:             FHT: 125, moderate variability, accelerations present, 3 consecutive late decelerations noted, responsive to position change  Contractions: regular, every 3-5 minutes    Examination chaperoned by Aramis POE    Cervical Exam: 4 cm dilated, 70 effaced, -1 station  Pitocin: @ 6 mu/min    Membranes: Ruptured clear fluid  Scalp Electrode in place: absent  Intrauterine Pressure Catheter in Place: absent    Interventions: SVE    Assessment/Plan:  Drew Willson is a 24 y.o. female  at 39w0d IUP   - GBS negative / Rh positive / R immune   - No indication for GBS prophylaxis   - COVID vaccinated    Spontaneous Labor   - VSS, afebrile   - cEFM and TOCO   - IVF:  mL/hr   - S/p Nubain x1   - Epidural in place   - Pitocin per protocol    - AROM (clear) @ [address]   - Insulin regimen held   - Checking BS q2hrs    Attending and senior resident updated and in agreement with plan    DO [facility] [place] Resident  10/28/2021, 12:31 PM

## 2021-10-28 NOTE — H&P
[facility]    Date: 10/28/2021       Time: 6:27 AM {  Patient Name: Iker George     Patient : 2000  Room/Bed: 019/2959-37    Admission Date/Time: 10/28/2021  5:29 AM      CC: Vaginal bleeding and abdominal cramping    HPI: Ikre George is a 24 y.o. Leetta Feathers at 39w0d who presents to L&D for evaluation of vaginal bleeding that woke her up from sleep around 0230. Patient states that the vaginal bleeding been persistent since that time. Patient also states that she passed a small clot and has also been experiencing some abdominal cramping since the onset of the bleeding. She denies any abdominal trauma or recent sexual activity. Patient denies any fever, chills, N/V, headaches, vision changes, chest pain, shortness of breath, RUQ pain and increased swelling/tenderness in bilateral lower extremities. Patient denies any vaginal discharge and any urinary complaints. The patient reports fetal movement is present, complains of contractions, denies loss of fluid, complains of vaginal bleeding. DATING:  LMP: No LMP recorded (lmp unknown). Patient is pregnant.   Estimated Date of Delivery: 21   Based on: early ultrasound, at 8 4/7 weeks GA    PREGNANCY RISK FACTORS:  Patient Active Problem List   Diagnosis    ADD (attention deficit disorder)    [facility]) [facility]    Family history of clotting disorder    Family history of diabetes mellitus    Neurocardiogenic syncope    CMV IgM+     19 M Apg 8/9 Wt 7#11    Seizures ([address]    Severe recurrent major depression without psychotic features ([address]    Late prenatal care    Gestational diabetes mellitus (GDM), antepartum    Short femur of fetus on prenatal ultrasound    High-risk pregnancy, unspecified trimester    38 weeks gestation of pregnancy    39 weeks gestation of pregnancy      Steroids Given In This Pregnancy:  no     REVIEW OF SYSTEMS:  Constitutional: negative fever, negative chills  HEENT: negative visual disturbances, negative headaches  Respiratory: negative dyspnea, negative cough  Cardiovascular: negative chest pain,  negative palpitations  Gastrointestinal: positive abdominal pain, negative RUQ pain, negative N/V, negative diarrhea, negative constipation  Genitourinary: negative dysuria, negative vaginal discharge, positive vaginal bleeding  Dermatological: negative rash  Hematologic: negative bruising  Immunologic/Lymphatic: negative recent illness, negative recent sick contact  Musculoskeletal: negative back pain, negative myalgias, negative arthralgias  Neurological:  negative dizziness, negative weakness  Behavior/Psych: negative depression, negative anxiety    OBSTETRICAL HISTORY:   OB History    Para Term  AB Living   2 1 1 0 0 1   SAB TAB Ectopic Molar Multiple Live Births   0 0 0 0 0 1      # Outcome Date GA Lbr South/2nd Weight Sex Delivery Anes PTL Lv   2 Current            1 Term 19 38w6d 02:25 / 00:22 7 lb 11.3 oz (3.495 kg) M Vag-Spont EPI N TERESA      Name: Jean Jasbir: 8  Apgar5: 9       PAST MEDICAL HISTORY:   has a past medical history of ADD (attention deficit disorder), Anxiety, Asthma, CMV (cytomegalovirus infection) ([address] Depression, Gestational diabetes mellitus (GDM), antepartum, Hearing loss in left ear, Heart disease, Hypothyroidism, Immunizations up to date in pediatric patient, Neurocardiogenic syncope, Pseudoseizures ([address] Raynaud disease, Seizures ([address] SVT (supraventricular tachycardia) ([address] Tachycardia, Traumatic injury during pregnancy, third trimester, and Wears glasses. PAST SURGICAL HISTORY:   has a past surgical history that includes REMOVAL FOREIGN BODY EAR (Left) and Tympanoplasty (Left, 2016). ALLERGIES:  is allergic to sulfa antibiotics. MEDICATIONS:  Prior to Admission medications    Medication Sig Start Date End Date Taking? Authorizing Provider   acetaminophen (TYLENOL) 325 MG tablet Take 1 tablet by mouth every 6 hours as needed for Pain 7/30/21  Yes German Zaidi MD   Prenatal Vit-Fe Fumarate-FA (PNV PRENATAL PLUS MULTIVITAMIN) 27-1 MG TABS Take 1 tablet by mouth daily 7/14/21 7/9/22 Yes DARRIAN Vences NP   acetaminophen (TYLENOL) 500 MG tablet Take 2 tablets by mouth every 6 hours as needed for Pain 10/20/21   Tyler Rae,    ONETOUCH ULTRA strip use 1 TEST STRIP to TEST BLOOD SUGAR four times a day 9/30/21   Traci Wiley APRN - CNP   famotidine (PEPCID) 20 MG tablet Take 1 tablet by mouth 2 times daily 9/14/21   Hildred Sep, DO   albuterol sulfate HFA (VENTOLIN HFA) 108 (90 Base) MCG/ACT inhaler Inhale 2 puffs into the lungs 4 times daily as needed for Wheezing 9/14/21   Hildred Sep, DO   Insulin Aspart FlexPen 100 UNIT/ML SOPN inject 6 units UNDER THE SKIN before dinner for 10 WEEKS 8/25/21   Historical Provider, MD Naa Jones N 100 UNIT/ML injection vial inject 8 units UNDER THE SKIN at bedtime FOR 10 WEEKS 8/26/21   Historical Provider, MD   EASY TOUCH PEN NEEDLES 32G X 4 MM MISC use 1 PEN NEEDLE to inject MEDICATION subcutaneously once daily 8/25/21   Historical Provider, MD GORDON INS SYR MICROFINE 1CC/28G 28G X 1/2\" 1 ML MISC use to inject insert once daily 8/25/21   Historical Provider, MD   Lancets ([address]) [address] [facility] use 1 LANCET to TEST BLOOD SUGAR four times a day 8/17/21   Historical Provider, MD   Blood Glucose Monitoring Suppl (ONE TOUCH ULTRA 2) w/Device KIT use as directed four times a day 8/17/21   Historical Provider, MD   RA Alcohol Swabs 70 % PADS USE 4 TIMES DAILY 8/16/21   Historical Provider, MD       FAMILY HISTORY:  family history includes Anxiety Disorder in her mother; Atrial Fibrillation in her mother; Bleeding Prob in an other family member; COPD in her maternal grandmother; Cancer in her maternal grandmother; Depression in her mother; Diabetes in her mother; Diabetes Type 1  in her maternal grandfather and another family member; Heart Disease in her maternal grandmother and mother; Hypertension in her maternal grandmother; Seizures in her maternal grandmother. She was adopted. SOCIAL HISTORY:   reports that she has quit smoking. Her smoking use included cigars. She smoked 0.50 packs per day. She has never used smokeless tobacco. She reports previous drug use. Drug: Marijuana. She reports that she does not drink alcohol.     VITALS:  Vitals:    10/28/21 0537 10/28/21 0615 10/28/21 0804   BP: 120/70  118/68   Pulse: 87  68   Resp: 18  20   Temp: 96.6 °F (35.9 °C)  97.5 °F (36.4 °C)   TempSrc: Oral  Oral   SpO2: 99%     Weight:  171 lb (77.6 kg)    Height:  5' 3\" (1.6 m)        PHYSICAL EXAM:  Fetal Heart Monitor:  Baseline Heart Rate 130, moderate variability, present accelerations, absent decelerations  [place]: irregular, every 2-6 minutes contractions    General appearance:  no apparent distress, alert, and cooperative  HEENT: head atraumatic, normocephalic, moist mucous membranes, trachea midline  Neurologic:  alert, oriented, normal speech, no focal findings or movement disorder noted  Lungs:  No increased work of breathing, good air exchange, clear to auscultation bilaterally, no crackles or wheezing  Heart:  regular rate and rhythm and no murmur    Abdomen:  soft, gravid, and non-tender  Extremities:  no calf tenderness, non edematous   Musculoskeletal: Gross strength equal and intact throughout, no gross abnormalities, range of motion normal in hips, knees, shoulders and spine  Psychiatric: Mood appropriate, normal affect   Rectal Exam: not indicated  Pelvic Exam:  Chaperone for Intimate Exam: Chaperone was present for entire exam, Chaperone Name: DEEPIKA Shaffer  Sterile Speculum Exam:   Urethral meatus: normal appearing   Vulva: Normal hair distribution, normal appearing vulva, no masses, tenderness or lesions, normal clitoris   Vagina: Normal appearing vaginal mucosa without lesions, no vaginal discharge noted in the posterior vault, no lacerations. Minimal bright red blood was pooling in the posterior vault   Cervix: Normal appearing cervix without lesions, external os visibly closed, no lacerations or abnormal lesions visualized. Slow oozing blood present from the external os  Sterile Vaginal Exam:  Cervix: No cervical motion tenderness   Uterus: Is gravid, Normal size, shape, consistency and non-tender   Adnexa: Non-tender, no palpable masses  Cervix: 2 cm dilated, 50 % effaced, -1 station, mid position (out of 3 station), soft consistency, FETAL POSITION: Cephalic (confirmed by ultrasound), Membranes intact,    Bishops Score: 7     0 1 2 3   Position Posterior Intermediate Anterior -   Consistency Firm Intermediate Soft -   Effacement 0-30% 31-50% 51-80% >80%   Dilation 0cm 1-2cm 3-4cm >5cm   Fetal Station -3 -2 -1, 0 +1, +2     DATA:  Membranes Ruptured: No  Valsalva/Pooling: absent  Vaginal Bleeding: present    LIMITED BEDSIDE US:  Position: Cephalic  Placental Location: anterior  Fetal Heart Tones: Present  Fetal Movement: Present  Amniotic Fluid Index/Volume: adequate 2x2 cm pocket  Estimated Fetal Weight:  7 lbs 9oz    PRENATAL LAB RESULTS:  Blood Type/Rh: A pos  Antibody Screen: negative  Hemoglobin, Hematocrit, Platelets: Hgb 88.4/ZSD 37.3/Plt 321  Rubella: immune  T.  Pallidum, IgG: non-reactive  Hepatitis B Surface Antigen: non-reactive   Hepatitis C Antibody: unknown   HIV: negative   Sickle Cell Screen: negative  Gonorrhea: negative  Chlamydia: positive, 21 and 21; negative 10/26/21  Urine culture: negative, date: 10/20/21    Early 1 hour Glucose Tolerance Test: 144  Early 3 hour Glucose Tolerance Test: Fastin; patient unable to perform other lab draws  3 hour Glucose Tolerance Test: Fastin; 1 hour: 267; 2 hour  237; 3 hour: 147    Group B Strep: negative RV culture on 10/13/21  Cystic Fibrosis Screen: negative  First Trimester Screen: not available  MSAFP/Multiple Markers: negative  Non-Invasive Prenatal Testing: low risk for aneuploidy  Anatomy US: anterior placenta, 3VC, male gender, normal anatomy    ASSESSMENT & PLAN:  Alex Martins is a 24 y.o. female  at 39w0d IUP    - GBS negative / Rh positive / R immune   - No indication for GBS prophylaxis    Concern for placental abruption vs spontaneous labor   - Patient is having unprovoked vaginal bleeding and abdominal cramping since 230. She denies any recent sexual activity or abdominal trauma. SSE shows minimal blood pooling in the posterior vault. Slow oozing of blood from the external cervical os on spec exam. SVE today showed that patient is 2/50/-1. Patient was evaluated in triage on 10/26 and her cervical check was 1/thick/-2.    - Patient is Rh pos and is currently VSS, Rhogam is not indicated   - cEFM and TOCO   - Will admit to labor and delivery under the service of Dr. Vibha Doss due to concern that vaginal bleeding may be due to placental abruption   - Cat 1 FHT and TOCO showing contractions q2-6 mins   - LBUS: Unremarkable. Anterior placenta present , fetal movement present, adequate 2x2 cm pocket of fluid visualized. EFW 7#9    - CBC, T&S, T.Pal ordered   - Patient is typed and crossed for 2 units of blood in the event that a blood transfusion is needed   - UDS ordered. R/B/A discussed with patient and patient agreeable   - IVF: NS @ 125 mL/hr   - Patient is vaccinated against COVID19   - C/S consent obtained and consent form is in the patient's chart       - Will continue expectant management and closely monitor patient for any increased bleeding resulting in a non-reassuring FHT or unable vital signs. Assess for spontaneous cervical change and if no change occurs, augment as needed    GDMA2    - Patient follows with MFM   - Her insulin regimen includes Novolog 0/6/10 and NPH 16 units.  She states that she is compliant with her insulin   - POCT glucose ordered - fasting and 2 hr PP. Will monitor blood glucose q1 hour when patient in active labor    FHx clotting disorder (maternal aunt)    - Patient denies any personal hx of clotting issues     Hx Chlamydia   - Pos 21 and 21   - TOR neg on 21    FHx DM    - Early 1 hr gtt 144      Short femur    - Seen on MFM US on 21   - FL was in the 3 %tile on , 15% tile on 10/21   - NIPT wnl      Questionable hx seizures    - Patient denies ever having a hx of seizures. She states that she has panic attacks. She has never followed with neurology and is not on any medication      Neurocardiogenic syncope/SVT    - Patient clinically asymptomatic at this time, no currently on any medications      Depression/ADD    - Stable not on any medications, previously on Focalin and Zoloft       BMI 30.29  Patient Active Problem List    Diagnosis Date Noted    Gestational diabetes mellitus (GDM), antepartum 08/10/2021     Priority: High     8/10/2021 consult MFM  2021 NPH 8 units before bedtime, 6 units Novolog before dinner.       SVT (supraventricular tachycardia) ([facility] 2019     Priority: High     2019 Referral to cardiology      Family history of clotting disorder 2019     Priority: High     2018 patient's maternal aunt with hx of Protein S and C deficiency      Family history of diabetes mellitus 2019     Priority: Medium     2019 early one hour GTT ordered      39 weeks gestation of pregnancy 10/28/2021    38 weeks gestation of pregnancy 10/26/2021    High-risk pregnancy, unspecified trimester 10/22/2021    Short femur of fetus on prenatal ultrasound 2021    Late prenatal care 2021    Severe recurrent major depression without psychotic features ([facility] 2020    Seizures ([facility] 03/15/2020      testing with weekly NST per [facility] starting at 28 weeks       19 M Apg 8/9 Wt 7#11 2019    CMV IgM+ 07/10/2019    Neurocardiogenic syncope 2019 Diagnosed by Dr. Fang White MD. See note in media section on 1/24/2019.  ADD (attention deficit disorder)      1/9/2019 stopped taking focalin 8 months ago         Plan discussed with Dr. Clarence Scott, who is agreeable. Steroids given this admission: No    Risks, benefits, alternatives and possible complications have been discussed in detail with the patient. Admission, and post admission procedures and expectations were discussed in detail. All questions were answered.     Attending's Name: Dr. Rodrick Vogt MD  Ob/Gyn Resident  10/28/2021, 6:27 AM

## 2021-10-29 LAB
HCT VFR BLD CALC: 39.9 % (ref 36.3–47.1)
HEMOGLOBIN: 12.6 G/DL (ref 11.9–15.1)

## 2021-10-29 PROCEDURE — 36415 COLL VENOUS BLD VENIPUNCTURE: CPT

## 2021-10-29 PROCEDURE — 85018 HEMOGLOBIN: CPT

## 2021-10-29 PROCEDURE — 85014 HEMATOCRIT: CPT

## 2021-10-29 PROCEDURE — 1220000000 HC SEMI PRIVATE OB R&B

## 2021-10-29 PROCEDURE — 6370000000 HC RX 637 (ALT 250 FOR IP): Performed by: STUDENT IN AN ORGANIZED HEALTH CARE EDUCATION/TRAINING PROGRAM

## 2021-10-29 RX ADMIN — SERTRALINE 25 MG: 25 TABLET, FILM COATED ORAL at 19:58

## 2021-10-29 RX ADMIN — IBUPROFEN 600 MG: 600 TABLET, FILM COATED ORAL at 00:49

## 2021-10-29 RX ADMIN — ACETAMINOPHEN 1000 MG: 500 TABLET ORAL at 22:27

## 2021-10-29 RX ADMIN — IBUPROFEN 600 MG: 600 TABLET, FILM COATED ORAL at 09:26

## 2021-10-29 RX ADMIN — ACETAMINOPHEN 1000 MG: 500 TABLET ORAL at 00:49

## 2021-10-29 RX ADMIN — IBUPROFEN 600 MG: 600 TABLET, FILM COATED ORAL at 16:00

## 2021-10-29 RX ADMIN — IBUPROFEN 600 MG: 600 TABLET, FILM COATED ORAL at 22:27

## 2021-10-29 ASSESSMENT — PAIN DESCRIPTION - LOCATION: LOCATION: ABDOMEN;BACK

## 2021-10-29 ASSESSMENT — PAIN SCALES - GENERAL
PAINLEVEL_OUTOF10: 4
PAINLEVEL_OUTOF10: 5
PAINLEVEL_OUTOF10: 8
PAINLEVEL_OUTOF10: 4

## 2021-10-29 ASSESSMENT — PAIN DESCRIPTION - PAIN TYPE: TYPE: ACUTE PAIN

## 2021-10-29 ASSESSMENT — PAIN DESCRIPTION - DESCRIPTORS: DESCRIPTORS: CRAMPING;DISCOMFORT

## 2021-10-29 ASSESSMENT — PAIN DESCRIPTION - FREQUENCY: FREQUENCY: INTERMITTENT

## 2021-10-29 ASSESSMENT — PAIN DESCRIPTION - ORIENTATION: ORIENTATION: LOWER

## 2021-10-29 NOTE — CARE COORDINATION
POST-PARTUM TRANSITIONAL CARE PLAN    39 weeks gestation of pregnancy [Z3A.39]    Writer met w/ Sparkle Angel ( Homar Corral asleep on the couch) at bedside to discuss DCP. She is S/P  on 10/28/2021 @ 39w0d at 1 of Male    Infant name on BC: Tristian Para. Infant to WIN. Infant PCP [facility]. FOB: Rachelle Vargas    Writer verified name/address/phone number correct on facesheet. Deepali Mcgraw stated she lives at home with [facility] insurance correct. Writer notified Deepali Mcgraw she has 30 days from date of birth to add  to insurance policy. She verbalized understanding. Deepali Mcgraw verbalized has/have all necessary items for [place] including a car seat (in the room) and crib, bassinet and pack n play at home. No previous home care. DME: all diabetic/insulin supplies at home    Anticipate DC 10/30/2021    CM continue to follow for any DC needs.

## 2021-10-29 NOTE — PROGRESS NOTES
Social Work    Met with patient at bedside to complete consult for no custody of other child. FOB was sleeping. SW did review patients history and of note is her mental health. Patient reported that this is her 2nd child Elise. FOEVERETT is Violette Samano and they reside together. Has all needed baby items and has a bassinet, crib and pack and play for safe sleep. Is linked with [facility] and has [facility] in place and her worker is Wade Godoy. SW inquired about other child and she stated her son Francisco Caldwell is with FOEVERETT. She stated he took custody because she didn't have baby items. SW did review notes from prior delivery and there was NO [facility] involvement. SW did ask if CSB was involved and she denied. Patient reported that she sees her other son every week at her parents home. Has good support from her mom and FOB's family. She denied any signs or symptoms of anxiety or depression but stated she has a history of it and has been on zoloft. Denied any SI. Stated she is linked with [facility] and she just started back there. Dr. Sid Mon prescribes her meds. She stated that she needs to get a therapy appt set up. PCP will be the [facility]. SW did contact CSB intake due to questionable history of custody. Spoke with Merlinda Eisenmenger, information taken and will likely be opened due to mental health history per [facility]. Await return call from [facility].

## 2021-10-29 NOTE — PROGRESS NOTES
POST PARTUM DAY # 1    Kianna Anderson is a 24 y.o. female  This patient was seen & examined today. Her pregnancy was complicated by:   Patient Active Problem List   Diagnosis    ADD (attention deficit disorder)    [facility]ra[facility]) ([facility]    Family history of clotting disorder    Family history of diabetes mellitus    Neurocardiogenic syncope    CMV IgM+     19 M Apg 8/9 Wt 7#11    Se[facility]ures ([facility]    [facility] without [facility] features [facility]    Late prenatal care    Gestational diabetes mellitus (GDM), antepartum    Short femur of fetus on prenatal ultrasound    39 weeks gestation of pregnancy     10/28/21 M Apg 8/9 Wt 8#4    Lost custody of children     Today she is doing well without any chief complaint. Her lochia is light. She denies chest pain, shortness of breath, headache, lightheadedness, blurred vision, peripheral edema and palpitations. She is  bottlefeeding and she denies any breast tenderness. She is ambulating well. Her voiding pattern is normal. I reviewed signs and symptoms of post partum depression with the patient, she currently denies any of these symptoms. She is tolerating solids.      Vital Signs:  Vitals:    10/28/21 1545 10/28/21 1600 10/28/21 2130 10/29/21 0100   BP: 105/76 117/80 116/67 109/68   Pulse: 68 65 67 56   Resp: 20 18 18 18   Temp:  97.8 °F (36.6 °C) 98 °F (36.7 °C) 98 °F (36.7 °C)   TempSrc:  Oral Oral    SpO2:  100%     Weight:       Height:         Physical Exam:  General:  no apparent distress, alert and cooperative  Neurologic:  alert, oriented, normal speech, no focal findings or movement disorder noted  Lungs:  No increased work of breathing, good air exchange, clear to auscultation bilaterally, no crackles or wheezing  Heart:  regular rate and rhythm    Abdomen: abdomen soft, non-distended, non-tender  Fundus: non-tender, normal size, firm, below umbilicus  Extremities:  no calf tenderness, non edematous    Lab:  Lab Results   Component Value Date    HGB 13.3 10/28/2021     Lab Results   Component Value Date    HCT 40.3 10/28/2021       Assessment/Plan:  1. Monroe Ernst is a [id number] PPD # 1 s/p    - Doing well, VSS   - male infant in [address], circumcision pending   - Encourage ambulation   - Pain controlled with motrin/tylenol  2. Rh positive/Rubella immune   - Rhogam/MMR not indicated  3. Bottle feeding    - Denies s/s of mastitis  4. FHx clotting disorder   - Patient's maternal aunt, she denies ever having a personal hx of clotting disorders  5. GDMA2   - Will need 2 hr gtt at 6 wk PP appt  6. Neurocardiogenic syncope   - Clinically asymptomatic at this time  7. Depression/ADD   - Zoloft 25 QD    - Reports stable mood, denies SI/HI  8. BMI 30.29   - Ambulation encouraged  9. Continue post partum care    Counseling Completed:  Secondary Smoke risks and Sudden Infant Death Syndrome were reviewed with recommendations. Infant sleeping, \"back to sleep\" and avoidance of co-sleeping recommendations were reviewed. Signs and Symptoms of Post Partum Depression were reviewed. The patient is to call if any occur. Signs and symptoms of Mastitis were reviewed. The patient is to call if any occur for follow up.   Discharge instructions including pelvic rest, no driving with pain medicine and office follow-up were reviewed with patient     Attending Physician: Dr. Sandra Kan MD  Ob/Gyn Resident   10/29/2021, 4:35 AM

## 2021-10-29 NOTE — PROGRESS NOTES
Social Work    Contacted B to find out if case was opened.  assigned is Felicia Holt [phone]. Left  on her vm.

## 2021-10-29 NOTE — FLOWSHEET NOTE
Rounded on patient and found  in \"clear bassinet\" positioned between MOB and FOB placed on couch and bed. I removed  and put him back on bassinet. I informed patients again on safe sleeping.

## 2021-10-30 VITALS
HEIGHT: 63 IN | DIASTOLIC BLOOD PRESSURE: 66 MMHG | WEIGHT: 171 LBS | BODY MASS INDEX: 30.3 KG/M2 | TEMPERATURE: 98.1 F | RESPIRATION RATE: 16 BRPM | HEART RATE: 58 BPM | SYSTOLIC BLOOD PRESSURE: 102 MMHG | OXYGEN SATURATION: 98 %

## 2021-10-30 LAB
ABO/RH: NORMAL
ANTIBODY SCREEN: NEGATIVE
ARM BAND NUMBER: NORMAL
BLD PROD TYP BPU: NORMAL
BLD PROD TYP BPU: NORMAL
CROSSMATCH RESULT: NORMAL
CROSSMATCH RESULT: NORMAL
DISPENSE STATUS BLOOD BANK: NORMAL
DISPENSE STATUS BLOOD BANK: NORMAL
EXPIRATION DATE: NORMAL
TRANSFUSION STATUS: NORMAL
TRANSFUSION STATUS: NORMAL
UNIT DIVISION: 0
UNIT DIVISION: 0
UNIT NUMBER: NORMAL
UNIT NUMBER: NORMAL

## 2021-10-30 PROCEDURE — 6370000000 HC RX 637 (ALT 250 FOR IP): Performed by: STUDENT IN AN ORGANIZED HEALTH CARE EDUCATION/TRAINING PROGRAM

## 2021-10-30 PROCEDURE — 6360000002 HC RX W HCPCS: Performed by: STUDENT IN AN ORGANIZED HEALTH CARE EDUCATION/TRAINING PROGRAM

## 2021-10-30 PROCEDURE — G0008 ADMIN INFLUENZA VIRUS VAC: HCPCS | Performed by: STUDENT IN AN ORGANIZED HEALTH CARE EDUCATION/TRAINING PROGRAM

## 2021-10-30 PROCEDURE — 90686 IIV4 VACC NO PRSV 0.5 ML IM: CPT | Performed by: STUDENT IN AN ORGANIZED HEALTH CARE EDUCATION/TRAINING PROGRAM

## 2021-10-30 RX ADMIN — ACETAMINOPHEN 1000 MG: 500 TABLET ORAL at 12:02

## 2021-10-30 RX ADMIN — IBUPROFEN 600 MG: 600 TABLET, FILM COATED ORAL at 12:02

## 2021-10-30 RX ADMIN — IBUPROFEN 600 MG: 600 TABLET, FILM COATED ORAL at 04:33

## 2021-10-30 RX ADMIN — INFLUENZA A VIRUS A/VICTORIA/2570/2019 IVR-215 (H1N1) ANTIGEN (PROPIOLACTONE INACTIVATED), INFLUENZA A VIRUS A/CAMBODIA/E0826360/2020 IVR-224 (H3N2) ANTIGEN (PROPIOLACTONE INACTIVATED), INFLUENZA B VIRUS B/VICTORIA/705/2018 BVR-11 ANTIGEN (PROPIOLACTONE INACTIVATED), INFLUENZA B VIRUS B/PHUKET/3073/2013 BVR-1B ANTIGEN (PROPIOLACTONE INACTIVATED) 0.5 ML: 15; 15; 15; 15 INJECTION, SUSPENSION INTRAMUSCULAR at 13:20

## 2021-10-30 RX ADMIN — ACETAMINOPHEN 1000 MG: 500 TABLET ORAL at 04:33

## 2021-10-30 ASSESSMENT — PAIN DESCRIPTION - PAIN TYPE: TYPE: ACUTE PAIN

## 2021-10-30 ASSESSMENT — PAIN DESCRIPTION - LOCATION: LOCATION: ABDOMEN

## 2021-10-30 ASSESSMENT — PAIN SCALES - GENERAL
PAINLEVEL_OUTOF10: 6
PAINLEVEL_OUTOF10: 6

## 2021-10-30 ASSESSMENT — PAIN DESCRIPTION - DESCRIPTORS: DESCRIPTORS: CRAMPING

## 2021-10-30 NOTE — PROGRESS NOTES
[facility] and  ready for [facility]. Anthony called [facility] and spoke with worker Marquita Caceres who will call and speak with worker. Anthony received call back from Rod Do who report she has spoken with  assigned to case and  can discharge home with mom. [facility] will follow up from home.

## 2021-10-30 NOTE — PROGRESS NOTES
CLINICAL PHARMACY NOTE: MEDS TO BEDS    Total # of Prescriptions Filled: 1   The following medications were delivered to the patient:  · Ibuprofen 600 mg tabs    Additional Documentation: Patient did not want senna for constipation.

## 2021-10-30 NOTE — PROGRESS NOTES
POST PARTUM DAY # 2    Uyen Millard is a 24 y.o. female  This patient was seen & examined today. Her pregnancy was complicated by:   Patient Active Problem List   Diagnosis    ADD (attention deficit disorder)    [facility]) [facility]    Family history of clotting disorder    Family history of diabetes mellitus    Neurocardiogenic syncope    CMV IgM+     19 M Apg 8/9 Wt 7#11    [facility]    [facility]    Late prenatal care    Gestational diabetes mellitus (GDM), antepartum    Short femur of fetus on prenatal ultrasound    39 weeks gestation of pregnancy     10/28/21 M Apg 8/9 Wt 8#4    Lost custody of children       Today she is doing well without any chief complaint. Her lochia is light. She denies chest pain, shortness of breath, headache, lightheadedness, blurred vision, peripheral edema and palpitations. She is  breast feeding and she denies any breast tenderness. She is ambulating well. Her voiding pattern is normal. I reviewed signs and symptoms of post partum depression with the patient, she currently denies any of these symptoms. She is tolerating solids.      Vital Signs:  Vitals:    10/29/21 0820 10/29/21 1600 10/2000 10/30/21 0136   BP: 111/71 108/64 106/73 124/85   Pulse: 62 78 73 61   Resp: 17 18 15 16   Temp: 97.9 °F (36.6 °C) 96.8 °F (36 °C) 97.5 °F (36.4 °C) 98 °F (36.7 °C)   TempSrc:  Oral Oral Oral   SpO2:   97% 98%   Weight:       Height:             Physical Exam:  General:  no apparent distress, alert and cooperative  Neurologic:  alert, oriented, normal speech, no focal findings  Lungs:  No increased work of breathing, good air exchange, clear to auscultation bilaterally, no crackles or wheezing  Heart:  normal S1 and S2 and regular rate and rhythm    Abdomen: abdomen soft, non-distended, non-tender  Fundus: non-tender, normal size, firm, below umbilicus  Extremities:  no

## 2021-10-30 NOTE — PLAN OF CARE
Problem: Pain:  Goal: Pain level will decrease  Description: Pain level will decrease  Outcome: Completed  Goal: Control of acute pain  Description: Control of acute pain  Outcome: Completed  Goal: Control of chronic pain  Description: Control of chronic pain  Outcome: Completed     Problem: Anxiety:  Goal: Level of anxiety will decrease  Description: Level of anxiety will decrease  Outcome: Completed     Problem: Fluid Volume - Imbalance:  Goal: Absence of imbalanced fluid volume signs and symptoms  Description: Absence of imbalanced fluid volume signs and symptoms  Outcome: Completed  Goal: Absence of intrapartum hemorrhage signs and symptoms  Description: Absence of intrapartum hemorrhage signs and symptoms  Outcome: Completed  Goal: Absence of postpartum hemorrhage signs and symptoms  Description: Absence of postpartum hemorrhage signs and symptoms  Outcome: Completed     Problem: Infection - Intrapartum Infection:  Goal: Will show no infection signs and symptoms  Description: Will show no infection signs and symptoms  Outcome: Completed     Problem: Pain - Acute:  Goal: Pain level will decrease  Description: Pain level will decrease  Outcome: Completed  Goal: Able to cope with pain  Description: Able to cope with pain  Outcome: Completed     Problem: Urinary Retention:  Goal: Experiences of bladder distention will decrease  Description: Experiences of bladder distention will decrease  Outcome: Completed  Goal: Urinary elimination within specified parameters  Description: Urinary elimination within specified parameters  Outcome: Completed     Problem: Discharge Planning:  Goal: Discharged to appropriate level of care  Description: Discharged to appropriate level of care  Outcome: Completed     Problem: Constipation:  Goal: Bowel elimination is within specified parameters  Description: Bowel elimination is within specified parameters  Outcome: Completed     Problem: Infection - Risk of, Puerperal Infection:  Goal: Will show no infection signs and symptoms  Description: Will show no infection signs and symptoms  Outcome: Completed     Problem: Mood - Altered:  Goal: Mood stable  Description: Mood stable  Outcome: Completed

## 2021-10-30 NOTE — PLAN OF CARE
Problem: Pain:  Goal: Pain level will decrease  Description: Pain level will decrease  Outcome: Ongoing  Goal: Control of acute pain  Description: Control of acute pain  Outcome: Ongoing  Goal: Control of chronic pain  Description: Control of chronic pain  Outcome: Ongoing     Problem: Anxiety:  Goal: Level of anxiety will decrease  Description: Level of anxiety will decrease  Outcome: Ongoing     Problem: Breathing Pattern - Ineffective:  Goal: Able to breathe comfortably  Description: Able to breathe comfortably  Outcome: Completed     Problem: Fluid Volume - Imbalance:  Goal: Absence of imbalanced fluid volume signs and symptoms  Description: Absence of imbalanced fluid volume signs and symptoms  Outcome: Ongoing  Goal: Absence of intrapartum hemorrhage signs and symptoms  Description: Absence of intrapartum hemorrhage signs and symptoms  Outcome: Ongoing  Goal: Absence of postpartum hemorrhage signs and symptoms  Description: Absence of postpartum hemorrhage signs and symptoms  Outcome: Ongoing     Problem: Infection - Intrapartum Infection:  Goal: Will show no infection signs and symptoms  Description: Will show no infection signs and symptoms  Outcome: Ongoing     Problem:  Screening:  Goal: Ability to make informed decisions regarding treatment has improved  Description: Ability to make informed decisions regarding treatment has improved  Outcome: Completed     Problem: Pain - Acute:  Goal: Pain level will decrease  Description: Pain level will decrease  Outcome: Ongoing  Goal: Able to cope with pain  Description: Able to cope with pain  Outcome: Ongoing     Problem: Tissue Perfusion - Uteroplacental, Altered:  Goal: Absence of abnormal fetal heart rate pattern  Description: Absence of abnormal fetal heart rate pattern  Outcome: Completed     Problem: Urinary Retention:  Goal: Experiences of bladder distention will decrease  Description: Experiences of bladder distention will decrease  Outcome: Ongoing  Goal: Urinary elimination within specified parameters  Description: Urinary elimination within specified parameters  Outcome: Ongoing     Problem: Discharge Planning:  Goal: Discharged to appropriate level of care  Description: Discharged to appropriate level of care  Outcome: Ongoing     Problem: Constipation:  Goal: Bowel elimination is within specified parameters  Description: Bowel elimination is within specified parameters  Outcome: Ongoing     Problem: Infection - Risk of, Puerperal Infection:  Goal: Will show no infection signs and symptoms  Description: Will show no infection signs and symptoms  Outcome: Ongoing     Problem: Mood - Altered:  Goal: Mood stable  Description: Mood stable  Outcome: Ongoing

## 2021-11-01 LAB — SURGICAL PATHOLOGY REPORT: NORMAL

## 2022-01-02 ENCOUNTER — HOSPITAL ENCOUNTER (EMERGENCY)
Age: 22
Discharge: HOME | End: 2022-01-02
Attending: EMERGENCY MEDICINE
Payer: COMMERCIAL

## 2022-01-02 VITALS
RESPIRATION RATE: 19 BRPM | TEMPERATURE: 97.8 F | DIASTOLIC BLOOD PRESSURE: 75 MMHG | SYSTOLIC BLOOD PRESSURE: 119 MMHG | HEART RATE: 79 BPM | OXYGEN SATURATION: 96 %

## 2022-01-02 DIAGNOSIS — R10.13 ABDOMINAL PAIN, EPIGASTRIC: Primary | ICD-10-CM

## 2022-01-02 LAB
ABSOLUTE EOS #: 0.14 K/UL (ref 0–0.44)
ABSOLUTE IMMATURE GRANULOCYTE: <0.03 K/UL (ref 0–0.3)
ABSOLUTE LYMPH #: 0.76 K/UL (ref 1.1–3.7)
ABSOLUTE MONO #: 0.68 K/UL (ref 0.1–1.4)
ANION GAP SERPL CALCULATED.3IONS-SCNC: 13 MMOL/L (ref 9–17)
BASOPHILS # BLD: 0 % (ref 0–2)
BASOPHILS ABSOLUTE: 0.03 K/UL (ref 0–0.2)
BUN BLDV-MCNC: 14 MG/DL (ref 6–20)
BUN/CREAT BLD: ABNORMAL (ref 9–20)
CALCIUM SERPL-MCNC: 8.6 MG/DL (ref 8.6–10.4)
CHLORIDE BLD-SCNC: 105 MMOL/L (ref 98–107)
CO2: 19 MMOL/L (ref 20–31)
CREAT SERPL-MCNC: 0.6 MG/DL (ref 0.5–0.9)
DIFFERENTIAL TYPE: ABNORMAL
EOSINOPHILS RELATIVE PERCENT: 2 % (ref 1–4)
GFR AFRICAN AMERICAN: >60 ML/MIN
GFR NON-AFRICAN AMERICAN: >60 ML/MIN
GFR SERPL CREATININE-BSD FRML MDRD: ABNORMAL ML/MIN/{1.73_M2}
GFR SERPL CREATININE-BSD FRML MDRD: ABNORMAL ML/MIN/{1.73_M2}
GLUCOSE BLD-MCNC: 97 MG/DL (ref 70–99)
HCG QUALITATIVE: NEGATIVE
HCT VFR BLD CALC: 39.6 % (ref 36.3–47.1)
HEMOGLOBIN: 13.1 G/DL (ref 11.9–15.1)
IMMATURE GRANULOCYTES: 0 %
LIPASE: 20 U/L (ref 13–60)
LYMPHOCYTES # BLD: 8 % (ref 25–45)
MAGNESIUM: 1.8 MG/DL (ref 1.6–2.6)
MCH RBC QN AUTO: 31.3 PG (ref 25.2–33.5)
MCHC RBC AUTO-ENTMCNC: 33.1 G/DL (ref 28.4–34.8)
MCV RBC AUTO: 94.5 FL (ref 82.6–102.9)
MONOCYTES # BLD: 7 % (ref 2–8)
NRBC AUTOMATED: 0 PER 100 WBC
PDW BLD-RTO: 13.5 % (ref 11.8–14.4)
PLATELET # BLD: 303 K/UL (ref 138–453)
PLATELET ESTIMATE: ABNORMAL
PMV BLD AUTO: 9 FL (ref 8.1–13.5)
POTASSIUM SERPL-SCNC: 3.5 MMOL/L (ref 3.7–5.3)
RBC # BLD: 4.19 M/UL (ref 3.95–5.11)
RBC # BLD: ABNORMAL 10*6/UL
SEG NEUTROPHILS: 83 % (ref 34–64)
SEGMENTED NEUTROPHILS ABSOLUTE COUNT: 7.84 K/UL (ref 1.5–8.1)
SODIUM BLD-SCNC: 137 MMOL/L (ref 135–144)
WBC # BLD: 9.5 K/UL (ref 4.5–13.5)
WBC # BLD: ABNORMAL 10*3/UL

## 2022-01-02 PROCEDURE — 83690 ASSAY OF LIPASE: CPT

## 2022-01-02 PROCEDURE — 99285 EMERGENCY DEPT VISIT HI MDM: CPT

## 2022-01-02 PROCEDURE — 85025 COMPLETE CBC W/AUTO DIFF WBC: CPT

## 2022-01-02 PROCEDURE — 6370000000 HC RX 637 (ALT 250 FOR IP): Performed by: GENERAL PRACTICE

## 2022-01-02 PROCEDURE — 6360000002 HC RX W HCPCS: Performed by: GENERAL PRACTICE

## 2022-01-02 PROCEDURE — 2580000003 HC RX 258: Performed by: GENERAL PRACTICE

## 2022-01-02 PROCEDURE — 84703 CHORIONIC GONADOTROPIN ASSAY: CPT

## 2022-01-02 PROCEDURE — 80048 BASIC METABOLIC PNL TOTAL CA: CPT

## 2022-01-02 PROCEDURE — 96372 THER/PROPH/DIAG INJ SC/IM: CPT

## 2022-01-02 PROCEDURE — 83735 ASSAY OF MAGNESIUM: CPT

## 2022-01-02 PROCEDURE — 96361 HYDRATE IV INFUSION ADD-ON: CPT

## 2022-01-02 PROCEDURE — 96374 THER/PROPH/DIAG INJ IV PUSH: CPT

## 2022-01-02 RX ORDER — 0.9 % SODIUM CHLORIDE 0.9 %
1000 INTRAVENOUS SOLUTION INTRAVENOUS ONCE
Status: COMPLETED | OUTPATIENT
Start: 2022-01-02 | End: 2022-01-02

## 2022-01-02 RX ORDER — MAGNESIUM HYDROXIDE/ALUMINUM HYDROXICE/SIMETHICONE 120; 1200; 1200 MG/30ML; MG/30ML; MG/30ML
30 SUSPENSION ORAL ONCE
Status: COMPLETED | OUTPATIENT
Start: 2022-01-02 | End: 2022-01-02

## 2022-01-02 RX ORDER — FAMOTIDINE 20 MG/1
20 TABLET, FILM COATED ORAL ONCE
Status: COMPLETED | OUTPATIENT
Start: 2022-01-02 | End: 2022-01-02

## 2022-01-02 RX ORDER — ONDANSETRON 2 MG/ML
4 INJECTION INTRAMUSCULAR; INTRAVENOUS ONCE
Status: COMPLETED | OUTPATIENT
Start: 2022-01-02 | End: 2022-01-02

## 2022-01-02 RX ORDER — DICYCLOMINE HYDROCHLORIDE 10 MG/ML
20 INJECTION INTRAMUSCULAR ONCE
Status: COMPLETED | OUTPATIENT
Start: 2022-01-02 | End: 2022-01-02

## 2022-01-02 RX ORDER — PANTOPRAZOLE SODIUM 20 MG/1
40 TABLET, DELAYED RELEASE ORAL DAILY
Qty: 30 TABLET | Refills: 0 | Status: SHIPPED | OUTPATIENT
Start: 2022-01-02 | End: 2022-01-26

## 2022-01-02 RX ADMIN — DICYCLOMINE HYDROCHLORIDE 20 MG: 10 INJECTION INTRAMUSCULAR at 08:40

## 2022-01-02 RX ADMIN — SODIUM CHLORIDE 1000 ML: 9 INJECTION, SOLUTION INTRAVENOUS at 08:51

## 2022-01-02 RX ADMIN — FAMOTIDINE 20 MG: 20 TABLET, FILM COATED ORAL at 08:40

## 2022-01-02 RX ADMIN — ALUMINUM HYDROXIDE, MAGNESIUM HYDROXIDE, AND SIMETHICONE 30 ML: 200; 200; 20 SUSPENSION ORAL at 08:40

## 2022-01-02 RX ADMIN — ONDANSETRON 4 MG: 2 INJECTION INTRAMUSCULAR; INTRAVENOUS at 08:40

## 2022-01-02 NOTE — ED PROVIDER NOTES
[facility]     Emergency Department     Faculty Attestation    I performed a history and physical examination of the patient and discussed management with the resident. I reviewed the residents note and agree with the documented findings and plan of care. Any areas of disagreement are noted on the chart. I was personally present for the key portions of any procedures. I have documented in the chart those procedures where I was not present during the key portions. I have reviewed the emergency nurses triage note. I agree with the chief complaint, past medical history, past surgical history, allergies, medications, social and family history as documented unless otherwise noted below. For Physician Assistant/ Nurse Practitioner cases/documentation I have personally evaluated this patient and have completed at least one if not all key elements of the E/M (history, physical exam, and MDM). Additional findings are as noted. This patient was evaluated in the Emergency Department for symptoms described in the history of present illness. He/she was evaluated in the context of the global COVID-19 pandemic, which necessitated consideration that the patient might be at risk for infection with the SARS-CoV-2 virus that causes COVID-19. Institutional protocols and algorithms that pertain to the evaluation of patients at risk for COVID-19 are in a state of rapid change based on information released by regulatory bodies including the CDC and federal and state organizations. These policies and algorithms were followed during the patient's care in the ED. Here with epigastric abdominal pain that radiates into her chest.  Nausea but no vomiting. Does states she had some alcohol New Year's Neelima she does not normally drink. No other complaints on exam resting comfortably chatting with the nurse. Heart lungs normal abdomen soft focal epigastric tenderness that reproduces symptoms no rebound no guarding. No pain below the umbilicus.   Will give antacids, check labs, reevaluate, probable discharge      Critical Care     none    Armando Almanzar MD, German Dutta  Attending Emergency  Physician             Armando Almanzar MD  01/02/22 [id number]

## 2022-01-02 NOTE — ED NOTES
Upon arrival patient was unable to move herself to the stretcher due to the pain that she was in. Patient moved to the stretcher by paramedics, patient immediately state that she had to have a bowel movement , patient got up unassisted and walked to the restroom.  Was told to provide a urine sample if able      Francyne Levent[facility]  01/02/22 [id number]

## 2022-01-02 NOTE — ED NOTES
Bed: 15  Expected date:   Expected time:   Means of arrival:   Comments:  [address], RN  01/02/22 7770

## 2022-01-02 NOTE — ED PROVIDER NOTES
[facility] ED  Emergency Department Encounter  EmergencyMedicine Resident     Pt Marlyce Simmonds  MRN: [id number]  [place] 2000  Date of evaluation: 1/2/22  PCP:  DARRIAN Kaufman CNP    CHIEF COMPLAINT       Chief Complaint   Patient presents with    Tachycardia    Abdominal Pain       HISTORY OF PRESENT ILLNESS  (Location/Symptom, Timing/Onset, Context/Setting, Quality, Duration, Modifying Factors, Severity.)      Conrado Wilson is a 24 y.o. female who presents with 2-day history of epigastric abdominal pain with nausea. She states she vomited twice today. She states that she had 10/10 pain without radiation, had a bowel movement just a few moments ago and it is now a 7 out of 10. She does not know what makes the pain better or worse. She denies lower quadrant abdominal pain. No vaginal discharge or pelvic pain. PAST MEDICAL / SURGICAL / SOCIAL / FAMILY HISTORY      has a past medical history of ADD (attention deficit disorder), Anxiety, Asthma, CMV (cytomegalovirus infection) ([address] Depression, Gestational diabetes mellitus (GDM), antepartum, Hearing loss in left ear, Heart disease, Hypothyroidism, Immunizations up to date in pediatric patient, Neurocardiogenic syncope, Pseudoseizures ([address] Raynaud disease, Seizures ([address] SVT (supraventricular tachycardia) ([address] Tachycardia, Traumatic injury during pregnancy, third trimester, and Wears glasses. Denies further past medical hx     has a past surgical history that includes REMOVAL FOREIGN BODY EAR (Left) and Tympanoplasty (Left, 06/24/2016).   Denies further past surgical hx    Social History     Socioeconomic History    Marital status: Single     Spouse name: Not on file    Number of children: Not on file    Years of education: Not on file    Highest education level: Not on file   Occupational History    Not on file   Tobacco Use    Smoking status: Former Smoker     Packs/day: 0.50     Types: Cigars  Smokeless tobacco: Never Used    Tobacco comment: 1-2 black and milds daily    Vaping Use    Vaping Use: Never used   Substance and Sexual Activity    Alcohol use: No    Drug use: Not Currently     Types: Marijuana Alexy Free)     Comment: not in pregnancy    Sexual activity: Yes     Partners: Male     Birth control/protection: Condom   Other Topics Concern    Not on file   Social History Narrative    Not on file     Social Determinants of Health     Financial Resource Strain:     Difficulty of Paying Living Expenses: Not on file   Food Insecurity:     Worried About Running Out of Food in the Last Year: Not on file    Darling of Food in the Last Year: Not on file   Transportation Needs:     Lack of Transportation (Medical): Not on file    Lack of Transportation (Non-Medical):  Not on file   Physical Activity:     Days of Exercise per Week: Not on file    Minutes of Exercise per Session: Not on file   Stress:     Feeling of Stress : Not on file   Social Connections:     Frequency of Communication with Friends and Family: Not on file    Frequency of Social Gatherings with Friends and Family: Not on file    Attends Cheondoism Services: Not on file    Active Member of Clubs or Organizations: Not on file    Attends Club or Organization Meetings: Not on file    Marital Status: Not on file   Intimate Partner Violence:     Fear of Current or Ex-Partner: Not on file    Emotionally Abused: Not on file    Physically Abused: Not on file    Sexually Abused: Not on file   Housing Stability:     Unable to Pay for Housing in the Last Year: Not on file    Number of [place] in the Last Year: Not on file    Unstable Housing in the Last Year: Not on file       Family History   Adopted: Yes   Problem Relation Age of Onset    Diabetes Mother         borderline diet controlled    Atrial Fibrillation Mother     Anxiety Disorder Mother     Depression Mother     Heart Disease Mother     Seizures Maternal Grandmother     COPD Maternal Grandmother     Hypertension Maternal Grandmother     Cancer Maternal Grandmother         ovarian    Heart Disease Maternal Grandmother     Diabetes Type 1  Maternal Grandfather     Diabetes Type 1  Other         m uncle    Bleeding Prob Other         m aunt protein S&C deficiency       Allergies:  Sulfa antibiotics    Home Medications:  Prior to Admission medications    Medication Sig Start Date End Date Taking? Authorizing Provider   pantoprazole (PROTONIX) 20 MG tablet Take 2 tablets by mouth daily 1/2/22  Yes Meggan Dutchess, DO   ibuprofen (ADVIL;MOTRIN) 600 MG tablet Take 1 tablet by mouth every 6 hours as needed for Pain 10/28/21   More Bon, DO   acetaminophen (TYLENOL) 500 MG tablet Take 2 tablets by mouth every 6 hours as needed for Pain 10/20/21   Michael [place], DO   famotidine (PEPCID) 20 MG tablet Take 1 tablet by mouth 2 times daily 9/14/21   [place], DO   albuterol sulfate HFA (VENTOLIN HFA) 108 (90 Base) MCG/ACT inhaler Inhale 2 puffs into the lungs 4 times daily as needed for Wheezing 9/14/21 Woodson Old, DO   acetaminophen (TYLENOL) 325 MG tablet Take 1 tablet by mouth every 6 hours as needed for Pain 7/30/21   Esha Vela MD   Prenatal Vit-Fe Fumarate-FA (PNV PRENATAL PLUS MULTIVITAMIN) 27-1 MG TABS Take 1 tablet by mouth daily 7/14/21 7/9/22  Rob January, APRN - NP       REVIEW OF SYSTEMS    (2-9 systems for level 4, 10 or more for level 5)      Review of Systems    Review of Systems   Constitutional: Negative for chills and fever. HENT: Negative for sore throat. Eyes: Negative for pain. Respiratory: Negative for cough. Cardiovascular: Negative for chest pain and palpitations. Gastrointestinal: Positive for epigastric abdominal pain. Genitourinary: Negative for dysuria. Musculoskeletal: Negative for gait problem. Skin: Negative for wound. Neurological: Negative for headaches.          PHYSICAL EXAM (up to 7 for level 4, 8 or more for level 5)      INITIAL VITALS:   /75   Pulse 79   Temp 97.8 °F (36.6 °C) (Oral)   Resp 19   SpO2 96%     Physical Exam   Gen. Appearance: patient appears well, nondistressed. Head: head atraumatic, normocephalic. Eyes: Extraocular movements intact. No scleral icterus  Mouth: Oropharynx clear and moist.  No oral lesions  Neck: Supple. No lymphadenopathy. Pulmonary: Lungs clear to auscultation bilaterally. No wheezing, rales or rhonchi   Cardiovascular: Regular rate and rhythm, no murmurs   Abdomen: Soft, mild tenderness with palpation of the epigastrium, no guarding or rebound, normal bowel sounds  Neurology: GCS 15. Oriented to person place and time.   moving all extremities   Skin: Warm, dry, well perfused        DIFFERENTIAL  DIAGNOSIS     PLAN (LABS / IMAGING / EKG):  Orders Placed This Encounter   Procedures    HCG Qualitative, Serum    CBC Auto Differential    Basic Metabolic Panel w/ Reflex to MG    Lipase    Magnesium       MEDICATIONS ORDERED:  Orders Placed This Encounter   Medications    0.9 % sodium chloride bolus    ondansetron (ZOFRAN) injection 4 mg    famotidine (PEPCID) tablet 20 mg    dicyclomine (BENTYL) injection 20 mg    aluminum & magnesium hydroxide-simethicone (MAALOX) 200-200-20 MG/5ML suspension 30 mL    pantoprazole (PROTONIX) 20 MG tablet     Sig: Take 2 tablets by mouth daily     Dispense:  30 tablet     Refill:  0           DIAGNOSTIC RESULTS / EMERGENCY DEPARTMENT COURSE / MDM     LABS:  Results for orders placed or performed during the hospital encounter of 01/02/22   HCG Qualitative, Serum   Result Value Ref Range    hCG Qual NEGATIVE NEGATIVE   CBC Auto Differential   Result Value Ref Range    WBC 9.5 4.5 - 13.5 k/uL    RBC 4.19 3.95 - 5.11 m/uL    Hemoglobin 13.1 11.9 - 15.1 g/dL    Hematocrit 39.6 36.3 - 47.1 %    MCV 94.5 82.6 - 102.9 fL    MCH 31.3 25.2 - 33.5 pg    MCHC 33.1 28.4 - 34.8 g/dL    RDW 13.5 11.8 - 14.4 %    Platelets 119 361 - 001 k/uL    MPV 9.0 8.1 - 13.5 fL    NRBC Automated 0.0 0.0 per 100 WBC    Differential Type NOT REPORTED     Seg Neutrophils 83 (H) 34 - 64 %    Lymphocytes 8 (L) 25 - 45 %    Monocytes 7 2 - 8 %    Eosinophils % 2 1 - 4 %    Basophils 0 0 - 2 %    Immature Granulocytes 0 0 %    Segs Absolute 7.84 1.50 - 8.10 k/uL    Absolute Lymph # 0.76 (L) 1.10 - 3.70 k/uL    Absolute Mono # 0.68 0.10 - 1.40 k/uL    Absolute Eos # 0.14 0.00 - 0.44 k/uL    Basophils Absolute 0.03 0.00 - 0.20 k/uL    Absolute Immature Granulocyte <0.03 0.00 - 0.30 k/uL    WBC Morphology NOT REPORTED     RBC Morphology NOT REPORTED     Platelet Estimate NOT REPORTED    Basic Metabolic Panel w/ Reflex to MG   Result Value Ref Range    Glucose 97 70 - 99 mg/dL    BUN 14 6 - 20 mg/dL    CREATININE 0.60 0.50 - 0.90 mg/dL    Bun/Cre Ratio NOT REPORTED 9 - 20    Calcium 8.6 8.6 - 10.4 mg/dL    Sodium 137 135 - 144 mmol/L    Potassium 3.5 (L) 3.7 - 5.3 mmol/L    Chloride 105 98 - 107 mmol/L    CO2 19 (L) 20 - 31 mmol/L    Anion Gap 13 9 - 17 mmol/L    GFR Non-African American >60 >60 mL/min    GFR African American >60 >60 mL/min    GFR Comment          GFR Staging NOT REPORTED    Lipase   Result Value Ref Range    Lipase 20 13 - 60 U/L   Magnesium   Result Value Ref Range    Magnesium 1.8 1.6 - 2.6 mg/dL       RADIOLOGY:  None    EKG  None    All EKG's are interpreted by the Emergency Department Physician who either signs or Co-signs this chart in the absence of a cardiologist.    [address] MDM:          ED Course as of 01/03/22 1005   Sun Jan 02, 2022   0904 Patient feels significant improvement after Bentyl, Pepcid and Maalox. Abdomen is soft and nontender now. Laboratory work-up unremarkable including BMP, CBC and lipase. hCG is negative. Will discharge home with 30-day prescription for Protonix.   Recommend follow-up with PCP [LULI]      ED Course User Index  [LULI] Juliana Sherman DO PROCEDURES:  None    CONSULTS:  None    CRITICAL CARE:  None    FINAL IMPRESSION      1. Abdominal pain, epigastric          DISPOSITION / PLAN     DISPOSITION Decision To Discharge 01/02/2022 09:05:16 AM      PATIENT REFERRED TO:  No follow-up provider specified.     DISCHARGE MEDICATIONS:  Discharge Medication List as of 1/2/2022  9:07 AM      START taking these medications    Details   pantoprazole (PROTONIX) 20 MG tablet Take 2 tablets by mouth daily, Disp-30 tablet, R-0Print             Debbie Villalta DO  Emergency Medicine Resident    (Please note that portions of thisnote were completed with a voice recognition program.  Efforts were made to edit the dictations but occasionally words are mis-transcribed.)        Debbie Villalta DO  Resident  01/03/22 [id number]

## 2022-01-02 NOTE — ED NOTES
Pt arrived to ED via [facility]. Pt states she is having 10/10 midgastric abdominal pain that radiates to the R lower back that began today. Pt states her stomach spasms when she feels 10/10 pain. Pt arrived to ED with an established IV. Pt is A&Ox4. Pt was changed into gown, connected to cardiac monitor and blood was drawn from established IV.      Delfina Martin RN  01/02/22 230 [facility], RN  01/02/22 2411

## 2022-01-20 ENCOUNTER — HOSPITAL ENCOUNTER (EMERGENCY)
Age: 22
Discharge: ANOTHER HOSPITAL | End: 2022-01-20
Attending: EMERGENCY MEDICINE
Payer: COMMERCIAL

## 2022-01-20 VITALS
RESPIRATION RATE: 16 BRPM | DIASTOLIC BLOOD PRESSURE: 88 MMHG | HEART RATE: 105 BPM | OXYGEN SATURATION: 98 % | TEMPERATURE: 97.7 F | SYSTOLIC BLOOD PRESSURE: 125 MMHG

## 2022-01-20 DIAGNOSIS — B34.9 VIRAL SYNDROME: Primary | ICD-10-CM

## 2022-01-20 DIAGNOSIS — Z20.822 EXPOSURE TO COVID-19 VIRUS: ICD-10-CM

## 2022-01-20 LAB
DIRECT EXAM: NORMAL
Lab: NORMAL
SPECIMEN DESCRIPTION: NORMAL
TROPONIN INTERP: NORMAL
TROPONIN T: NORMAL NG/ML
TROPONIN, HIGH SENSITIVITY: <6 NG/L (ref 0–14)

## 2022-01-20 PROCEDURE — U0003 INFECTIOUS AGENT DETECTION BY NUCLEIC ACID (DNA OR RNA); SEVERE ACUTE RESPIRATORY SYNDROME CORONAVIRUS 2 (SARS-COV-2) (CORONAVIRUS DISEASE [COVID-19]), AMPLIFIED PROBE TECHNIQUE, MAKING USE OF HIGH THROUGHPUT TECHNOLOGIES AS DESCRIBED BY CMS-2020-01-R: HCPCS

## 2022-01-20 PROCEDURE — 93005 ELECTROCARDIOGRAM TRACING: CPT | Performed by: HEALTH CARE PROVIDER

## 2022-01-20 PROCEDURE — 87804 INFLUENZA ASSAY W/OPTIC: CPT

## 2022-01-20 PROCEDURE — U0005 INFEC AGEN DETEC AMPLI PROBE: HCPCS

## 2022-01-20 PROCEDURE — 99282 EMERGENCY DEPT VISIT SF MDM: CPT

## 2022-01-20 PROCEDURE — 84484 ASSAY OF TROPONIN QUANT: CPT

## 2022-01-20 ASSESSMENT — ENCOUNTER SYMPTOMS
SHORTNESS OF BREATH: 1
ABDOMINAL PAIN: 0
CONSTIPATION: 0
DIARRHEA: 0
NAUSEA: 0
SORE THROAT: 0
VOMITING: 0

## 2022-01-20 ASSESSMENT — PAIN DESCRIPTION - LOCATION: LOCATION: ABDOMEN

## 2022-01-20 ASSESSMENT — PAIN DESCRIPTION - PAIN TYPE: TYPE: ACUTE PAIN

## 2022-01-20 ASSESSMENT — PAIN SCALES - GENERAL: PAINLEVEL_OUTOF10: 6

## 2022-01-20 ASSESSMENT — PAIN DESCRIPTION - FREQUENCY: FREQUENCY: INTERMITTENT

## 2022-01-20 NOTE — Clinical Note
Dora Paulino was seen and treated in our emergency department on 1/20/2022. She may return to work on 01/24/2022. If you have any questions or concerns, please don't hesitate to call.       Meghan Glynn, DO

## 2022-01-20 NOTE — ED TRIAGE NOTES
Pt states she was exposed to Covid at work, pt states chills and cough.  Pt denies vomiting, pt states nausea

## 2022-01-20 NOTE — ED PROVIDER NOTES
[facility] ED  Emergency Department Encounter  Emergency Medicine Resident     Pt Name: Ez Dean  MRN: [id number]  [address]ig[address] 2000  Date of evaluation: 1/20/22  PCP:  DARRIAN Son CNP    CHIEF COMPLAINT       Chief Complaint   Patient presents with    Covid Testing     was exposed        HISTORY OFPRESENT ILLNESS  (Location/Symptom, Timing/Onset, Context/Setting, Quality, Duration, Modifying Brena Going.)      Ez Dean is a 24 y.o. female who presents with concerns for COVID-19. Patient started having symptoms about 6 days ago. Has a known COVID-19 exposure 6-7 days ago from someone at work. Patient reports some dizziness, general malaise, intermittent cough, abdominal cramping and nausea. Denies any fevers, sore throat, rhinorrhea, nausea/vomiting at this time. PAST MEDICAL / SURGICAL / SOCIAL / FAMILY HISTORY      has a past medical history of ADD (attention deficit disorder), Anxiety, Asthma, CMV (cytomegalovirus infection) ([address] Depression, Gestational diabetes mellitus (GDM), antepartum, Hearing loss in left ear, Heart disease, Hypothyroidism, Immunizations up to date in pediatric patient, Neurocardiogenic syncope, Pseudoseizures ([address] Raynaud disease, Seizures ([address] SVT (supraventricular tachycardia) ([address] Tachycardia, Traumatic injury during pregnancy, third trimester, and Wears glasses. has a past surgical history that includes REMOVAL FOREIGN BODY EAR (Left) and Tympanoplasty (Left, 06/24/2016).     Social History     Socioeconomic History    Marital status: Single     Spouse name: Not on file    Number of children: Not on file    Years of education: Not on file    Highest education level: Not on file   Occupational History    Not on file   Tobacco Use    Smoking status: Former Smoker     Packs/day: 0.50     Types: Cigars    Smokeless tobacco: Never Used    Tobacco comment: 1-2 black and milds daily    Vaping Use    Vaping Use: Never used   Substance and Sexual Activity    Alcohol use: No    Drug use: Not Currently     Types: Marijuana Lyndel Denise)     Comment: not in pregnancy    Sexual activity: Yes     Partners: Male     Birth control/protection: Condom   Other Topics Concern    Not on file   Social History Narrative    Not on file     Social Determinants of Health     Financial Resource Strain:     Difficulty of Paying Living Expenses: Not on file   Food Insecurity:     Worried About Running Out of Food in the Last Year: Not on file    Darling of Food in the Last Year: Not on file   Transportation Needs:     Lack of Transportation (Medical): Not on file    Lack of Transportation (Non-Medical):  Not on file   Physical Activity:     Days of Exercise per Week: Not on file    Minutes of Exercise per Session: Not on file   Stress:     Feeling of Stress : Not on file   Social Connections:     Frequency of Communication with Friends and Family: Not on file    Frequency of Social Gatherings with Friends and Family: Not on file    Attends Oriental orthodox Services: Not on file    Active Member of Clubs or Organizations: Not on file    Attends Club or Organization Meetings: Not on file    Marital Status: Not on file   Intimate Partner Violence:     Fear of Current or Ex-Partner: Not on file    Emotionally Abused: Not on file    Physically Abused: Not on file    Sexually Abused: Not on file   Housing Stability:     Unable to Pay for Housing in the Last Year: Not on file    Number of [place] in the Last Year: Not on file    Unstable Housing in the Last Year: Not on file       Family History   Adopted: Yes   Problem Relation Age of Onset    Diabetes Mother         borderline diet controlled    Atrial Fibrillation Mother     Anxiety Disorder Mother     Depression Mother     Heart Disease Mother     Seizures Maternal Grandmother     COPD Maternal Grandmother     Hypertension Maternal Grandmother     Cancer Maternal Grandmother         ovarian    Heart Disease Maternal Grandmother     Diabetes Type 1  Maternal Grandfather     Diabetes Type 1  Other         m uncle    Bleeding Prob Other         m aunt protein S&C deficiency       Allergies:  Sulfa antibiotics    Home Medications:  Prior to Admission medications    Medication Sig Start Date End Date Taking? Authorizing Provider   pantoprazole (PROTONIX) 20 MG tablet Take 2 tablets by mouth daily 1/2/22   Paz Millard, DO   ibuprofen (ADVIL;MOTRIN) 600 MG tablet Take 1 tablet by mouth every 6 hours as needed for Pain 10/28/21   Derek Jane, DO   acetaminophen (TYLENOL) 500 MG tablet Take 2 tablets by mouth every 6 hours as needed for Pain 10/20/21   Jag Jaquez, DO   famotidine (PEPCID) 20 MG tablet Take 1 tablet by mouth 2 times daily 9/14/21   Nikos Singer DO   albuterol sulfate HFA (VENTOLIN HFA) 108 (90 Base) MCG/ACT inhaler Inhale 2 puffs into the lungs 4 times daily as needed for Wheezing 9/14/21   Nikos Singer DO   acetaminophen (TYLENOL) 325 MG tablet Take 1 tablet by mouth every 6 hours as needed for Pain 7/30/21   George Licona MD   Prenatal Vit-Fe Fumarate-FA (PNV PRENATAL PLUS MULTIVITAMIN) 27-1 MG TABS Take 1 tablet by mouth daily 7/14/21 7/9/22  DARRIAN Parrish - NP       REVIEW OF SYSTEMS    (2-9 systems for level 4, 10 or more for level 5)      Review of Systems   Constitutional: Positive for fatigue. Negative for chills and fever. HENT: Negative for ear pain, hearing loss and sore throat. Eyes: Negative for visual disturbance. Respiratory: Positive for shortness of breath. Cardiovascular: Negative for chest pain. Gastrointestinal: Negative for abdominal pain, constipation, diarrhea, nausea and vomiting. Genitourinary: Negative for difficulty urinating and dysuria. Musculoskeletal: Positive for myalgias. Negative for arthralgias. Neurological: Positive for light-headedness.  Negative for numbness and headaches. Psychiatric/Behavioral: Negative for agitation and confusion. PHYSICAL EXAM   (up to 7 for level 4, 8 or more for level 5)     INITIAL VITALS:    oral temperature is 97.7 °F (36.5 °C). Her blood pressure is 125/88 and her pulse is 105. Her respiration is 16 and oxygen saturation is 98%. Physical Exam  Vitals and nursing note reviewed. Constitutional:       General: She is not in acute distress. Appearance: She is well-developed. She is not diaphoretic. HENT:      Head: Normocephalic and atraumatic. Right Ear: External ear normal.      Left Ear: External ear normal.      Nose: Nose normal.   Eyes:      Conjunctiva/sclera: Conjunctivae normal.   Neck:      Trachea: No tracheal deviation. Cardiovascular:      Rate and Rhythm: Regular rhythm. Tachycardia present. Heart sounds: Normal heart sounds. No murmur heard. No friction rub. No gallop. Pulmonary:      Effort: Pulmonary effort is normal. No respiratory distress. Breath sounds: Normal breath sounds. No stridor. No wheezing, rhonchi or rales. Abdominal:      General: Bowel sounds are normal. There is no distension. Palpations: Abdomen is soft. Tenderness: There is no abdominal tenderness. Musculoskeletal:         General: No tenderness. Normal range of motion. Cervical back: Neck supple. Skin:     General: Skin is warm and dry. Capillary Refill: Capillary refill takes less than 2 seconds. Neurological:      Mental Status: She is alert and oriented to person, place, and time. Motor: No abnormal muscle tone. DIFFERENTIAL  DIAGNOSIS     PLAN (LABS / IMAGING / EKG):  Orders Placed This Encounter   Procedures    RAPID INFLUENZA A/B ANTIGENS    Troponin    COVID-19    EKG 12 Lead       MEDICATIONS ORDERED:  No orders of the defined types were placed in this encounter. DDX: COVID-19, influenza    Initial MDM/Plan: 24 y.o. female who presents with viral-like syndrome. Suspect COVID-19 or influenza. Plan for EKG and troponin given some chest tightness, r/o myocarditis. Plan for march test. If no concerning findings plan for nonrapid COVID and influenza screen, work note, discharge home. DIAGNOSTIC RESULTS / EMERGENCY DEPARTMENT COURSE / MDM     LABS:  Labs Reviewed   RAPID INFLUENZA A/B ANTIGENS   TROPONIN   COVID-19         RADIOLOGY:  No results found. EKG:  EKG Interpretation    Interpreted by me    Rhythm: normal sinus   Rate: normal @ 66bpm  Axis: normal  Ectopy: none  Conduction: normal  ST Segments: no acute change  T Waves: no acute change  Q Waves: none    Clinical Impression: no acute ST/T wave changes, normal EKG    EMERGENCY DEPARTMENT COURSE:  ED Course as of 01/20/22 1914   Thu Jan 20, 2022   1824 EKG normal. Troponin negative. March test negative. Patient marched at bedside without any significant desaturation during or after the test.  We will obtain a rapid COVID-19 test as well as influenza testing. Discussed the patient can check her test results on her MyChart, or follow-up with her primary care office. Work note given through the 24th given the onset of patient's symptoms. Discussed the patient to follow-up with her primary care office that she is still experience persistent symptoms. Patient verbalized understanding and no further questions at time of discharge. [JS]      ED Course User Index  [JS] Maude Landno DO       PROCEDURES:  None    CONSULTS:  None    CRITICAL CARE:  Please see attending note    FINAL IMPRESSION      1. Viral syndrome    2. Exposure to COVID-19 virus         DISPOSITION / PLAN     DISPOSITION Decision To Discharge 01/20/2022 06:41:28 PM    PATIENTREFERRED TO:  Nicky Bernheim, APRN - CNP  [facility] [place] [address]  [phone]    Call in 1 day  To discuss this ER visit and any needed follow up, and to follow up lab work.     [facility] ED  [address] [address] [phone]  Go to As needed, If symptoms worsen      DISCHARGE MEDICATIONS:  Discharge Medication List as of 1/20/2022  6:32 PM          Patricia Hawley DO  EmergencyMedicine Resident    (Please note that portions of this note were completed with a voice recognition program.  Efforts were made to edit the dictations but occasionally words are mis-transcribed.)      Milla Diego DO  Resident  01/20/22 [id number]

## 2022-01-20 NOTE — ED PROVIDER NOTES
[facility]  Emergency Department  Faculty Attestation     I performed a history and physical examination of the patient and discussed management with the resident. I reviewed the residents note and agree with the documented findings and plan of care. Any areas of disagreement are noted on the chart. I was personally present for the key portions of any procedures. I have documented in the chart those procedures where I was not present during the key portions. I have reviewed the emergency nurses triage note. I agree with the chief complaint, past medical history, past surgical history, allergies, medications, social and family history as documented unless otherwise noted below. For Physician Assistant/ Nurse Practitioner cases/documentation I have personally evaluated this patient and have completed at least one if not all key elements of the E/M (history, physical exam, and MDM). Additional findings are as noted. Primary Care Physician:  DARRIAN Elmore CNP    Screenings:  [unfilled]    CHIEF COMPLAINT       Chief Complaint   Patient presents with    Covid Testing     was exposed        RECENT VITALS:   Temp: 97.7 °F (36.5 °C),  Pulse: 105, Resp: 16, BP: 125/88    LABS:  Labs Reviewed   TROPONIN       Radiology  No orders to display       CRITICAL CARE: There was a high probability of clinically significant/life threatening deterioration in this patient's condition which required my urgent intervention. Total critical care time was none minutes. This excludes any time for separately reportable procedures. EKG:      Attending Physician Additional  Notes    Patient's been ill for the past 6 days with viral syndrome symptoms including chills fatigue myalgias sore throat and rhinorrhea that have resolved. No vomiting or diarrhea. No cough.   There is intermittent difficulty breathing associated with the chest heaviness that lasts a few minutes at a time and usually this is with exertion. She does not feel palpitations during these episodes but she has a history of SVT. No reflux symptoms during the episode. No leg pain calf swelling immobility or hemoptysis. She had a COVID exposure and is incompletely vaccinated. On exam she has resting tachycardia which is improved while at bedrest, vital signs otherwise normal.  No respite distress. Lungs are clear. Card exam is benign. Skin is warm and dry. No edema cords Homans or calf tenderness. Impression is viral syndrome and atypical chest pain. Plan is EKG, troponin, pulse oximetry stress test.  If these tests are normal anticipate discharge with a nonrapid COVID assay. If they are abnormal we will do further work-up and do a rapid COVID. Petr Jane.  Jasmin Nielsen MD, [facility]  Attending Emergency  Physician               Jam Redmond MD  01/20/22 7911

## 2022-01-21 LAB
EKG ATRIAL RATE: 66 BPM
EKG P AXIS: 46 DEGREES
EKG P-R INTERVAL: 134 MS
EKG Q-T INTERVAL: 374 MS
EKG QRS DURATION: 80 MS
EKG QTC CALCULATION (BAZETT): 392 MS
EKG R AXIS: 61 DEGREES
EKG T AXIS: 33 DEGREES
EKG VENTRICULAR RATE: 66 BPM
SARS-COV-2: NORMAL
SARS-COV-2: NOT DETECTED
SOURCE: NORMAL

## 2022-01-21 PROCEDURE — 93010 ELECTROCARDIOGRAM REPORT: CPT | Performed by: INTERNAL MEDICINE

## 2022-01-26 ENCOUNTER — HOSPITAL ENCOUNTER (EMERGENCY)
Age: 22
Discharge: HOME | End: 2022-01-26
Attending: EMERGENCY MEDICINE
Payer: COMMERCIAL

## 2022-01-26 VITALS
DIASTOLIC BLOOD PRESSURE: 74 MMHG | OXYGEN SATURATION: 99 % | TEMPERATURE: 98.4 F | HEART RATE: 61 BPM | RESPIRATION RATE: 16 BRPM | SYSTOLIC BLOOD PRESSURE: 117 MMHG

## 2022-01-26 DIAGNOSIS — B96.89 BACTERIAL VAGINOSIS: Primary | ICD-10-CM

## 2022-01-26 DIAGNOSIS — N76.0 BACTERIAL VAGINOSIS: Primary | ICD-10-CM

## 2022-01-26 LAB
-: ABNORMAL
ABSOLUTE EOS #: 0.32 K/UL (ref 0–0.44)
ABSOLUTE IMMATURE GRANULOCYTE: <0.03 K/UL (ref 0–0.3)
ABSOLUTE LYMPH #: 2 K/UL (ref 1.1–3.7)
ABSOLUTE MONO #: 0.59 K/UL (ref 0.1–1.4)
ALBUMIN SERPL-MCNC: 4.8 G/DL (ref 3.5–5.2)
ALBUMIN/GLOBULIN RATIO: 1.5 (ref 1–2.5)
ALP BLD-CCNC: 126 U/L (ref 35–104)
ALT SERPL-CCNC: 37 U/L (ref 5–33)
AMORPHOUS: ABNORMAL
ANION GAP SERPL CALCULATED.3IONS-SCNC: 12 MMOL/L (ref 9–17)
AST SERPL-CCNC: 22 U/L
BACTERIA: ABNORMAL
BASOPHILS # BLD: 1 % (ref 0–2)
BASOPHILS ABSOLUTE: 0.04 K/UL (ref 0–0.2)
BILIRUB SERPL-MCNC: 0.35 MG/DL (ref 0.3–1.2)
BILIRUBIN URINE: NEGATIVE
BUN BLDV-MCNC: 10 MG/DL (ref 6–20)
BUN/CREAT BLD: ABNORMAL (ref 9–20)
CALCIUM SERPL-MCNC: 9.7 MG/DL (ref 8.6–10.4)
CANDIDA SPECIES, DNA PROBE: NEGATIVE
CASTS UA: ABNORMAL /LPF (ref 0–2)
CHLORIDE BLD-SCNC: 102 MMOL/L (ref 98–107)
CO2: 24 MMOL/L (ref 20–31)
COLOR: YELLOW
COMMENT UA: ABNORMAL
CREAT SERPL-MCNC: 0.52 MG/DL (ref 0.5–0.9)
CRYSTALS, UA: ABNORMAL /HPF
DIFFERENTIAL TYPE: ABNORMAL
EOSINOPHILS RELATIVE PERCENT: 5 % (ref 1–4)
EPITHELIAL CELLS UA: ABNORMAL /HPF (ref 0–5)
GARDNERELLA VAGINALIS, DNA PROBE: POSITIVE
GFR AFRICAN AMERICAN: >60 ML/MIN
GFR NON-AFRICAN AMERICAN: >60 ML/MIN
GFR SERPL CREATININE-BSD FRML MDRD: ABNORMAL ML/MIN/{1.73_M2}
GFR SERPL CREATININE-BSD FRML MDRD: ABNORMAL ML/MIN/{1.73_M2}
GLUCOSE BLD-MCNC: 75 MG/DL (ref 70–99)
GLUCOSE URINE: NEGATIVE
HCG QUANTITATIVE: <1 IU/L
HCT VFR BLD CALC: 45.2 % (ref 36.3–47.1)
HEMOGLOBIN: 14.9 G/DL (ref 11.9–15.1)
IMMATURE GRANULOCYTES: 0 %
KETONES, URINE: NEGATIVE
LEUKOCYTE ESTERASE, URINE: ABNORMAL
LYMPHOCYTES # BLD: 32 % (ref 25–45)
MCH RBC QN AUTO: 31.5 PG (ref 25.2–33.5)
MCHC RBC AUTO-ENTMCNC: 33 G/DL (ref 28.4–34.8)
MCV RBC AUTO: 95.6 FL (ref 82.6–102.9)
MONOCYTES # BLD: 10 % (ref 2–8)
MUCUS: ABNORMAL
NITRITE, URINE: NEGATIVE
NRBC AUTOMATED: 0 PER 100 WBC
OTHER OBSERVATIONS UA: ABNORMAL
PDW BLD-RTO: 13.3 % (ref 11.8–14.4)
PH UA: 6 (ref 5–8)
PLATELET # BLD: 337 K/UL (ref 138–453)
PLATELET ESTIMATE: ABNORMAL
PMV BLD AUTO: 9.3 FL (ref 8.1–13.5)
POTASSIUM SERPL-SCNC: 3.9 MMOL/L (ref 3.7–5.3)
PROTEIN UA: ABNORMAL
RBC # BLD: 4.73 M/UL (ref 3.95–5.11)
RBC # BLD: ABNORMAL 10*6/UL
RBC UA: ABNORMAL /HPF (ref 0–2)
RENAL EPITHELIAL, UA: ABNORMAL /HPF
SEG NEUTROPHILS: 52 % (ref 34–64)
SEGMENTED NEUTROPHILS ABSOLUTE COUNT: 3.26 K/UL (ref 1.5–8.1)
SODIUM BLD-SCNC: 138 MMOL/L (ref 135–144)
SOURCE: ABNORMAL
SPECIFIC GRAVITY UA: 1.03 (ref 1–1.03)
TOTAL PROTEIN: 8 G/DL (ref 6.4–8.3)
TRICHOMONAS VAGINALIS DNA: NEGATIVE
TRICHOMONAS: ABNORMAL
TURBIDITY: ABNORMAL
URINE HGB: ABNORMAL
UROBILINOGEN, URINE: NORMAL
WBC # BLD: 6.2 K/UL (ref 4.5–13.5)
WBC # BLD: ABNORMAL 10*3/UL
WBC UA: ABNORMAL /HPF (ref 0–5)
YEAST: ABNORMAL

## 2022-01-26 PROCEDURE — 86403 PARTICLE AGGLUT ANTBDY SCRN: CPT

## 2022-01-26 PROCEDURE — 84702 CHORIONIC GONADOTROPIN TEST: CPT

## 2022-01-26 PROCEDURE — 87591 N.GONORRHOEAE DNA AMP PROB: CPT

## 2022-01-26 PROCEDURE — 87086 URINE CULTURE/COLONY COUNT: CPT

## 2022-01-26 PROCEDURE — 87491 CHLMYD TRACH DNA AMP PROBE: CPT

## 2022-01-26 PROCEDURE — 80053 COMPREHEN METABOLIC PANEL: CPT

## 2022-01-26 PROCEDURE — 87660 TRICHOMONAS VAGIN DIR PROBE: CPT

## 2022-01-26 PROCEDURE — 87510 GARDNER VAG DNA DIR PROBE: CPT

## 2022-01-26 PROCEDURE — 99285 EMERGENCY DEPT VISIT HI MDM: CPT

## 2022-01-26 PROCEDURE — 85025 COMPLETE CBC W/AUTO DIFF WBC: CPT

## 2022-01-26 PROCEDURE — 87480 CANDIDA DNA DIR PROBE: CPT

## 2022-01-26 PROCEDURE — 81001 URINALYSIS AUTO W/SCOPE: CPT

## 2022-01-26 RX ORDER — METRONIDAZOLE 7.5 MG/G
GEL VAGINAL
Qty: 70 G | Refills: 0 | Status: SHIPPED | OUTPATIENT
Start: 2022-01-26 | End: 2022-02-02

## 2022-01-26 ASSESSMENT — PAIN DESCRIPTION - PAIN TYPE: TYPE: ACUTE PAIN

## 2022-01-26 ASSESSMENT — PAIN SCALES - GENERAL: PAINLEVEL_OUTOF10: 4

## 2022-01-26 ASSESSMENT — PAIN DESCRIPTION - LOCATION: LOCATION: PELVIS

## 2022-01-26 NOTE — ED NOTES
Bed: 43  Expected date:   Expected time:   Means of arrival:   Comments:     Anthony Araujo RN  01/26/22 1333

## 2022-01-26 NOTE — ED TRIAGE NOTES
Pt presents with minimal vaginal spotting and lower pelvic cramping. Pt states she was worried that she has implantation pain last year when she was pregnant with her son.

## 2022-01-26 NOTE — ED NOTES
Bed: 44  Expected date:   Expected time:   Means of arrival:   Comments:     Renée John RN  01/26/22 [id number]

## 2022-01-26 NOTE — ED PROVIDER NOTES
[facility]  Emergency Department  Faculty Attestation     I performed a history and physical examination of the patient and discussed management with the resident. I reviewed the residents note and agree with the documented findings and plan of care. Any areas of disagreement are noted on the chart. I was personally present for the key portions of any procedures. I have documented in the chart those procedures where I was not present during the key portions. I have reviewed the emergency nurses triage note. I agree with the chief complaint, past medical history, past surgical history, allergies, medications, social and family history as documented unless otherwise noted below. For Physician Assistant/ Nurse Practitioner cases/documentation I have personally evaluated this patient and have completed at least one if not all key elements of the E/M (history, physical exam, and MDM). Additional findings are as noted. Primary Care Physician:  DARRIAN Nayak - CNP    Screenings:  [unfilled]    CHIEF COMPLAINT       Chief Complaint   Patient presents with    Vaginal Bleeding     Pt had a child three months ago and is now worried she is pregnant due to slight bleeding and lower pelvic cramping       RECENT VITALS:   Temp: 97.5 °F (36.4 °C),  Pulse: 71, Resp: 18, BP: (!) 108/58    LABS:  Labs Reviewed   CULTURE, URINE   VAGINITIS DNA PROBE   C.TRACHOMATIS N.GONORRHOEAE DNA   CBC WITH AUTO DIFFERENTIAL   COMPREHENSIVE METABOLIC PANEL W/ REFLEX TO MG FOR LOW K   HCG, QUANTITATIVE, PREGNANCY   URINALYSIS       Radiology  No orders to display       Attending Physician Additional  Notes    Patient has nausea but no vomiting, vaginal bleeding but no clots or tissue. No UTI symptoms. No true abdominal pain. She is a G2, P2. On exam she is nontoxic afebrile vital signs normal.  Skin is warm and dry. No pallor noted. Abdomen soft nontender.   Impression is vaginal bleeding with nausea rule out pregnancy or ectopic. Plan is pelvic exam, pregnancy test, probable pelvic ultrasound, reassess. Melo Mantilla.  Rafael Diego MD, [facility]  Attending Emergency  Physician               Tonya Kirby MD  01/26/22 6164

## 2022-01-27 LAB
C TRACH DNA GENITAL QL NAA+PROBE: NEGATIVE
CULTURE: ABNORMAL
Lab: ABNORMAL
N. GONORRHOEAE DNA: NEGATIVE
SPECIMEN DESCRIPTION: ABNORMAL
SPECIMEN DESCRIPTION: NORMAL

## 2022-01-27 ASSESSMENT — ENCOUNTER SYMPTOMS
TROUBLE SWALLOWING: 0
ABDOMINAL DISTENTION: 0
CHEST TIGHTNESS: 0
SORE THROAT: 0
ABDOMINAL PAIN: 1
CONSTIPATION: 0
DIARRHEA: 0
VOMITING: 0
COUGH: 0
SHORTNESS OF BREATH: 0
BACK PAIN: 0

## 2022-01-27 NOTE — ED PROVIDER NOTES
[facility] ED  Emergency Department Encounter  EmergencyMedicine Resident     Pt Yan Austin  MRN: [id number]  [place] 2000  Date of evaluation: 22  PCP:  DARRIAN Garcia CNP    This patient was evaluated in the Emergency Department for symptoms described in the history of present illness. The patient was evaluated in the context of the global COVID-19 pandemic, which necessitated consideration that the patient might be at risk for infection with the SARS-CoV-2 virus that causes COVID-19. Institutional protocols and algorithms that pertain to the evaluation of patients at risk for COVID-19 are in a state of rapid change based on information released by regulatory bodies including the CDC and federal and state organizations. These policies and algorithms were followed during the patient's care in the ED. CHIEF COMPLAINT       Chief Complaint   Patient presents with    Vaginal Bleeding     Pt had a child three months ago and is now worried she is pregnant due to slight bleeding and lower pelvic cramping       HISTORY OF PRESENT ILLNESS  (Location/Symptom, Timing/Onset, Context/Setting, Quality, Duration, Modifying Factors, Severity.)      Fadi Ragsdale is a 24 y.o. female who presents with vaginal bleeding. Patient states that it started yesterday and it was a small amount of blood when she wipes. Patient has a history of a  in July. She says that these are the symptoms that made her concern for pregnancy with her last pregnancy. She had no complications with the pregnancy. She denies any fevers, chills, nausea, vomiting. She states she is not having any abnormal discharge. Please having some mild lower abdominal cramping. Her last menstrual period was on 1/10. States that she has not taken anything for the pain. She has not even filled one menstrual pad. Denies any hematuria or dysuria.     PAST MEDICAL / SURGICAL / SOCIAL / FAMILY HISTORY      has a past medical history of ADD (attention deficit disorder), Anxiety, Asthma, CMV (cytomegalovirus infection) ([facility] [address] Depression, Gestational diabetes mellitus (GDM), antepartum, Hearing loss in left ear, Heart disease, Hypothyroidism, Immunizations up to date in pediatric patient, Neurocardiogenic syncope, Pseudoseizures ([facility] [address] Raynaud disease, Seizures ([facility] [address] SVT (supraventricular tachycardia) ([facility] [address] Tachycardia, Traumatic injury during pregnancy, third trimester, and Wears glasses. has a past surgical history that includes REMOVAL FOREIGN BODY EAR (Left) and Tympanoplasty (Left, 06/24/2016). Social History     Socioeconomic History    Marital status: Single     Spouse name: Not on file    Number of children: Not on file    Years of education: Not on file    Highest education level: Not on file   Occupational History    Not on file   Tobacco Use    Smoking status: Former Smoker     Packs/day: 0.50     Types: Cigars    Smokeless tobacco: Never Used    Tobacco comment: 1-2 black and milds daily    Vaping Use    Vaping Use: Never used   Substance and Sexual Activity    Alcohol use: No    Drug use: Not Currently     Types: Marijuana Rosa Bachelor)     Comment: not in pregnancy    Sexual activity: Yes     Partners: Male     Birth control/protection: Condom   Other Topics Concern    Not on file   Social History Narrative    Not on file     Social Determinants of Health     Financial Resource Strain:     Difficulty of Paying Living Expenses: Not on file   Food Insecurity:     Worried About Running Out of Food in the Last Year: Not on file    Darling of Food in the Last Year: Not on file   Transportation Needs:     Lack of Transportation (Medical): Not on file    Lack of Transportation (Non-Medical):  Not on file   Physical Activity:     Days of Exercise per Week: Not on file    Minutes of Exercise per Session: Not on file   Stress:     Feeling of Stress : Not on file   Social Connections:     Frequency of Communication with Friends and Family: Not on file    Frequency of Social Gatherings with Friends and Family: Not on file    Attends Druze Services: Not on file    Active Member of Clubs or Organizations: Not on file    Attends Club or Organization Meetings: Not on file    Marital Status: Not on file   Intimate Partner Violence:     Fear of Current or Ex-Partner: Not on file    Emotionally Abused: Not on file    Physically Abused: Not on file    Sexually Abused: Not on file   Housing Stability:     Unable to Pay for Housing in the Last Year: Not on file    Number of [place] in the Last Year: Not on file    Unstable Housing in the Last Year: Not on file       Family History   Adopted: Yes   Problem Relation Age of Onset    Diabetes Mother         borderline diet controlled    Atrial Fibrillation Mother     Anxiety Disorder Mother     Depression Mother     Heart Disease Mother     Seizures Maternal Grandmother     COPD Maternal Grandmother     Hypertension Maternal Grandmother     Cancer Maternal Grandmother         ovarian    Heart Disease Maternal Grandmother     Diabetes Type 1  Maternal Grandfather     Diabetes Type 1  Other         m uncle    Bleeding Prob Other         m aunt protein S&C deficiency       Allergies:  Sulfa antibiotics    Home Medications:  Prior to Admission medications    Medication Sig Start Date End Date Taking? Authorizing Provider   sertraline (ZOLOFT) 50 MG tablet Take 50 mg by mouth daily   Yes Historical Provider, MD   metroNIDAZOLE (METROGEL VAGINAL) 0.75 % vaginal gel Once daily for five days intravaginally 1/26/22 2/2/22 Yes Marquita Garza MD       REVIEW OF SYSTEMS    (2-9 systems for level 4, 10 or more for level 5)      Review of Systems   Constitutional: Negative for chills and fever. HENT: Negative for ear pain, postnasal drip, sore throat and trouble swallowing. Eyes: Negative for visual disturbance. Respiratory: Negative for cough, chest tightness and shortness of breath. Cardiovascular: Negative for chest pain. Gastrointestinal: Positive for abdominal pain. Negative for abdominal distention, constipation, diarrhea and vomiting. Genitourinary: Positive for vaginal bleeding. Negative for difficulty urinating, dysuria, flank pain, frequency, hematuria and vaginal discharge. Musculoskeletal: Negative for back pain and neck pain. Neurological: Negative for dizziness, weakness, numbness and headaches. Psychiatric/Behavioral: Negative for confusion. PHYSICAL EXAM   (up to 7 for level 4, 8 or more for level 5)      INITIAL VITALS:   /74   Pulse 61   Temp 98.4 °F (36.9 °C)   Resp 16   LMP 01/10/2022   SpO2 99%     Physical Exam  Constitutional:       Appearance: Normal appearance. HENT:      Head: Normocephalic and atraumatic. Right Ear: External ear normal.      Left Ear: External ear normal.   Eyes:      Extraocular Movements: Extraocular movements intact. Cardiovascular:      Rate and Rhythm: Normal rate. Pulses: Normal pulses. Pulmonary:      Effort: Pulmonary effort is normal.      Breath sounds: Normal breath sounds. Abdominal:      Palpations: Abdomen is soft. Tenderness: There is no abdominal tenderness. Comments: Mild suprapubic tenderness   Genitourinary:     Comments: Cervical os is closed. There is active bleeding from the os. No lesions or trauma seen. Musculoskeletal:         General: Normal range of motion. Cervical back: Normal range of motion. Skin:     General: Skin is warm. Capillary Refill: Capillary refill takes less than 2 seconds. Neurological:      General: No focal deficit present. Mental Status: She is alert and oriented to person, place, and time.    Psychiatric:         Mood and Affect: Mood normal.         DIFFERENTIAL  DIAGNOSIS     PLAN (LABS / IMAGING / EKG):  Orders Placed This Encounter   Procedures    Culture, Urine    VAGINITIS DNA PROBE    C.trachomatis N.gonorrhoeae DNA    CBC Auto Differential    Comprehensive Metabolic Panel w/ Reflex to MG    HCG, Quantitative, Pregnancy    Urinalysis, reflex to microscopic    Microscopic Urinalysis       MEDICATIONS ORDERED:  Orders Placed This Encounter   Medications    metroNIDAZOLE (METROGEL VAGINAL) 0.75 % vaginal gel     Sig: Once daily for five days intravaginally     Dispense:  70 g     Refill:  0       DDX: Pregnancy, ectopic pregnancy, STI, UTI    MDM: 24 y.o. female presents today with vaginal bleeding. BC, CMP, UA, urine culture, vaginitis, hCG quant and GC are ordered. On pelvic exam patient has active bleeding from a closed cervical os. Patient's vaginitis probe comes back positive for bacterial vaginosis. Pregnancy test is negative. Her urine results are consistent with that. Abdominal bedside ultrasound is performed and shows obvious free fluid or abnormality. Patient will be discharged home with metro gel and strict return precautions and PCP follow-up in a week. DIAGNOSTIC RESULTS / EMERGENCY DEPARTMENT COURSE / MDM   LAB RESULTS:  Results for orders placed or performed during the hospital encounter of 01/26/22   VAGINITIS DNA PROBE    Specimen: Vaginal   Result Value Ref Range    Source . VAGINAL SWAB     Trichomonas Vaginalis DNA NEGATIVE NEGATIVE    GARDNERELLA VAGINALIS, DNA PROBE POSITIVE (A) NEGATIVE    CANDIDA SPECIES, DNA PROBE NEGATIVE NEGATIVE   CBC Auto Differential   Result Value Ref Range    WBC 6.2 4.5 - 13.5 k/uL    RBC 4.73 3.95 - 5.11 m/uL    Hemoglobin 14.9 11.9 - 15.1 g/dL    Hematocrit 45.2 36.3 - 47.1 %    MCV 95.6 82.6 - 102.9 fL    MCH 31.5 25.2 - 33.5 pg    MCHC 33.0 28.4 - 34.8 g/dL    RDW 13.3 11.8 - 14.4 %    Platelets 359 412 - 160 k/uL    MPV 9.3 8.1 - 13.5 fL    NRBC Automated 0.0 0.0 per 100 WBC    Differential Type NOT REPORTED     Seg Neutrophils 52 34 - 64 %    Lymphocytes 32 25 - 45 %    Monocytes 10 (H) 2 - 8 %    Eosinophils % 5 (H) 1 - 4 %    Basophils 1 0 - 2 %    Immature Granulocytes 0 0 %    Segs Absolute 3.26 1.50 - 8.10 k/uL    Absolute Lymph # 2.00 1. 10 - 3.70 k/uL    Absolute Mono # 0.59 0.10 - 1.40 k/uL    Absolute Eos # 0.32 0.00 - 0.44 k/uL    Basophils Absolute 0.04 0.00 - 0.20 k/uL    Absolute Immature Granulocyte <0.03 0.00 - 0.30 k/uL    WBC Morphology NOT REPORTED     RBC Morphology NOT REPORTED     Platelet Estimate NOT REPORTED    Comprehensive Metabolic Panel w/ Reflex to MG   Result Value Ref Range    Glucose 75 70 - 99 mg/dL    BUN 10 6 - 20 mg/dL    CREATININE 0.52 0.50 - 0.90 mg/dL    Bun/Cre Ratio NOT REPORTED 9 - 20    Calcium 9.7 8.6 - 10.4 mg/dL    Sodium 138 135 - 144 mmol/L    Potassium 3.9 3.7 - 5.3 mmol/L    Chloride 102 98 - 107 mmol/L    CO2 24 20 - 31 mmol/L    Anion Gap 12 9 - 17 mmol/L    Alkaline Phosphatase 126 (H) 35 - 104 U/L    ALT 37 (H) 5 - 33 U/L    AST 22 <32 U/L    Total Bilirubin 0.35 0.3 - 1.2 mg/dL    Total Protein 8.0 6.4 - 8.3 g/dL    Albumin 4.8 3.5 - 5.2 g/dL    Albumin/Globulin Ratio 1.5 1.0 - 2.5    GFR Non-African American >60 >60 mL/min    GFR African American >60 >60 mL/min    GFR Comment          GFR Staging NOT REPORTED    HCG, Quantitative, Pregnancy   Result Value Ref Range    hCG Quant <1 <5 IU/L   Urinalysis, reflex to microscopic   Result Value Ref Range    Color, UA Yellow Yellow    Turbidity UA Cloudy (A) Clear    Glucose, Ur NEGATIVE NEGATIVE    Bilirubin Urine NEGATIVE NEGATIVE    Ketones, Urine NEGATIVE NEGATIVE    Specific Gravity, UA 1.028 1.005 - 1.030    Urine Hgb LARGE (A) NEGATIVE    pH, UA 6.0 5.0 - 8.0    Protein, UA TRACE (A) NEGATIVE    Urobilinogen, Urine Normal Normal    Nitrite, Urine NEGATIVE NEGATIVE    Leukocyte Esterase, Urine SMALL (A) NEGATIVE    Urinalysis Comments NOT REPORTED    Microscopic Urinalysis   Result Value Ref Range    -          WBC, UA 2 TO 5 0 - 5 /HPF    RBC, UA 50  0 - 2 /HPF    Casts UA NOT REPORTED 0 - 2 /LPF    Crystals, UA NOT REPORTED None /HPF    Epithelial Cells UA 20 TO 50 0 - 5 /HPF    Renal Epithelial, UA NOT REPORTED 0 /HPF    Bacteria, UA FEW (A) None    Mucus, UA 2+ (A) None    Trichomonas, UA NOT REPORTED None    Amorphous, UA NOT REPORTED None    Other Observations UA NOT REPORTED NOT REQ. Yeast, UA NOT REPORTED None           RADIOLOGY:  No orders to display      EMERGENCY DEPARTMENT COURSE:  ED Course as of 01/27/22 0924 Wed Jan 26, 2022   1609 Nominal pain, vaginal spotting since yesterday. Patient is also been nauseous but has not vomited. Her last menstrual period was on 1/10 she has not taken her home pregnancy test.  Is denying any abnormal vaginal discharge, dysuria, hematuria [SS]   1609 Is not currently nauseous. Plan for basic labs, quant, urine and pelvic exam.   [SS]   1634 No free fluid or obvious abnormality seen on bedside ultrasound of the abdomen. [SS]   1651 Turbidity UA(!): Cloudy [SS]   1652 Urine Hgb(!): LARGE [SS]   1652 Leukocyte Esterase, Urine(!): SMALL [SS]   1739 Bacteria, UA(!): FEW [SS]   1739 WBC, UA: 2 TO 5 [SS]      ED Course User Index  [SS] Lisa Dawn MD        PROCEDURES:  None    CONSULTS:  None    CRITICAL CARE:  None    FINAL IMPRESSION      1.  Bacterial vaginosis          DISPOSITION / PLAN     DISPOSITION Decision To Discharge 01/26/2022 06:37:44 PM      PATIENT REFERRED TO:  Allen Kinney, DARRIAN - CNP  [address] [place] [id number]    Schedule an appointment as soon as possible for a visit         DISCHARGE MEDICATIONS:  Discharge Medication List as of 1/26/2022  6:40 PM      START taking these medications    Details   metroNIDAZOLE (METROGEL VAGINAL) 0.75 % vaginal gel Once daily for five days intravaginally, Disp-70 g, R-0, Print             Lisa Dawn MD  Emergency Medicine Resident    (Please note that portions of thisnote were completed with a voice recognition program.  Efforts were made to edit the dictations but occasionally words are mis-transcribed.)       Noemi Shipman MD  Resident  01/27/22 [id number]

## 2022-01-28 NOTE — PROGRESS NOTES
Reviewed patient's urine culture - culture positive for Beta hemolytic Streptococci. Patient was discharged on no antimicrobials for UTI, which is appropriate as this organism is commonly a colonizer and the patient's pregnancy test was negative.     Barbie PowerD, BCCCP  1/28/2022  10:50 AM

## 2022-02-02 ENCOUNTER — HOSPITAL ENCOUNTER (EMERGENCY)
Age: 22
Discharge: HOME | End: 2022-02-02
Attending: EMERGENCY MEDICINE
Payer: COMMERCIAL

## 2022-02-02 ENCOUNTER — APPOINTMENT (OUTPATIENT)
Dept: CT IMAGING | Age: 22
End: 2022-02-02
Payer: COMMERCIAL

## 2022-02-02 VITALS
DIASTOLIC BLOOD PRESSURE: 78 MMHG | SYSTOLIC BLOOD PRESSURE: 117 MMHG | OXYGEN SATURATION: 96 % | HEIGHT: 65 IN | TEMPERATURE: 97.3 F | WEIGHT: 171 LBS | RESPIRATION RATE: 18 BRPM | BODY MASS INDEX: 28.49 KG/M2 | HEART RATE: 111 BPM

## 2022-02-02 DIAGNOSIS — T74.91XA DOMESTIC VIOLENCE OF ADULT, INITIAL ENCOUNTER: Primary | ICD-10-CM

## 2022-02-02 LAB
ANION GAP SERPL CALCULATED.3IONS-SCNC: 12 MMOL/L (ref 9–17)
BUN BLDV-MCNC: 14 MG/DL (ref 6–20)
BUN/CREAT BLD: ABNORMAL (ref 9–20)
CALCIUM SERPL-MCNC: 9.5 MG/DL (ref 8.6–10.4)
CHLORIDE BLD-SCNC: 105 MMOL/L (ref 98–107)
CO2: 24 MMOL/L (ref 20–31)
CREAT SERPL-MCNC: 0.6 MG/DL (ref 0.5–0.9)
GFR AFRICAN AMERICAN: >60 ML/MIN
GFR NON-AFRICAN AMERICAN: >60 ML/MIN
GFR SERPL CREATININE-BSD FRML MDRD: ABNORMAL ML/MIN/{1.73_M2}
GFR SERPL CREATININE-BSD FRML MDRD: ABNORMAL ML/MIN/{1.73_M2}
GLUCOSE BLD-MCNC: 118 MG/DL (ref 70–99)
POTASSIUM SERPL-SCNC: 4.1 MMOL/L (ref 3.7–5.3)
SODIUM BLD-SCNC: 141 MMOL/L (ref 135–144)

## 2022-02-02 PROCEDURE — 70498 CT ANGIOGRAPHY NECK: CPT

## 2022-02-02 PROCEDURE — 6370000000 HC RX 637 (ALT 250 FOR IP): Performed by: HEALTH CARE PROVIDER

## 2022-02-02 PROCEDURE — 80048 BASIC METABOLIC PNL TOTAL CA: CPT

## 2022-02-02 PROCEDURE — 84703 CHORIONIC GONADOTROPIN ASSAY: CPT

## 2022-02-02 PROCEDURE — 6360000004 HC RX CONTRAST MEDICATION: Performed by: HEALTH CARE PROVIDER

## 2022-02-02 PROCEDURE — 70450 CT HEAD/BRAIN W/O DYE: CPT

## 2022-02-02 PROCEDURE — 99283 EMERGENCY DEPT VISIT LOW MDM: CPT

## 2022-02-02 RX ORDER — ACETAMINOPHEN 500 MG
1000 TABLET ORAL ONCE
Status: COMPLETED | OUTPATIENT
Start: 2022-02-02 | End: 2022-02-02

## 2022-02-02 RX ADMIN — ACETAMINOPHEN 1000 MG: 500 TABLET ORAL at 18:25

## 2022-02-02 RX ADMIN — IOPAMIDOL 90 ML: 755 INJECTION, SOLUTION INTRAVENOUS at 18:43

## 2022-02-02 ASSESSMENT — PAIN SCALES - GENERAL
PAINLEVEL_OUTOF10: 6
PAINLEVEL_OUTOF10: 0

## 2022-02-02 ASSESSMENT — ENCOUNTER SYMPTOMS
DIARRHEA: 0
VOMITING: 0
SHORTNESS OF BREATH: 0
NAUSEA: 0
CONSTIPATION: 0
ROS SKIN COMMENTS: BRUISING
SORE THROAT: 0
ABDOMINAL PAIN: 0

## 2022-02-02 NOTE — ED NOTES
The following labs labeled with pt sticker and tubed to lab:     [] Blue     [x] Lavender   [] on ice  [x] Green/yellow  [] Green/black [] on ice  [] Yellow  [] Red  [] Pink      [] COVID-19 swab    [] Rapid  [] PCR  [] Flu swab  [] Peds Viral Panel     [] Urine Sample  [] Pelvic Cultures  [] Blood Cultures            Jessika Alfaro RN  02/02/22 [id number]
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
## 2022-02-03 LAB — HCG QUALITATIVE: NEGATIVE

## 2022-04-01 ENCOUNTER — HOSPITAL ENCOUNTER (EMERGENCY)
Age: 22
Discharge: LEFT BEFORE DISCHARGE | End: 2022-04-01

## 2022-04-20 DIAGNOSIS — N92.6 MISSED PERIOD: Primary | ICD-10-CM

## 2022-04-21 ENCOUNTER — APPOINTMENT (OUTPATIENT)
Dept: ULTRASOUND IMAGING | Age: 22
End: 2022-04-21
Payer: COMMERCIAL

## 2022-04-21 ENCOUNTER — HOSPITAL ENCOUNTER (EMERGENCY)
Age: 22
Discharge: HOME | End: 2022-04-21
Attending: EMERGENCY MEDICINE
Payer: COMMERCIAL

## 2022-04-21 VITALS
WEIGHT: 145 LBS | BODY MASS INDEX: 25.69 KG/M2 | OXYGEN SATURATION: 97 % | DIASTOLIC BLOOD PRESSURE: 73 MMHG | TEMPERATURE: 98.2 F | HEIGHT: 63 IN | RESPIRATION RATE: 18 BRPM | SYSTOLIC BLOOD PRESSURE: 113 MMHG | HEART RATE: 91 BPM

## 2022-04-21 DIAGNOSIS — B37.9 YEAST INFECTION: ICD-10-CM

## 2022-04-21 DIAGNOSIS — N76.0 BACTERIAL VAGINOSIS: ICD-10-CM

## 2022-04-21 DIAGNOSIS — R82.71 BACTERIA IN URINE: ICD-10-CM

## 2022-04-21 DIAGNOSIS — Z33.1 PELVIC PAIN WITH POSITIVE BETA-HUMAN CHORIONIC GONADOTROPIN (BHCG) IN FEMALE: Primary | ICD-10-CM

## 2022-04-21 DIAGNOSIS — R10.2 PELVIC PAIN WITH POSITIVE BETA-HUMAN CHORIONIC GONADOTROPIN (BHCG) IN FEMALE: Primary | ICD-10-CM

## 2022-04-21 DIAGNOSIS — B96.89 BACTERIAL VAGINOSIS: ICD-10-CM

## 2022-04-21 LAB
-: ABNORMAL
ABSOLUTE EOS #: 0.2 K/UL (ref 0–0.44)
ABSOLUTE IMMATURE GRANULOCYTE: <0.03 K/UL (ref 0–0.3)
ABSOLUTE LYMPH #: 2.41 K/UL (ref 1.1–3.7)
ABSOLUTE MONO #: 0.64 K/UL (ref 0.1–1.4)
ALBUMIN SERPL-MCNC: 5 G/DL (ref 3.5–5.2)
ALBUMIN/GLOBULIN RATIO: 2 (ref 1–2.5)
ALP BLD-CCNC: 108 U/L (ref 35–104)
ALT SERPL-CCNC: 23 U/L (ref 5–33)
ANION GAP SERPL CALCULATED.3IONS-SCNC: 9 MMOL/L (ref 9–17)
AST SERPL-CCNC: 19 U/L
BACTERIA: ABNORMAL
BASOPHILS # BLD: 1 % (ref 0–2)
BASOPHILS ABSOLUTE: 0.04 K/UL (ref 0–0.2)
BILIRUB SERPL-MCNC: 0.21 MG/DL (ref 0.3–1.2)
BILIRUBIN URINE: NEGATIVE
BUN BLDV-MCNC: 11 MG/DL (ref 6–20)
C TRACH DNA GENITAL QL NAA+PROBE: NEGATIVE
CALCIUM SERPL-MCNC: 9.6 MG/DL (ref 8.6–10.4)
CANDIDA SPECIES, DNA PROBE: POSITIVE
CASTS UA: ABNORMAL /LPF (ref 0–8)
CHLORIDE BLD-SCNC: 105 MMOL/L (ref 98–107)
CO2: 25 MMOL/L (ref 20–31)
COLOR: YELLOW
CREAT SERPL-MCNC: 0.56 MG/DL (ref 0.5–0.9)
CULTURE: NORMAL
EOSINOPHILS RELATIVE PERCENT: 3 % (ref 1–4)
EPITHELIAL CELLS UA: ABNORMAL /HPF (ref 0–5)
GARDNERELLA VAGINALIS, DNA PROBE: POSITIVE
GFR AFRICAN AMERICAN: >60 ML/MIN
GFR NON-AFRICAN AMERICAN: >60 ML/MIN
GFR SERPL CREATININE-BSD FRML MDRD: ABNORMAL ML/MIN/{1.73_M2}
GLUCOSE BLD-MCNC: 67 MG/DL (ref 70–99)
GLUCOSE URINE: NEGATIVE
HCG QUALITATIVE: POSITIVE
HCG QUANTITATIVE: 90 MIU/ML
HCT VFR BLD CALC: 38.5 % (ref 36.3–47.1)
HEMOGLOBIN: 13 G/DL (ref 11.9–15.1)
IMMATURE GRANULOCYTES: 0 %
KETONES, URINE: NEGATIVE
LEUKOCYTE ESTERASE, URINE: NEGATIVE
LYMPHOCYTES # BLD: 32 % (ref 25–45)
MCH RBC QN AUTO: 31.9 PG (ref 25.2–33.5)
MCHC RBC AUTO-ENTMCNC: 33.8 G/DL (ref 28.4–34.8)
MCV RBC AUTO: 94.4 FL (ref 82.6–102.9)
MONOCYTES # BLD: 8 % (ref 2–8)
N. GONORRHOEAE DNA: NEGATIVE
NITRITE, URINE: NEGATIVE
NRBC AUTOMATED: 0 PER 100 WBC
PDW BLD-RTO: 12.2 % (ref 11.8–14.4)
PH UA: 6.5 (ref 5–8)
PLATELET # BLD: 313 K/UL (ref 138–453)
PMV BLD AUTO: 9.1 FL (ref 8.1–13.5)
POTASSIUM SERPL-SCNC: 4 MMOL/L (ref 3.7–5.3)
PROTEIN UA: NEGATIVE
RBC # BLD: 4.08 M/UL (ref 3.95–5.11)
RBC UA: ABNORMAL /HPF (ref 0–4)
SEG NEUTROPHILS: 56 % (ref 34–64)
SEGMENTED NEUTROPHILS ABSOLUTE COUNT: 4.29 K/UL (ref 1.5–8.1)
SODIUM BLD-SCNC: 139 MMOL/L (ref 135–144)
SOURCE: ABNORMAL
SPECIFIC GRAVITY UA: 1.03 (ref 1–1.03)
SPECIMEN DESCRIPTION: NORMAL
SPECIMEN DESCRIPTION: NORMAL
TOTAL PROTEIN: 7.5 G/DL (ref 6.4–8.3)
TRICHOMONAS VAGINALIS DNA: NEGATIVE
TURBIDITY: CLEAR
URINE HGB: NEGATIVE
UROBILINOGEN, URINE: NORMAL
WBC # BLD: 7.6 K/UL (ref 4.5–13.5)
WBC UA: ABNORMAL /HPF (ref 0–5)

## 2022-04-21 PROCEDURE — 87491 CHLMYD TRACH DNA AMP PROBE: CPT

## 2022-04-21 PROCEDURE — 87086 URINE CULTURE/COLONY COUNT: CPT

## 2022-04-21 PROCEDURE — 87510 GARDNER VAG DNA DIR PROBE: CPT

## 2022-04-21 PROCEDURE — 6370000000 HC RX 637 (ALT 250 FOR IP): Performed by: STUDENT IN AN ORGANIZED HEALTH CARE EDUCATION/TRAINING PROGRAM

## 2022-04-21 PROCEDURE — 85025 COMPLETE CBC W/AUTO DIFF WBC: CPT

## 2022-04-21 PROCEDURE — 99284 EMERGENCY DEPT VISIT MOD MDM: CPT

## 2022-04-21 PROCEDURE — 87660 TRICHOMONAS VAGIN DIR PROBE: CPT

## 2022-04-21 PROCEDURE — 96360 HYDRATION IV INFUSION INIT: CPT

## 2022-04-21 PROCEDURE — 81001 URINALYSIS AUTO W/SCOPE: CPT

## 2022-04-21 PROCEDURE — 93976 VASCULAR STUDY: CPT

## 2022-04-21 PROCEDURE — 84702 CHORIONIC GONADOTROPIN TEST: CPT

## 2022-04-21 PROCEDURE — 87591 N.GONORRHOEAE DNA AMP PROB: CPT

## 2022-04-21 PROCEDURE — 76817 TRANSVAGINAL US OBSTETRIC: CPT

## 2022-04-21 PROCEDURE — 2580000003 HC RX 258: Performed by: STUDENT IN AN ORGANIZED HEALTH CARE EDUCATION/TRAINING PROGRAM

## 2022-04-21 PROCEDURE — 80053 COMPREHEN METABOLIC PANEL: CPT

## 2022-04-21 PROCEDURE — 87480 CANDIDA DNA DIR PROBE: CPT

## 2022-04-21 PROCEDURE — 84703 CHORIONIC GONADOTROPIN ASSAY: CPT

## 2022-04-21 RX ORDER — METRONIDAZOLE 7.5 MG/G
GEL VAGINAL
Qty: 70 G | Refills: 0 | Status: SHIPPED | OUTPATIENT
Start: 2022-04-21 | End: 2022-04-28

## 2022-04-21 RX ORDER — FLUCONAZOLE 50 MG/1
150 TABLET ORAL ONCE
Status: COMPLETED | OUTPATIENT
Start: 2022-04-21 | End: 2022-04-21

## 2022-04-21 RX ORDER — CEPHALEXIN 250 MG/1
500 CAPSULE ORAL ONCE
Status: COMPLETED | OUTPATIENT
Start: 2022-04-21 | End: 2022-04-21

## 2022-04-21 RX ORDER — 0.9 % SODIUM CHLORIDE 0.9 %
1000 INTRAVENOUS SOLUTION INTRAVENOUS ONCE
Status: COMPLETED | OUTPATIENT
Start: 2022-04-21 | End: 2022-04-21

## 2022-04-21 RX ORDER — CEPHALEXIN 500 MG/1
500 CAPSULE ORAL 2 TIMES DAILY
Qty: 14 CAPSULE | Refills: 0 | Status: SHIPPED | OUTPATIENT
Start: 2022-04-21 | End: 2022-04-28

## 2022-04-21 RX ORDER — ACETAMINOPHEN 500 MG
1000 TABLET ORAL ONCE
Status: COMPLETED | OUTPATIENT
Start: 2022-04-21 | End: 2022-04-21

## 2022-04-21 RX ADMIN — FLUCONAZOLE 150 MG: 50 TABLET ORAL at 03:17

## 2022-04-21 RX ADMIN — SODIUM CHLORIDE 1000 ML: 9 INJECTION, SOLUTION INTRAVENOUS at 02:20

## 2022-04-21 RX ADMIN — ACETAMINOPHEN 1000 MG: 500 TABLET ORAL at 03:10

## 2022-04-21 RX ADMIN — CEPHALEXIN 500 MG: 250 CAPSULE ORAL at 03:18

## 2022-04-21 ASSESSMENT — PAIN SCALES - GENERAL: PAINLEVEL_OUTOF10: 4

## 2022-04-21 ASSESSMENT — ENCOUNTER SYMPTOMS
NAUSEA: 1
ABDOMINAL PAIN: 1
VOMITING: 1

## 2022-04-21 NOTE — ED PROVIDER NOTES
[facility]     Emergency Department     Faculty Attestation    I performed a history and physical examination of the patient and discussed management with the resident. I reviewed the resident´s note and agree with the documented findings and plan of care. Any areas of disagreement are noted on the chart. I was personally present for the key portions of any procedures. I have documented in the chart those procedures where I was not present during the key portions. I have reviewed the emergency nurses triage note. I agree with the chief complaint, past medical history, past surgical history, allergies, medications, social and family history as documented unless otherwise noted below. For Physician Assistant/ Nurse Practitioner cases/documentation I have personally evaluated this patient and have completed at least one if not all key elements of the E/M (history, physical exam, and MDM). Additional findings are as noted. Diagnosed with pregnancy here in emergency department, mild right lower quadrant abdominal pain, no guarding or rebounding, patient able to jump up and down without any discomfort. Patient states she has been pregnant twice in the past and has 2 children, blood type is O+.      Mitch Tucker MD  04/21/22 7744

## 2022-04-21 NOTE — ED NOTES
Pt presents to the ED with concern for pregnancy. Pt states that she had a positive home pregnancy test. LMP 3/22/22 per pt. Pt c/o intermittent abd pain. VSS. No distress noted. Call light within reach.       Gisell Aldridge RN  04/21/22 7893

## 2022-04-21 NOTE — ED PROVIDER NOTES
Faculty Sign-Out Attestation  Handoff taken on the following patient from prior Attending Physician: Kate Henao    I was available and discussed any additional care issues that arose and coordinated the management plans with the resident(s) caring for the patient during my duty period. Any areas of disagreement with residents documentation of care or procedures are noted on the chart. I was personally present for the key portions of any/all procedures during my duty period. I have documented in the chart those procedures where I was not present during the key portions.     Pregnant, US pending, O+ rh,   If stable can be discharged    Catherine Pinto DO  Attending Physician     Catherine Pinto, DO  04/21/22 0201    hcg 90, US no iup, consistent with early pregnancy,   Vss, given referral & out pt follow up, we stressed gyn follow up,   hgb 13,      Catherine Pinto, DO  04/21/22 [facility], DO  04/21/22 5268

## 2022-04-21 NOTE — ED PROVIDER NOTES
file    Number of children: Not on file    Years of education: Not on file    Highest education level: Not on file   Occupational History    Not on file   Tobacco Use    Smoking status: Former Smoker     Packs/day: 0.50     Types: Cigars    Smokeless tobacco: Never Used    Tobacco comment: 1-2 black and milds daily    Vaping Use    Vaping Use: Never used   Substance and Sexual Activity    Alcohol use: No    Drug use: Not Currently     Types: Marijuana Danya Beat)     Comment: not in pregnancy    Sexual activity: Yes     Partners: Male     Birth control/protection: Condom   Other Topics Concern    Not on file   Social History Narrative    Not on file     Social Determinants of Health     Financial Resource Strain:     Difficulty of Paying Living Expenses: Not on file   Food Insecurity:     Worried About Running Out of Food in the Last Year: Not on file    Darling of Food in the Last Year: Not on file   Transportation Needs:     Lack of Transportation (Medical): Not on file    Lack of Transportation (Non-Medical):  Not on file   Physical Activity:     Days of Exercise per Week: Not on file    Minutes of Exercise per Session: Not on file   Stress:     Feeling of Stress : Not on file   Social Connections:     Frequency of Communication with Friends and Family: Not on file    Frequency of Social Gatherings with Friends and Family: Not on file    Attends Mosque Services: Not on file    Active Member of [address] [place] [address] or Organizations: Not on file    Attends Club or Organization Meetings: Not on file    Marital Status: Not on file   Intimate Partner Violence:     Fear of Current or Ex-Partner: Not on file    Emotionally Abused: Not on file    Physically Abused: Not on file    Sexually Abused: Not on file   Housing Stability:     Unable to Pay for Housing in the Last Year: Not on file    Number of [place] in the Last Year: Not on file    Unstable Housing in the Last Year: Not on file       Family History   Adopted: Yes   Problem Relation Age of Onset    Diabetes Mother         borderline diet controlled    Atrial Fibrillation Mother     Anxiety Disorder Mother     Depression Mother     Heart Disease Mother     Seizures Maternal Grandmother     COPD Maternal Grandmother     Hypertension Maternal Grandmother     Cancer Maternal Grandmother         ovarian    Heart Disease Maternal Grandmother     Diabetes Type 1  Maternal Grandfather     Diabetes Type 1  Other         m uncle    Bleeding Prob Other         m aunt protein S&C deficiency        Allergies:  Sulfa antibiotics    Home Medications:  Prior to Admission medications    Medication Sig Start Date End Date Taking? Authorizing Provider   cephALEXin (KEFLEX) 500 MG capsule Take 1 capsule by mouth 2 times daily for 7 days 4/21/22 4/28/22 Yes Hannah Croft MD   metroNIDAZOLE (METROGEL VAGINAL) 0.75 % vaginal gel Place vaginally 2 times daily for 7 days. 4/21/22 4/28/22 Yes Maite Abrams MD   sertraline (ZOLOFT) 50 MG tablet Take 50 mg by mouth daily    Historical Provider, MD       REVIEW OFSYSTEMS    (2-9 systems for level 4, 10 or more for level 5)      Review of Systems   Constitutional: Positive for appetite change. Negative for fatigue and fever. Gastrointestinal: Positive for abdominal pain, nausea and vomiting. Skin: Negative for rash and wound. Psychiatric/Behavioral: Negative for behavioral problems and confusion. PHYSICAL EXAM   (up to 7 for level 4, 8 or more forlevel 5)      INITIAL VITALS:   ED Triage Vitals [04/21/22 0037]   BP Temp Temp Source Pulse Resp SpO2 Height Weight   113/73 98.2 °F (36.8 °C) Oral 91 18 97 % 5' 3\" (1.6 m) 145 lb (65.8 kg)       Physical Exam  Constitutional:       General: She is not in acute distress. Appearance: Normal appearance. HENT:      Head: Normocephalic and atraumatic. Eyes:      Extraocular Movements: Extraocular movements intact.       Pupils: Pupils are equal, round, and reactive to light. Cardiovascular:      Rate and Rhythm: Normal rate. Pulmonary:      Effort: Pulmonary effort is normal. No respiratory distress. Abdominal:      Palpations: Abdomen is soft. Tenderness: There is abdominal tenderness. Comments: Patient with right lower quadrant abdominal tenderness on palpation, no rebound or guarding. Patient was able to jump up and down on her leg without any   Musculoskeletal:         General: No swelling or tenderness. Cervical back: No rigidity. Skin:     Capillary Refill: Capillary refill takes less than 2 seconds. Coloration: Skin is not jaundiced. Neurological:      General: No focal deficit present. Mental Status: She is alert and oriented to person, place, and time. Psychiatric:         Mood and Affect: Mood normal.         Behavior: Behavior normal.         DIFFERENTIAL  DIAGNOSIS     PLAN (LABS / IMAGING / EKG):  Orders Placed This Encounter   Procedures    Culture, Urine    Vaginitis DNA Probe    C.trachomatis N.gonorrhoeae DNA    US OB TRANSVAGINAL    US DUP ABD PEL RETRO SCROT LIMITED    CBC with Auto Differential    HCG Qualitative, Serum    HCG, Quantitative, Pregnancy    Urinalysis with Microscopic    Comprehensive Metabolic Panel       MEDICATIONS ORDERED:  Orders Placed This Encounter   Medications    0.9 % sodium chloride bolus    acetaminophen (TYLENOL) tablet 1,000 mg    fluconazole (DIFLUCAN) tablet 150 mg    cephALEXin (KEFLEX) capsule 500 mg     Order Specific Question:   Antimicrobial Indications     Answer:   Urinary Tract Infection    cephALEXin (KEFLEX) 500 MG capsule     Sig: Take 1 capsule by mouth 2 times daily for 7 days     Dispense:  14 capsule     Refill:  0    metroNIDAZOLE (METROGEL VAGINAL) 0.75 % vaginal gel     Sig: Place vaginally 2 times daily for 7 days.      Dispense:  70 g     Refill:  0         Initial MDM/Plan: 24 y.o. female who presents with concern for pregnancy test at home in addition to right lower quadrant abdominal pain. She does have tenderness over the right lower quadrant, no rebound or guarding  Point-of-care pregnancy test came back positive  Will get official ECG in addition to quantitative. Plan to get ultrasound to rule out ectopic pregnancy. Also obtain UA, vaginitis swab, and urine chlamydia, CBC, CMP. Her disposition is pending on labs and imaging      Her pelvic exam did demonstrate right adnexal tenderness, with no blood in the vaginal vault however she did have white discharge, no mucopurulent discharge, no cervical friability. DIAGNOSTIC RESULTS / EMERGENCYDEPARTMENT COURSE / MDM     LABS:  Labs Reviewed   VAGINITIS DNA PROBE - Abnormal; Notable for the following components:       Result Value    GARDNERELLA VAGINALIS, DNA PROBE POSITIVE (*)     CANDIDA SPECIES, DNA PROBE POSITIVE (*)     All other components within normal limits   HCG, SERUM, QUALITATIVE - Abnormal; Notable for the following components:    hCG Qual POSITIVE (*)     All other components within normal limits   HCG, QUANTITATIVE, PREGNANCY - Abnormal; Notable for the following components:    hCG Quant 90 (*)     All other components within normal limits   URINALYSIS WITH MICROSCOPIC - Abnormal; Notable for the following components:    Bacteria, UA FEW (*)     All other components within normal limits   COMPREHENSIVE METABOLIC PANEL - Abnormal; Notable for the following components:    Glucose 67 (*)     Alkaline Phosphatase 108 (*)     All other components within normal limits   CULTURE, URINE   C.TRACHOMATIS N.GONORRHOEAE DNA   CBC WITH AUTO DIFFERENTIAL         RADIOLOGY:  US OB TRANSVAGINAL    Result Date: 4/21/2022  1. No intrauterine pregnancy is identified, concordant with a beta HCG result of 90. An early pregnancy is still possible. Clinical follow-up with serial beta HCG as well as a pelvic ultrasound in 2-3 weeks are recommended.  2. Suspected left corpus luteum cyst measuring 2.1 by 1.1 cm. 3. Trace free pelvic fluid, likely physiologic. 4. Color-Doppler flow is documented in both ovaries. EKG      All EKG's are interpreted by the Emergency Department Physicianwho either signs or Co-signs this chart in the absence of a cardiologist.    EMERGENCY DEPARTMENT COURSE:  ED Course as of 04/21/22 0423   Thu Apr 21, 2022   0312 Patient with quantitative of 90, ultrasound was not able to detect an intrauterine pregnancy, recommend repeat ultrasound in 2 to 3 weeks. She was positive for Gardnerella, will be treated with MetroGel, she did have a sugar of 67 and thus was given juice. She was also positive for yeast and will be given 1 dose of Diflucan. Patient also had bacteria in her urine will be started on Keflex. Patient reports that her OB is at [address] [place] [address] and she will follow-up with them. Plan to discharge patient with OB follow-up. She is given strict return precautions. [AN]      ED Course User Index  [AN] Kuldip Elliott MD          PROCEDURES:  None    CONSULTS:  None    CRITICAL CARE:      FINAL IMPRESSION      1. Pelvic pain with positive beta-human chorionic gonadotropin (BhCG) in female    2. Bacteria in urine    3. Bacterial vaginosis    4.  Yeast infection          DISPOSITION / PLAN     DISPOSITION Decision To Discharge 04/21/2022 03:12:11 AM      PATIENT REFERRED TO:  [facility]  [address] [place] [address]  [phone]    If symptoms worsen    DARRIAN Rankin  [facility]  [phone]      As needed    [facility]  [address]  [phone]          DISCHARGE MEDICATIONS:  Discharge Medication List as of 4/21/2022  3:19 AM      START taking these medications    Details   cephALEXin (KEFLEX) 500 MG capsule Take 1 capsule by mouth 2 times daily for 7 days, Disp-14 capsule, R-0Print      metroNIDAZOLE (METROGEL VAGINAL) 0.75 % vaginal gel Place vaginally 2 times daily for 7 days., Disp-70 g, R-0, Print             Samantha Ramírez MD  Emergency Medicine Resident    (Please note that portions of this note were completed with a voice recognition program.Efforts were made to edit the dictations but occasionally words are mis-transcribed.)        Samantha Ramírez MD  Resident  04/21/22 8589

## 2022-05-02 DIAGNOSIS — Z34.90 EARLY STAGE OF PREGNANCY: Primary | ICD-10-CM

## 2022-05-02 PROBLEM — Z3A.39 39 WEEKS GESTATION OF PREGNANCY: Status: RESOLVED | Noted: 2021-10-28 | Resolved: 2022-05-02

## 2022-05-02 PROBLEM — O35.HXX0 SHORT FEMUR OF FETUS ON PRENATAL ULTRASOUND: Status: RESOLVED | Noted: 2021-09-23 | Resolved: 2022-05-02

## 2022-05-02 PROBLEM — O09.30 LATE PRENATAL CARE: Status: RESOLVED | Noted: 2021-06-14 | Resolved: 2022-05-02

## 2022-05-04 ENCOUNTER — TELEPHONE (OUTPATIENT)
Dept: OBGYN CLINIC | Age: 22
End: 2022-05-04

## 2022-05-04 ENCOUNTER — HOSPITAL ENCOUNTER (OUTPATIENT)
Age: 22
Discharge: HOME | End: 2022-05-04
Payer: COMMERCIAL

## 2022-05-04 DIAGNOSIS — Z34.90 EARLY STAGE OF PREGNANCY: Primary | ICD-10-CM

## 2022-05-04 DIAGNOSIS — Z34.90 EARLY STAGE OF PREGNANCY: ICD-10-CM

## 2022-05-04 LAB — HCG QUANTITATIVE: ABNORMAL MIU/ML

## 2022-05-04 PROCEDURE — 36415 COLL VENOUS BLD VENIPUNCTURE: CPT

## 2022-05-04 PROCEDURE — 84702 CHORIONIC GONADOTROPIN TEST: CPT

## 2022-05-04 RX ORDER — PNV NO.95/FERROUS FUM/FOLIC AC 28MG-0.8MG
1 TABLET ORAL DAILY
Qty: 30 TABLET | Refills: 12 | Status: SHIPPED | OUTPATIENT
Start: 2022-05-04 | End: 2022-10-07 | Stop reason: SDUPTHER

## 2022-05-04 NOTE — TELEPHONE ENCOUNTER
----- Message from DARRIAN Quezada CNP sent at 5/4/2022  2:06 PM EDT -----  Needs new OB intake/ultrasound scheduled. Prenatal vitamin 1 PO QD with 12 refills.

## 2022-05-04 NOTE — TELEPHONE ENCOUNTER
Per Mariya Dates pt notified of [place] [facility] results and pt scheduled for OB intake/US. Pt stating she is not exactly sure of LMP stating it was probably in February. PNV will be sent to pt pharm.

## 2022-05-06 ENCOUNTER — APPOINTMENT (OUTPATIENT)
Dept: ULTRASOUND IMAGING | Age: 22
End: 2022-05-06
Payer: COMMERCIAL

## 2022-05-06 ENCOUNTER — HOSPITAL ENCOUNTER (EMERGENCY)
Age: 22
Discharge: HOME | End: 2022-05-06
Attending: EMERGENCY MEDICINE
Payer: COMMERCIAL

## 2022-05-06 VITALS
HEIGHT: 63 IN | DIASTOLIC BLOOD PRESSURE: 52 MMHG | SYSTOLIC BLOOD PRESSURE: 92 MMHG | OXYGEN SATURATION: 98 % | BODY MASS INDEX: 26.22 KG/M2 | RESPIRATION RATE: 16 BRPM | WEIGHT: 148 LBS | TEMPERATURE: 98.1 F | HEART RATE: 62 BPM

## 2022-05-06 DIAGNOSIS — Z34.90 INTRAUTERINE PREGNANCY: Primary | ICD-10-CM

## 2022-05-06 DIAGNOSIS — R11.0 NAUSEA: ICD-10-CM

## 2022-05-06 LAB
-: ABNORMAL
ABSOLUTE EOS #: 0.11 K/UL (ref 0–0.44)
ABSOLUTE IMMATURE GRANULOCYTE: 0.04 K/UL (ref 0–0.3)
ABSOLUTE LYMPH #: 1.45 K/UL (ref 1.1–3.7)
ABSOLUTE MONO #: 0.54 K/UL (ref 0.1–1.4)
ALBUMIN SERPL-MCNC: 4.9 G/DL (ref 3.5–5.2)
ALBUMIN/GLOBULIN RATIO: 1.8 (ref 1–2.5)
ALP BLD-CCNC: 98 U/L (ref 35–104)
ALT SERPL-CCNC: 23 U/L (ref 5–33)
ANION GAP SERPL CALCULATED.3IONS-SCNC: 10 MMOL/L (ref 9–17)
AST SERPL-CCNC: 22 U/L
BACTERIA: ABNORMAL
BASOPHILS # BLD: 1 % (ref 0–2)
BASOPHILS ABSOLUTE: 0.05 K/UL (ref 0–0.2)
BILIRUB SERPL-MCNC: 0.68 MG/DL (ref 0.3–1.2)
BILIRUBIN URINE: NEGATIVE
BUN BLDV-MCNC: 8 MG/DL (ref 6–20)
CALCIUM SERPL-MCNC: 9.4 MG/DL (ref 8.6–10.4)
CASTS UA: ABNORMAL /LPF (ref 0–8)
CHLORIDE BLD-SCNC: 102 MMOL/L (ref 98–107)
CO2: 24 MMOL/L (ref 20–31)
COLOR: YELLOW
CREAT SERPL-MCNC: 0.41 MG/DL (ref 0.5–0.9)
EOSINOPHILS RELATIVE PERCENT: 1 % (ref 1–4)
EPITHELIAL CELLS UA: ABNORMAL /HPF (ref 0–5)
GFR AFRICAN AMERICAN: >60 ML/MIN
GFR NON-AFRICAN AMERICAN: >60 ML/MIN
GFR SERPL CREATININE-BSD FRML MDRD: ABNORMAL ML/MIN/{1.73_M2}
GLUCOSE BLD-MCNC: 85 MG/DL (ref 70–99)
GLUCOSE URINE: NEGATIVE
HCG QUALITATIVE: POSITIVE
HCG QUANTITATIVE: ABNORMAL MIU/ML
HCT VFR BLD CALC: 39.1 % (ref 36.3–47.1)
HEMOGLOBIN: 13 G/DL (ref 11.9–15.1)
IMMATURE GRANULOCYTES: 1 %
KETONES, URINE: ABNORMAL
LEUKOCYTE ESTERASE, URINE: NEGATIVE
LIPASE: 14 U/L (ref 13–60)
LYMPHOCYTES # BLD: 17 % (ref 25–45)
MCH RBC QN AUTO: 31.3 PG (ref 25.2–33.5)
MCHC RBC AUTO-ENTMCNC: 33.2 G/DL (ref 28.4–34.8)
MCV RBC AUTO: 94 FL (ref 82.6–102.9)
MONOCYTES # BLD: 6 % (ref 2–8)
NITRITE, URINE: NEGATIVE
NRBC AUTOMATED: 0 PER 100 WBC
PDW BLD-RTO: 12 % (ref 11.8–14.4)
PH UA: 6.5 (ref 5–8)
PLATELET # BLD: 306 K/UL (ref 138–453)
PMV BLD AUTO: 9.5 FL (ref 8.1–13.5)
POTASSIUM SERPL-SCNC: 3.9 MMOL/L (ref 3.7–5.3)
PROTEIN UA: NEGATIVE
RBC # BLD: 4.16 M/UL (ref 3.95–5.11)
RBC UA: ABNORMAL /HPF (ref 0–4)
SEG NEUTROPHILS: 74 % (ref 34–64)
SEGMENTED NEUTROPHILS ABSOLUTE COUNT: 6.3 K/UL (ref 1.5–8.1)
SODIUM BLD-SCNC: 136 MMOL/L (ref 135–144)
SPECIFIC GRAVITY UA: 1.02 (ref 1–1.03)
TOTAL PROTEIN: 7.6 G/DL (ref 6.4–8.3)
TURBIDITY: CLEAR
URINE HGB: ABNORMAL
UROBILINOGEN, URINE: NORMAL
WBC # BLD: 8.5 K/UL (ref 4.5–13.5)
WBC UA: ABNORMAL /HPF (ref 0–5)

## 2022-05-06 PROCEDURE — 84702 CHORIONIC GONADOTROPIN TEST: CPT

## 2022-05-06 PROCEDURE — 6360000002 HC RX W HCPCS: Performed by: PEDIATRICS

## 2022-05-06 PROCEDURE — 80053 COMPREHEN METABOLIC PANEL: CPT

## 2022-05-06 PROCEDURE — 99284 EMERGENCY DEPT VISIT MOD MDM: CPT

## 2022-05-06 PROCEDURE — 85025 COMPLETE CBC W/AUTO DIFF WBC: CPT

## 2022-05-06 PROCEDURE — 96361 HYDRATE IV INFUSION ADD-ON: CPT

## 2022-05-06 PROCEDURE — 84703 CHORIONIC GONADOTROPIN ASSAY: CPT

## 2022-05-06 PROCEDURE — 2580000003 HC RX 258: Performed by: PEDIATRICS

## 2022-05-06 PROCEDURE — 76801 OB US < 14 WKS SINGLE FETUS: CPT

## 2022-05-06 PROCEDURE — 96374 THER/PROPH/DIAG INJ IV PUSH: CPT

## 2022-05-06 PROCEDURE — 81001 URINALYSIS AUTO W/SCOPE: CPT

## 2022-05-06 PROCEDURE — 83690 ASSAY OF LIPASE: CPT

## 2022-05-06 PROCEDURE — 93976 VASCULAR STUDY: CPT

## 2022-05-06 RX ORDER — 0.9 % SODIUM CHLORIDE 0.9 %
1000 INTRAVENOUS SOLUTION INTRAVENOUS ONCE
Status: COMPLETED | OUTPATIENT
Start: 2022-05-06 | End: 2022-05-06

## 2022-05-06 RX ORDER — ONDANSETRON 2 MG/ML
4 INJECTION INTRAMUSCULAR; INTRAVENOUS ONCE
Status: COMPLETED | OUTPATIENT
Start: 2022-05-06 | End: 2022-05-06

## 2022-05-06 RX ORDER — ONDANSETRON 4 MG/1
4 TABLET, ORALLY DISINTEGRATING ORAL 3 TIMES DAILY PRN
Qty: 21 TABLET | Refills: 0 | Status: SHIPPED | OUTPATIENT
Start: 2022-05-06 | End: 2022-05-16

## 2022-05-06 RX ADMIN — SODIUM CHLORIDE 1000 ML: 9 INJECTION, SOLUTION INTRAVENOUS at 13:35

## 2022-05-06 RX ADMIN — ONDANSETRON 4 MG: 2 INJECTION INTRAMUSCULAR; INTRAVENOUS at 13:35

## 2022-05-06 ASSESSMENT — ENCOUNTER SYMPTOMS
EYE DISCHARGE: 0
CONSTIPATION: 0
DIARRHEA: 0
VOMITING: 0
ABDOMINAL PAIN: 1
COUGH: 0
CHOKING: 0
RHINORRHEA: 0
EYE REDNESS: 0
COLOR CHANGE: 0
SORE THROAT: 0
SHORTNESS OF BREATH: 0
WHEEZING: 0

## 2022-05-06 NOTE — ED NOTES
Patient requesting food and drink  Continues to be nauseated, but has improved slightly since receiving Zofran  Will check with doc     Celi uLther RN  05/06/22 4230

## 2022-05-06 NOTE — ED NOTES
Report received from [facility]. This caregiver met patient, resting quietly, no new change in status.          Solomon Crigler, RN  05/06/22 6801

## 2022-05-06 NOTE — ED PROVIDER NOTES
[facility]  ED  Emergency Department Encounter  EmergencyMedicine Resident     Pt Terrance Tavarez  MRN: [id number]  [place] 2000  Date of evaluation: 5/6/22  PCP:  DARRIAN Diallo CNP    CHIEF COMPLAINT       Chief Complaint   Patient presents with    Emesis     N/V with pregnancy, unable to keep food down, tolerates small amounts of water. Pt approx. 6 weeks pregnant, denies vaginal bleeding, reports mild lower abdominal cramping. HISTORY OF PRESENT ILLNESS  (Location/Symptom, Timing/Onset, Context/Setting, Quality, Duration, Modifying Factors, Severity.)      Elizabeth Orozco is a 24 y.o. female who presents with with severe dehydration complaint given multiple episodes of emesis. Patient states she has been pregnant for the past approximately 6 weeks without confirmed IUP. States her last transvaginal ultrasound was 2 weeks ago. She has a follow-up with Dr. Cris Hahn on 5/31/2022. She states that before then she was post to receive a transvaginal ultrasound to confirm IUP prior as this was advised per her recent ER visit. She states she called her OB office for antinausea medication today however she was diverted to the ER as she was having some lower abdominal discomfort. Denies vaginal bleeding vaginal pain vaginal pressure or leakage of fluid or vaginal discharge  Dors compliance with medication and states she has not felt her prenatal vitamin prescription yet    States that she had a vaginal birth 6 months ago  Since her third pregnancy  She does not have custody of one of her children, the FOB does. She lives with her boyfriend who is the father of her 10month-old child    Patient states he is unable to tolerate p.o. for 2 to 3 days of decreased urination. States the abdominal pain is twinge like and comes and goes and is not present at this time.   Emesis nonbloody nonbilious    PAST MEDICAL / SURGICAL / SOCIAL / FAMILY HISTORY      has a past medical history of ADD (attention deficit disorder), Anxiety, Asthma, CMV (cytomegalovirus infection) ([facility] [address] Depression, Gestational diabetes mellitus (GDM), antepartum, Hearing loss in left ear, Heart disease, Hypothyroidism, Immunizations up to date in pediatric patient, Neurocardiogenic syncope, Pseudoseizures ([facility] [address] Raynaud disease, Seizures ([facility] [address] SVT (supraventricular tachycardia) ([facility] [address] Tachycardia, Traumatic injury during pregnancy, third trimester, and Wears glasses. Reviewed     has a past surgical history that includes REMOVAL FOREIGN BODY EAR (Left) and Tympanoplasty (Left, 06/24/2016). Reviewed    Social History     Socioeconomic History    Marital status: Single     Spouse name: Not on file    Number of children: Not on file    Years of education: Not on file    Highest education level: Not on file   Occupational History    Not on file   Tobacco Use    Smoking status: Former Smoker     Packs/day: 0.50     Types: Cigars    Smokeless tobacco: Never Used    Tobacco comment: 1-2 black and milds daily    Vaping Use    Vaping Use: Never used   Substance and Sexual Activity    Alcohol use: No    Drug use: Not Currently     Types: Marijuana Zollie Axe)     Comment: not in pregnancy    Sexual activity: Yes     Partners: Male     Birth control/protection: Condom   Other Topics Concern    Not on file   Social History Narrative    Not on file     Social Determinants of Health     Financial Resource Strain:     Difficulty of Paying Living Expenses: Not on file   Food Insecurity:     Worried About Running Out of Food in the Last Year: Not on file    Darling of Food in the Last Year: Not on file   Transportation Needs:     Lack of Transportation (Medical): Not on file    Lack of Transportation (Non-Medical):  Not on file   Physical Activity:     Days of Exercise per Week: Not on file    Minutes of Exercise per Session: Not on file   Stress:     Feeling of Stress : Not on file   Social Connections:  Frequency of Communication with Friends and Family: Not on file    Frequency of Social Gatherings with Friends and Family: Not on file    Attends Hinduism Services: Not on file    Active Member of Clubs or Organizations: Not on file    Attends Club or Organization Meetings: Not on file    Marital Status: Not on file   Intimate Partner Violence:     Fear of Current or Ex-Partner: Not on file    Emotionally Abused: Not on file    Physically Abused: Not on file    Sexually Abused: Not on file   Housing Stability:     Unable to Pay for Housing in the Last Year: Not on file    Number of [place] in the Last Year: Not on file    Unstable Housing in the Last Year: Not on file       Family History   Adopted: Yes   Problem Relation Age of Onset    Diabetes Mother         borderline diet controlled    Atrial Fibrillation Mother     Anxiety Disorder Mother     Depression Mother     Heart Disease Mother     Seizures Maternal Grandmother     COPD Maternal Grandmother     Hypertension Maternal Grandmother     Cancer Maternal Grandmother         ovarian    Heart Disease Maternal Grandmother     Diabetes Type 1  Maternal Grandfather     Diabetes Type 1  Other         m uncle    Bleeding Prob Other         m aunt protein S&C deficiency       Allergies:  Sulfa antibiotics    Home Medications:  Prior to Admission medications    Medication Sig Start Date End Date Taking?  Authorizing Provider   ondansetron (ZOFRAN-ODT) 4 MG disintegrating tablet Place 1 tablet under the tongue 3 times daily as needed for Nausea or Vomiting 5/6/22 5/16/22 Yes Kate Kimbrough MD   Prenatal Vit-Fe Fumarate-FA (PRENATAL VITAMINS) 28-0.8 MG TABS Take 1 tablet by mouth daily 5/4/22 6/3/22  Sanjuanita Wiley APRN - CNP   sertraline (ZOLOFT) 50 MG tablet Take 50 mg by mouth daily    Historical Provider, MD       REVIEW OF SYSTEMS    (2-9 systems for level 4, 10 or more for level 5)      Review of Systems   Constitutional: Negative for activity change, appetite change and fever. HENT: Negative for congestion, ear pain, rhinorrhea and sore throat. Eyes: Negative for discharge and redness. Respiratory: Negative for cough, choking, shortness of breath and wheezing. Cardiovascular: Negative for chest pain. Gastrointestinal: Positive for abdominal pain. Negative for constipation, diarrhea and vomiting. Endocrine: Negative for polydipsia and polyuria. Genitourinary: Negative for decreased urine volume, difficulty urinating, dyspareunia, dysuria, enuresis, flank pain, frequency, genital sores, hematuria, menstrual problem, pelvic pain, urgency, vaginal bleeding, vaginal discharge and vaginal pain. Musculoskeletal: Negative for gait problem, neck pain and neck stiffness. Skin: Negative for color change, pallor, rash and wound. Allergic/Immunologic: Negative for food allergies. Neurological: Negative for dizziness, facial asymmetry, speech difficulty and headaches. Psychiatric/Behavioral: Negative for behavioral problems. PHYSICAL EXAM   (up to 7 for level 4, 8 or more for level 5)      INITIAL VITALS:   BP (!) 92/52   Pulse 62   Temp 98.1 °F (36.7 °C) (Oral)   Resp 16   Ht 5' 3\" (1.6 m)   Wt 148 lb (67.1 kg)   LMP 02/15/2022 (Approximate)   SpO2 98%   BMI 26.22 kg/m²     Physical Exam  Vitals and nursing note reviewed. Constitutional:       General: She is not in acute distress. Appearance: She is well-developed. She is not ill-appearing. Comments: BP (!) 92/52   Pulse 62   Temp 98.1 °F (36.7 °C) (Oral)   Resp 16   Ht 5' 3\" (1.6 m)   Wt 148 lb (67.1 kg)   LMP 02/15/2022 (Approximate)   SpO2 98%   BMI 26.22 kg/m²      HENT:      Head: Normocephalic and atraumatic. Nose: Nose normal. No congestion. Mouth/Throat:      Mouth: Mucous membranes are moist.      Pharynx: Oropharynx is clear. No oropharyngeal exudate. Eyes:      General: No scleral icterus. Right eye: No discharge. Left eye: No discharge. Conjunctiva/sclera: Conjunctivae normal.      Pupils: Pupils are equal, round, and reactive to light. Cardiovascular:      Rate and Rhythm: Normal rate and regular rhythm. Pulses: Normal pulses. Heart sounds: Normal heart sounds. Pulmonary:      Effort: Pulmonary effort is normal. No respiratory distress. Breath sounds: Normal breath sounds. No wheezing. Abdominal:      General: Abdomen is flat. There is no distension. Palpations: Abdomen is soft. Tenderness: There is no abdominal tenderness. There is no left CVA tenderness or guarding. Musculoskeletal:      Cervical back: Normal range of motion and neck supple. Lymphadenopathy:      Cervical: No cervical adenopathy. Skin:     General: Skin is warm. Capillary Refill: Capillary refill takes less than 2 seconds. Findings: No rash. Neurological:      General: No focal deficit present. Mental Status: She is alert and oriented to person, place, and time. Cranial Nerves: No cranial nerve deficit. Motor: No weakness.    Psychiatric:         Mood and Affect: Mood normal.         DIFFERENTIAL  DIAGNOSIS     PLAN (LABS / IMAGING / EKG):  Orders Placed This Encounter   Procedures    US OB LESS THAN 14 WEEKS SINGLE OR FIRST GESTATION    US DUP ABD PEL RETRO SCROT LIMITED    Lipase    Comprehensive Metabolic Panel w/ Reflex to MG    CBC with Auto Differential    HCG Qualitative, Serum    Urinalysis with Reflex to Culture    HCG, Quantitative, Pregnancy    Microscopic Urinalysis       MEDICATIONS ORDERED:  Orders Placed This Encounter   Medications    ondansetron (ZOFRAN) injection 4 mg    0.9 % sodium chloride bolus    ondansetron (ZOFRAN-ODT) 4 MG disintegrating tablet     Sig: Place 1 tablet under the tongue 3 times daily as needed for Nausea or Vomiting     Dispense:  21 tablet     Refill:  0       DDX: Normal pregnancy ectopic pregnancy hyperemesis gravidarum ovarian torsion hypotension dehydration    DIAGNOSTIC RESULTS / EMERGENCY DEPARTMENT COURSE / MDM   LAB RESULTS:  Results for orders placed or performed during the hospital encounter of 05/06/22   Lipase   Result Value Ref Range    Lipase 14 13 - 60 U/L   Comprehensive Metabolic Panel w/ Reflex to MG   Result Value Ref Range    Glucose 85 70 - 99 mg/dL    BUN 8 6 - 20 mg/dL    CREATININE 0.41 (L) 0.50 - 0.90 mg/dL    Calcium 9.4 8.6 - 10.4 mg/dL    Sodium 136 135 - 144 mmol/L    Potassium 3.9 3.7 - 5.3 mmol/L    Chloride 102 98 - 107 mmol/L    CO2 24 20 - 31 mmol/L    Anion Gap 10 9 - 17 mmol/L    Alkaline Phosphatase 98 35 - 104 U/L    ALT 23 5 - 33 U/L    AST 22 <32 U/L    Total Bilirubin 0.68 0.3 - 1.2 mg/dL    Total Protein 7.6 6.4 - 8.3 g/dL    Albumin 4.9 3.5 - 5.2 g/dL    Albumin/Globulin Ratio 1.8 1.0 - 2.5    GFR Non-African American >60 >60 mL/min    GFR African American >60 >60 mL/min    GFR Comment         CBC with Auto Differential   Result Value Ref Range    WBC 8.5 4.5 - 13.5 k/uL    RBC 4.16 3.95 - 5.11 m/uL    Hemoglobin 13.0 11.9 - 15.1 g/dL    Hematocrit 39.1 36.3 - 47.1 %    MCV 94.0 82.6 - 102.9 fL    MCH 31.3 25.2 - 33.5 pg    MCHC 33.2 28.4 - 34.8 g/dL    RDW 12.0 11.8 - 14.4 %    Platelets 453 182 - 734 k/uL    MPV 9.5 8.1 - 13.5 fL    NRBC Automated 0.0 0.0 per 100 WBC    Seg Neutrophils 74 (H) 34 - 64 %    Lymphocytes 17 (L) 25 - 45 %    Monocytes 6 2 - 8 %    Eosinophils % 1 1 - 4 %    Basophils 1 0 - 2 %    Immature Granulocytes 1 (H) 0 %    Segs Absolute 6.30 1.50 - 8.10 k/uL    Absolute Lymph # 1.45 1.10 - 3.70 k/uL    Absolute Mono # 0.54 0.10 - 1.40 k/uL    Absolute Eos # 0.11 0.00 - 0.44 k/uL    Basophils Absolute 0.05 0.00 - 0.20 k/uL    Absolute Immature Granulocyte 0.04 0.00 - 0.30 k/uL   HCG Qualitative, Serum   Result Value Ref Range    hCG Qual POSITIVE (A) NEGATIVE   Urinalysis with Reflex to Culture    Specimen: Urine   Result Value Ref Range    Color, UA Yellow Yellow    Turbidity UA Clear Clear    Glucose, Ur NEGATIVE NEGATIVE    Bilirubin Urine NEGATIVE NEGATIVE    Ketones, Urine SMALL (A) NEGATIVE    Specific Gravity, UA 1.022 1.005 - 1.030    Urine Hgb TRACE (A) NEGATIVE    pH, UA 6.5 5.0 - 8.0    Protein, UA NEGATIVE NEGATIVE    Urobilinogen, Urine Normal Normal    Nitrite, Urine NEGATIVE NEGATIVE    Leukocyte Esterase, Urine NEGATIVE NEGATIVE   HCG, Quantitative, Pregnancy   Result Value Ref Range    hCG Quant 46,215 (H) <5 mIU/mL   Microscopic Urinalysis   Result Value Ref Range    -          WBC, UA 0 TO 2 0 - 5 /HPF    RBC, UA 5 TO 10 0 - 4 /HPF    Casts UA  0 - 8 /LPF     0 TO 2 HYALINE Reference range defined for non-centrifuged specimen. Epithelial Cells UA 5 TO 10 0 - 5 /HPF    Bacteria, UA FEW (A) None       IMPRESSION: Confirmed IUP with nausea and vomiting/dehydration    RADIOLOGY:  US OB LESS THAN 14 WEEKS SINGLE OR FIRST GESTATION   Final Result   Single intrauterine pregnancy at 5 weeks 6 days with no fetal pole   demonstrated. No findings to suggest ectopic pregnancy. Recommend continued   serial serum beta HCG surveillance and follow-up pelvis imaging as indicated   clinically. US DUP ABD PEL RETRO SCROT LIMITED   Preliminary Result   Single intrauterine pregnancy at 5 weeks 6 days with no fetal pole   demonstrated. No findings to suggest ectopic pregnancy. Recommend continued   serial serum beta HCG surveillance and follow-up pelvis imaging as indicated   clinically. EMERGENCY DEPARTMENT COURSE:  ED Course as of 05/06/22 1609   Fri May 06, 2022   1311 Patient not in room at this time [LL]   1330 LMP 2/22/2022 [LL]   36 Next OB appointment  5/31/2022  Dr. Bobby Hsieh [LL]   [phone] IUP confirmed [LL]      ED Course User Index  [LL] Leopoldo Frames, MD         CONSULTS:  None    CRITICAL CARE:  Please see attending note    FINAL IMPRESSION      1. Intrauterine pregnancy    2.  Nausea          DISPOSITION / PLAN     DISPOSITION Decision To Discharge 05/06/2022 03:57:04 PM      PATIENT REFERRED TO:  Bryn Meraz, APRN - CNP  [address] [facility]  [phone]    Schedule an appointment as soon as possible for a visit in 2 days  For hospital follow-up    [facility]  [address] [facility] [address] [phone]    If symptoms worsen    [address], [address] [address], [address] [place]   [phone]    Go to   As scheduled on May 31      DISCHARGE MEDICATIONS:  Discharge Medication List as of 5/6/2022  3:59 PM      START taking these medications    Details   ondansetron (ZOFRAN-ODT) 4 MG disintegrating tablet Place 1 tablet under the tongue 3 times daily as needed for Nausea or Vomiting, Disp-21 tablet, R-0Print             Ryley Waddell MD  Emergency Medicine Resident    (Please note that portions of thisnote were completed with a voice recognition program.  Efforts were made to edit the dictations but occasionally words are mis-transcribed.)       Ryley Waddell MD  Resident  05/06/22 [id number]

## 2022-05-06 NOTE — ED NOTES
Pt to the ER with c/o N/V with pregnancy, unable to keep food down, tolerates small amounts of water. Pt approx. 6 weeks pregnant, denies vaginal bleeding, reports mild lower abdominal cramping. No acute distress noted, rr even and NL.  Awaiting physician evaluation     Alphia Kocher, RN  05/06/22 [id number]

## 2022-06-07 ENCOUNTER — OFFICE VISIT (OUTPATIENT)
Dept: OBGYN CLINIC | Age: 22
End: 2022-06-07
Payer: COMMERCIAL

## 2022-06-07 ENCOUNTER — INITIAL PRENATAL (OUTPATIENT)
Dept: OBGYN CLINIC | Age: 22
End: 2022-06-07
Payer: COMMERCIAL

## 2022-06-07 VITALS
BODY MASS INDEX: 26.04 KG/M2 | DIASTOLIC BLOOD PRESSURE: 68 MMHG | WEIGHT: 147 LBS | HEART RATE: 60 BPM | SYSTOLIC BLOOD PRESSURE: 102 MMHG

## 2022-06-07 DIAGNOSIS — Z3A.11 11 WEEKS GESTATION OF PREGNANCY: Primary | ICD-10-CM

## 2022-06-07 DIAGNOSIS — O36.80X0 PREGNANCY WITH INCONCLUSIVE FETAL VIABILITY, SINGLE OR UNSPECIFIED FETUS: Primary | ICD-10-CM

## 2022-06-07 DIAGNOSIS — O24.410 DIET CONTROLLED GESTATIONAL DIABETES MELLITUS (GDM), ANTEPARTUM: ICD-10-CM

## 2022-06-07 DIAGNOSIS — R55 NEUROCARDIOGENIC SYNCOPE: ICD-10-CM

## 2022-06-07 DIAGNOSIS — O09.91 SUPERVISION OF HIGH RISK PREGNANCY IN FIRST TRIMESTER: Primary | ICD-10-CM

## 2022-06-07 DIAGNOSIS — Z34.90 EARLY STAGE OF PREGNANCY: ICD-10-CM

## 2022-06-07 DIAGNOSIS — R56.9 SEIZURES (HCC): ICD-10-CM

## 2022-06-07 DIAGNOSIS — Z3A.11 11 WEEKS GESTATION OF PREGNANCY: ICD-10-CM

## 2022-06-07 DIAGNOSIS — I47.1 SVT (SUPRAVENTRICULAR TACHYCARDIA) (HCC): ICD-10-CM

## 2022-06-07 DIAGNOSIS — F90.9 ATTENTION DEFICIT HYPERACTIVITY DISORDER (ADHD), UNSPECIFIED ADHD TYPE: ICD-10-CM

## 2022-06-07 DIAGNOSIS — F33.2 SEVERE RECURRENT MAJOR DEPRESSION WITHOUT PSYCHOTIC FEATURES (HCC): ICD-10-CM

## 2022-06-07 PROBLEM — Z83.3 FAMILY HISTORY OF DIABETES MELLITUS: Status: RESOLVED | Noted: 2019-01-09 | Resolved: 2022-06-07

## 2022-06-07 PROBLEM — O24.419 GESTATIONAL DIABETES MELLITUS (GDM), ANTEPARTUM: Status: RESOLVED | Noted: 2021-08-10 | Resolved: 2022-06-07

## 2022-06-07 LAB
CRL: NORMAL
SAC DIAMETER: NORMAL

## 2022-06-07 PROCEDURE — 76801 OB US < 14 WKS SINGLE FETUS: CPT | Performed by: OBSTETRICS & GYNECOLOGY

## 2022-06-07 PROCEDURE — H1000 PRENATAL CARE ATRISK ASSESSM: HCPCS | Performed by: NURSE PRACTITIONER

## 2022-06-07 RX ORDER — LURASIDONE HYDROCHLORIDE 20 MG/1
TABLET, FILM COATED ORAL
COMMUNITY
Start: 2022-05-20 | End: 2022-06-27

## 2022-06-07 RX ORDER — PYRIDOXINE HCL (VITAMIN B6) 50 MG
1 TABLET ORAL 2 TIMES DAILY
Qty: 60 TABLET | Refills: 2 | Status: SHIPPED | OUTPATIENT
Start: 2022-06-07 | End: 2022-10-07

## 2022-06-07 NOTE — PROGRESS NOTES
Patient presents to office for OB intake, nurse visit only. Intake completed following ultrasound in office to confirm pregnancy dating/viability. Based on ultrasound, patient is currently 11w1d  gestation, Estimated Date of Delivery: 12/26/22. Patient presents to intake Support person. This was an unplanned pregnancy. Patient currently taking has a current medication list which includes the following prescription(s): b-6, sertraline, latuda, and prenatal vitamins. . Patient's medical, surgical, obstetrical and family history reviewed. See EHR for details. Patient prenatal lab work given to patient at this visit as well as OB education material. New OB consent forms signed. Patient accepted first trimester screening. Cystic Fibrosis screening previously done 1/2019. Referral placed to [facility] for first trimester screen, hx of SVT and neurocardiogenic syncope, pseudoseizures, short interval between pregnancies, and fetal exposure to zoloft and latuda. Pt has appt scheduled for cardiology. Per Donnie Cardoso NP, pt to stop taking latuda and f/u with prescribing dr; pt verbalized understanding. Patient scheduled for follow up OB appointment with Homer Reynolds NP on 6/27/22. Patient instructed to complete lab work prior to follow up OB appointment.

## 2022-06-20 ENCOUNTER — ROUTINE PRENATAL (OUTPATIENT)
Dept: PERINATAL CARE | Age: 22
End: 2022-06-20
Payer: COMMERCIAL

## 2022-06-20 VITALS
DIASTOLIC BLOOD PRESSURE: 67 MMHG | HEART RATE: 78 BPM | BODY MASS INDEX: 26.76 KG/M2 | RESPIRATION RATE: 16 BRPM | SYSTOLIC BLOOD PRESSURE: 102 MMHG | HEIGHT: 63 IN | WEIGHT: 151.01 LBS | TEMPERATURE: 98.2 F

## 2022-06-20 DIAGNOSIS — Z86.32 HISTORY OF GESTATIONAL DIABETES IN PRIOR PREGNANCY, CURRENTLY PREGNANT, FIRST TRIMESTER: ICD-10-CM

## 2022-06-20 DIAGNOSIS — O35.8XX0 SUSPECTED DAMAGE TO FETUS FROM DISEASE IN MOTHER, ANTEPARTUM CONDITION, SINGLE OR UNSPECIFIED FETUS: ICD-10-CM

## 2022-06-20 DIAGNOSIS — Z36.9 FIRST TRIMESTER SCREENING: Primary | ICD-10-CM

## 2022-06-20 DIAGNOSIS — Z3A.13 13 WEEKS GESTATION OF PREGNANCY: ICD-10-CM

## 2022-06-20 DIAGNOSIS — O09.291 HISTORY OF GESTATIONAL DIABETES IN PRIOR PREGNANCY, CURRENTLY PREGNANT, FIRST TRIMESTER: ICD-10-CM

## 2022-06-20 DIAGNOSIS — O36.80X0 ENCOUNTER TO DETERMINE FETAL VIABILITY OF PREGNANCY, SINGLE OR UNSPECIFIED FETUS: ICD-10-CM

## 2022-06-20 DIAGNOSIS — O99.411 MATERNAL CARDIOVASCULAR DISEASE AFFECTING PREGNANCY IN FIRST TRIMESTER: ICD-10-CM

## 2022-06-20 LAB
CRL: NORMAL
SAC DIAMETER: NORMAL

## 2022-06-20 PROCEDURE — 76813 OB US NUCHAL MEAS 1 GEST: CPT | Performed by: OBSTETRICS & GYNECOLOGY

## 2022-06-20 PROCEDURE — 76801 OB US < 14 WKS SINGLE FETUS: CPT | Performed by: OBSTETRICS & GYNECOLOGY

## 2022-06-20 PROCEDURE — 99999 PR OFFICE/OUTPT VISIT,PROCEDURE ONLY: CPT | Performed by: OBSTETRICS & GYNECOLOGY

## 2022-06-21 ENCOUNTER — HOSPITAL ENCOUNTER (OUTPATIENT)
Age: 22
Discharge: HOME | End: 2022-06-21
Payer: COMMERCIAL

## 2022-06-21 DIAGNOSIS — Z34.90 EARLY STAGE OF PREGNANCY: ICD-10-CM

## 2022-06-21 DIAGNOSIS — Z3A.11 11 WEEKS GESTATION OF PREGNANCY: ICD-10-CM

## 2022-06-21 PROBLEM — Z86.32 HISTORY OF GESTATIONAL DIABETES: Status: ACTIVE | Noted: 2022-06-21

## 2022-06-21 LAB
ABO/RH: NORMAL
ABSOLUTE EOS #: 0.1 K/UL (ref 0–0.4)
ABSOLUTE LYMPH #: 1.3 K/UL (ref 1–4.8)
ABSOLUTE MONO #: 0.5 K/UL (ref 0.1–1.3)
ANTIBODY SCREEN: NEGATIVE
BACTERIA: ABNORMAL
BASOPHILS # BLD: 0 % (ref 0–2)
BASOPHILS ABSOLUTE: 0 K/UL (ref 0–0.2)
BILIRUBIN URINE: NEGATIVE
CASTS UA: ABNORMAL /LPF
COLOR: YELLOW
EOSINOPHILS RELATIVE PERCENT: 1 % (ref 0–4)
EPITHELIAL CELLS UA: ABNORMAL /HPF
GLUCOSE ADMINISTRATION: NORMAL
GLUCOSE TOLERANCE SCREEN 50G: 101 MG/DL (ref 70–135)
GLUCOSE URINE: NEGATIVE
HCT VFR BLD CALC: 37.8 % (ref 36–46)
HEMOGLOBIN: 12.9 G/DL (ref 12–16)
HEPATITIS B SURFACE ANTIGEN: NONREACTIVE
HIV AG/AB: NONREACTIVE
KETONES, URINE: NEGATIVE
LEUKOCYTE ESTERASE, URINE: ABNORMAL
LYMPHOCYTES # BLD: 18 % (ref 24–44)
MCH RBC QN AUTO: 32.2 PG (ref 26–34)
MCHC RBC AUTO-ENTMCNC: 34.2 G/DL (ref 31–37)
MCV RBC AUTO: 94.3 FL (ref 80–100)
MONOCYTES # BLD: 6 % (ref 1–7)
NITRITE, URINE: NEGATIVE
PDW BLD-RTO: 14.7 % (ref 11.5–14.9)
PH UA: 7 (ref 5–8)
PLATELET # BLD: 327 K/UL (ref 150–450)
PMV BLD AUTO: 6.5 FL (ref 6–12)
PROTEIN UA: ABNORMAL
RBC # BLD: 4.01 M/UL (ref 4–5.2)
RBC UA: ABNORMAL /HPF
RUBV IGG SER QL: 62.7 IU/ML
SEG NEUTROPHILS: 75 % (ref 36–66)
SEGMENTED NEUTROPHILS ABSOLUTE COUNT: 5.4 K/UL (ref 1.3–9.1)
SPECIFIC GRAVITY UA: 1.02 (ref 1–1.03)
T. PALLIDUM, IGG: NONREACTIVE
TSH SERPL DL<=0.05 MIU/L-ACNC: 0.66 UIU/ML (ref 0.3–5)
TURBIDITY: ABNORMAL
URINE HGB: NEGATIVE
UROBILINOGEN, URINE: NORMAL
WBC # BLD: 7.3 K/UL (ref 3.5–11)
WBC UA: ABNORMAL /HPF

## 2022-06-21 PROCEDURE — 86850 RBC ANTIBODY SCREEN: CPT

## 2022-06-21 PROCEDURE — 36415 COLL VENOUS BLD VENIPUNCTURE: CPT

## 2022-06-21 PROCEDURE — 87389 HIV-1 AG W/HIV-1&-2 AB AG IA: CPT

## 2022-06-21 PROCEDURE — 87340 HEPATITIS B SURFACE AG IA: CPT

## 2022-06-21 PROCEDURE — 84443 ASSAY THYROID STIM HORMONE: CPT

## 2022-06-21 PROCEDURE — 86901 BLOOD TYPING SEROLOGIC RH(D): CPT

## 2022-06-21 PROCEDURE — 86762 RUBELLA ANTIBODY: CPT

## 2022-06-21 PROCEDURE — 85025 COMPLETE CBC W/AUTO DIFF WBC: CPT

## 2022-06-21 PROCEDURE — 86900 BLOOD TYPING SEROLOGIC ABO: CPT

## 2022-06-21 PROCEDURE — 82950 GLUCOSE TEST: CPT

## 2022-06-21 PROCEDURE — 81001 URINALYSIS AUTO W/SCOPE: CPT

## 2022-06-21 PROCEDURE — 86780 TREPONEMA PALLIDUM: CPT

## 2022-06-27 ENCOUNTER — ROUTINE PRENATAL (OUTPATIENT)
Dept: OBGYN CLINIC | Age: 22
End: 2022-06-27
Payer: COMMERCIAL

## 2022-06-27 ENCOUNTER — HOSPITAL ENCOUNTER (OUTPATIENT)
Age: 22
Setting detail: SPECIMEN
Discharge: HOME | End: 2022-06-27

## 2022-06-27 VITALS
BODY MASS INDEX: 26.57 KG/M2 | WEIGHT: 150 LBS | HEART RATE: 86 BPM | SYSTOLIC BLOOD PRESSURE: 110 MMHG | DIASTOLIC BLOOD PRESSURE: 62 MMHG

## 2022-06-27 DIAGNOSIS — O09.891 SHORT INTERVAL BETWEEN PREGNANCIES AFFECTING PREGNANCY IN FIRST TRIMESTER, ANTEPARTUM: ICD-10-CM

## 2022-06-27 DIAGNOSIS — R55 NEUROCARDIOGENIC SYNCOPE: ICD-10-CM

## 2022-06-27 DIAGNOSIS — Z3A.14 14 WEEKS GESTATION OF PREGNANCY: ICD-10-CM

## 2022-06-27 DIAGNOSIS — Z86.32 HISTORY OF GESTATIONAL DIABETES: Primary | ICD-10-CM

## 2022-06-27 PROCEDURE — G8419 CALC BMI OUT NRM PARAM NOF/U: HCPCS | Performed by: NURSE PRACTITIONER

## 2022-06-27 PROCEDURE — G8427 DOCREV CUR MEDS BY ELIG CLIN: HCPCS | Performed by: NURSE PRACTITIONER

## 2022-06-27 PROCEDURE — 1036F TOBACCO NON-USER: CPT | Performed by: NURSE PRACTITIONER

## 2022-06-27 PROCEDURE — 99214 OFFICE O/P EST MOD 30 MIN: CPT | Performed by: NURSE PRACTITIONER

## 2022-06-27 NOTE — PROGRESS NOTES
Evonne Crocker  2022    YOB: 2000   Patient's last menstrual period was 2022 (approximate). 14w0d  [id number]      Primary Care Physician: No primary care provider on file. CC: Initial Prenatal Visit    Subjective:     Evonne Crocker is a 25 y.o. female [id number]    is being seen today for her first obstetrical visit. This is not a planned pregnancy. She is at 14w0d  Her obstetrical history is significant for fetal drug exposure, short interval pregnancy, syncope, hx gdm x2     Relationship with FOB: ex-partner. Patient does not intend to breast feed. Pregnancy history fully reviewed. Mother's ethnicity:   Father's ethnicity:       Objective:   Blood pressure 110/62, pulse 86, weight 150 lb (68 kg), last menstrual period 2022, unknown if currently breastfeeding.     OB History    Para Term  AB Living   3 2 2 0 0 2   SAB IAB Ectopic Molar Multiple Live Births   0 0 0 0 0 2      # Outcome Date GA Lbr South/2nd Weight Sex Delivery Anes PTL Lv   3 Current            2 Term 10/28/21 39w0d 09:10 / 00:20 8 lb 4.6 oz (3.76 kg) M Vag-Spont EPI N TERESA      Name: Magan Chrislin  Apgar5: 9   1 Term 19 38w6d 02:25 / 00:22 7 lb 11.3 oz (3.495 kg) M Vag-Spont EPI N TERESA      Name: Magan Spurlin  Apgar5: 9       Past Medical History:   Diagnosis Date    ADD (attention deficit disorder)     Anxiety     Asthma     CMV [facility]cytomegalovirus infection) ([facility] 2019    Depression     Family history of diabetes mellitus 2019 early one hour GTT ordered    Gestational diabetes mellitus (GDM), antepartum 2019    Hearing loss in left ear     Heart disease 2018    SVT    Hypothyroidism 2019    Immunizations up to date in pediatric patient     Neurocardiogenic syncope     Pseudoseizures ([facility]     PTSD (post-traumatic stress disorder)     Raynaud disease     Seizures ([facility] 3/15/2020    Severe recurrent major depression without psychotic features ([facility] 3/22/2020     19 M Apg 8/9 Wt 7#11 2019    SVT [facility]ra[facility]ricular tachycardia) [facility])     Tachycardia     Saw Dr. Ailyn Velaqzuez 5 yrs ago\". Echo and holter monitor done, neg results    Traumatic injury during pregnancy, third trimester     Wears glasses      Past Surgical History:   Procedure Laterality Date    REMOVAL FOREIGN BODY EAR Left     \"insect laid eggs\"    TYMPANOPLASTY Left 2016      Social History     Socioeconomic History    Marital status: Single     Spouse name: Not on file    Number of children: Not on file    Years of education: Not on file    Highest education level: Not on file   Occupational History    Not on file   Tobacco Use    Smoking status: Former Smoker     Packs/day: 0.50     Types: Cigars    Smokeless tobacco: Never Used    Tobacco comment: 1-2 black and milds daily    Vaping Use    Vaping Use: Never used   Substance and Sexual Activity    Alcohol use: No    Drug use: Not Currently     Types: Marijuana [place])     Comment: not in pregnancy    Sexual activity: Yes     Partners: Male     Birth control/protection: Condom   Other Topics Concern    Not on file   Social History Narrative    Not on file     Social Determinants of Health     Financial Resource Strain:     Difficulty of Paying Living Expenses: Not on file   Food Insecurity:     Worried About Running Out of Food in the Last Year: Not on file    Darling of Food in the Last Year: Not on file   Transportation Needs:     Lack of Transportation (Medical): Not on file    Lack of Transportation (Non-Medical):  Not on file   Physical Activity:     Days of Exercise per Week: Not on file    Minutes of Exercise per Session: Not on file   Stress:     Feeling of Stress : Not on file   Social Connections:     Frequency of Communication with Friends and Family: Not on file    Frequency of Social Gatherings with Friends and Family: Not on file    Attends Zoroastrianism Services: Not on file    Active Member of Clubs or Organizations: Not on file    Attends Club or Organization Meetings: Not on file    Marital Status: Not on file   Intimate Partner Violence:     Fear of Current or Ex-Partner: Not on file    Emotionally Abused: Not on file    Physically Abused: Not on file    Sexually Abused: Not on file   Housing Stability:     Unable to Pay for Housing in the Last Year: Not on file    Number of [place] in the Last Year: Not on file    Unstable Housing in the Last Year: Not on file     Family History   Adopted: Yes   Problem Relation Age of Onset    Anxiety Disorder Mother     Depression Mother     Seizures Maternal Grandmother     COPD Maternal Grandmother     Hypertension Maternal Grandmother     Cancer Maternal Grandmother         ovarian    Heart Disease Maternal Grandmother     Atrial Fibrillation Maternal Grandmother     Diabetes Type 1  Maternal Grandfather     Diabetes Type 1  Other         m uncle    Bleeding Prob Other         m aunt protein S&C deficiency    Diabetes Paternal Uncle        MEDICATIONS:  Current Outpatient Medications   Medication Sig Dispense Refill    Pyridoxine HCl (B-6) 50 MG TABS Take 1 tablet by mouth 2 times daily 60 tablet 2    Prenatal Vit-Fe Fumarate-FA (PRENATAL VITAMINS) 28-0.8 MG TABS Take 1 tablet by mouth daily 30 tablet 12    sertraline (ZOLOFT) 50 MG tablet Take 50 mg by mouth daily       No current facility-administered medications for this visit. ALLERGIES:  Allergies as of 06/27/2022 - Fully Reviewed 06/27/2022   Allergen Reaction Noted    Sulfa antibiotics  04/08/2015           Physical Exam Completed-See Epic Navigator    Chaperone for Intimate Exam   Chaperone was offered and accepted as part of the rooming process.    Chaperone: Patricia LEGGETT               Assessment:      Pregnancy at 14 and 0/7 weeks    Diagnosis Orders   1. History of gestational diabetes     2. Fetal drug exposure     3. 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     Priority: High     Fetal echo at 26 weeks      SVT (supraventricular tachycardia) ([facility] 2019     Priority: High     2019 Referral to cardiology      Family history of clotting disorder 2019     Priority: High     2018 patient's maternal aunt with hx of Protein S and C deficiency       10/28/21 M Apg 8/ Wt 8#4 10/28/2021    Lost custody of children 10/28/2021     Patient told nurse she does not have custody of her first child - SW consulted placed      Seizures ([facility] 03/15/2020      testing with weekly NST per [facility] starting at 28 weeks      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 Murray Silva MD. See note in media section on 2019. Plan:      Initial labs drawn. Prenatal vitamins. Problem list reviewed and updated. NT Screen/Quad Testing and MSAFP Single Marker: requested  Role of ultrasound in pregnancy discussed; fetal survey: requests  Amniocentesis discussed: Not indicated  NIPT Testing not indicated  Cultures & Wet Prep Collected  Follow-up in 4 weeks  Repeat Annual every 1 year  Cervical Cytology Evaluation begins at 24years old. If Negative Cytology, Follow-up screening per current guidelines. [facility] appointment scheduled      COUNSELING COMPLETED:  INITIAL OBSTETRICAL VISIT EVALUATION:  The patient was seen full history and physical was completed/reviewed. Cytology was collected for patients over 24years of age. Cultures were collected. The patient was counseled on office policies and she was counseled on termination of pregnancy in the state of [place].      The patient was counseled on Toxoplasmosis, HIV, Tobacco Abuse, Group Beta Strep Infections, Cystic Fibrosis,  Labor precautions and Sickle Cell disease. The patient was counseled on the risks of tobacco abuse. Both maternal and fetal. She was instructed to stop smoking if currently using tobacco. Morbidity, mortality, and cessation programs were reviewed. The risks include but are not limited to increased risks of  labor,  delivery, premature rupture of membranes, intrauterine growth restriction, intrauterine fetal demise and abruptio placenta. Secondary smoke risks were also reviewed. Increases in cancer, respiratory problems, and sudden infant death syndrome were reviewed as well. The patient was informed of a 2-4% risk of congenital anomalies in the general population. She was also informed that karyotyping is the only way to evaluate the fetus for genetic problems and genetic lethal anomalies. Chorionic villous sampling, amniocentesis and NIPT testing were also discussed with morbidity rates in detail. She requested the procedure. Route of delivery and counseling on vaginal, operative vaginal, and  sections were completed with the risks of each to both the patient as well as her baby. The possibility of a blood transfusion was discussed as well. The patient was not opposed to receiving a transfusion if needed. Nuchal translucency/Quad Evaluation and MSAFP single marker testing was reviewed in detail with attention to timing of testing and their windows. For patients beyond the gestational age for Nuchal translucency evaluation Quad testing was recommended. Timing for the Quad test was reviewed. Benefits of the above testing was reviewed. A second trimester amniocentesis was also made available to the patient. Risks, Benefits and non-invasive alternative testing was reviewed.      The literature regarding a questionable link to pitocin augmentation and induction of labor, the assistance of labor contractions and the initiation of contractions to help delivery, have been reviewed with the patient regarding the increased potential of having a  with Attention Deficit Hyperactivity Disorder and or Autism. These two disorders and the ramifications of their impact on a child and the family caring for that child has been reviewed with the patient in detail. She was given the risks, benefits and alternatives of the use of this medication. She has agreed to its use in the delivery of her unborn child if needed at the time of delivery, Yes. The patient was questioned in detail regarding any genetic misnomer history, chromosomal abnormalities, or learning disabilities in  herself, the father of the baby or their families. SHE DENIED ANY HISTORY AS STATED ABOVE: Yes    Upon completion of the visit all questions were answered and the patients follow-up and testing schedule were reviewed. Prenatal vitamins were given. T-dap Vaccine recommendations reviewed with the patient. Patient notified of timing of vaccination 27-36 weeks gestation. Patient aware Vaccine is NOT Live. Yes. The patient, Rabia Early is a 25 y.o. female, was seen with a total time spent of 35 minutes for the visit on this date of service by the E/M provider. The time component had both face to face and non face to face time spent in determining the total time component. Counseling and education regarding her diagnosis listed below and her options regarding those diagnoses were also included in determining her time component. Diagnosis Orders   1. History of gestational diabetes     2. Fetal drug exposure     3. 14 weeks gestation of pregnancy          The patient had her preventative health maintenance recommendations and follow-up reviewed with her at the completion of her visit.

## 2022-06-28 DIAGNOSIS — Z3A.14 14 WEEKS GESTATION OF PREGNANCY: ICD-10-CM

## 2022-07-01 LAB
CULTURE: NORMAL
SPECIMEN DESCRIPTION: NORMAL

## 2022-07-07 LAB — CYTOLOGY REPORT: NORMAL

## 2022-07-20 ENCOUNTER — HOSPITAL ENCOUNTER (OUTPATIENT)
Age: 22
Setting detail: SPECIMEN
Discharge: HOME | End: 2022-07-20
Payer: COMMERCIAL

## 2022-07-20 ENCOUNTER — ROUTINE PRENATAL (OUTPATIENT)
Dept: PERINATAL CARE | Age: 22
End: 2022-07-20
Payer: COMMERCIAL

## 2022-07-20 VITALS
RESPIRATION RATE: 16 BRPM | DIASTOLIC BLOOD PRESSURE: 54 MMHG | HEART RATE: 63 BPM | SYSTOLIC BLOOD PRESSURE: 99 MMHG | TEMPERATURE: 97.5 F | BODY MASS INDEX: 26.58 KG/M2 | WEIGHT: 150 LBS | HEIGHT: 63 IN

## 2022-07-20 DIAGNOSIS — O09.292 H/O GESTATIONAL DIABETES IN PRIOR PREGNANCY, CURRENTLY PREGNANT, SECOND TRIMESTER: ICD-10-CM

## 2022-07-20 DIAGNOSIS — Z86.32 H/O GESTATIONAL DIABETES IN PRIOR PREGNANCY, CURRENTLY PREGNANT, SECOND TRIMESTER: ICD-10-CM

## 2022-07-20 DIAGNOSIS — O99.412 MATERNAL CARDIOVASCULAR DISEASE DURING PREGNANCY IN SECOND TRIMESTER: ICD-10-CM

## 2022-07-20 DIAGNOSIS — O35.8XX0 SUSPECTED DAMAGE TO FETUS FROM DISEASE IN MOTHER, ANTEPARTUM CONDITION, SINGLE OR UNSPECIFIED FETUS: Primary | ICD-10-CM

## 2022-07-20 DIAGNOSIS — O09.899 SHORT INTERVAL BETWEEN PREGNANCIES COMPLICATING PREGNANCY, ANTEPARTUM: ICD-10-CM

## 2022-07-20 DIAGNOSIS — Z3A.17 17 WEEKS GESTATION OF PREGNANCY: ICD-10-CM

## 2022-07-20 PROCEDURE — 36415 COLL VENOUS BLD VENIPUNCTURE: CPT

## 2022-07-20 PROCEDURE — G8427 DOCREV CUR MEDS BY ELIG CLIN: HCPCS | Performed by: OBSTETRICS & GYNECOLOGY

## 2022-07-20 PROCEDURE — G8419 CALC BMI OUT NRM PARAM NOF/U: HCPCS | Performed by: OBSTETRICS & GYNECOLOGY

## 2022-07-20 PROCEDURE — 99243 OFF/OP CNSLTJ NEW/EST LOW 30: CPT | Performed by: OBSTETRICS & GYNECOLOGY

## 2022-07-20 PROCEDURE — 82105 ALPHA-FETOPROTEIN SERUM: CPT

## 2022-07-21 LAB
SEND OUT REPORT: NORMAL
TEST NAME: NORMAL

## 2022-07-23 LAB
AFP INTERPRETATION: NORMAL
AFP MOM: 1.69
AFP SPECIMEN: NORMAL
AFP: 69 NG/ML
DATE OF BIRTH: NORMAL
DATING METHOD: NORMAL
DETERMINED BY: NORMAL
DIABETIC: NEGATIVE
DONOR EGG?: NORMAL
DUE DATE: NORMAL
ESTIMATED DUE DATE: NORMAL
FAMILY HISTORY NTD: NEGATIVE
GESTATIONAL AGE: NORMAL
IN VITRO FERTILIZATION: NORMAL
INSULIN REQ DIABETES: NO
LAST MENSTRUAL PERIOD: NORMAL
MATERNAL AGE AT EDD: 22.6 YR
MATERNAL WEIGHT: 150
MONOCHORIONIC TWINS: NORMAL
NUMBER OF FETUSES: NORMAL
PATIENT WEIGHT UNITS: NORMAL
PATIENT WEIGHT: NORMAL
RACE (MATERNAL): NORMAL
RACE: NORMAL
REPEAT SPECIMEN?: NORMAL
SMOKING: NORMAL
SMOKING: NORMAL
VALPROIC/CARBAMAZEP: NORMAL
ZZ NTE CLEAN UP: HISTORY: NO

## 2022-07-25 ENCOUNTER — ROUTINE PRENATAL (OUTPATIENT)
Dept: OBGYN CLINIC | Age: 22
End: 2022-07-25
Payer: COMMERCIAL

## 2022-07-25 VITALS
WEIGHT: 156 LBS | DIASTOLIC BLOOD PRESSURE: 70 MMHG | SYSTOLIC BLOOD PRESSURE: 116 MMHG | BODY MASS INDEX: 27.63 KG/M2 | HEART RATE: 82 BPM

## 2022-07-25 DIAGNOSIS — Z3A.18 18 WEEKS GESTATION OF PREGNANCY: ICD-10-CM

## 2022-07-25 DIAGNOSIS — Z86.32 HISTORY OF GESTATIONAL DIABETES: Primary | ICD-10-CM

## 2022-07-25 DIAGNOSIS — O09.891 SHORT INTERVAL BETWEEN PREGNANCIES AFFECTING PREGNANCY IN FIRST TRIMESTER, ANTEPARTUM: ICD-10-CM

## 2022-07-25 PROCEDURE — 99213 OFFICE O/P EST LOW 20 MIN: CPT | Performed by: NURSE PRACTITIONER

## 2022-07-25 NOTE — PROGRESS NOTES
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 History of gestational diabetes        2. Fetal drug exposure        3. Short interval between pregnancies affecting pregnancy in first trimester, antepartum        4. 18 weeks gestation of pregnancy              Plan:  RTO in 4 weeks. [facility] 2022          Patient was seen with total face to face time of 20 minutes. More than 50% of this visit was on counseling and education regarding her    Diagnosis Orders   1. History of gestational diabetes        2. Fetal drug exposure        3. Short interval between pregnancies affecting pregnancy in first trimester, antepartum        4. 18 weeks gestation of pregnancy         and her options. She was also counseled on her preventative health maintenance recommendations and follow-up.

## 2022-08-03 ENCOUNTER — HOSPITAL ENCOUNTER (EMERGENCY)
Age: 22
Discharge: HOME | End: 2022-08-03
Attending: EMERGENCY MEDICINE
Payer: COMMERCIAL

## 2022-08-03 VITALS
HEART RATE: 100 BPM | TEMPERATURE: 98.1 F | SYSTOLIC BLOOD PRESSURE: 126 MMHG | DIASTOLIC BLOOD PRESSURE: 62 MMHG | OXYGEN SATURATION: 96 % | RESPIRATION RATE: 17 BRPM

## 2022-08-03 DIAGNOSIS — K42.9 UMBILICAL HERNIA WITHOUT OBSTRUCTION AND WITHOUT GANGRENE: ICD-10-CM

## 2022-08-03 DIAGNOSIS — H60.502 ACUTE OTITIS EXTERNA OF LEFT EAR, UNSPECIFIED TYPE: Primary | ICD-10-CM

## 2022-08-03 LAB
BACTERIA: ABNORMAL
BILIRUBIN URINE: NEGATIVE
CASTS UA: ABNORMAL /LPF
COLOR: YELLOW
EPITHELIAL CELLS UA: ABNORMAL /HPF
GLUCOSE URINE: NEGATIVE
KETONES, URINE: ABNORMAL
LEUKOCYTE ESTERASE, URINE: NEGATIVE
NITRITE, URINE: NEGATIVE
PH UA: 5.5 (ref 5–8)
PROTEIN UA: NEGATIVE
RBC UA: ABNORMAL /HPF
SPECIFIC GRAVITY UA: 1.03 (ref 1–1.03)
TURBIDITY: ABNORMAL
URINE HGB: NEGATIVE
UROBILINOGEN, URINE: NORMAL
WBC UA: ABNORMAL /HPF

## 2022-08-03 PROCEDURE — 81001 URINALYSIS AUTO W/SCOPE: CPT

## 2022-08-03 PROCEDURE — 99283 EMERGENCY DEPT VISIT LOW MDM: CPT | Performed by: PHYSICIAN ASSISTANT

## 2022-08-03 RX ORDER — AMOXICILLIN 500 MG/1
500 CAPSULE ORAL 2 TIMES DAILY
Qty: 20 CAPSULE | Refills: 0 | Status: SHIPPED | OUTPATIENT
Start: 2022-08-03 | End: 2022-08-13

## 2022-08-03 ASSESSMENT — PAIN - FUNCTIONAL ASSESSMENT: PAIN_FUNCTIONAL_ASSESSMENT: NONE - DENIES PAIN

## 2022-08-03 NOTE — DISCHARGE INSTRUCTIONS
Please follow up with OB and surgery. Return to the ed if the pain becomes constant, severe, you have vomiting or changes in BMs.

## 2022-08-03 NOTE — ED PROVIDER NOTES
[address] ED  eMERGENCY dEPARTMENT eNCOUnter      Pt Name: Esmer Hicks  MRN: [id number]  [place] 2000  Date of evaluation: 8/3/2022  Provider: Roberta Hankins PA-C    CHIEF COMPLAINT       Chief Complaint   Patient presents with    Otalgia    Abdominal Pain     19 weeks pregnant           HISTORY OF PRESENT ILLNESS  (Location/Symptom, Timing/Onset, Context/Setting, Quality, Duration, Modifying Factors, Severity.)   Esmer Hicks is a 25 y.o. female who is 19 weeks pregnant presents to the emergency department for evaluation of left ear pain x 2 weeks. Pt states ear canal is pruritic. Reports intermittent headaches. No fevers, chills, congestion, rhinorrhea, sore throat, cough, cp, sob. Pt has tried tylenol with no relief. Additionally, pt reports intermittent stomach pain x 2 weeks. States the pain occurs with lifting. Reports a \"bump\" near her umbilicus. Denies any changes with BMs. No vomiting. Nausea from pregnancy. Denies dysuria, vaginal bleeding. No other complaints. Nursing Notes were reviewed. REVIEW OF SYSTEMS    (2-9 systems for level 4, 10 or more for level 5)     Review of Systems   Ear pain  Abdominal pain       Except as noted above the remainder of the review of systems was reviewed and negative.        PAST MEDICAL HISTORY     Past Medical History:   Diagnosis Date    ADD (attention deficit disorder)     Anxiety     Asthma     CMV (cytomegalovirus infection) ([address] 2019    Depression     Family history of diabetes mellitus 1/9/2019 1/9/2019 early one hour GTT ordered    Gestational diabetes mellitus (GDM), antepartum 6/24/2019    Hearing loss in left ear     Heart disease 2018    SVT    Hypothyroidism 1/16/2019    Immunizations up to date in pediatric patient     Neurocardiogenic syncope     Pseudoseizures ([address]     PTSD (post-traumatic stress disorder)     Raynaud disease     Seizures ([address] 3/15/2020    Severe recurrent major depression without psychotic features ([facility] 3/22/2020     19 M Apg 8/9 Wt 7#11 2019    SVT (supraventricular tachycardia) ([facility]     Tachycardia     Saw Dr. Young Aguilar 5 yrs ago\". Echo and holter monitor done, neg results    Traumatic injury during pregnancy, third trimester     Wears glasses      None otherwise stated in nurses notes    SURGICAL HISTORY       Past Surgical History:   Procedure Laterality Date    REMOVAL FOREIGN BODY EAR Left     \"insect laid eggs\"    TYMPANOPLASTY Left 2016     None otherwise stated in nurses notes    CURRENT MEDICATIONS       Discharge Medication List as of 8/3/2022  6:36 PM        CONTINUE these medications which have NOT CHANGED    Details   Pyridoxine HCl (B-6) 50 MG TABS Take 1 tablet by mouth 2 times daily, Disp-60 tablet, R-2Normal      Prenatal Vit-Fe Fumarate-FA (PRENATAL VITAMINS) 28-0.8 MG TABS Take 1 tablet by mouth daily, Disp-30 tablet, R-12Normal      sertraline (ZOLOFT) 50 MG tablet Take 50 mg by mouth dailyHistorical Med             ALLERGIES     Sulfa antibiotics    FAMILY HISTORY           Adopted: Yes   Problem Relation Age of Onset    Anxiety Disorder Mother     Depression Mother     Seizures Maternal Grandmother     COPD Maternal Grandmother     Hypertension Maternal Grandmother     Cancer Maternal Grandmother         ovarian    Heart Disease Maternal Grandmother     Atrial Fibrillation Maternal Grandmother     Diabetes Type 1  Maternal Grandfather     Diabetes Type 1  Other         m uncle    Bleeding Prob Other         m aunt protein S&C deficiency    Diabetes Paternal Uncle      Family Status   Relation Name Status    Mother  (Not Specified)    MGM  (Not Specified)    MGF  (Not Specified)    Other  (Not Specified)    PUnc  (Not Specified)      None otherwise stated in nurses notes    SOCIAL HISTORY      reports that she has quit smoking. Her smoking use included cigars. She smoked an average of .5 packs per day.  She has never used smokeless tobacco. She reports that she does not currently use drugs after having used the following drugs: Marijuana Suzy Kos). She reports that she does not drink alcohol. lives at home with others     PHYSICAL EXAM    (up to 7 for level 4, 8 or more for level 5)     ED Triage Vitals [08/03/22 1527]   BP Temp Temp src Heart Rate Resp SpO2 Height Weight   (!) 120/53 98.1 °F (36.7 °C) -- (!) 103 16 96 % -- --       Physical Exam   Nursing note and vitals reviewed. Constitutional: Oriented to person, place, and time and well-developed, well-nourished. Head: Normocephalic and atraumatic. Ear: External ears normal. Right ear WNL. Left ear canal is erythematous with mild swelling. Pain with exam.  No drainage. No erythema of the TM. No perforation. Nose: Nose normal and midline. Eyes: Conjunctivae and EOM are normal. Pupils are equal, round, and reactive to light. Neck: Normal range of motion. Neck supple. Throat: mask not removed. Cardiovascular: Normal rate, regular rhythm, normal heart sounds and intact distal pulses. Pulmonary/Chest: Effort normal and breath sounds normal. No respiratory distress. No wheezes. No rales. No chest tenderness. Abdominal: Soft. Bowel sounds are normal. No distension and no mass. There is minimal umbilical tenderness. Umbilical hernia. Reducible. No signs of strangulation or incarceration. No pain with rest.  There is no rebound and no guarding. No rigidity. Bedside US shows good fetal movement. Heart rate of 147bpm.  Musculoskeletal: Normal range of motion. Neurological: Alert and oriented to person, place, and time. GCS score is 15. Skin: Skin is warm and dry. No rash noted. No erythema. No pallor.    Psychiatric: Mood, memory, affect and judgment normal.           DIAGNOSTIC RESULTS     EKG: All EKG's are interpreted by the Emergency Department Physician who either signs or Co-signs this chart in the absence of a cardiologist.        RADIOLOGY:   All plain film, CT, MRI, and formal ultrasound images (except ED bedside ultrasound) are read by the radiologist, see reports below, unless otherwise noted in MDM or here. No orders to display       No results found. LABS:  Labs Reviewed   URINALYSIS WITH REFLEX TO CULTURE - Abnormal; Notable for the following components:       Result Value    Turbidity UA Cloudy (*)     Ketones, Urine TRACE (*)     All other components within normal limits   MICROSCOPIC URINALYSIS - Abnormal; Notable for the following components:    Bacteria, UA FEW (*)     All other components within normal limits       All other labs were within normal range or not returned as of this dictation. EMERGENCY DEPARTMENT COURSE and DIFFERENTIAL DIAGNOSIS/MDM:   Vitals:    Vitals:    08/03/22 1527 08/03/22 1717   BP: (!) 120/53 126/62   Pulse: (!) 103 100   Resp: 16 17   Temp: 98.1 °F (36.7 °C)    SpO2: 96% 96%         Patient instructed to return to the emergency room if symptoms worsen, return, or any other concern right away which is agreed by the patient    ED MEDS:  Orders Placed This Encounter   Medications    amoxicillin (AMOXIL) 500 MG capsule     Sig: Take 1 capsule by mouth in the morning and 1 capsule before bedtime. Do all this for 10 days. Dispense:  20 capsule     Refill:  0         CONSULTS:  None    PROCEDURES:  None      FINAL IMPRESSION      1. Acute otitis externa of left ear, unspecified type    2.  Umbilical hernia without obstruction and without gangrene          DISPOSITION/PLAN   DISPOSITION Decision To Discharge    PATIENT REFERRED TO:  [facility] SPECIALIZED SURGERY  [address]  [facility] [address]  [phone]  Call       [facility] ED  [facility] [id number]  [address], APRN [facility]  [address]  [address] [facility] [address] [facility]    Call       Adelaida Form, APRN - [facility]  [address] [facility]  [phone]          DISCHARGE MEDICATIONS:  Discharge Medication List as of 8/3/2022  6:36 PM        START taking these medications    Details   amoxicillin (AMOXIL) 500 MG capsule Take 1 capsule by mouth in the morning and 1 capsule before bedtime. Do all this for 10 days. , Disp-20 capsule, R-0Normal               Summation      Patient Course:    Left ear pain. Exam reveals otitis externa. No signs of otitis media. Pt states her ENT states she cannot use ear drops. Will start on oral amoxicillin. Recommend follow up with ENT. Pain in umbilicus with lifting x 2 weeks. Pain is not constant. Mild. On exam, umbilical hernia. Reducible. No signs of strangulation or incarceration. No pain at rest.    Will refer to general surgery and recommend FU with OB. Also recommend FU with PCP. Bedside US shows good fetal movement. Heart rate of 147bpm.  No vaginal bleeding or cramping. ASHLYN Hennessy who is agreeable with plan. Discussed results and plan with the pt. They expressed appropriate understanding. Pt given close follow up, supportive care instructions and strict return instructions at the bedside. The care is provided during an unprecedented national emergency due to the novel coronavirus, COVID-19. ED Medications administered this visit:  Medications - No data to display    New Prescriptions from this visit:    Discharge Medication List as of 8/3/2022  6:36 PM        START taking these medications    Details   amoxicillin (AMOXIL) 500 MG capsule Take 1 capsule by mouth in the morning and 1 capsule before bedtime. Do all this for 10 days. , Disp-20 capsule, R-0Normal             Follow-up:  [facility] SPECIALIZED SURGERY  [address]  [address] [facility] [address]  [phone]  Call       [address] ED  [facility] [id number] [place] [address] Alessandro Denton 79, APRN - CNP  [address]  [address] [facility] [address]    Call       Severo Bains, APRN - CNP  [address]  [address] [facility] [facility]  [phone]            Final Impression:   1. Acute otitis externa of left ear, unspecified type    2.  Umbilical hernia without obstruction and without gangrene               (Please note that portions of this note were completed with a voice recognition program )        Ky Singletary, [address] PA-C  08/03/22 [id number]

## 2022-08-03 NOTE — ED NOTES

## 2022-08-03 NOTE — ED TRIAGE NOTES
Patient to emergency department with complaints of left ear pain and abdominal pain when lifting ongoing for 2 weeks.  Pt states she is 19 weeks pregnant

## 2022-08-09 ENCOUNTER — ROUTINE PRENATAL (OUTPATIENT)
Dept: PERINATAL CARE | Age: 22
End: 2022-08-09
Payer: COMMERCIAL

## 2022-08-09 VITALS
BODY MASS INDEX: 28.17 KG/M2 | HEART RATE: 108 BPM | TEMPERATURE: 97 F | DIASTOLIC BLOOD PRESSURE: 64 MMHG | SYSTOLIC BLOOD PRESSURE: 106 MMHG | RESPIRATION RATE: 16 BRPM | HEIGHT: 63 IN | WEIGHT: 159 LBS

## 2022-08-09 DIAGNOSIS — Z3A.20 20 WEEKS GESTATION OF PREGNANCY: ICD-10-CM

## 2022-08-09 DIAGNOSIS — Z36.86 ENCOUNTER FOR SCREENING FOR RISK OF PRE-TERM LABOR: ICD-10-CM

## 2022-08-09 DIAGNOSIS — O99.412 MATERNAL CARDIOVASCULAR DISEASE DURING PREGNANCY IN SECOND TRIMESTER: ICD-10-CM

## 2022-08-09 DIAGNOSIS — O09.292 H/O GESTATIONAL DIABETES IN PRIOR PREGNANCY, CURRENTLY PREGNANT, SECOND TRIMESTER: ICD-10-CM

## 2022-08-09 DIAGNOSIS — Z86.32 H/O GESTATIONAL DIABETES IN PRIOR PREGNANCY, CURRENTLY PREGNANT, SECOND TRIMESTER: ICD-10-CM

## 2022-08-09 DIAGNOSIS — O09.899 SHORT INTERVAL BETWEEN PREGNANCIES COMPLICATING PREGNANCY, ANTEPARTUM: ICD-10-CM

## 2022-08-09 DIAGNOSIS — O35.8XX0 SUSPECTED DAMAGE TO FETUS FROM DISEASE IN MOTHER, ANTEPARTUM CONDITION, SINGLE OR UNSPECIFIED FETUS: ICD-10-CM

## 2022-08-09 LAB
ABDOMINAL CIRCUMFERENCE: NORMAL
ABDOMINAL CIRCUMFERENCE: NORMAL
BIPARIETAL DIAMETER: NORMAL
BIPARIETAL DIAMETER: NORMAL
ESTIMATED FETAL WEIGHT: NORMAL
ESTIMATED FETAL WEIGHT: NORMAL
FEMORAL DIAMETER: NORMAL
FEMORAL DIAMETER: NORMAL
HC/AC: NORMAL
HC/AC: NORMAL
HEAD CIRCUMFERENCE: NORMAL
HEAD CIRCUMFERENCE: NORMAL

## 2022-08-09 PROCEDURE — 76817 TRANSVAGINAL US OBSTETRIC: CPT | Performed by: OBSTETRICS & GYNECOLOGY

## 2022-08-09 PROCEDURE — 99999 PR OFFICE/OUTPT VISIT,PROCEDURE ONLY: CPT | Performed by: OBSTETRICS & GYNECOLOGY

## 2022-08-09 PROCEDURE — 76811 OB US DETAILED SNGL FETUS: CPT | Performed by: OBSTETRICS & GYNECOLOGY

## 2022-08-09 NOTE — PROGRESS NOTES
COPD EDUCATION by COPD CLINICAL EDUCATOR  6/8/2017 at 11:00 AM by Kasandra Pierre     Patient reviewed by COPD education team. Patient does not qualify for COPD program.   Obstetric/Gynecology Maternal Fetal Medicine Resident Note    Updated patient on US finding of microcephaly today. Patient is agreeable to returning for follow up scan and consult. Patient has also opted for noninvasive prenatal testing.        DO DELGADO Rhodes Resident, PGY2  [facility]  8/9/2022, 4:54 PM

## 2022-08-10 DIAGNOSIS — O09.92 SUPERVISION OF HIGH RISK PREGNANCY IN SECOND TRIMESTER: Primary | ICD-10-CM

## 2022-08-10 DIAGNOSIS — Q02 MICROCEPHALY (HCC): ICD-10-CM

## 2022-08-10 PROBLEM — O35.07X0 FETAL MICROCEPHALY: Status: ACTIVE | Noted: 2022-08-10

## 2022-08-18 ENCOUNTER — HOSPITAL ENCOUNTER (INPATIENT)
Age: 22
LOS: 1 days | Discharge: HOME | DRG: 566 | End: 2022-08-18
Attending: OBSTETRICS & GYNECOLOGY | Admitting: OBSTETRICS & GYNECOLOGY
Payer: COMMERCIAL

## 2022-08-18 VITALS
SYSTOLIC BLOOD PRESSURE: 104 MMHG | RESPIRATION RATE: 16 BRPM | HEART RATE: 81 BPM | TEMPERATURE: 98.9 F | DIASTOLIC BLOOD PRESSURE: 59 MMHG

## 2022-08-18 PROBLEM — R10.9 ABDOMINAL PAIN AFFECTING PREGNANCY, ANTEPARTUM: Status: ACTIVE | Noted: 2022-08-18

## 2022-08-18 PROBLEM — O26.899 ABDOMINAL PAIN AFFECTING PREGNANCY, ANTEPARTUM: Status: ACTIVE | Noted: 2022-08-18

## 2022-08-18 LAB
BACTERIA: ABNORMAL
BILIRUBIN URINE: NEGATIVE
CANDIDA SPECIES, DNA PROBE: NEGATIVE
CASTS UA: ABNORMAL /LPF
COLOR: YELLOW
EPITHELIAL CELLS UA: ABNORMAL /HPF
GARDNERELLA VAGINALIS, DNA PROBE: NEGATIVE
GLUCOSE URINE: NEGATIVE
KETONES, URINE: NEGATIVE
LEUKOCYTE ESTERASE, URINE: ABNORMAL
NITRITE, URINE: NEGATIVE
PH UA: 6 (ref 5–8)
PROTEIN UA: NEGATIVE
RBC UA: ABNORMAL /HPF
SOURCE: NORMAL
SPECIFIC GRAVITY UA: 1.02 (ref 1–1.03)
TRICHOMONAS VAGINALIS DNA: NEGATIVE
TURBIDITY: CLEAR
URINE HGB: NEGATIVE
UROBILINOGEN, URINE: NORMAL
WBC UA: ABNORMAL /HPF

## 2022-08-18 PROCEDURE — 87480 CANDIDA DNA DIR PROBE: CPT

## 2022-08-18 PROCEDURE — G0378 HOSPITAL OBSERVATION PER HR: HCPCS

## 2022-08-18 PROCEDURE — 87510 GARDNER VAG DNA DIR PROBE: CPT

## 2022-08-18 PROCEDURE — 81001 URINALYSIS AUTO W/SCOPE: CPT

## 2022-08-18 PROCEDURE — 87491 CHLMYD TRACH DNA AMP PROBE: CPT

## 2022-08-18 PROCEDURE — 1220000000 HC SEMI PRIVATE OB R&B

## 2022-08-18 PROCEDURE — 87591 N.GONORRHOEAE DNA AMP PROB: CPT

## 2022-08-18 PROCEDURE — 6370000000 HC RX 637 (ALT 250 FOR IP)

## 2022-08-18 PROCEDURE — 87660 TRICHOMONAS VAGIN DIR PROBE: CPT

## 2022-08-18 PROCEDURE — 99213 OFFICE O/P EST LOW 20 MIN: CPT

## 2022-08-18 PROCEDURE — 87086 URINE CULTURE/COLONY COUNT: CPT

## 2022-08-18 PROCEDURE — 99221 1ST HOSP IP/OBS SF/LOW 40: CPT

## 2022-08-18 RX ORDER — ACETAMINOPHEN 500 MG
1000 TABLET ORAL ONCE
Status: COMPLETED | OUTPATIENT
Start: 2022-08-18 | End: 2022-08-18

## 2022-08-18 RX ORDER — ACETAMINOPHEN 500 MG
1000 TABLET ORAL EVERY 8 HOURS PRN
Qty: 90 TABLET | Refills: 0 | Status: SHIPPED | OUTPATIENT
Start: 2022-08-18 | End: 2022-10-07

## 2022-08-18 RX ADMIN — ACETAMINOPHEN 1000 MG: 500 TABLET ORAL at 19:21

## 2022-08-18 ASSESSMENT — PAIN SCALES - GENERAL: PAINLEVEL_OUTOF10: 7

## 2022-08-18 ASSESSMENT — PAIN DESCRIPTION - ORIENTATION: ORIENTATION: LOWER

## 2022-08-18 ASSESSMENT — PAIN DESCRIPTION - LOCATION: LOCATION: ABDOMEN

## 2022-08-18 NOTE — DISCHARGE INSTRUCTIONS
Call your physicians office or notify your physician by paging them if you experience:    Signs of Pre-eclampsia:  Dizziness or severe headache  Spots before eyes or blurred vision  Epigastric pain / Right side abdominal pain  Swelling of face, hands, legs or feet not decreased with rest on left side.     Signs of infection:  Chills  Fever  Start of, or change in, vaginal discharge    Signs of labor:  Uterine contractions are regular and 5 minutes apart if 1st baby or 10 minutes apart if not 1st baby  Bag or joshi leaking or gush of fluid from vagina  Any vaginal bleeding that is heavier than a menstrual period  Intermittent low backache  Abdominal or menstrual like cramping that is constant or heavier, comes and goes  Vaginal pressure    Potential for dehydration:  Vomiting or diarrhea for several hours    Potential abnormal fetal well being signs:  Decrease or absence of baby movement

## 2022-08-18 NOTE — H&P
OBSTETRICAL HISTORY AND PHYSICAL    Provider:  DARRIAN Carson - CNM      Date: 2022  Time: 7:20 PM    Patient Name: Radha Bee  Patient : 2000  Room/Bed: 2498/0079-37  Admission Date/Time: 2022  6:33 PM  MRN #: [id number]  [facility] #: [id number]    Primary Care Physician: No primary care provider on file. Admitting Provider: Dr. Rajan Callahan is a 25 y.o. female [id number]    CC: lower abdominal pain     HPI: Radha Bee is a 25 y.o. [id number] at 21w3d who presents with lower abdominal pain that started around 1700. She reports the pain came on suddenly and was sharp across both sides of her abdomen. She reports the pain was an 8/10. She reports she did not take anything at home for the pain. She reports the pain is worse with movement and gets better with rest. She reports the pain has gotten better since sitting in the bed after arriving in triage. The patient reports fetal movement is present, denies contractions, denies loss of fluid, denies vaginal bleeding. She does reports having some increased anxiety due to being told her baby has a smaller head at the anatomy scan. She declines urinary or bowel issues. She declines any other concerns at this time. DATING:  LMP: Patient's last menstrual period was 2022 (approximate). Estimated Date of Delivery: 22   Based on: ultrasound at . 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 Current Outpatient Medications on File Prior to Encounter   Medication Sig Dispense Refill    Pyridoxine HCl (B-6) 50 MG TABS Take 1 tablet by mouth 2 times daily 60 tablet 2    Prenatal Vit-Fe Fumarate-FA (PRENATAL VITAMINS) 28-0.8 MG TABS Take 1 tablet by mouth daily 30 tablet 12    sertraline (ZOLOFT) 50 MG tablet Take 50 mg by mouth daily            Steroids Given In This Pregnancy:  no     Past Medical History:   Diagnosis Date    ADD (attention deficit disorder)     Anxiety     Asthma     CMV (cytomegalovirus infection) ([address] 2019    Depression     Family history of diabetes mellitus 2019 early one hour GTT ordered    Gestational diabetes mellitus (GDM), antepartum 2019    Hearing loss in left ear     Heart disease 2018    SVT    Hypothyroidism 2019    Immunizations up to date in pediatric patient     Neurocardiogenic syncope     Pseudoseizures ([address]     PTSD (post-traumatic stress disorder)     Raynaud disease     Seizures ([address] 3/15/2020    Severe recurrent major depression without psychotic features ([address] 3/22/2020     19 M Apg 8/ Wt 7#11 2019    SVT (supraventricular tachycardia) ([address]     Tachycardia     Saw Dr. Sunita aRe 5 yrs ago\".   Echo and holter monitor done, neg results    Traumatic injury during pregnancy, third trimester     Wears glasses        Past Surgical History:   Procedure Laterality Date    REMOVAL FOREIGN BODY EAR Left     \"insect laid eggs\"    TYMPANOPLASTY Left 2016       OB History    Para Term  AB Living   3 2 2 0 0 2   SAB IAB Ectopic Molar Multiple Live Births   0 0 0 0 0 2      # Outcome Date GA Lbr South/2nd Weight Sex Delivery Anes PTL Lv   3 Current            2 Term 10/28/21 39w0d 09:10 / 00:20 8 lb 4.6 oz (3.76 kg) M Vag-Spont EPI N TERESA Name: Surya Campos: Jodee  Apgar5: 9   1 Term 07/25/19 38w6d 02:25 / 00:22 7 lb 11.3 oz (3.495 kg) M Vag-Spont EPI N TERESA      Name: Surya Campos: Jodee  Apgar5: 9       Family History   Adopted: Yes   Problem Relation Age of Onset    Anxiety Disorder Mother     Depression Mother     Seizures Maternal Grandmother     COPD Maternal Grandmother     Hypertension Maternal Grandmother     Cancer Maternal Grandmother         ovarian    Heart Disease Maternal Grandmother     Atrial Fibrillation Maternal Grandmother     Diabetes Type 1  Maternal Grandfather     Diabetes Type 1  Other         m uncle    Bleeding Prob Other         m aunt protein S&C deficiency    Diabetes Paternal Uncle          Social History     Socioeconomic History    Marital status: Single     Spouse name: Not on file    Number of children: Not on file    Years of education: Not on file    Highest education level: Not on file   Occupational History    Not on file   Tobacco Use    Smoking status: Former     Packs/day: 0.50     Types: Cigars, Cigarettes    Smokeless tobacco: Never    Tobacco comments:     1-2 black and milds daily    Vaping Use    Vaping Use: Never used   Substance and Sexual Activity    Alcohol use: No    Drug use: Not Currently     Types: Marijuana [place])     Comment: not in pregnancy    Sexual activity: Yes     Partners: Male     Birth control/protection: Condom   Other Topics Concern    Not on file   Social History Narrative    Not on file     Social Determinants of Health     Financial Resource Strain: Not on file   Food Insecurity: Not on file   Transportation Needs: Not on file   Physical Activity: Not on file   Stress: Not on file   Social Connections: Not on file   Intimate Partner Violence: Not on file   Housing Stability: Not on file       Review Of Systems:  A minimum of an eleven point review of systems was completed.     REVIEW OF SYSTEMS:   Constitutional: negative fever, negative chills, negative weight changes   HEENT: negative visual disturbances, negative headaches, negative dizziness, negative hearing loss  Breast: Negative breast abnormalities, negative breast lumps, negative nipple discharge  Respiratory: negative dyspnea, negative cough, negative SOB  Cardiovascular: negative chest pain,  negative palpitations, negative arrhythmia, negative syncope   Gastrointestinal: positive lower abdominal pain, negative RUQ pain, negative N/V, negative diarrhea, negative constipation, negative bowel changes, negative heartburn   Genitourinary: negative dysuria, negative hematuria, negative urinary incontinence, negative vaginal discharge, negative vaginal bleeding or spotting  Dermatological: negative rash, negative pruritis, negative mole or other skin changes  Hematologic: negative bruising  Immunologic/Lymphatic: negative recent illness, negative recent sick contact  Musculoskeletal: negative back pain, negative myalgias, negative arthralgias  Neurological:  negative dizziness, negative migraines, negative seizures, negative weakness  Behavior/Psych: negative depression, negative anxiety, negative SI, negative HI    PHYSICAL EXAM:    Vital Signs: Blood pressure (!) 104/59, pulse 81, temperature 98.9 °F (37.2 °C), resp. rate 16, last menstrual period 2022, unknown if currently breastfeeding. OB History          3    Para   2    Term   2       0    AB   0    Living   2         SAB   0    IAB   0    Ectopic   0    Molar        Multiple   0    Live Births   2             General appearance: alert, appears stated age and cooperative  Skin: Skin color, texture, turgor normal. No rashes or lesions  HEENT: Head: Normal, normocephalic, atraumatic.   Neck: no adenopathy, no carotid bruit, no JVD, supple, symmetrical, trachea midline, and thyroid not enlarged, symmetric, no tenderness/mass/nodules  Lungs: clear to auscultation bilaterally  Heart: regular rate and rhythm, S1, S2 normal, no murmur, click, rub or gallop  Extremities: extremities normal, atraumatic, no cyanosis or edema  Neurologic: Mental status: Alert, oriented, thought content appropriate    Abdomen:  Abdomen soft, non tender, consistent with her EGA. Fetal heart rate:  148 via doppler and confirmed by BSUS    Cervix:  Deferred    Contractions: none    Membranes:  Intact    Speculum exam: Cervix visually closed without active bleeding from cervical os. LIMITED BEDSIDE US:  Position: cephalic  Placental Location: fundal  Fetal Heart Tones: Present  Fetal Movement: Present  Amniotic Fluid Index/Volume:  adequate 2x2 cm fluid pocket        ASSESSMENT/PLAN:  Man Maxwell is a 89 [facility] y.o. female   At 21w3d  Admit to Triage for: lower abdominal pain  - Sterile speculum exam performed without active bleeding from cervical os  - BSUS performed with fetal movement and FHT present  - Urine, GC/CT, vaginitis probe collected  - Tylenol ordered. - Discussed round ligament pain. Reviewed comfort measures including good body mechanics, Tylenol as needed, epsom salt baths, and belly band. - Diet: regular  - Will await lab results and plan for discharge home if patient status remains stable. Plan discussed with Dr. Denisse Yepez, who is agreeable.      DARRIAN Gamboa CNM  Midwife [facility]  2022, 7:20 PM

## 2022-08-19 PROBLEM — R10.9 ABDOMINAL PAIN AFFECTING PREGNANCY, ANTEPARTUM: Status: RESOLVED | Noted: 2022-08-18 | Resolved: 2022-08-19

## 2022-08-19 PROBLEM — O26.899 ABDOMINAL PAIN AFFECTING PREGNANCY, ANTEPARTUM: Status: RESOLVED | Noted: 2022-08-18 | Resolved: 2022-08-19

## 2022-08-19 LAB
C TRACH DNA GENITAL QL NAA+PROBE: NEGATIVE
CULTURE: NORMAL
N. GONORRHOEAE DNA: NEGATIVE
SPECIMEN DESCRIPTION: NORMAL
SPECIMEN DESCRIPTION: NORMAL

## 2022-08-19 NOTE — DISCHARGE SUMMARY
Obstetric Discharge Summary  JAMSHID GOVEA    Patient Name: Kayla New  Patient : 2000  Room/Bed: 3433/6665-93  Primary Care Physician: No primary care provider on file. Admit Date: 2022  6:33 PM  MRN #: [id number]  [facility] #: [id number]  OBGYN: [facility]    Principal Diagnosis: IUP at 21w3d, evaluated and seen as Outpatient in Triage for round ligament pain     Her pregnancy has been complicated by:   Patient Active Problem List   Diagnosis    SVT (supraventricular tachycardia) ([facility] [address]    Family history of clotting disorder    Neurocardiogenic syncope    CMV IgM+    Seizures ([facility] [address]     10/28/21 M Apg 8/ Wt 8#4    Lost custody of children    History of gestational diabetes    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 female  at 21w3d evaluated in outpatient setting for round ligament pain and received BSUS, urinalysis, GC/CT, vaginitis probe, sterile speculum exam, and tylenol.          Course of patient: uncomplicated    Discharge to: Home      Recommendations on Discharge:     Medications:        Medication List        START taking these medications      acetaminophen 500 MG tablet  Commonly known as: TYLENOL  Take 2 tablets by mouth every 8 hours as needed for Pain            CONTINUE taking these medications      B-6 50 MG Tabs  Take 1 tablet by mouth 2 times daily     Prenatal Vitamins 28-0.8 MG Tabs  Take 1 tablet by mouth daily     sertraline 50 MG tablet  Commonly known as: ZOLOFT               Where to Get Your Medications        These medications were sent to [facility], [address] [place] [address] [address]  [address] [place] [id number]      Phone: [phone]   acetaminophen 500 MG tablet Activity: Return to ADLs  Diet: Regular  Follow up: as scheduled with Eliel López on discharge: stable    Discharge date: 8/18/22

## 2022-08-23 ENCOUNTER — HOSPITAL ENCOUNTER (OUTPATIENT)
Age: 22
Discharge: HOME | End: 2022-08-23
Payer: COMMERCIAL

## 2022-08-23 ENCOUNTER — ROUTINE PRENATAL (OUTPATIENT)
Dept: OBGYN CLINIC | Age: 22
End: 2022-08-23
Payer: COMMERCIAL

## 2022-08-23 ENCOUNTER — HOSPITAL ENCOUNTER (OUTPATIENT)
Age: 22
Setting detail: SPECIMEN
Discharge: HOME | End: 2022-08-23

## 2022-08-23 VITALS — WEIGHT: 159 LBS | BODY MASS INDEX: 28.17 KG/M2 | DIASTOLIC BLOOD PRESSURE: 62 MMHG | SYSTOLIC BLOOD PRESSURE: 102 MMHG

## 2022-08-23 DIAGNOSIS — J45.20 MILD INTERMITTENT ASTHMA, UNSPECIFIED WHETHER COMPLICATED: ICD-10-CM

## 2022-08-23 DIAGNOSIS — O09.891 SHORT INTERVAL BETWEEN PREGNANCIES AFFECTING PREGNANCY IN FIRST TRIMESTER, ANTEPARTUM: ICD-10-CM

## 2022-08-23 DIAGNOSIS — Z86.79 HX OF SUPRAVENTRICULAR TACHYCARDIA: ICD-10-CM

## 2022-08-23 DIAGNOSIS — R55 NEUROCARDIOGENIC SYNCOPE: ICD-10-CM

## 2022-08-23 DIAGNOSIS — Z86.59 HX OF MAJOR DEPRESSION: ICD-10-CM

## 2022-08-23 DIAGNOSIS — Z91.410 HX OF ADULT PHYSICAL AND SEXUAL ABUSE: ICD-10-CM

## 2022-08-23 DIAGNOSIS — N94.9 ROUND LIGAMENT PAIN: ICD-10-CM

## 2022-08-23 DIAGNOSIS — O09.90 HIGH RISK PREGNANCY, ANTEPARTUM: ICD-10-CM

## 2022-08-23 DIAGNOSIS — Z3A.22 22 WEEKS GESTATION OF PREGNANCY: ICD-10-CM

## 2022-08-23 DIAGNOSIS — Z86.32 HISTORY OF GESTATIONAL DIABETES: ICD-10-CM

## 2022-08-23 DIAGNOSIS — Z3A.22 22 WEEKS GESTATION OF PREGNANCY: Primary | ICD-10-CM

## 2022-08-23 PROBLEM — I47.1 SVT (SUPRAVENTRICULAR TACHYCARDIA) (HCC): Status: RESOLVED | Noted: 2019-01-09 | Resolved: 2022-08-23

## 2022-08-23 PROBLEM — B25.9 CMV (CYTOMEGALOVIRUS INFECTION) (HCC): Status: RESOLVED | Noted: 2019-07-10 | Resolved: 2022-08-23

## 2022-08-23 PROBLEM — Z78.9 ADOPTED: Status: ACTIVE | Noted: 2022-08-23

## 2022-08-23 PROBLEM — Z83.2 FAMILY HISTORY OF CLOTTING DISORDER: Status: RESOLVED | Noted: 2019-01-09 | Resolved: 2022-08-23

## 2022-08-23 PROBLEM — J45.909 ASTHMA: Status: ACTIVE | Noted: 2022-08-23

## 2022-08-23 PROBLEM — Z02.82 ADOPTED: Status: ACTIVE | Noted: 2022-08-23

## 2022-08-23 PROBLEM — Z78.9 ADOPTED: Status: RESOLVED | Noted: 2022-08-23 | Resolved: 2022-08-23

## 2022-08-23 PROBLEM — Z02.82 ADOPTED: Status: RESOLVED | Noted: 2022-08-23 | Resolved: 2022-08-23

## 2022-08-23 PROBLEM — I47.10 SVT (SUPRAVENTRICULAR TACHYCARDIA): Status: RESOLVED | Noted: 2019-01-09 | Resolved: 2022-08-23

## 2022-08-23 LAB
AMPHETAMINE SCREEN URINE: NEGATIVE
BARBITURATE SCREEN URINE: NEGATIVE
BENZODIAZEPINE SCREEN, URINE: NEGATIVE
CANNABINOID SCREEN URINE: NEGATIVE
COCAINE METABOLITE, URINE: NEGATIVE
FENTANYL URINE: NEGATIVE
METHADONE SCREEN, URINE: NEGATIVE
OPIATES, URINE: NEGATIVE
OXYCODONE SCREEN URINE: NEGATIVE
PHENCYCLIDINE, URINE: NEGATIVE
TEST INFORMATION: NORMAL

## 2022-08-23 PROCEDURE — G8419 CALC BMI OUT NRM PARAM NOF/U: HCPCS | Performed by: ADVANCED PRACTICE MIDWIFE

## 2022-08-23 PROCEDURE — 93005 ELECTROCARDIOGRAM TRACING: CPT | Performed by: ADVANCED PRACTICE MIDWIFE

## 2022-08-23 PROCEDURE — G8427 DOCREV CUR MEDS BY ELIG CLIN: HCPCS | Performed by: ADVANCED PRACTICE MIDWIFE

## 2022-08-23 PROCEDURE — 1111F DSCHRG MED/CURRENT MED MERGE: CPT | Performed by: ADVANCED PRACTICE MIDWIFE

## 2022-08-23 PROCEDURE — 99204 OFFICE O/P NEW MOD 45 MIN: CPT | Performed by: ADVANCED PRACTICE MIDWIFE

## 2022-08-23 PROCEDURE — 1036F TOBACCO NON-USER: CPT | Performed by: ADVANCED PRACTICE MIDWIFE

## 2022-08-23 RX ORDER — ALBUTEROL SULFATE 90 UG/1
2 AEROSOL, METERED RESPIRATORY (INHALATION) 4 TIMES DAILY PRN
Qty: 54 G | Refills: 1 | Status: SHIPPED | OUTPATIENT
Start: 2022-08-23 | End: 2022-10-07 | Stop reason: SDUPTHER

## 2022-08-23 SDOH — ECONOMIC STABILITY: INCOME INSECURITY: IN THE LAST 12 MONTHS, WAS THERE A TIME WHEN YOU WERE NOT ABLE TO PAY THE MORTGAGE OR RENT ON TIME?: YES

## 2022-08-23 SDOH — ECONOMIC STABILITY: TRANSPORTATION INSECURITY
IN THE PAST 12 MONTHS, HAS THE LACK OF TRANSPORTATION KEPT YOU FROM MEDICAL APPOINTMENTS OR FROM GETTING MEDICATIONS?: NO

## 2022-08-23 SDOH — ECONOMIC STABILITY: TRANSPORTATION INSECURITY
IN THE PAST 12 MONTHS, HAS LACK OF TRANSPORTATION KEPT YOU FROM MEETINGS, WORK, OR FROM GETTING THINGS NEEDED FOR DAILY LIVING?: NO

## 2022-08-23 SDOH — ECONOMIC STABILITY: HOUSING INSECURITY: IN THE LAST 12 MONTHS, HOW MANY PLACES HAVE YOU LIVED?: 3

## 2022-08-23 SDOH — ECONOMIC STABILITY: HOUSING INSECURITY
IN THE LAST 12 MONTHS, WAS THERE A TIME WHEN YOU DID NOT HAVE A STEADY PLACE TO SLEEP OR SLEPT IN A SHELTER (INCLUDING NOW)?: NO

## 2022-08-23 SDOH — ECONOMIC STABILITY: FOOD INSECURITY: WITHIN THE PAST 12 MONTHS, YOU WORRIED THAT YOUR FOOD WOULD RUN OUT BEFORE YOU GOT MONEY TO BUY MORE.: NEVER TRUE

## 2022-08-23 SDOH — ECONOMIC STABILITY: FOOD INSECURITY: WITHIN THE PAST 12 MONTHS, THE FOOD YOU BOUGHT JUST DIDN'T LAST AND YOU DIDN'T HAVE MONEY TO GET MORE.: NEVER TRUE

## 2022-08-23 ASSESSMENT — SOCIAL DETERMINANTS OF HEALTH (SDOH)
WITHIN THE LAST YEAR, HAVE YOU BEEN KICKED, HIT, SLAPPED, OR OTHERWISE PHYSICALLY HURT BY YOUR PARTNER OR EX-PARTNER?: YES
HOW HARD IS IT FOR YOU TO PAY FOR THE VERY BASICS LIKE FOOD, HOUSING, MEDICAL CARE, AND HEATING?: NOT HARD AT ALL
WITHIN THE LAST YEAR, HAVE YOU BEEN AFRAID OF YOUR PARTNER OR EX-PARTNER?: YES
WITHIN THE LAST YEAR, HAVE TO BEEN RAPED OR FORCED TO HAVE ANY KIND OF SEXUAL ACTIVITY BY YOUR PARTNER OR EX-PARTNER?: NO
WITHIN THE LAST YEAR, HAVE YOU BEEN HUMILIATED OR EMOTIONALLY ABUSED IN OTHER WAYS BY YOUR PARTNER OR EX-PARTNER?: YES

## 2022-08-23 ASSESSMENT — PATIENT HEALTH QUESTIONNAIRE - PHQ9
SUM OF ALL RESPONSES TO PHQ QUESTIONS 1-9: 0
2. FEELING DOWN, DEPRESSED OR HOPELESS: 0
SUM OF ALL RESPONSES TO PHQ QUESTIONS 1-9: 0
SUM OF ALL RESPONSES TO PHQ9 QUESTIONS 1 & 2: 0
1. LITTLE INTEREST OR PLEASURE IN DOING THINGS: 0
SUM OF ALL RESPONSES TO PHQ QUESTIONS 1-9: 0
SUM OF ALL RESPONSES TO PHQ QUESTIONS 1-9: 0

## 2022-08-23 NOTE — PROGRESS NOTES
Patient here for 22w1d prenatal visit  Patient states fetal movement Present   Patient concerns: none
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
## 2022-08-24 LAB
EKG ATRIAL RATE: 68 BPM
EKG P AXIS: 42 DEGREES
EKG P-R INTERVAL: 124 MS
EKG Q-T INTERVAL: 394 MS
EKG QRS DURATION: 80 MS
EKG QTC CALCULATION (BAZETT): 418 MS
EKG R AXIS: 64 DEGREES
EKG T AXIS: 34 DEGREES
EKG VENTRICULAR RATE: 68 BPM

## 2022-08-24 PROCEDURE — 93010 ELECTROCARDIOGRAM REPORT: CPT | Performed by: INTERNAL MEDICINE

## 2022-08-25 ENCOUNTER — TELEPHONE (OUTPATIENT)
Dept: OBGYN CLINIC | Age: 22
End: 2022-08-25

## 2022-09-01 ENCOUNTER — HOSPITAL ENCOUNTER (EMERGENCY)
Age: 22
Discharge: HOME | End: 2022-09-01
Attending: EMERGENCY MEDICINE
Payer: COMMERCIAL

## 2022-09-01 VITALS
DIASTOLIC BLOOD PRESSURE: 62 MMHG | HEART RATE: 79 BPM | OXYGEN SATURATION: 99 % | WEIGHT: 150 LBS | HEIGHT: 63 IN | SYSTOLIC BLOOD PRESSURE: 106 MMHG | TEMPERATURE: 98.9 F | BODY MASS INDEX: 26.58 KG/M2 | RESPIRATION RATE: 16 BRPM

## 2022-09-01 DIAGNOSIS — R55 SYNCOPE AND COLLAPSE: Primary | ICD-10-CM

## 2022-09-01 LAB
ABSOLUTE EOS #: 0.06 K/UL (ref 0–0.44)
ABSOLUTE IMMATURE GRANULOCYTE: <0.03 K/UL (ref 0–0.3)
ABSOLUTE LYMPH #: 1.3 K/UL (ref 1.1–3.7)
ABSOLUTE MONO #: 0.54 K/UL (ref 0.1–1.2)
ALBUMIN SERPL-MCNC: 4 G/DL (ref 3.5–5.2)
ALBUMIN/GLOBULIN RATIO: 1.5 (ref 1–2.5)
ALP BLD-CCNC: 92 U/L (ref 35–104)
ALT SERPL-CCNC: 11 U/L (ref 5–33)
ANION GAP SERPL CALCULATED.3IONS-SCNC: 14 MMOL/L (ref 9–17)
AST SERPL-CCNC: 16 U/L
BASOPHILS # BLD: 0 % (ref 0–2)
BASOPHILS ABSOLUTE: <0.03 K/UL (ref 0–0.2)
BILIRUB SERPL-MCNC: 0.3 MG/DL (ref 0.3–1.2)
BILIRUBIN URINE: NEGATIVE
BUN BLDV-MCNC: 8 MG/DL (ref 6–20)
CALCIUM SERPL-MCNC: 8.9 MG/DL (ref 8.6–10.4)
CANDIDA SPECIES, DNA PROBE: NEGATIVE
CASTS UA: ABNORMAL /LPF (ref 0–8)
CHLORIDE BLD-SCNC: 103 MMOL/L (ref 98–107)
CO2: 21 MMOL/L (ref 20–31)
COLOR: YELLOW
CREAT SERPL-MCNC: 0.56 MG/DL (ref 0.5–0.9)
D-DIMER QUANTITATIVE: 0.86 MG/L FEU
EOSINOPHILS RELATIVE PERCENT: 1 % (ref 1–4)
EPITHELIAL CELLS UA: ABNORMAL /HPF (ref 0–5)
GARDNERELLA VAGINALIS, DNA PROBE: NEGATIVE
GFR AFRICAN AMERICAN: >60 ML/MIN
GFR NON-AFRICAN AMERICAN: >60 ML/MIN
GFR SERPL CREATININE-BSD FRML MDRD: ABNORMAL ML/MIN/{1.73_M2}
GLUCOSE BLD-MCNC: 100 MG/DL (ref 70–99)
GLUCOSE URINE: NEGATIVE
HCT VFR BLD CALC: 34.2 % (ref 36.3–47.1)
HEMOGLOBIN: 12.1 G/DL (ref 11.9–15.1)
IMMATURE GRANULOCYTES: 0 %
KETONES, URINE: ABNORMAL
LEUKOCYTE ESTERASE, URINE: NEGATIVE
LYMPHOCYTES # BLD: 15 % (ref 24–43)
MCH RBC QN AUTO: 34.1 PG (ref 25.2–33.5)
MCHC RBC AUTO-ENTMCNC: 35.4 G/DL (ref 28.4–34.8)
MCV RBC AUTO: 96.3 FL (ref 82.6–102.9)
MONOCYTES # BLD: 6 % (ref 3–12)
NITRITE, URINE: NEGATIVE
NRBC AUTOMATED: 0 PER 100 WBC
PDW BLD-RTO: 12.9 % (ref 11.8–14.4)
PH UA: 6.5 (ref 5–8)
PLATELET # BLD: 269 K/UL (ref 138–453)
PMV BLD AUTO: 8.9 FL (ref 8.1–13.5)
POTASSIUM SERPL-SCNC: 3.7 MMOL/L (ref 3.7–5.3)
PROTEIN UA: NEGATIVE
RBC # BLD: 3.55 M/UL (ref 3.95–5.11)
RBC UA: ABNORMAL /HPF (ref 0–4)
SEG NEUTROPHILS: 78 % (ref 36–65)
SEGMENTED NEUTROPHILS ABSOLUTE COUNT: 6.56 K/UL (ref 1.5–8.1)
SODIUM BLD-SCNC: 138 MMOL/L (ref 135–144)
SOURCE: NORMAL
SPECIFIC GRAVITY UA: 1.01 (ref 1–1.03)
TOTAL PROTEIN: 6.6 G/DL (ref 6.4–8.3)
TRICHOMONAS VAGINALIS DNA: NEGATIVE
TURBIDITY: CLEAR
URINE HGB: NEGATIVE
UROBILINOGEN, URINE: NORMAL
WBC # BLD: 8.5 K/UL (ref 3.5–11.3)
WBC UA: ABNORMAL /HPF (ref 0–5)

## 2022-09-01 PROCEDURE — 81001 URINALYSIS AUTO W/SCOPE: CPT

## 2022-09-01 PROCEDURE — 93005 ELECTROCARDIOGRAM TRACING: CPT

## 2022-09-01 PROCEDURE — 80053 COMPREHEN METABOLIC PANEL: CPT

## 2022-09-01 PROCEDURE — 87510 GARDNER VAG DNA DIR PROBE: CPT

## 2022-09-01 PROCEDURE — 87491 CHLMYD TRACH DNA AMP PROBE: CPT

## 2022-09-01 PROCEDURE — 87480 CANDIDA DNA DIR PROBE: CPT

## 2022-09-01 PROCEDURE — 87660 TRICHOMONAS VAGIN DIR PROBE: CPT

## 2022-09-01 PROCEDURE — 2580000003 HC RX 258

## 2022-09-01 PROCEDURE — 87086 URINE CULTURE/COLONY COUNT: CPT

## 2022-09-01 PROCEDURE — 99284 EMERGENCY DEPT VISIT MOD MDM: CPT

## 2022-09-01 PROCEDURE — 85379 FIBRIN DEGRADATION QUANT: CPT

## 2022-09-01 PROCEDURE — 87591 N.GONORRHOEAE DNA AMP PROB: CPT

## 2022-09-01 PROCEDURE — 85025 COMPLETE CBC W/AUTO DIFF WBC: CPT

## 2022-09-01 RX ORDER — 0.9 % SODIUM CHLORIDE 0.9 %
1000 INTRAVENOUS SOLUTION INTRAVENOUS ONCE
Status: COMPLETED | OUTPATIENT
Start: 2022-09-01 | End: 2022-09-01

## 2022-09-01 RX ADMIN — SODIUM CHLORIDE 1000 ML: 9 INJECTION, SOLUTION INTRAVENOUS at 12:56

## 2022-09-01 ASSESSMENT — ENCOUNTER SYMPTOMS
NAUSEA: 0
ABDOMINAL PAIN: 1
PHOTOPHOBIA: 0
SHORTNESS OF BREATH: 1
RHINORRHEA: 0
WHEEZING: 0
BACK PAIN: 0
VOMITING: 0

## 2022-09-01 ASSESSMENT — PAIN DESCRIPTION - FREQUENCY: FREQUENCY: CONTINUOUS

## 2022-09-01 ASSESSMENT — PAIN DESCRIPTION - PAIN TYPE: TYPE: ACUTE PAIN

## 2022-09-01 ASSESSMENT — PAIN DESCRIPTION - LOCATION: LOCATION: ABDOMEN

## 2022-09-01 ASSESSMENT — PAIN SCALES - GENERAL: PAINLEVEL_OUTOF10: 6

## 2022-09-01 ASSESSMENT — PAIN DESCRIPTION - DESCRIPTORS: DESCRIPTORS: CRAMPING

## 2022-09-01 ASSESSMENT — PAIN - FUNCTIONAL ASSESSMENT: PAIN_FUNCTIONAL_ASSESSMENT: 0-10

## 2022-09-01 NOTE — ED PROVIDER NOTES
[facility] ED  eMERGENCY dEPARTMENT eNCOUnter   Attending Attestation     Pt Name: Miquel Charles  MRN: [id number]  [place] 2000  Date of evaluation: 9/1/22       Miquel Charles is a 25 y.o. female who presents with Loss of Consciousness (Syncopal episode 30 mins PTA at work) and Abdominal Cramping (23 weeks pregnant)      History: Presents with syncopal episode she says that it was 10 minutes she was unconscious. Patient said that she was at work. Patient says that she did not hit her head. Patient says that she has had abdominal pain her entire pregnancy there is no new pain today. Exam: Heart rate and rhythm are regular. Lungs are clear to auscultation bilaterally. Abdomen is soft, nontender. Patient is well-appearing in no acute distress. EKG shows normal sinus rhythm with a rate of 75 beats minute. Normal axis. No ST elevation or depression. T waves are upright except in lead III. Plan for labs for syncope work-up, will get a D-dimer, will not get a CT of the chest unless it is above the threshold for her level of pregnancy. Will discuss with OB. Probable discharge. I performed a history and physical examination of the patient and discussed management with the resident. I reviewed the residents note and agree with the documented findings and plan of care. Any areas of disagreement are noted on the chart. I was personally present for the key portions of any procedures. I have documented in the chart those procedures where I was not present during the key portions. I have personally reviewed all images and agree with the resident's interpretation. I have reviewed the emergency nurses triage note. I agree with the chief complaint, past medical history, past surgical history, allergies, medications, social and family history as documented unless otherwise noted below.  Documentation of the HPI, Physical Exam and Medical Decision Making performed by medical students or scribes is based on my personal performance of the HPI, PE and MDM. For Phys Assistant/ Nurse Practitioner cases/documentation I have had a face to face evaluation of this patient and have completed at least one if not all key elements of the E/M (history, physical exam, and MDM). Additional findings are as noted. For APC cases I have personally evaluated and examined the patient in conjunction with the APC and agree with the treatment plan and disposition of the patient as recorded by the APC.     Klaudia Hanna MD  Attending Emergency  Physician       Joey Reyes MD  09/01/22 [id number]

## 2022-09-01 NOTE — ED TRIAGE NOTES
Patient arrives via [facility] after having a syncopal episode 30 mins PTA at work. Patient states she was just standing and got dizzy and then ended up having a syncopal episode. Witnessed by coworkers, did not hit head. Patient also has reports of abdominal cramping, patient is 23 weeks pregnant. States she has felt baby move and has so far had a healthy pregnancy. This is patient's third pregnancy. Patient is alert and oriented x4.
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
## 2022-09-01 NOTE — DISCHARGE INSTRUCTIONS
Thank you for visiting [facility] Emergency Department. You need to call Ms. Felix  to make an appointment as directed for follow up. These return to emergency department for any new or worrisome symptoms including any further episodes of passing out, vomiting, abdominal pain, vaginal bleeding. Should you have any questions regarding your care or further treatment, please call [facility] at [phone].

## 2022-09-02 ENCOUNTER — PATIENT MESSAGE (OUTPATIENT)
Dept: OBGYN CLINIC | Age: 22
End: 2022-09-02

## 2022-09-02 DIAGNOSIS — O22.42 HEMORRHOIDS DURING PREGNANCY IN SECOND TRIMESTER: ICD-10-CM

## 2022-09-02 DIAGNOSIS — B37.9 YEAST INFECTION: Primary | ICD-10-CM

## 2022-09-02 LAB
C TRACH DNA GENITAL QL NAA+PROBE: NEGATIVE
CULTURE: NORMAL
EKG ATRIAL RATE: 75 BPM
EKG P AXIS: 38 DEGREES
EKG P-R INTERVAL: 136 MS
EKG Q-T INTERVAL: 388 MS
EKG QRS DURATION: 82 MS
EKG QTC CALCULATION (BAZETT): 433 MS
EKG R AXIS: 52 DEGREES
EKG T AXIS: 6 DEGREES
EKG VENTRICULAR RATE: 75 BPM
N. GONORRHOEAE DNA: NEGATIVE
SPECIMEN DESCRIPTION: NORMAL
SPECIMEN DESCRIPTION: NORMAL

## 2022-09-06 RX ORDER — HYDROCORTISONE ACETATE 25 MG/1
25 SUPPOSITORY RECTAL EVERY 12 HOURS
Qty: 14 SUPPOSITORY | Refills: 0 | Status: SHIPPED | OUTPATIENT
Start: 2022-09-06 | End: 2022-10-07

## 2022-09-06 NOTE — TELEPHONE ENCOUNTER
From: Siddhartha Arreguin  To: DARRIAN Miller CNM  Sent: 9/2/2022 3:12 PM EDT  Subject: Yeast infection/hemorrhoids     Can you send in a prescription for a yeast infection and hemmorhoids

## 2022-09-13 ENCOUNTER — TELEPHONE (OUTPATIENT)
Dept: OBGYN CLINIC | Age: 22
End: 2022-09-13

## 2022-09-20 ENCOUNTER — TELEPHONE (OUTPATIENT)
Dept: OBGYN CLINIC | Age: 22
End: 2022-09-20

## 2022-09-20 NOTE — TELEPHONE ENCOUNTER
Lm to r/s appt and or to confirm transferring to [facility] - pt is scheduled with their office on 9/28.

## 2022-09-22 ENCOUNTER — TELEPHONE (OUTPATIENT)
Dept: OBGYN CLINIC | Age: 22
End: 2022-09-22

## 2022-09-23 ENCOUNTER — ROUTINE PRENATAL (OUTPATIENT)
Dept: OBGYN CLINIC | Age: 22
End: 2022-09-23
Payer: COMMERCIAL

## 2022-09-23 ENCOUNTER — HOSPITAL ENCOUNTER (OUTPATIENT)
Age: 22
Setting detail: SPECIMEN
Discharge: HOME | End: 2022-09-23
Payer: COMMERCIAL

## 2022-09-23 VITALS — WEIGHT: 162.3 LBS | BODY MASS INDEX: 28.75 KG/M2 | DIASTOLIC BLOOD PRESSURE: 58 MMHG | SYSTOLIC BLOOD PRESSURE: 100 MMHG

## 2022-09-23 DIAGNOSIS — Z86.39 HISTORY OF HYPOTHYROIDISM: ICD-10-CM

## 2022-09-23 DIAGNOSIS — O09.90 HIGH RISK PREGNANCY, ANTEPARTUM: ICD-10-CM

## 2022-09-23 DIAGNOSIS — Z3A.26 26 WEEKS GESTATION OF PREGNANCY: Primary | ICD-10-CM

## 2022-09-23 DIAGNOSIS — Z3A.26 26 WEEKS GESTATION OF PREGNANCY: ICD-10-CM

## 2022-09-23 DIAGNOSIS — O09.891 SHORT INTERVAL BETWEEN PREGNANCIES AFFECTING PREGNANCY IN FIRST TRIMESTER, ANTEPARTUM: ICD-10-CM

## 2022-09-23 DIAGNOSIS — Z86.32 HISTORY OF GESTATIONAL DIABETES: ICD-10-CM

## 2022-09-23 DIAGNOSIS — R55 NEUROCARDIOGENIC SYNCOPE: ICD-10-CM

## 2022-09-23 DIAGNOSIS — J45.20 MILD INTERMITTENT ASTHMA, UNSPECIFIED WHETHER COMPLICATED: ICD-10-CM

## 2022-09-23 LAB
GLUCOSE ADMINISTRATION: NORMAL
GLUCOSE TOLERANCE SCREEN 50G: 115 MG/DL (ref 70–135)
HCT VFR BLD CALC: 33.5 % (ref 36–46)
HEMOGLOBIN: 12 G/DL (ref 12–16)
HEPATITIS C ANTIBODY: NONREACTIVE
HIV AG/AB: NONREACTIVE
MCH RBC QN AUTO: 33.9 PG (ref 26–34)
MCHC RBC AUTO-ENTMCNC: 35.7 G/DL (ref 31–37)
MCV RBC AUTO: 94.8 FL (ref 80–100)
PDW BLD-RTO: 13 % (ref 11.5–14.9)
PLATELET # BLD: 287 K/UL (ref 150–450)
PMV BLD AUTO: 6.9 FL (ref 6–12)
RBC # BLD: 3.53 M/UL (ref 4–5.2)
T. PALLIDUM, IGG: NONREACTIVE
TSH SERPL DL<=0.05 MIU/L-ACNC: 1.41 UIU/ML (ref 0.3–5)
WBC # BLD: 8.2 K/UL (ref 3.5–11)

## 2022-09-23 PROCEDURE — 85027 COMPLETE CBC AUTOMATED: CPT

## 2022-09-23 PROCEDURE — 87389 HIV-1 AG W/HIV-1&-2 AB AG IA: CPT

## 2022-09-23 PROCEDURE — 82950 GLUCOSE TEST: CPT

## 2022-09-23 PROCEDURE — 86803 HEPATITIS C AB TEST: CPT

## 2022-09-23 PROCEDURE — 99214 OFFICE O/P EST MOD 30 MIN: CPT | Performed by: ADVANCED PRACTICE MIDWIFE

## 2022-09-23 PROCEDURE — 84443 ASSAY THYROID STIM HORMONE: CPT

## 2022-09-23 PROCEDURE — G8419 CALC BMI OUT NRM PARAM NOF/U: HCPCS | Performed by: ADVANCED PRACTICE MIDWIFE

## 2022-09-23 PROCEDURE — G8428 CUR MEDS NOT DOCUMENT: HCPCS | Performed by: ADVANCED PRACTICE MIDWIFE

## 2022-09-23 PROCEDURE — 86780 TREPONEMA PALLIDUM: CPT

## 2022-09-23 PROCEDURE — 36415 COLL VENOUS BLD VENIPUNCTURE: CPT

## 2022-09-23 PROCEDURE — 1036F TOBACCO NON-USER: CPT | Performed by: ADVANCED PRACTICE MIDWIFE

## 2022-09-23 PROCEDURE — 86787 VARICELLA-ZOSTER ANTIBODY: CPT
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
## 2022-09-26 ENCOUNTER — TELEPHONE (OUTPATIENT)
Dept: OBGYN CLINIC | Age: 22
End: 2022-09-26

## 2022-09-26 LAB — VZV IGG SER QL IA: 1.62

## 2022-09-26 NOTE — TELEPHONE ENCOUNTER
Prenatal 27w0d called with complain of increased discharge. Patient states she is currently walking to work and she feels like the crotch of her pants is extremely wet. Patient wondering if she should be concerned and would like a call back. Advised patient that increased discharge is normal was she gets further along in the pregnancy.

## 2022-09-26 NOTE — TELEPHONE ENCOUNTER
Patient called. She reports she was walking to work and noticed her pants were wet. Discussed putting a pad in her underwear to assess for continued leaking of fluid. Patient reports it could be discharge or urine but is unsure. Discussed calling if she continues to have leaking throughout the day. Discussed that increased discharge can be normal in pregnancy. Discussed if she has any odor or irritation she should call and schedule an appointment. Patient declines vaginal bleeding and reports fetal movement. Patient verbalized understanding and will call if she has any further leaking.

## 2022-09-28 ENCOUNTER — ROUTINE PRENATAL (OUTPATIENT)
Dept: PERINATAL CARE | Age: 22
End: 2022-09-28
Payer: COMMERCIAL

## 2022-09-28 VITALS
BODY MASS INDEX: 28.7 KG/M2 | HEART RATE: 102 BPM | HEIGHT: 63 IN | SYSTOLIC BLOOD PRESSURE: 94 MMHG | TEMPERATURE: 98.1 F | WEIGHT: 162 LBS | DIASTOLIC BLOOD PRESSURE: 66 MMHG | RESPIRATION RATE: 16 BRPM

## 2022-09-28 DIAGNOSIS — O36.5930 INTRAUTERINE GROWTH RESTRICTION (IUGR) AFFECTING CARE OF MOTHER, THIRD TRIMESTER, SINGLE GESTATION: Primary | ICD-10-CM

## 2022-09-28 DIAGNOSIS — O09.899 SHORT INTERVAL BETWEEN PREGNANCIES COMPLICATING PREGNANCY, ANTEPARTUM: ICD-10-CM

## 2022-09-28 DIAGNOSIS — O35.8XX0 SUSPECTED DAMAGE TO FETUS FROM DISEASE IN MOTHER, ANTEPARTUM CONDITION, SINGLE OR UNSPECIFIED FETUS: ICD-10-CM

## 2022-09-28 DIAGNOSIS — Z3A.27 27 WEEKS GESTATION OF PREGNANCY: ICD-10-CM

## 2022-09-28 DIAGNOSIS — O99.413 MATERNAL CARDIOVASCULAR DISEASE AFFECTING PREGNANCY IN THIRD TRIMESTER: ICD-10-CM

## 2022-09-28 DIAGNOSIS — Z36.4 ANTENATAL SCREENING FOR FETAL GROWTH RETARDATION USING ULTRASONICS: ICD-10-CM

## 2022-09-28 PROCEDURE — 76820 UMBILICAL ARTERY ECHO: CPT | Performed by: OBSTETRICS & GYNECOLOGY

## 2022-09-28 PROCEDURE — 76821 MIDDLE CEREBRAL ARTERY ECHO: CPT | Performed by: OBSTETRICS & GYNECOLOGY

## 2022-09-28 PROCEDURE — 76827 ECHO EXAM OF FETAL HEART: CPT | Performed by: OBSTETRICS & GYNECOLOGY

## 2022-09-28 PROCEDURE — G8427 DOCREV CUR MEDS BY ELIG CLIN: HCPCS | Performed by: OBSTETRICS & GYNECOLOGY

## 2022-09-28 PROCEDURE — 76816 OB US FOLLOW-UP PER FETUS: CPT | Performed by: OBSTETRICS & GYNECOLOGY

## 2022-09-28 PROCEDURE — 93325 DOPPLER ECHO COLOR FLOW MAPG: CPT | Performed by: OBSTETRICS & GYNECOLOGY

## 2022-09-28 PROCEDURE — 99243 OFF/OP CNSLTJ NEW/EST LOW 30: CPT | Performed by: OBSTETRICS & GYNECOLOGY

## 2022-09-28 PROCEDURE — 76825 ECHO EXAM OF FETAL HEART: CPT | Performed by: OBSTETRICS & GYNECOLOGY

## 2022-09-28 PROCEDURE — 76819 FETAL BIOPHYS PROFIL W/O NST: CPT | Performed by: OBSTETRICS & GYNECOLOGY

## 2022-09-28 PROCEDURE — G8419 CALC BMI OUT NRM PARAM NOF/U: HCPCS | Performed by: OBSTETRICS & GYNECOLOGY

## 2022-10-06 PROBLEM — O36.5990 IUGR (INTRAUTERINE GROWTH RESTRICTION) AFFECTING CARE OF MOTHER: Status: ACTIVE | Noted: 2022-10-06

## 2022-10-06 NOTE — PROGRESS NOTES
SUBJECTIVE:    Shea Almeida is here for her return OB visit. denies concerns today. She reports  feeling fetal movement. She denies vaginal bleeding. She denies vaginal discharge. She denies leaking of fluid. She denies uterine cramping. She reports occasional nausea and/or vomiting. OBJECTIVE:  Blood pressure 102/62, weight 163 lb 9.6 oz (74.2 kg), last menstrual period 03/22/2022, unknown if currently breastfeeding. Total weight gain: 7 lb 9.6 oz (3.447 kg)    Shea Almeida accepted the flu vaccine as appropriate. Vaccine information statement influenza (inactivated) edition 8/6/21given to patient on 10/7/2022   Shea Almeida accepted the Tdap vaccine as appropriate  Vaccine information statement tdap edition 4/1/2020 given to patient on 10/7/2022   Shea Almeida accepted the COVID vaccine as appropriate    ASSESSMENT/PLAN:    1. High risk pregnancy, antepartum  IUP @ 28+4 weeks  S = D    - CT IMMUNIZ,ADMIN,EACH ADDL  - CT IMMUNIZ ADMIN,1 SINGLE/COMB VAC/TOXOID  - Tdap, BOOSTRIX, (age 8 yrs+), IM  - Influenza, FLUCELVAX, (age 10 mo+), IM, Preservative Free, 0.5 mL  - albuterol sulfate HFA (VENTOLIN HFA) 108 (90 Base) MCG/ACT inhaler; Inhale 2 puffs into the lungs 4 times daily as needed for Wheezing  Dispense: 54 g; Refill: 1  - Prenatal Vit-Fe Fumarate-FA (PRENATAL VITAMINS) 28-0.8 MG TABS; Take 1 tablet by mouth daily  Dispense: 30 tablet; Refill: 12    2. 28 weeks gestation of pregnancy  Due date is based on 11w 1d dating ultrasound  Patient's prenatal labs are completed. Patient's blood type A positive and rhogam is not indicated in pregnancy. Early glucola indicated: yes - 101  Patient accepted genetic screening. First trimester screen is completed and normal. Msafp was ordered: normal. Myriad carrier screen negative. Anatomy scan completed, 8/10/2022. Placenta posterior,normal cord insertion: Yes, cervical length 47-55 mm, no low lying, no previa noted.  Follow up 26 weeks fetal echo and repeat anatomy, 26 week US show IUGR f/u in 4 weeks for fetal growth, BPP, doppler. Fetal echo WNL. Glucola between 24-28 weeks. complete 115 Pass  Total weight gain in pregnancy reviewed: Yes  Fetal Kick Count was discussed and explained. She was instructed to lay on her left side 20 minutes after eating and count movements for up to 2 hours with a target value of 10 movements. Instructed to notify office if she does not make the target. - [place] IMMUNIZ,ADMIN,EACH ADDL  - [place] IMMUNIZ ADMIN,1 SINGLE/COMB VAC/TOXOID  - Tdap, BOOSTRIX, (age 8 yrs+), IM  - Influenza, FLUCELVAX, (age 10 mo+), IM, Preservative Free, 0.5 mL  - albuterol sulfate HFA (VENTOLIN HFA) 108 (90 Base) MCG/ACT inhaler; Inhale 2 puffs into the lungs 4 times daily as needed for Wheezing  Dispense: 54 g; Refill: 1  - Prenatal Vit-Fe Fumarate-FA (PRENATAL VITAMINS) 28-0.8 MG TABS; Take 1 tablet by mouth daily  Dispense: 30 tablet; Refill: 12    3. Poor fetal growth affecting management of mother in third trimester, single or unspecified fetus  Dx 2022 MFM US  MFM f/u 4 weeks for growth, BPP, doppler, appointment 10/26  Fetal  testing to start at 30 weeks, NST weekly per [place]    4. Mild intermittent asthma, unspecified whether complicated  Albuterol inhaler PRN  Patient states need an Rx for her inhaler, does not have one at this time  - albuterol sulfate HFA (VENTOLIN HFA) 108 (90 Base) MCG/ACT inhaler; Inhale 2 puffs into the lungs 4 times daily as needed for Wheezing  Dispense: 54 g; Refill: 1    5. Fetal small head circumference  Anatomy US 8/10/2022, HC < 3%  MFM consult 2022  Fetal growth US and echo at 26 weeks  US showed IUGR (see above)  Fetal echo at 26 weeks, WNL    6. 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  Need for Tdap vaccination  - [place] [id number] [address] [facility] ADDL  - Tdap, BOOSTRIX, (age 8 yrs+), IM    8. Flu vaccine need  - [place] IMMUNIZ ADMIN,1 SINGLE/COMB VAC/TOXOID  - Influenza, FLUCELVAX, (age 10 mo+), IM, Preservative Free, 0.5 mL    9. History of hypothyroidism  6/21/2022 TSH 0.66, 9/23/2022 TSH 1.41  Order next visit    10. History of gestational diabetes  Early 1 hr   28 week     11. Hx of major depression  Zoloft daily, patient reports not taking consistently, causes nausea  Has appointment scheduled with psychiatrist at [facility]  Patient states managing and coping well at this time            She was counseled regarding all of the above:    Return in about 2 weeks (around 10/21/2022) for Return OB.     The patient, Magdaleno Leger,  was seen with a total time spent of 25 minutes for the visit on this date of service by the [facility]  On this date October 7, 2022,  I have spent greater than 50% of this visit:  [x] reviewing previous notes  [x] reviewing test results  [x] pre-charting  Discussed with patient:  [x] Importance of compliance with treatment plan  [x] Counseling  [] Coordinating care  [x] Documenting     Electronically Signed By: DARRIAN Aguirre CNM

## 2022-10-07 ENCOUNTER — ROUTINE PRENATAL (OUTPATIENT)
Dept: OBGYN CLINIC | Age: 22
End: 2022-10-07
Payer: COMMERCIAL

## 2022-10-07 VITALS — SYSTOLIC BLOOD PRESSURE: 102 MMHG | WEIGHT: 163.6 LBS | DIASTOLIC BLOOD PRESSURE: 62 MMHG | BODY MASS INDEX: 28.98 KG/M2

## 2022-10-07 DIAGNOSIS — Z3A.28 28 WEEKS GESTATION OF PREGNANCY: ICD-10-CM

## 2022-10-07 DIAGNOSIS — O35.07X0 FETAL MICROCEPHALY: ICD-10-CM

## 2022-10-07 DIAGNOSIS — Z86.39 HISTORY OF HYPOTHYROIDISM: ICD-10-CM

## 2022-10-07 DIAGNOSIS — Z86.32 HISTORY OF GESTATIONAL DIABETES: ICD-10-CM

## 2022-10-07 DIAGNOSIS — O36.5930 POOR FETAL GROWTH AFFECTING MANAGEMENT OF MOTHER IN THIRD TRIMESTER, SINGLE OR UNSPECIFIED FETUS: ICD-10-CM

## 2022-10-07 DIAGNOSIS — J45.20 MILD INTERMITTENT ASTHMA, UNSPECIFIED WHETHER COMPLICATED: ICD-10-CM

## 2022-10-07 DIAGNOSIS — Z86.59 HX OF MAJOR DEPRESSION: ICD-10-CM

## 2022-10-07 DIAGNOSIS — O09.90 HIGH RISK PREGNANCY, ANTEPARTUM: Primary | ICD-10-CM

## 2022-10-07 DIAGNOSIS — Z23 FLU VACCINE NEED: ICD-10-CM

## 2022-10-07 DIAGNOSIS — Z23 NEED FOR TDAP VACCINATION: ICD-10-CM

## 2022-10-07 PROCEDURE — G8419 CALC BMI OUT NRM PARAM NOF/U: HCPCS

## 2022-10-07 PROCEDURE — 90674 CCIIV4 VAC NO PRSV 0.5 ML IM: CPT

## 2022-10-07 PROCEDURE — G8482 FLU IMMUNIZE ORDER/ADMIN: HCPCS

## 2022-10-07 PROCEDURE — 90715 TDAP VACCINE 7 YRS/> IM: CPT

## 2022-10-07 PROCEDURE — 99213 OFFICE O/P EST LOW 20 MIN: CPT

## 2022-10-07 PROCEDURE — G8427 DOCREV CUR MEDS BY ELIG CLIN: HCPCS

## 2022-10-07 PROCEDURE — 1036F TOBACCO NON-USER: CPT

## 2022-10-07 RX ORDER — PNV NO.95/FERROUS FUM/FOLIC AC 28MG-0.8MG
1 TABLET ORAL DAILY
Qty: 30 TABLET | Refills: 12 | Status: SHIPPED | OUTPATIENT
Start: 2022-10-07 | End: 2022-11-06

## 2022-10-07 RX ORDER — ALBUTEROL SULFATE 90 UG/1
2 AEROSOL, METERED RESPIRATORY (INHALATION) 4 TIMES DAILY PRN
Qty: 54 G | Refills: 1 | Status: SHIPPED | OUTPATIENT
Start: 2022-10-07

## 2022-10-07 NOTE — PROGRESS NOTES
Patient here for 28w4d prenatal visit  Patient states fetal movement Present  Patient concerns: none

## 2022-10-17 ENCOUNTER — ROUTINE PRENATAL (OUTPATIENT)
Dept: OBGYN CLINIC | Age: 22
End: 2022-10-17
Payer: COMMERCIAL

## 2022-10-17 VITALS — BODY MASS INDEX: 29.03 KG/M2 | WEIGHT: 163.9 LBS | SYSTOLIC BLOOD PRESSURE: 100 MMHG | DIASTOLIC BLOOD PRESSURE: 54 MMHG

## 2022-10-17 DIAGNOSIS — Z3A.30 30 WEEKS GESTATION OF PREGNANCY: ICD-10-CM

## 2022-10-17 DIAGNOSIS — O36.5930 POOR FETAL GROWTH AFFECTING MANAGEMENT OF MOTHER IN THIRD TRIMESTER, SINGLE OR UNSPECIFIED FETUS: ICD-10-CM

## 2022-10-17 DIAGNOSIS — M79.605 PAIN IN BOTH LOWER EXTREMITIES: ICD-10-CM

## 2022-10-17 DIAGNOSIS — M79.604 PAIN IN BOTH LOWER EXTREMITIES: ICD-10-CM

## 2022-10-17 DIAGNOSIS — O09.90 HIGH RISK PREGNANCY, ANTEPARTUM: Primary | ICD-10-CM

## 2022-10-17 PROCEDURE — 99213 OFFICE O/P EST LOW 20 MIN: CPT

## 2022-10-17 RX ORDER — COMPRESS.STOCKING,KNEE,REG,MED
1 EACH MISCELLANEOUS DAILY
Qty: 1 EACH | Refills: 0 | Status: SHIPPED | OUTPATIENT
Start: 2022-10-17

## 2022-10-17 NOTE — PROGRESS NOTES
Patient here for 30w0d prenatal visit  Patient states fetal movement Present  Patient concerns: none

## 2022-10-17 NOTE — PROGRESS NOTES
[facility],[address] [facility] [address] [id number]  Dept: [phone]      Subjective:    Chief Complaint   Patient presents with    Routine Prenatal Visit         Dustin Myers arrives for NST at 30w0d for fetal surveillance secondary to IUGR. Reports fetal movement, denies contractions, denies vaginal bleeding, denies leaking of fluid. Bonny Aden has concerns today about some bilateral lower extremity pain. She states that she has been getting pain in her lower extremities while walking. The pain will go away when she stops walking. She works at [employer] and is on her feet at work during her whole shift. She denies swelling, numbness, and tingling. Objective:  Vitals:    10/17/22 1013   BP: (!) 100/54   Weight: 163 lb 14.4 oz (74.3 kg)        Total weight gain: 7 lb 14.4 oz (3.583 kg)    NST:   Baseline:  140 bpm  Variability:  Moderate  Accelerations:  Present  Decelerations: Absent   Fetal Movement:  Perceived by patient during NST  Contractions: patient denies contractions, contractions Absent  on toco.  Interpretation: Reactive (Category1)    Assessment/Plan:    IUP @ 30w0d  Bonny Aden was seen today for routine prenatal visit. Diagnoses and all orders for this visit:    High risk pregnancy, antepartum  -     Elastic Bandages & Supports (JOBST RELIEF KNEE HIGH/MEDIUM) MISC; 1 box by Does not apply route daily    30 weeks gestation of pregnancy  -     Elastic Bandages & Supports (JOBST RELIEF KNEE HIGH/MEDIUM) MISC; 1 box by Does not apply route daily    Poor fetal growth affecting management of mother in third trimester, single or unspecified fetus  Bonny Aden will monitor for fetal movements daily. Fetal Kick Count was reinforced. She was instructed to lay on her left side 20 minutes after eating and count movements for up to 2 hours with a target value of 10 movements.  Instructed to notify office if she does not make the target. She reports adequate kick counts. Reviewed how to contact midwives. Pain in both lower extremities  -     Elastic Bandages & Supports (JOBST RELIEF KNEE HIGH/MEDIUM) MISC; 1 box by Does not apply route daily      Reactive NST    Return in about 1 week (around 10/24/2022) for Return OB & weekly NST. The problem list was reviewed and updated as needed with any new problems. Agustíntrish Sorto was counseled regarding all of the above. Patient was seen with total face to face time of 15 minutes. More than 50% of this visit was for counseling and education regarding encounter diagnoses and all of the above.     Electronically signed by: DARRIAN Hand CNM

## 2022-10-26 ENCOUNTER — ROUTINE PRENATAL (OUTPATIENT)
Dept: PERINATAL CARE | Age: 22
End: 2022-10-26
Payer: COMMERCIAL

## 2022-10-26 VITALS
RESPIRATION RATE: 16 BRPM | TEMPERATURE: 98.1 F | HEIGHT: 63 IN | DIASTOLIC BLOOD PRESSURE: 62 MMHG | HEART RATE: 99 BPM | WEIGHT: 164 LBS | SYSTOLIC BLOOD PRESSURE: 96 MMHG | BODY MASS INDEX: 29.06 KG/M2

## 2022-10-26 DIAGNOSIS — Z3A.31 31 WEEKS GESTATION OF PREGNANCY: ICD-10-CM

## 2022-10-26 DIAGNOSIS — O36.5930 INTRAUTERINE GROWTH RESTRICTION (IUGR) AFFECTING CARE OF MOTHER, THIRD TRIMESTER, SINGLE GESTATION: ICD-10-CM

## 2022-10-26 DIAGNOSIS — Z03.74 SUSPECTED PROBLEM WITH FETAL GROWTH NOT FOUND: ICD-10-CM

## 2022-10-26 DIAGNOSIS — Z36.4 ULTRASOUND FOR ANTENATAL SCREENING FOR FETAL GROWTH RESTRICTION: ICD-10-CM

## 2022-10-26 DIAGNOSIS — O99.413 MATERNAL CARDIOVASCULAR DISEASE AFFECTING PREGNANCY IN THIRD TRIMESTER: ICD-10-CM

## 2022-10-26 DIAGNOSIS — O35.07X0 FETAL MICROCEPHALY AFFECTING ANTEPARTUM CARE OF MOTHER, SINGLE OR UNSPECIFIED FETUS: Primary | ICD-10-CM

## 2022-10-26 DIAGNOSIS — Z13.89 ENCOUNTER FOR ROUTINE SCREENING FOR MALFORMATION USING ULTRASONICS: ICD-10-CM

## 2022-10-26 DIAGNOSIS — O35.8XX0 SUSPECTED DAMAGE TO FETUS FROM DISEASE IN MOTHER, ANTEPARTUM CONDITION, SINGLE OR UNSPECIFIED FETUS: ICD-10-CM

## 2022-10-26 DIAGNOSIS — O09.899 SHORT INTERVAL BETWEEN PREGNANCIES COMPLICATING PREGNANCY, ANTEPARTUM: ICD-10-CM

## 2022-10-26 PROCEDURE — 99999 PR OFFICE/OUTPT VISIT,PROCEDURE ONLY: CPT | Performed by: OBSTETRICS & GYNECOLOGY

## 2022-10-26 PROCEDURE — 76819 FETAL BIOPHYS PROFIL W/O NST: CPT | Performed by: OBSTETRICS & GYNECOLOGY

## 2022-10-26 PROCEDURE — 76805 OB US >/= 14 WKS SNGL FETUS: CPT | Performed by: OBSTETRICS & GYNECOLOGY

## 2022-10-27 ENCOUNTER — ROUTINE PRENATAL (OUTPATIENT)
Dept: OBGYN CLINIC | Age: 22
End: 2022-10-27
Payer: COMMERCIAL

## 2022-10-27 ENCOUNTER — HOSPITAL ENCOUNTER (OUTPATIENT)
Age: 22
Setting detail: SPECIMEN
Discharge: HOME | End: 2022-10-27
Payer: COMMERCIAL

## 2022-10-27 VITALS — DIASTOLIC BLOOD PRESSURE: 60 MMHG | WEIGHT: 163.4 LBS | BODY MASS INDEX: 28.95 KG/M2 | SYSTOLIC BLOOD PRESSURE: 102 MMHG

## 2022-10-27 DIAGNOSIS — O09.891 SHORT INTERVAL BETWEEN PREGNANCIES AFFECTING PREGNANCY IN FIRST TRIMESTER, ANTEPARTUM: ICD-10-CM

## 2022-10-27 DIAGNOSIS — O36.5930 POOR FETAL GROWTH AFFECTING MANAGEMENT OF MOTHER IN THIRD TRIMESTER, SINGLE OR UNSPECIFIED FETUS: ICD-10-CM

## 2022-10-27 DIAGNOSIS — O35.07X0 FETAL MICROCEPHALY: ICD-10-CM

## 2022-10-27 DIAGNOSIS — O09.90 HIGH RISK PREGNANCY, ANTEPARTUM: Primary | ICD-10-CM

## 2022-10-27 DIAGNOSIS — Z3A.31 31 WEEKS GESTATION OF PREGNANCY: ICD-10-CM

## 2022-10-27 DIAGNOSIS — Z86.39 HISTORY OF HYPOTHYROIDISM: ICD-10-CM

## 2022-10-27 DIAGNOSIS — R55 NEUROCARDIOGENIC SYNCOPE: ICD-10-CM

## 2022-10-27 DIAGNOSIS — Z86.32 HISTORY OF GESTATIONAL DIABETES: ICD-10-CM

## 2022-10-27 LAB — TSH SERPL DL<=0.05 MIU/L-ACNC: 1.48 UIU/ML (ref 0.3–5)

## 2022-10-27 PROCEDURE — G8482 FLU IMMUNIZE ORDER/ADMIN: HCPCS

## 2022-10-27 PROCEDURE — G8427 DOCREV CUR MEDS BY ELIG CLIN: HCPCS

## 2022-10-27 PROCEDURE — 84443 ASSAY THYROID STIM HORMONE: CPT

## 2022-10-27 PROCEDURE — 59025 FETAL NON-STRESS TEST: CPT

## 2022-10-27 PROCEDURE — 99212 OFFICE O/P EST SF 10 MIN: CPT

## 2022-10-27 PROCEDURE — 36415 COLL VENOUS BLD VENIPUNCTURE: CPT

## 2022-10-27 PROCEDURE — G8419 CALC BMI OUT NRM PARAM NOF/U: HCPCS

## 2022-10-27 PROCEDURE — 1036F TOBACCO NON-USER: CPT

## 2022-10-27 NOTE — PROGRESS NOTES
Patient here for 31w3d prenatal visit  Patient states fetal movement Present  Patient concerns: none

## 2022-10-27 NOTE — PROGRESS NOTES
SUBJECTIVE:    Raul Eaton is here for her return OB visit. She declines concerns. She reports feeling fetal movement. She denies vaginal bleeding. She denies vaginal discharge. She denies leaking of fluid. She denies uterine cramping. She denies  nausea and/or vomiting. OBJECTIVE:  Blood pressure 102/60, weight 163 lb 6.4 oz (74.1 kg), last menstrual period 2022, unknown if currently breastfeeding. Total weight gain: 7 lb 6.4 oz (3.357 kg)    Raul Eaton has received the flu vaccine as appropriate. Raul Eaton has received the Tdap vaccine as appropriate  Raul Eaton has received the COVID vaccine as appropriate    NST: reactive     ASSESSMENT/PLAN:  1. High risk pregnancy, antepartum  IUP @ 31 weeks  S = D    2. 31 weeks gestation of pregnancy  Due date is based on 11w1d early dating ultrasound  Patient's prenatal labs are completed. Patient's blood type A positive and rhogam is not indicated in pregnancy. Patient accepted genetic screening. First trimester screen is completed and WNL. Msafp was ordered and normal.  Anatomy scan completed 8/10/22. Placenta posterior,  normal cord insertion, cervical length 47-55mm, no low lying/previa noted. Follow up at 26 weeks. Glucola completed between 24-28 weeks and WNL. Reviewed fetal kick counts  Reviewed signs and symptoms of preeclampsia  Reviewed signs and symptoms of  labor versus cande garcia contractions     3. History of gestational diabetes  - 24-28 week glucose WNL    4. History of hypothyroidism  - Repeat TSH ordered at this visit     5. Poor fetal growth affecting management of mother in third trimester, single or unspecified fetus  - Weekly NSTs starting at 31 weeks  - Following with MFM  - Repeat growth/BPP schedule for 22  - NST reactive    She was counseled regarding all of the above:    Return in about 1 week (around 11/3/2022) for NST.     The patient, Raul Eaton was seen with a total time spent of 15 minutes for the visit on this date of service by the [facility]  Both face-to-face (counseling and education) and non face-to-face time (care coordination), were spent in determining the total time component.      Electronically Signed By: DARRIAN Melendez CNM

## 2022-11-01 ENCOUNTER — TELEPHONE (OUTPATIENT)
Dept: OBGYN CLINIC | Age: 22
End: 2022-11-01

## 2022-11-01 DIAGNOSIS — R19.7 DIARRHEA, UNSPECIFIED TYPE: Primary | ICD-10-CM

## 2022-11-01 RX ORDER — LOPERAMIDE HYDROCHLORIDE 2 MG/1
2 CAPSULE ORAL 4 TIMES DAILY PRN
Qty: 30 CAPSULE | Refills: 0 | Status: SHIPPED | OUTPATIENT
Start: 2022-11-01 | End: 2022-11-11

## 2022-11-01 RX ORDER — ACETAMINOPHEN 500 MG
1000 TABLET ORAL EVERY 8 HOURS PRN
Qty: 60 TABLET | Refills: 0 | Status: SHIPPED | OUTPATIENT
Start: 2022-11-01

## 2022-11-01 NOTE — TELEPHONE ENCOUNTER
Spoke with patient. Patient reports she had about 4 episodes of vomiting with abdominal cramping and diarrhea over night. She reports the vomiting has stopped but she is still having diarrhea. She reports she feels she may have food poisoning or eaten something that upset her. She reports she desires to move her appointment moved to tomorrow. Discussed I would send in immodium and Tylenol to see if this is helpful. Discussed increasing water intake. If symptoms worsen patient is to call back. Patient is agreeable with plan at this time.

## 2022-11-01 NOTE — TELEPHONE ENCOUNTER
Prenatal 32w1d called stating last night she had projectile vomiting and diarrhea. This morning she still is having diarrhea along with back and stomach pain. Patient wondering what she should do.  Has an appointment today at [place] [id number].

## 2022-11-03 ENCOUNTER — TELEPHONE (OUTPATIENT)
Dept: OBGYN CLINIC | Age: 22
End: 2022-11-03

## 2022-11-03 NOTE — TELEPHONE ENCOUNTER
PN 32w3d called wondering if you could go into labor without having any pain. Patient states she feels baby's head is very low in her pelvis. Also, unsure if she water broke while using the restroom states fluid came out but doesn't think she urinated. Had some decreased fetal movement yesterday. Patient wondering what she should do.

## 2022-11-04 NOTE — TELEPHONE ENCOUNTER
Called patient, no answer. Left message for patient, checking on to see how she is feeling and to reschedule missed appointment on 11/2/22.

## 2022-11-08 ENCOUNTER — TELEPHONE (OUTPATIENT)
Dept: OBGYN CLINIC | Age: 22
End: 2022-11-08

## 2022-11-08 NOTE — TELEPHONE ENCOUNTER
Prenatal 33w1d called with complaint of decreased fetal movement, states she has not felt baby move since last night. Advised patient to drink something cold, sit down and focus on feeling for baby's movement until we are able to call her back. Also patient states she is unable to move her legs due to back and side pain that she is having. Has tried heating pad and tylenol but states is does not help with the pain.

## 2022-11-08 NOTE — TELEPHONE ENCOUNTER
Called patient to see if she had felt baby move since her initial phone call. Patient states she has been feeling baby move all around since she call the office.

## 2022-11-08 NOTE — TELEPHONE ENCOUNTER
Left detailed message for patient if she is not meeting kick counts she needs to go to [facility] for evaluation.  Left office number for patient to call back if she has any questions

## 2022-11-09 ENCOUNTER — HOSPITAL ENCOUNTER (EMERGENCY)
Age: 22
Discharge: HOME | End: 2022-11-09
Attending: EMERGENCY MEDICINE
Payer: COMMERCIAL

## 2022-11-09 ENCOUNTER — TELEPHONE (OUTPATIENT)
Dept: OBGYN CLINIC | Age: 22
End: 2022-11-09

## 2022-11-09 VITALS
BODY MASS INDEX: 28.88 KG/M2 | WEIGHT: 163 LBS | OXYGEN SATURATION: 99 % | TEMPERATURE: 97.6 F | RESPIRATION RATE: 17 BRPM | SYSTOLIC BLOOD PRESSURE: 101 MMHG | HEART RATE: 99 BPM | HEIGHT: 63 IN | DIASTOLIC BLOOD PRESSURE: 61 MMHG

## 2022-11-09 DIAGNOSIS — R00.2 PALPITATIONS: Primary | ICD-10-CM

## 2022-11-09 LAB
ABSOLUTE EOS #: 0 K/UL (ref 0–0.4)
ABSOLUTE LYMPH #: 1.3 K/UL (ref 1–4.8)
ABSOLUTE MONO #: 0.5 K/UL (ref 0.1–1.3)
ANION GAP SERPL CALCULATED.3IONS-SCNC: 9 MMOL/L (ref 9–17)
BASOPHILS # BLD: 0 % (ref 0–2)
BASOPHILS ABSOLUTE: 0 K/UL (ref 0–0.2)
BUN BLDV-MCNC: 8 MG/DL (ref 6–20)
CALCIUM SERPL-MCNC: 8.6 MG/DL (ref 8.6–10.4)
CHLORIDE BLD-SCNC: 104 MMOL/L (ref 98–107)
CO2: 23 MMOL/L (ref 20–31)
CREAT SERPL-MCNC: 0.57 MG/DL (ref 0.5–0.9)
EOSINOPHILS RELATIVE PERCENT: 1 % (ref 0–4)
GFR SERPL CREATININE-BSD FRML MDRD: >60 ML/MIN/1.73M2
GLUCOSE BLD-MCNC: 102 MG/DL (ref 70–99)
HCT VFR BLD CALC: 32.2 % (ref 36–46)
HEMOGLOBIN: 11.4 G/DL (ref 12–16)
LYMPHOCYTES # BLD: 15 % (ref 24–44)
MCH RBC QN AUTO: 33.7 PG (ref 26–34)
MCHC RBC AUTO-ENTMCNC: 35.4 G/DL (ref 31–37)
MCV RBC AUTO: 95.2 FL (ref 80–100)
MONOCYTES # BLD: 7 % (ref 1–7)
MYOGLOBIN: <21 NG/ML (ref 25–58)
PDW BLD-RTO: 14.1 % (ref 11.5–14.9)
PLATELET # BLD: 304 K/UL (ref 150–450)
PMV BLD AUTO: 6.4 FL (ref 6–12)
POTASSIUM SERPL-SCNC: 3.8 MMOL/L (ref 3.7–5.3)
RBC # BLD: 3.38 M/UL (ref 4–5.2)
SEG NEUTROPHILS: 77 % (ref 36–66)
SEGMENTED NEUTROPHILS ABSOLUTE COUNT: 6.3 K/UL (ref 1.3–9.1)
SODIUM BLD-SCNC: 136 MMOL/L (ref 135–144)
THYROXINE, FREE: 0.87 NG/DL (ref 0.93–1.7)
TROPONIN, HIGH SENSITIVITY: <6 NG/L (ref 0–14)
TSH SERPL DL<=0.05 MIU/L-ACNC: 1 UIU/ML (ref 0.3–5)
WBC # BLD: 8.2 K/UL (ref 3.5–11)

## 2022-11-09 PROCEDURE — 83874 ASSAY OF MYOGLOBIN: CPT

## 2022-11-09 PROCEDURE — 84439 ASSAY OF FREE THYROXINE: CPT

## 2022-11-09 PROCEDURE — 84484 ASSAY OF TROPONIN QUANT: CPT

## 2022-11-09 PROCEDURE — 80048 BASIC METABOLIC PNL TOTAL CA: CPT

## 2022-11-09 PROCEDURE — 84443 ASSAY THYROID STIM HORMONE: CPT

## 2022-11-09 PROCEDURE — 85025 COMPLETE CBC W/AUTO DIFF WBC: CPT

## 2022-11-09 PROCEDURE — 36415 COLL VENOUS BLD VENIPUNCTURE: CPT

## 2022-11-09 PROCEDURE — 99284 EMERGENCY DEPT VISIT MOD MDM: CPT

## 2022-11-09 PROCEDURE — 93005 ELECTROCARDIOGRAM TRACING: CPT | Performed by: EMERGENCY MEDICINE

## 2022-11-09 ASSESSMENT — ENCOUNTER SYMPTOMS
VOMITING: 0
EYE REDNESS: 0
DIARRHEA: 0
EYE PAIN: 0
COUGH: 0
ABDOMINAL PAIN: 0
SINUS PRESSURE: 0
RHINORRHEA: 0
BACK PAIN: 0
TROUBLE SWALLOWING: 0
NAUSEA: 0
CONSTIPATION: 0
EYE DISCHARGE: 0
WHEEZING: 0
COLOR CHANGE: 0
CHEST TIGHTNESS: 0
FACIAL SWELLING: 0
SHORTNESS OF BREATH: 1
SORE THROAT: 0
BLOOD IN STOOL: 0

## 2022-11-09 ASSESSMENT — PAIN SCALES - GENERAL: PAINLEVEL_OUTOF10: 6

## 2022-11-09 ASSESSMENT — PAIN - FUNCTIONAL ASSESSMENT: PAIN_FUNCTIONAL_ASSESSMENT: 0-10

## 2022-11-09 ASSESSMENT — LIFESTYLE VARIABLES
HOW MANY STANDARD DRINKS CONTAINING ALCOHOL DO YOU HAVE ON A TYPICAL DAY: PATIENT DOES NOT DRINK
HOW OFTEN DO YOU HAVE A DRINK CONTAINING ALCOHOL: NEVER

## 2022-11-09 NOTE — TELEPHONE ENCOUNTER
Patient called and reports she is having right sided leg cramping and pain and reports her leg will occasionally feel tingly. Discussed this is likely sciatica pain. Discussed googling stretches for sciatica pain and trying these. Also reviewed tylenol, epsom salt baths, and chiropractic care. Patient verbalized understanding. Patient reports she also has been having on and off palpitations for the last few days. She reports she will feel short of breath and dizzy when she has palpitation. Discussed recommendation to go to ER for evaluation of palpitations and patient verbalized understanding.

## 2022-11-09 NOTE — ED PROVIDER NOTES
[address] ED  eMERGENCY dEPARTMENT eNCOUnter      Pt Name: Sindy Sellers  MRN: [id number]  [place] 2000  Date of evaluation: 11/9/22      CHIEF COMPLAINT       Chief Complaint   Patient presents with    Palpitations    Shortness of Breath         HISTORY OF PRESENT ILLNESS    Sindy Sellers is a 25 y.o. female who presents complaining of palpitations. Patient is 33 weeks pregnant she of SVT and states for the last 2 days she has been having intermittent episodes of palpitations. She states sometimes she feels like her heart to beat out of her chest all times she feels like it skipping a beat sometimes it feels slow. Usually these episodes only last for about 30 seconds and she has associated shortness of breath and some chest discomfort. Patient states its not constant. Patient's had no pain or swelling in the legs. Patient states the baby has been moving patient denies any abdominal pain difficulty urinating or vaginal bleeding. REVIEW OF SYSTEMS       Review of Systems   Constitutional:  Negative for activity change, appetite change, chills, diaphoresis and fever. HENT:  Negative for congestion, ear pain, facial swelling, nosebleeds, rhinorrhea, sinus pressure, sore throat and trouble swallowing. Eyes:  Negative for pain, discharge and redness. Respiratory:  Positive for shortness of breath. Negative for cough, chest tightness and wheezing. Cardiovascular:  Positive for chest pain and palpitations. Negative for leg swelling. Gastrointestinal:  Negative for abdominal pain, blood in stool, constipation, diarrhea, nausea and vomiting. Genitourinary:  Negative for difficulty urinating, dysuria, flank pain, frequency, genital sores and hematuria. Musculoskeletal:  Negative for arthralgias, back pain, gait problem, joint swelling, myalgias and neck pain. Skin:  Negative for color change, pallor, rash and wound.    Neurological:  Negative for dizziness, tremors, seizures, syncope, speech difficulty, weakness, numbness and headaches. Psychiatric/Behavioral:  Negative for confusion, decreased concentration, hallucinations, self-injury, sleep disturbance and suicidal ideas. PAST MEDICAL HISTORY     Past Medical History:   Diagnosis Date    ADD (attention deficit disorder)     Adopted 2022    Anxiety     Asthma     CMV (cytomegalovirus infection) ([facility] 2019    Depression     Family history of clotting disorder 2019 patient's maternal aunt with hx of Protein S and C deficiency    Family history of diabetes mellitus 2019 early one hour GTT ordered    Gestational diabetes mellitus (GDM), antepartum 2019    Hearing loss in left ear     Heart disease 2018    SVT    Hypothyroidism 2019    Immunizations up to date in pediatric patient     Neurocardiogenic syncope     Pseudoseizures ([facility]     PTSD (post-traumatic stress disorder)     Raynaud disease     Seizures ([facility] 3/15/2020    Severe recurrent major depression without psychotic features ([facility] 3/22/2020     19 M Apg  Wt 7#11 2019    SVT (supraventricular tachycardia) ([facility] [address] [facility]     Tachycardia     Saw Dr. Ravi Alex 5 yrs ago\".   Echo and holter monitor done, neg results    Traumatic injury during pregnancy, third trimester     Wears glasses        SURGICAL HISTORY       Past Surgical History:   Procedure Laterality Date    REMOVAL FOREIGN BODY EAR Left     \"insect laid eggs\"    TYMPANOPLASTY Left 2016       CURRENT MEDICATIONS       Current Discharge Medication List        CONTINUE these medications which have NOT CHANGED    Details   loperamide (RA ANTI-DIARRHEAL) 2 MG capsule Take 1 capsule by mouth 4 times daily as needed for Diarrhea  Qty: 30 capsule, Refills: 0    Associated Diagnoses: Diarrhea, unspecified type      acetaminophen (TYLENOL) 500 MG tablet Take 2 tablets by mouth every 8 hours as needed for Pain  Qty: 60 tablet, Refills: 0    Associated Diagnoses: Diarrhea, unspecified type      Elastic Bandages & Supports (JOBST RELIEF KNEE HIGH/MEDIUM) MISC 1 box by Does not apply route daily  Qty: 1 each, Refills: 0    Associated Diagnoses: High risk pregnancy, antepartum; Pain in both lower extremities; 30 weeks gestation of pregnancy      albuterol sulfate HFA (VENTOLIN HFA) 108 (90 Base) MCG/ACT inhaler Inhale 2 puffs into the lungs 4 times daily as needed for Wheezing  Qty: 54 g, Refills: 1    Associated Diagnoses: High risk pregnancy, antepartum; Mild intermittent asthma, unspecified whether complicated; 28 weeks gestation of pregnancy      Prenatal Vit-Fe Fumarate-FA (PRENATAL VITAMINS) 28-0.8 MG TABS Take 1 tablet by mouth daily  Qty: 30 tablet, Refills: 12    Associated Diagnoses: High risk pregnancy, antepartum; 28 weeks gestation of pregnancy             ALLERGIES     is allergic to sulfa antibiotics. FAMILY HISTORY     [unfilled]     SOCIAL HISTORY      reports that she has quit smoking. Her smoking use included cigars and cigarettes. She smoked an average of .5 packs per day. She has never used smokeless tobacco. She reports that she does not currently use drugs after having used the following drugs: Marijuana Deadra Jeff). She reports that she does not drink alcohol. PHYSICAL EXAM     INITIAL VITALS: /61   Pulse 99   Temp 97.6 °F (36.4 °C) (Oral)   Resp 17   Ht 5' 3\" (1.6 m)   Wt 163 lb (73.9 kg)   LMP 03/22/2022 (Approximate)   SpO2 99%   BMI 28.87 kg/m²      Physical Exam  Vitals and nursing note reviewed. Constitutional:       General: She is not in acute distress. Appearance: She is well-developed. She is not diaphoretic. HENT:      Head: Normocephalic and atraumatic. Eyes:      General: No scleral icterus. Right eye: No discharge. Left eye: No discharge. Conjunctiva/sclera: Conjunctivae normal.      Pupils: Pupils are equal, round, and reactive to light.    Cardiovascular:      Rate and Rhythm: Normal rate and regular rhythm. Heart sounds: Normal heart sounds. No murmur heard. No friction rub. No gallop. Pulmonary:      Effort: Pulmonary effort is normal. No respiratory distress. Breath sounds: Normal breath sounds. No wheezing or rales. Chest:      Chest wall: No tenderness. Abdominal:      General: Bowel sounds are normal. There is no distension. Palpations: Abdomen is soft. There is no mass. Tenderness: There is no abdominal tenderness. There is no guarding or rebound. Musculoskeletal:         General: No tenderness. Normal range of motion. Skin:     General: Skin is warm and dry. Coloration: Skin is not pale. Findings: No erythema or rash. Neurological:      Mental Status: She is alert and oriented to person, place, and time. Cranial Nerves: No cranial nerve deficit. Sensory: No sensory deficit. Motor: No abnormal muscle tone. Coordination: Coordination normal.      Deep Tendon Reflexes: Reflexes normal.   Psychiatric:         Behavior: Behavior normal.         Thought Content: Thought content normal.         Judgment: Judgment normal.       MEDICAL DECISION MAKING:     Patient does not appear to be having any complaints about her abdomen to be worried about the baby to swell palpitations and some chest pain so I will do a cardiac work-up but I will talk it over with OB to see if they want to send down the nurses to do a monitoring or not.     DIAGNOSTIC RESULTS     EKG: All EKG's are interpreted by the Emergency Department Physician who either signs or Co-signs this chart in the absence of a cardiologist.    EKG Interpretation    Interpreted by me    Rhythm: normal sinus   Rate: normal  Axis: normal  Ectopy: none  Conduction: normal  ST Segments: no acute change  T Waves: no acute change  Q Waves: none    Clinical Impression: no acute changes and normal EKG    RADIOLOGY:All plain film, CT, MRI, and formal ultrasound images (except ED bedside ultrasound)are read by the radiologist and interpretations are directly viewed by the emergency physician. No results found. LABS: All lab results were reviewed bymyself, and all abnormals are listed below. Labs Reviewed   BASIC METABOLIC PANEL - Abnormal; Notable for the following components:       Result Value    Glucose 102 (*)     All other components within normal limits   CBC WITH AUTO DIFFERENTIAL - Abnormal; Notable for the following components:    RBC 3.38 (*)     Hemoglobin 11.4 (*)     Hematocrit 32.2 (*)     Seg Neutrophils 77 (*)     Lymphocytes 15 (*)     All other components within normal limits   TROP/MYOGLOBIN - Abnormal; Notable for the following components:    Myoglobin <21 (*)     All other components within normal limits   T4, FREE - Abnormal; Notable for the following components:    Thyroxine, Free 0.87 (*)     All other components within normal limits   TSH         EMERGENCY DEPARTMENT COURSE:   Vitals:    Vitals:    11/09/22 1432   BP: 101/61   Pulse: 99   Resp: 17   Temp: 97.6 °F (36.4 °C)   TempSrc: Oral   SpO2: 99%   Weight: 163 lb (73.9 kg)   Height: 5' 3\" (1.6 m)       The patient was given the following medications while in the emergency department:   No orders of the defined types were placed in this encounter. -------------------------  3:02 PM EST  Discussed the case with Dr. Emily Leroy for [facility] and he is okay with the patient being seen in the ER and then following up tomorrow at her appointment with the midwives. 4:11 PM EST  Patient has been evaluated everything comes back normal I am okay discharging her home patient was updated and is okay with the plan of following up tomorrow with her midwives as scheduled. CRITICAL CARE:   None    CONSULTS:  IP CONSULT TO OB GYN    PROCEDURES:  None    FINAL IMPRESSION      1.  Palpitations          DISPOSITION/PLAN   DISPOSITION Decision To Discharge 11/09/2022 04:10:27 PM      PATIENT REFERRED TO:  [facility]  [address] [facility] [address]  [phone]    If symptoms worsen    DISCHARGE MEDICATIONS:  Current Discharge Medication List          (Please note that portions of this note were completed with a voice recognition program.  Efforts were made to edit the dictations but occasionally words are mis-transcribed.)    Jose Holman MD  Attending Mansi English MD  11/09/22 2757

## 2022-11-10 ENCOUNTER — HOSPITAL ENCOUNTER (OUTPATIENT)
Age: 22
Setting detail: SPECIMEN
Discharge: HOME | End: 2022-11-10
Payer: COMMERCIAL

## 2022-11-10 ENCOUNTER — ROUTINE PRENATAL (OUTPATIENT)
Dept: OBGYN CLINIC | Age: 22
End: 2022-11-10
Payer: COMMERCIAL

## 2022-11-10 VITALS — SYSTOLIC BLOOD PRESSURE: 104 MMHG | DIASTOLIC BLOOD PRESSURE: 70 MMHG | WEIGHT: 162.2 LBS | BODY MASS INDEX: 28.73 KG/M2

## 2022-11-10 DIAGNOSIS — Z86.39 HISTORY OF HYPOTHYROIDISM: ICD-10-CM

## 2022-11-10 DIAGNOSIS — O09.891 SHORT INTERVAL BETWEEN PREGNANCIES AFFECTING PREGNANCY IN FIRST TRIMESTER, ANTEPARTUM: ICD-10-CM

## 2022-11-10 DIAGNOSIS — O09.90 HIGH RISK PREGNANCY, ANTEPARTUM: ICD-10-CM

## 2022-11-10 DIAGNOSIS — Z86.32 HISTORY OF GESTATIONAL DIABETES: ICD-10-CM

## 2022-11-10 DIAGNOSIS — Z3A.33 33 WEEKS GESTATION OF PREGNANCY: Primary | ICD-10-CM

## 2022-11-10 DIAGNOSIS — O26.899 PELVIC PAIN IN PREGNANCY: ICD-10-CM

## 2022-11-10 DIAGNOSIS — R10.2 PELVIC PAIN IN PREGNANCY: ICD-10-CM

## 2022-11-10 DIAGNOSIS — O35.07X0 FETAL MICROCEPHALY: ICD-10-CM

## 2022-11-10 DIAGNOSIS — R55 NEUROCARDIOGENIC SYNCOPE: ICD-10-CM

## 2022-11-10 LAB
EKG ATRIAL RATE: 82 BPM
EKG P AXIS: 38 DEGREES
EKG P-R INTERVAL: 118 MS
EKG Q-T INTERVAL: 368 MS
EKG QRS DURATION: 76 MS
EKG QTC CALCULATION (BAZETT): 429 MS
EKG R AXIS: 43 DEGREES
EKG T AXIS: 28 DEGREES
EKG VENTRICULAR RATE: 82 BPM
TSH SERPL DL<=0.05 MIU/L-ACNC: 1.44 UIU/ML (ref 0.3–5)

## 2022-11-10 PROCEDURE — 36415 COLL VENOUS BLD VENIPUNCTURE: CPT

## 2022-11-10 PROCEDURE — 1036F TOBACCO NON-USER: CPT | Performed by: ADVANCED PRACTICE MIDWIFE

## 2022-11-10 PROCEDURE — 99214 OFFICE O/P EST MOD 30 MIN: CPT | Performed by: ADVANCED PRACTICE MIDWIFE

## 2022-11-10 PROCEDURE — G8427 DOCREV CUR MEDS BY ELIG CLIN: HCPCS | Performed by: ADVANCED PRACTICE MIDWIFE

## 2022-11-10 PROCEDURE — 84443 ASSAY THYROID STIM HORMONE: CPT

## 2022-11-10 PROCEDURE — G8482 FLU IMMUNIZE ORDER/ADMIN: HCPCS | Performed by: ADVANCED PRACTICE MIDWIFE

## 2022-11-10 PROCEDURE — G8419 CALC BMI OUT NRM PARAM NOF/U: HCPCS | Performed by: ADVANCED PRACTICE MIDWIFE

## 2022-11-10 PROCEDURE — 93010 ELECTROCARDIOGRAM REPORT: CPT | Performed by: INTERNAL MEDICINE

## 2022-11-10 NOTE — LETTER
[facility]  [address] [place] [address] 68 [id number]  Phone: [phone]  Fax: [phone]    DARRIAN Andrade CNM        November 10, 2022     Patient: Nolvia Washington   YOB: 2000   Date of Visit: 11/10/2022       To Whom It May Concern: It is my medical opinion that Benedicto Sequeira should remain out of work until further notice due to complications of pregnancy. If you have any questions or concerns, please don't hesitate to call.     Sincerely,        DARRIAN Andrade CNM

## 2022-11-10 NOTE — PROGRESS NOTES
Patient here for 33w3d prenatal visit  Patient states fetal movement Present  Patient concerns: back and leg pain that causes her leg to go numb
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
## 2022-11-14 ENCOUNTER — TELEPHONE (OUTPATIENT)
Dept: OBGYN CLINIC | Age: 22
End: 2022-11-14

## 2022-11-14 NOTE — TELEPHONE ENCOUNTER
Called Dr Edgard Baptiste office to schedule appointment, at this time the office is scheduling into the new year. [facility] in [place] to schedule appointment. Per the office they would need her records and referral faxed to the office and then they would call and schedule the patient an appointment. Records and referral faxed on 11/14/22.

## 2022-11-14 NOTE — TELEPHONE ENCOUNTER
----- Message from DARRIAN EdwardsM sent at 11/10/2022  8:26 PM EST -----  Regarding: cardiology  It does not look like she has been to cardiology. She was in the ED 11/9/22 for palpitations w/ hx SVT. Can you please get an appointment scheduled for her. Thank you!

## 2022-11-16 ENCOUNTER — ROUTINE PRENATAL (OUTPATIENT)
Dept: OBGYN CLINIC | Age: 22
End: 2022-11-16
Payer: COMMERCIAL

## 2022-11-16 VITALS — SYSTOLIC BLOOD PRESSURE: 118 MMHG | DIASTOLIC BLOOD PRESSURE: 76 MMHG

## 2022-11-16 DIAGNOSIS — Z3A.34 34 WEEKS GESTATION OF PREGNANCY: ICD-10-CM

## 2022-11-16 DIAGNOSIS — O09.891 SHORT INTERVAL BETWEEN PREGNANCIES AFFECTING PREGNANCY IN FIRST TRIMESTER, ANTEPARTUM: ICD-10-CM

## 2022-11-16 DIAGNOSIS — Z86.32 HISTORY OF GESTATIONAL DIABETES: ICD-10-CM

## 2022-11-16 DIAGNOSIS — O36.5930 POOR FETAL GROWTH AFFECTING MANAGEMENT OF MOTHER IN THIRD TRIMESTER, SINGLE OR UNSPECIFIED FETUS: Primary | ICD-10-CM

## 2022-11-16 DIAGNOSIS — R55 NEUROCARDIOGENIC SYNCOPE: ICD-10-CM

## 2022-11-16 DIAGNOSIS — O09.90 HIGH RISK PREGNANCY, ANTEPARTUM: ICD-10-CM

## 2022-11-16 DIAGNOSIS — O35.07X0 FETAL MICROCEPHALY: Primary | ICD-10-CM

## 2022-11-16 DIAGNOSIS — O36.5930 POOR FETAL GROWTH AFFECTING MANAGEMENT OF MOTHER IN THIRD TRIMESTER, SINGLE OR UNSPECIFIED FETUS: ICD-10-CM

## 2022-11-16 PROCEDURE — 59025 FETAL NON-STRESS TEST: CPT | Performed by: ADVANCED PRACTICE MIDWIFE

## 2022-11-16 PROCEDURE — 99213 OFFICE O/P EST LOW 20 MIN: CPT | Performed by: ADVANCED PRACTICE MIDWIFE
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
## 2022-11-21 ENCOUNTER — HOSPITAL ENCOUNTER (OUTPATIENT)
Age: 22
Discharge: HOME | End: 2022-11-21
Attending: OBSTETRICS & GYNECOLOGY | Admitting: OBSTETRICS & GYNECOLOGY
Payer: COMMERCIAL

## 2022-11-21 ENCOUNTER — HOSPITAL ENCOUNTER (EMERGENCY)
Age: 22
Discharge: HOME | End: 2022-11-21
Attending: EMERGENCY MEDICINE
Payer: COMMERCIAL

## 2022-11-21 ENCOUNTER — TELEPHONE (OUTPATIENT)
Dept: OBGYN CLINIC | Age: 22
End: 2022-11-21

## 2022-11-21 VITALS
WEIGHT: 163 LBS | BODY MASS INDEX: 28.88 KG/M2 | HEART RATE: 76 BPM | TEMPERATURE: 98.2 F | RESPIRATION RATE: 17 BRPM | OXYGEN SATURATION: 97 % | SYSTOLIC BLOOD PRESSURE: 113 MMHG | HEIGHT: 63 IN | DIASTOLIC BLOOD PRESSURE: 69 MMHG

## 2022-11-21 VITALS
SYSTOLIC BLOOD PRESSURE: 96 MMHG | TEMPERATURE: 98.4 F | RESPIRATION RATE: 16 BRPM | HEART RATE: 88 BPM | DIASTOLIC BLOOD PRESSURE: 55 MMHG | OXYGEN SATURATION: 100 %

## 2022-11-21 DIAGNOSIS — R00.2 PALPITATIONS: Primary | ICD-10-CM

## 2022-11-21 PROBLEM — I47.10 SVT (SUPRAVENTRICULAR TACHYCARDIA): Status: ACTIVE | Noted: 2022-11-21

## 2022-11-21 PROBLEM — I47.1 SVT (SUPRAVENTRICULAR TACHYCARDIA) (HCC): Status: ACTIVE | Noted: 2022-11-21

## 2022-11-21 PROBLEM — Z3A.35 35 WEEKS GESTATION OF PREGNANCY: Status: ACTIVE | Noted: 2022-11-21

## 2022-11-21 PROCEDURE — 99213 OFFICE O/P EST LOW 20 MIN: CPT

## 2022-11-21 PROCEDURE — 99284 EMERGENCY DEPT VISIT MOD MDM: CPT

## 2022-11-21 PROCEDURE — 93005 ELECTROCARDIOGRAM TRACING: CPT

## 2022-11-21 PROCEDURE — 99219 PR INITIAL OBSERVATION CARE/DAY 50 MINUTES: CPT | Performed by: ADVANCED PRACTICE MIDWIFE

## 2022-11-21 ASSESSMENT — PAIN - FUNCTIONAL ASSESSMENT: PAIN_FUNCTIONAL_ASSESSMENT: NONE - DENIES PAIN
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
## 2022-11-21 NOTE — CONSULTS
[facility]    Patient Name: Albino Quigley     Patient : 2000  Room/Bed:   Admission Date/Time: 2022 10:46 AM  Primary Care Physician: No primary care provider on file. Consulting Provider: Dr. Aamir Clemens  Reason for Consult: chest palpitations          HPI: Albino Quigley is a 25 y.o. female  at 35w0d [place] who presents to the Emergency Department with the chief complaint of chest palpitations. Phone consult requested by ED resident. Patient was not seen or evaluated by OBGYN resident in the Emergency Department. Per ED resident, patient has history of SVT. EKG and bedside echo completed in the ED are within normal limits. Bedside US reveals FHT in the 140s with gross movement noted. VSS. Patient denies chest pain, shortness of breath, syncope, nausea, vomiting, abdominal pain. Patient reports positive fetal movement, denies contractions, loss of fluid, or vaginal bleeding. Recommend patient present to Labor and Delivery for evaluation with fetal heart tracing once medically cleared from the emergency department.     Vitals:    22 1047   BP: 113/69   Pulse: 76   Resp: 17   Temp: 98.2 °F (36.8 °C)   TempSrc: Oral   SpO2: 97%   Weight: 163 lb (73.9 kg)   Height: 5' 3\" (1.6 m)           Kyler Smith DO  Ob/Gyn Resident  Bhargav Hooks  2022, 11:18 AM
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
## 2022-11-21 NOTE — ED NOTES
Pt. Arrives to ED via LS 2 for c/o palpitations, cramping in lower abdomen, and low BP. Pt is 35 weeks pregnant and sees a midwife for care and was told to come to the ER.        Romeo Marcum RN  11/21/22 [id number]

## 2022-11-21 NOTE — TELEPHONE ENCOUNTER
Patient called stating she has been have heart palpations all weekend which make it hard for her to catch her breath. Advised patient she should go to the ER for evaluation.      Patient also states when trying to get out of bed she is having some pelvic pressure and wondering if this is normal.     FYI Patient has appointment with cardiology in January 2023

## 2022-11-21 NOTE — FLOWSHEET NOTE
Pt presents to L and D, accompanied by ER transport, via w/c. Pt here for EFM/ 20 min strip after she was seen and cleared in the ER for her hx SVT. Pt denies any SROM, vag bleeding, or decreased fetal movement. Pt states she has been having pelvic pressure for the past couple weeks, and she has shared this info with her midwife, Junior Cleaning. Pt gowned and in bed, oriented to room and call light. EFM explained and applied. FHT's 135. Jim Castro CNM, aware of admission.

## 2022-11-21 NOTE — DISCHARGE INSTRUCTIONS
Thank you for visiting [facility] Emergency Department. You need to call [facility] to make an appointment as directed for follow up. Return to emergency department for any new or worrisome symptoms including any vaginal bleeding, chest pain, shortness of breath. Should you have any questions regarding your care or further treatment, please call [facility] Emergency Department at [phone].

## 2022-11-21 NOTE — ED PROVIDER NOTES
[facility]     Emergency Department     Faculty Attestation    I performed a history and physical examination of the patient and discussed management with the resident. I reviewed the resident´s note and agree with the documented findings and plan of care. Any areas of disagreement are noted on the chart. I was personally present for the key portions of any procedures. I have documented in the chart those procedures where I was not present during the key portions. I have reviewed the emergency nurses triage note. I agree with the chief complaint, past medical history, past surgical history, allergies, medications, social and family history as documented unless otherwise noted below. For Physician Assistant/ Nurse Practitioner cases/documentation I have personally evaluated this patient and have completed at least one if not all key elements of the E/M (history, physical exam, and MDM). Additional findings are as noted. 35 weeks pregnant, palpitations for 1 year. On exam chest clear, heart exam normal, no lower extremity pain or swelling on examination. Nontender gravid uterus. EKG Interpretation    Interpreted by emergency department physician    Rhythm: normal sinus   Rate: normal/86  Axis: normal  Ectopy: none  Conduction: normal  ST Segments: no acute change  T Waves: no acute change  Q Waves: none    Clinical Impression: Normal EKG    Christina Hu III    The patient was texting the entire time I was in the room and made no eye contact.      Christina Hu MD  11/21/22 4420 Farhan Benavides MD  11/21/22 [id number]

## 2022-11-21 NOTE — DISCHARGE INSTRUCTIONS
Notify Physician for:       *  Regular contractions that are  5 minutes apart or less lasting longer than 1 hr for 1st baby, 10 mins apart or less if 2nd baby or greater. *  Leaking or gush of fluid from vagina. *  Any vaginal bleeding that is heavier than a menstrual period. *  Decrease or absence of baby movement. *  Vaginal pressure, low backache. *  Vomiting or diarrhea for several hours, and unable to take in/ keep fluids or food down. *  Increase or change in vaginal discharge. *  Abdominal or menstrual- like cramping that is constant or intermittent. *  Fever and/ or chills. *  Headache              *  Blurred and or visual changes-  (spots before eyes, \"floaters\")              *  Upper abdominal/ epigastric pain  Activities:       As tolerated, frequent rest periods                      Diet:          As previous    Drink 10- 12 glasses of water daily.     Be sure to keep next scheduled appt, and call your Dr. With any questions

## 2022-11-21 NOTE — TELEPHONE ENCOUNTER
Spoke with patient advised needs to be evaluated in the ER for heart palpations. Patient verbalizes understanding, will attempt to get an uber to take her son to day and then to come to ER. Patient also concerned she is having a lot of pelvic pressure and unsure if she is having contractions. Stated we would be able to evaluate her upon arrival to ER for contractions. Patient to call at this time for an uber.

## 2022-11-22 LAB
EKG ATRIAL RATE: 86 BPM
EKG P AXIS: 28 DEGREES
EKG P-R INTERVAL: 112 MS
EKG Q-T INTERVAL: 372 MS
EKG QRS DURATION: 66 MS
EKG QTC CALCULATION (BAZETT): 445 MS
EKG R AXIS: 33 DEGREES
EKG T AXIS: 5 DEGREES
EKG VENTRICULAR RATE: 86 BPM

## 2022-11-29 ENCOUNTER — ROUTINE PRENATAL (OUTPATIENT)
Dept: OBGYN CLINIC | Age: 22
End: 2022-11-29
Payer: COMMERCIAL

## 2022-11-29 ENCOUNTER — HOSPITAL ENCOUNTER (OUTPATIENT)
Age: 22
Setting detail: SPECIMEN
Discharge: HOME | End: 2022-11-29

## 2022-11-29 VITALS — BODY MASS INDEX: 28.84 KG/M2 | WEIGHT: 162.8 LBS | SYSTOLIC BLOOD PRESSURE: 118 MMHG | DIASTOLIC BLOOD PRESSURE: 72 MMHG

## 2022-11-29 DIAGNOSIS — O35.07X0 FETAL MICROCEPHALY: ICD-10-CM

## 2022-11-29 DIAGNOSIS — O09.93 HIGH-RISK PREGNANCY IN THIRD TRIMESTER: Primary | ICD-10-CM

## 2022-11-29 DIAGNOSIS — Z3A.36 36 WEEKS GESTATION OF PREGNANCY: ICD-10-CM

## 2022-11-29 DIAGNOSIS — O36.5930 POOR FETAL GROWTH AFFECTING MANAGEMENT OF MOTHER IN THIRD TRIMESTER, SINGLE OR UNSPECIFIED FETUS: ICD-10-CM

## 2022-11-29 DIAGNOSIS — O09.93 HIGH-RISK PREGNANCY IN THIRD TRIMESTER: ICD-10-CM

## 2022-11-29 DIAGNOSIS — Z86.32 HISTORY OF GESTATIONAL DIABETES: ICD-10-CM

## 2022-11-29 PROCEDURE — 1036F TOBACCO NON-USER: CPT

## 2022-11-29 PROCEDURE — G8419 CALC BMI OUT NRM PARAM NOF/U: HCPCS

## 2022-11-29 PROCEDURE — 99213 OFFICE O/P EST LOW 20 MIN: CPT

## 2022-11-29 PROCEDURE — G8427 DOCREV CUR MEDS BY ELIG CLIN: HCPCS

## 2022-11-29 PROCEDURE — G8482 FLU IMMUNIZE ORDER/ADMIN: HCPCS

## 2022-11-29 NOTE — PROGRESS NOTES
SUBJECTIVE:    Rodolfo Kumar is here for her return OB visit. denies concerns today. She reports  feeling fetal movement. She denies vaginal bleeding. She denies vaginal discharge. She denies leaking of fluid. She denies uterine cramping. She denies  nausea and/or vomiting. OBJECTIVE:  Blood pressure 118/72, weight 162 lb 12.8 oz (73.8 kg), last menstrual period 03/22/2022, unknown if currently breastfeeding. Total weight gain: 6 lb 12.8 oz (3.084 kg)    Rodolfo Kumar accepted the flu vaccine as appropriate. Rodolfo Kumar accepted the Tdap vaccine as appropriate  Rodolfo Kumar accepted the COVID vaccine as appropriate    NST  FHR BASELINE: 145  VARIABILITY: moderate  ACCELERATIONS: present  DECELERATION: absent  FETAL MOVEMENT: Perceived by patient during NST  INTERPRETATION: Reassuring and reactive, 2 or more accels of 15 BPM lasting 15 sec in 40 min periord. BPP: Reported as 6/8 for fetal breathing    ASSESSMENT/PLAN:    1. High-risk pregnancy in third trimester  IUP @ 36+1 weeks  S = D  - Culture, Strep B Screen, Vaginal/Rectal; Future    2. 36 weeks gestation of pregnancy  Due date is based on  11w1d early dating ultrasound  Patient's prenatal labs are completed. Patient's blood type A positive and rhogam is not indicated in pregnancy. Early glucola indicated: yes - 101  Patient accepted genetic screening. First trimester screen is completed and normal. Msafp was ordered: normal. [employer] carrier screen negative  Anatomy scan completed, 8/10/2022. Placenta posterior,normal cord insertion: Yes, cervical length 47-55, no low lying, no previa noted. Follow up 26 weeks fetal echo and repeat anatomy, 26 week US show IUGR f/u in 4 weeks for fetal growth, BPP, doppler. Fetal echo WNL. F/u scheduled 12/7/2022  Glucola between 24-28 weeks. complete 115 Pass  Total weight gain in pregnancy reviewed: Yes  Fetal Kick Count was discussed and explained.  She was instructed to lay on her left side 20 minutes after eating and count movements for up to 2 hours with a target value of 10 movements. Instructed to notify office if she does not make the target. Reviewed signs and symptoms of  labor: yes  Reviewed signs and symptoms of labor: yes  Reviewed signs and symptoms of preeclampsia: yes  Reviewed signs and symptoms of cande hick contractions: yes  - Culture, Strep B Screen, Vaginal/Rectal; Future    3. Fetal microcephaly  - Anatomy US 8/10/22 HC <3%  - MFM consult 2022  - Fetal growth US & echo at 26 weeks  - NST weekly in office starting at 30 weeks  - 2022 Echo WNL, HC 4%, EFW 32%  - 10/26/ HC <3%, EFW 36%    4. Poor fetal growth affecting management of mother in third trimester, single or unspecified fetus  - Dx 2022 MFM US  - MFM recommendation f/u 4 weeks for growth, BPP, doppler  - Fetal  test at 30 weeks with weekly NST per Valeriano  - 2022 - EFW 5%, KAYCEE 12.35 cm. MFM dopplers referral sent    5. Fetal drug exposure  - Fetal echo WNL    6. History of gestational diabetes  - Early 1hr   24-28 week         She was counseled regarding all of the above:    Return in about 1 week (around 2022) for Return OB.     The patient, Francheska Velasquez,  was seen with a total time spent of 25 minutes for the visit on this date of service by the [facility]  On this date 2022,  I have spent greater than 50% of this visit:  [x] reviewing previous notes  [x] reviewing test results  [x] pre-charting  Discussed with patient:  [x] Importance of compliance with treatment plan  [x] Counseling  [] Coordinating care  [x] Documenting     Electronically Signed By: DARRIAN Rivers CNM

## 2022-11-29 NOTE — PROGRESS NOTES
Patient here for 36w1d prenatal visit  Patient states fetal movement Present  Patient concerns: states baby is not moving as much as she used to

## 2022-12-02 ENCOUNTER — TELEPHONE (OUTPATIENT)
Dept: OBGYN CLINIC | Age: 22
End: 2022-12-02

## 2022-12-02 PROBLEM — Z3A.35 35 WEEKS GESTATION OF PREGNANCY: Status: RESOLVED | Noted: 2022-11-21 | Resolved: 2022-12-02

## 2022-12-02 LAB
CULTURE: NORMAL
SPECIMEN DESCRIPTION: NORMAL

## 2022-12-08 ENCOUNTER — TELEPHONE (OUTPATIENT)
Dept: OBGYN | Age: 22
End: 2022-12-08

## 2022-12-08 NOTE — TELEPHONE ENCOUNTER
Patient called due to missing her scheduled appointment today and patient did not answer. Left voicemail to return call. Patient scheduled for IOL next Wednesday December 14th at [facility] at 10am per Dr. Kemi Akins recommendations for IOL at 37 weeks. Patient notified via voicemail of IOL appointment. De Spar notified of scheduled induction.

## 2022-12-13 ENCOUNTER — TELEPHONE (OUTPATIENT)
Dept: SOCIAL WORK | Age: 22
End: 2022-12-13

## 2022-12-13 NOTE — TELEPHONE ENCOUNTER
Social Work    Sw received email of [facility], [employer], and adoption contact information for agency representing client. Per documents received, [employer] ([employer]) will be working with pt upon delivery. [employer] worker is Jesus Osorio [phone]. Pt's wishes as of now for birth plan: Will labor and deliver alone (no supports, adoption agency asks for much compassion for pt as no support system). Does not want to see baby  Does not provide permission for any visitors for herself (this includes biological father of baby and any of her family). Would like adoptive parents Carl Ribera and Cristian) to have separate room and provide care for the baby and receive medical updates. Pt would like to leave hospital as soon as she is medically cleared. Sw will coordinate with pt at time of arrival to ensure above wishes remain and provide support and coordination throughout stay. Adoption agency does provide pt with OP supports as needed.

## 2022-12-14 ENCOUNTER — HOSPITAL ENCOUNTER (INPATIENT)
Age: 22
LOS: 2 days | Discharge: HOME | DRG: 560 | End: 2022-12-16
Attending: OBSTETRICS & GYNECOLOGY | Admitting: OBSTETRICS & GYNECOLOGY
Payer: COMMERCIAL

## 2022-12-14 PROBLEM — Z3A.38 38 WEEKS GESTATION OF PREGNANCY: Status: ACTIVE | Noted: 2022-12-14

## 2022-12-14 LAB
ABO/RH: NORMAL
ABSOLUTE EOS #: 0.04 K/UL (ref 0–0.44)
ABSOLUTE IMMATURE GRANULOCYTE: <0.03 K/UL (ref 0–0.3)
ABSOLUTE LYMPH #: 1.87 K/UL (ref 1.1–3.7)
ABSOLUTE MONO #: 0.58 K/UL (ref 0.1–1.2)
AMPHETAMINE SCREEN URINE: NEGATIVE
ANTIBODY SCREEN: NEGATIVE
ARM BAND NUMBER: NORMAL
BARBITURATE SCREEN URINE: NEGATIVE
BASOPHILS # BLD: 0 % (ref 0–2)
BASOPHILS ABSOLUTE: <0.03 K/UL (ref 0–0.2)
BENZODIAZEPINE SCREEN, URINE: NEGATIVE
CANNABINOID SCREEN URINE: NEGATIVE
COCAINE METABOLITE, URINE: NEGATIVE
EOSINOPHILS RELATIVE PERCENT: 0 % (ref 1–4)
EXPIRATION DATE: NORMAL
FENTANYL URINE: NEGATIVE
HCT VFR BLD CALC: 36 % (ref 36.3–47.1)
HEMOGLOBIN: 12.1 G/DL (ref 11.9–15.1)
IMMATURE GRANULOCYTES: 0 %
LYMPHOCYTES # BLD: 19 % (ref 24–43)
MCH RBC QN AUTO: 32.3 PG (ref 25.2–33.5)
MCHC RBC AUTO-ENTMCNC: 33.6 G/DL (ref 28.4–34.8)
MCV RBC AUTO: 96 FL (ref 82.6–102.9)
METHADONE SCREEN, URINE: NEGATIVE
MONOCYTES # BLD: 6 % (ref 3–12)
NRBC AUTOMATED: 0 PER 100 WBC
OPIATES, URINE: NEGATIVE
OXYCODONE SCREEN URINE: NEGATIVE
PDW BLD-RTO: 13.8 % (ref 11.8–14.4)
PHENCYCLIDINE, URINE: NEGATIVE
PLATELET # BLD: 311 K/UL (ref 138–453)
PMV BLD AUTO: 8.8 FL (ref 8.1–13.5)
RBC # BLD: 3.75 M/UL (ref 3.95–5.11)
SEG NEUTROPHILS: 74 % (ref 36–65)
SEGMENTED NEUTROPHILS ABSOLUTE COUNT: 7.27 K/UL (ref 1.5–8.1)
T. PALLIDUM, IGG: NONREACTIVE
TEST INFORMATION: NORMAL
WBC # BLD: 9.8 K/UL (ref 3.5–11.3)

## 2022-12-14 PROCEDURE — 86850 RBC ANTIBODY SCREEN: CPT

## 2022-12-14 PROCEDURE — 6360000002 HC RX W HCPCS

## 2022-12-14 PROCEDURE — 85025 COMPLETE CBC W/AUTO DIFF WBC: CPT

## 2022-12-14 PROCEDURE — 1220000000 HC SEMI PRIVATE OB R&B

## 2022-12-14 PROCEDURE — 80307 DRUG TEST PRSMV CHEM ANLYZR: CPT

## 2022-12-14 PROCEDURE — 7200000001 HC VAGINAL DELIVERY

## 2022-12-14 PROCEDURE — 58300 INSERT INTRAUTERINE DEVICE: CPT | Performed by: ADVANCED PRACTICE MIDWIFE

## 2022-12-14 PROCEDURE — 6370000000 HC RX 637 (ALT 250 FOR IP): Performed by: STUDENT IN AN ORGANIZED HEALTH CARE EDUCATION/TRAINING PROGRAM

## 2022-12-14 PROCEDURE — 86780 TREPONEMA PALLIDUM: CPT

## 2022-12-14 PROCEDURE — 86900 BLOOD TYPING SEROLOGIC ABO: CPT

## 2022-12-14 PROCEDURE — 88307 TISSUE EXAM BY PATHOLOGIST: CPT

## 2022-12-14 PROCEDURE — 59409 OBSTETRICAL CARE: CPT | Performed by: ADVANCED PRACTICE MIDWIFE

## 2022-12-14 PROCEDURE — 10907ZC DRAINAGE OF AMNIOTIC FLUID, THERAPEUTIC FROM PRODUCTS OF CONCEPTION, VIA NATURAL OR ARTIFICIAL OPENING: ICD-10-PCS | Performed by: ADVANCED PRACTICE MIDWIFE

## 2022-12-14 PROCEDURE — 2580000003 HC RX 258

## 2022-12-14 PROCEDURE — 86901 BLOOD TYPING SEROLOGIC RH(D): CPT

## 2022-12-14 PROCEDURE — 0UH97HZ INSERTION OF CONTRACEPTIVE DEVICE INTO UTERUS, VIA NATURAL OR ARTIFICIAL OPENING: ICD-10-PCS | Performed by: ADVANCED PRACTICE MIDWIFE

## 2022-12-14 PROCEDURE — 3E033VJ INTRODUCTION OF OTHER HORMONE INTO PERIPHERAL VEIN, PERCUTANEOUS APPROACH: ICD-10-PCS | Performed by: ADVANCED PRACTICE MIDWIFE

## 2022-12-14 RX ORDER — ONDANSETRON 2 MG/ML
4 INJECTION INTRAMUSCULAR; INTRAVENOUS EVERY 4 HOURS PRN
Status: DISCONTINUED | OUTPATIENT
Start: 2022-12-14 | End: 2022-12-16 | Stop reason: HOSPADM

## 2022-12-14 RX ORDER — HYDROCORTISONE 25 MG/G
CREAM TOPICAL
Status: DISCONTINUED | OUTPATIENT
Start: 2022-12-14 | End: 2022-12-16 | Stop reason: HOSPADM

## 2022-12-14 RX ORDER — DOCUSATE SODIUM 100 MG/1
100 CAPSULE, LIQUID FILLED ORAL 2 TIMES DAILY
Status: DISCONTINUED | OUTPATIENT
Start: 2022-12-14 | End: 2022-12-16 | Stop reason: HOSPADM

## 2022-12-14 RX ORDER — IBUPROFEN 600 MG/1
600 TABLET ORAL EVERY 6 HOURS PRN
Qty: 40 TABLET | Refills: 1 | Status: SHIPPED | OUTPATIENT
Start: 2022-12-14

## 2022-12-14 RX ORDER — ONDANSETRON 2 MG/ML
4 INJECTION INTRAMUSCULAR; INTRAVENOUS EVERY 6 HOURS PRN
Status: DISCONTINUED | OUTPATIENT
Start: 2022-12-14 | End: 2022-12-14

## 2022-12-14 RX ORDER — SODIUM CHLORIDE 0.9 % (FLUSH) 0.9 %
5-40 SYRINGE (ML) INJECTION PRN
Status: DISCONTINUED | OUTPATIENT
Start: 2022-12-14 | End: 2022-12-14

## 2022-12-14 RX ORDER — ACETAMINOPHEN 500 MG
1000 TABLET ORAL EVERY 6 HOURS PRN
Status: DISCONTINUED | OUTPATIENT
Start: 2022-12-14 | End: 2022-12-16 | Stop reason: HOSPADM

## 2022-12-14 RX ORDER — SODIUM CHLORIDE, SODIUM LACTATE, POTASSIUM CHLORIDE, AND CALCIUM CHLORIDE .6; .31; .03; .02 G/100ML; G/100ML; G/100ML; G/100ML
500 INJECTION, SOLUTION INTRAVENOUS PRN
Status: DISCONTINUED | OUTPATIENT
Start: 2022-12-14 | End: 2022-12-14

## 2022-12-14 RX ORDER — SODIUM CHLORIDE, SODIUM LACTATE, POTASSIUM CHLORIDE, CALCIUM CHLORIDE 600; 310; 30; 20 MG/100ML; MG/100ML; MG/100ML; MG/100ML
INJECTION, SOLUTION INTRAVENOUS CONTINUOUS
Status: DISCONTINUED | OUTPATIENT
Start: 2022-12-14 | End: 2022-12-14

## 2022-12-14 RX ORDER — BISACODYL 10 MG
10 SUPPOSITORY, RECTAL RECTAL DAILY PRN
Status: DISCONTINUED | OUTPATIENT
Start: 2022-12-14 | End: 2022-12-16 | Stop reason: HOSPADM

## 2022-12-14 RX ORDER — SODIUM CHLORIDE, SODIUM LACTATE, POTASSIUM CHLORIDE, AND CALCIUM CHLORIDE .6; .31; .03; .02 G/100ML; G/100ML; G/100ML; G/100ML
1000 INJECTION, SOLUTION INTRAVENOUS PRN
Status: DISCONTINUED | OUTPATIENT
Start: 2022-12-14 | End: 2022-12-14

## 2022-12-14 RX ORDER — ACETAMINOPHEN 500 MG
1000 TABLET ORAL EVERY 6 HOURS PRN
Status: DISCONTINUED | OUTPATIENT
Start: 2022-12-14 | End: 2022-12-14

## 2022-12-14 RX ORDER — LANOLIN 72 %
OINTMENT (GRAM) TOPICAL PRN
Status: DISCONTINUED | OUTPATIENT
Start: 2022-12-14 | End: 2022-12-16 | Stop reason: HOSPADM

## 2022-12-14 RX ORDER — IBUPROFEN 600 MG/1
600 TABLET ORAL EVERY 6 HOURS PRN
Status: DISCONTINUED | OUTPATIENT
Start: 2022-12-14 | End: 2022-12-16 | Stop reason: HOSPADM

## 2022-12-14 RX ORDER — SODIUM CHLORIDE 0.9 % (FLUSH) 0.9 %
5-40 SYRINGE (ML) INJECTION PRN
Status: DISCONTINUED | OUTPATIENT
Start: 2022-12-14 | End: 2022-12-16 | Stop reason: HOSPADM

## 2022-12-14 RX ORDER — SODIUM CHLORIDE 9 MG/ML
25 INJECTION, SOLUTION INTRAVENOUS PRN
Status: DISCONTINUED | OUTPATIENT
Start: 2022-12-14 | End: 2022-12-14

## 2022-12-14 RX ORDER — SIMETHICONE 80 MG
80 TABLET,CHEWABLE ORAL EVERY 6 HOURS PRN
Status: DISCONTINUED | OUTPATIENT
Start: 2022-12-14 | End: 2022-12-16 | Stop reason: HOSPADM

## 2022-12-14 RX ORDER — ALBUTEROL SULFATE 90 UG/1
2 AEROSOL, METERED RESPIRATORY (INHALATION) 4 TIMES DAILY PRN
Status: DISCONTINUED | OUTPATIENT
Start: 2022-12-14 | End: 2022-12-16 | Stop reason: HOSPADM

## 2022-12-14 RX ORDER — DOCUSATE SODIUM 100 MG/1
100 CAPSULE, LIQUID FILLED ORAL 2 TIMES DAILY PRN
Qty: 60 CAPSULE | Refills: 0 | Status: SHIPPED | OUTPATIENT
Start: 2022-12-14

## 2022-12-14 RX ORDER — SODIUM CHLORIDE 0.9 % (FLUSH) 0.9 %
5-40 SYRINGE (ML) INJECTION EVERY 12 HOURS SCHEDULED
Status: DISCONTINUED | OUTPATIENT
Start: 2022-12-14 | End: 2022-12-16 | Stop reason: HOSPADM

## 2022-12-14 RX ORDER — SODIUM CHLORIDE 9 MG/ML
INJECTION, SOLUTION INTRAVENOUS PRN
Status: DISCONTINUED | OUTPATIENT
Start: 2022-12-14 | End: 2022-12-16 | Stop reason: HOSPADM

## 2022-12-14 RX ORDER — SEVOFLURANE 250 ML/250ML
1 LIQUID RESPIRATORY (INHALATION) CONTINUOUS PRN
Status: DISCONTINUED | OUTPATIENT
Start: 2022-12-14 | End: 2022-12-14

## 2022-12-14 RX ORDER — SODIUM CHLORIDE, SODIUM LACTATE, POTASSIUM CHLORIDE, CALCIUM CHLORIDE 600; 310; 30; 20 MG/100ML; MG/100ML; MG/100ML; MG/100ML
INJECTION, SOLUTION INTRAVENOUS CONTINUOUS
Status: DISCONTINUED | OUTPATIENT
Start: 2022-12-14 | End: 2022-12-16 | Stop reason: HOSPADM

## 2022-12-14 RX ORDER — SODIUM CHLORIDE 0.9 % (FLUSH) 0.9 %
5-40 SYRINGE (ML) INJECTION EVERY 12 HOURS SCHEDULED
Status: DISCONTINUED | OUTPATIENT
Start: 2022-12-14 | End: 2022-12-14

## 2022-12-14 RX ADMIN — Medication 166.7 ML: at 21:24

## 2022-12-14 RX ADMIN — Medication 87.3 MILLI-UNITS/MIN: at 21:35

## 2022-12-14 RX ADMIN — ACETAMINOPHEN 1000 MG: 500 TABLET ORAL at 23:25

## 2022-12-14 RX ADMIN — Medication 25 MCG: at 12:40

## 2022-12-14 RX ADMIN — Medication 1 MILLI-UNITS/MIN: at 17:21

## 2022-12-14 RX ADMIN — LEVONORGESTREL 1 EACH: 52 INTRAUTERINE DEVICE INTRAUTERINE at 21:45

## 2022-12-14 RX ADMIN — DOCUSATE SODIUM 100 MG: 100 CAPSULE ORAL at 22:13

## 2022-12-14 RX ADMIN — IBUPROFEN 600 MG: 600 TABLET, FILM COATED ORAL at 22:13

## 2022-12-14 RX ADMIN — SODIUM CHLORIDE, POTASSIUM CHLORIDE, SODIUM LACTATE AND CALCIUM CHLORIDE: 600; 310; 30; 20 INJECTION, SOLUTION INTRAVENOUS at 12:16

## 2022-12-14 ASSESSMENT — PAIN SCALES - GENERAL: PAINLEVEL_OUTOF10: 7

## 2022-12-14 ASSESSMENT — PAIN DESCRIPTION - DESCRIPTORS: DESCRIPTORS: CRAMPING

## 2022-12-14 ASSESSMENT — PAIN DESCRIPTION - LOCATION: LOCATION: ABDOMEN

## 2022-12-14 ASSESSMENT — PAIN DESCRIPTION - ORIENTATION: ORIENTATION: LOWER

## 2022-12-14 NOTE — CARE COORDINATION
ANTEPARTUM NOTE    38 weeks gestation of pregnancy [Z3A.38]    Flores Martinez was admitted to L&D on 12/14/22 for IOL for FGR @ 38w2d    OB GYN Provider: mmw    Will meet with patient after delivery to verify name/address/phone/insurance and discuss discharge planning. Per  notes Flores Martinez is working w/ [employer] agency for adoption    Anticipate DC home 2 nights after vaginal delivery or 4 nights after C/S delivery as long as hemodynamically stable.
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
## 2022-12-14 NOTE — CARE COORDINATION
Social Work    Sw checked in with pt to introduce self and assess needs. Sw aware of pt. From Adoption Agency who notified Sw earlier thie week (see note). Pt has her boyfriend Conner present as support. Pt states the only elements of adoption plan that has changed is that she will want to hold baby, provide first feed, and spend some time with baby. Pt will also want to meet with adoptive parents Eber Silverman and Jaiden Henriquez). Pt will want Michael Garcia and Jaiden Henriquez to come to her room when she is done with her time with baby, meet them, and then allow them to leave room with baby and go to their own room where they will then provide all care until baby dc's. Nurse and SACHIN Cw aware of updates. Contact Sw with any questions.
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
## 2022-12-14 NOTE — PROGRESS NOTES
Called out from [facility] that balloon had fallen out  SVE: Posterior/soft/5/50%/-2 with BBOW  Repositioned to chair and will continue Pitocin at 1 unit  FHR is reactive

## 2022-12-14 NOTE — PROGRESS NOTES
[facility] Labor Note for Low Intervention    Subjective:  Christelle Level is feeling some contractions and cramping. She is agreeable to balloon insertion and Nitrous was initiated for relaxation. Ovalle cath placed into cervical os without difficulty. Pt tolerated procedure very well. Objective:     VS:  oral temperature is 98.4 °F (36.9 °C). Her blood pressure is 106/60 and her pulse is 72. Her respiration is 18 and oxygen saturation is 98%. FHT:    Baseline: 130   Variability: moderate   Decels: Absent   Accels: present         Contractions: irregular, mild    Cx: 1-2cm 50% medium -2      Assessment/plan:  High risk pregnancy  [email or web address] weeks. FGR   Hx SVT, asymptomatic at present  GBS negative  VSS  Low dose Pitocin with Balloon  Continue support  Anticipate .
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
## 2022-12-15 PROCEDURE — 1220000000 HC SEMI PRIVATE OB R&B

## 2022-12-15 PROCEDURE — 6370000000 HC RX 637 (ALT 250 FOR IP): Performed by: STUDENT IN AN ORGANIZED HEALTH CARE EDUCATION/TRAINING PROGRAM

## 2022-12-15 PROCEDURE — 99024 POSTOP FOLLOW-UP VISIT: CPT | Performed by: STUDENT IN AN ORGANIZED HEALTH CARE EDUCATION/TRAINING PROGRAM

## 2022-12-15 PROCEDURE — 2580000003 HC RX 258: Performed by: STUDENT IN AN ORGANIZED HEALTH CARE EDUCATION/TRAINING PROGRAM

## 2022-12-15 RX ORDER — SERTRALINE HYDROCHLORIDE 25 MG/1
75 TABLET, FILM COATED ORAL DAILY
Qty: 60 TABLET | Refills: 5 | Status: SHIPPED | OUTPATIENT
Start: 2022-12-15

## 2022-12-15 RX ADMIN — IBUPROFEN 600 MG: 600 TABLET, FILM COATED ORAL at 22:04

## 2022-12-15 RX ADMIN — DOCUSATE SODIUM 100 MG: 100 CAPSULE ORAL at 09:21

## 2022-12-15 RX ADMIN — ACETAMINOPHEN 1000 MG: 500 TABLET ORAL at 14:28

## 2022-12-15 RX ADMIN — ACETAMINOPHEN 1000 MG: 500 TABLET ORAL at 22:04

## 2022-12-15 RX ADMIN — DOCUSATE SODIUM 100 MG: 100 CAPSULE ORAL at 22:04

## 2022-12-15 RX ADMIN — SERTRALINE 75 MG: 50 TABLET, FILM COATED ORAL at 09:24

## 2022-12-15 RX ADMIN — IBUPROFEN 600 MG: 600 TABLET, FILM COATED ORAL at 04:23

## 2022-12-15 RX ADMIN — SODIUM CHLORIDE, PRESERVATIVE FREE 10 ML: 5 INJECTION INTRAVENOUS at 09:21

## 2022-12-15 ASSESSMENT — PAIN DESCRIPTION - ORIENTATION: ORIENTATION: LOWER;MID

## 2022-12-15 ASSESSMENT — PAIN DESCRIPTION - LOCATION
LOCATION: ABDOMEN

## 2022-12-15 ASSESSMENT — PAIN SCALES - GENERAL
PAINLEVEL_OUTOF10: 2
PAINLEVEL_OUTOF10: 3
PAINLEVEL_OUTOF10: 2
PAINLEVEL_OUTOF10: 4

## 2022-12-15 ASSESSMENT — PAIN DESCRIPTION - DESCRIPTORS
DESCRIPTORS: CRAMPING

## 2022-12-15 NOTE — CARE COORDINATION
Social Work    Per SACHIN Patterson) mom has requested that adoptive parents Campos Alvarez and [facility]) arrive at hospital today to begin caring for child. As of Now [employer] (SACHIN) has temp. Custody of baby (papers in bio mom and baby's chart). This mean that if any consents are needed they will need signed by Adoption Agency. Contact for Adoption Agency is [place] [address] [phone]. If any contact needs on Saturday contact is [facility] [phone]. Adoption agency must be present at time of dc to sign dc papers. Baby will then be placed with Adoptive Parents. Contact Sw if any questions. Sw did attempt to check in on bio mom. Her boyfriend Hartwell Mcardle states she is in room with adoptive parents and baby. Hartwell Mcardle states she is doing well and will let her know to reach out if Sw can assist in any way.

## 2022-12-15 NOTE — FLOWSHEET NOTE
Patient admitted to room 739 from 702 via wheelchair. Oriented to room and surroundings. Plan of care reviewed. Verbalized understanding. Instructed on infant security and safe sleep practices. Preventing falls education provided . The following handouts given: A New Beginning: Your Guide to Postpartum Care, Rounding, gs Security System,Babies Cry A lot, Safe Sleep, Security and Visitation Guidelines. Call light placed within reach.

## 2022-12-15 NOTE — CARE COORDINATION
Social Work    Sw attempted to check in on mom since she has delivered, mom was in restroom. Sw did speak with nurse, who informs that mom had time with baby, , and then asked nurse to take baby to nursury. Per nurse mom does not plan any interaction today. Sw will remain available as support and coordinate with Adoption Agency (DOROTHEA obtained).

## 2022-12-15 NOTE — PROGRESS NOTES
Labor Progress Note    Aj Gibbs is a 25 y.o. female  at 36w4d  The patient was seen and examined. Her pain is moderately well controlled with Nitrous. She reports fetal movement is present, complains of contractions, denies loss of fluid, denies vaginal bleeding.        Vital Signs:  Vitals:    22 1008 22 1623 22 1857 22 1900   BP: 113/60 106/60 124/71 119/71   Pulse: 91 72 81 80   Resp: 16 18 16    Temp: 98.8 °F (37.1 °C) 98.4 °F (36.9 °C)     TempSrc: Oral Oral     SpO2: 98%  96%          FHT:  135 , moderate variability, accelerations present, decelerations absent  Contractions: regular, every q2-3 minutes    Chaperone for Intimate Exam: Chaperone was present for entire exam, Chaperone Name: Marlene Avery RN  Cervical Exam: 5 cm dilated, 50 effaced, -2 station  Pitocin: @ 1 mu/min    Membranes: Ruptured clear fluid  Scalp Electrode in place: absent  Intrauterine Pressure Catheter in Place: absent    Interventions: SVE, AROM    Assessment/Plan:  Aj Gibbs is a 25 y.o. female  at 36w4d admitted for IOL 2/2 FGR   - GBS negative, No indication for GBS prophylaxis   - VSS, afebrile    - CEFM/TOCO showing cat 1 tracing   - S/p Ovalle balloon   - Pitocin per protocol   - Nitrous   - SVE /-2   - AROM (clr) @ 194   - Continue to monitor    Discussed plan of care and decisions made in agreement with Thomas Youssef MD  Ob/Gyn Resident  2022, 7:57 PM

## 2022-12-15 NOTE — L&D DELIVERY NOTE
Mother's Information      Labor Events     Labor?: No  Cervical Ripening:   Now               Scottie Browning Girl Girtha Gilford [[id number]]      Labor Events     Labor?: No   Steroids?: None  Cervical Ripening Date/Time:     Cervical Ripening Type: Misoprostol  Antibiotics Received during Labor?: No  Rupture Identifier: Sac 1   Rupture Date/Time: 22 19:44:00   Rupture Type: AROM, Bulging  Fluid Color: Clear  Fluid Odor: None  Fluid Volume: Large  Induction: Oxytocin  Augmentation: None  Labor Complications: None       Anesthesia    Method: None       Start Pushing      Labor onset date/time:   Now     Dilation complete date/time:   Now     Start pushing date/time:    Decision date/time (emergent ):           Delivery ([place])      Delivery Date/Time:  22 21:19:00   Delivery Type: Vaginal, Spontaneous    Details:            Shoulder Dystocia    Shoulder Dystocia Present?: No  Add Second Maneuver  Add Third Maneuver  Add Fourth Maneuver  Add Fifth Maneuver  Add Sixth Maneuver  Add Seventh Maneuver  Add Eighth Maneuver  Add Ninth Maneuver       Assisted Delivery Details    Forceps Attempted?: No  Vacuum Extractor Attempted?: No       Document Additional Attempt         Document Additional Attempt                 Cord    Vessels: 3 Vessels  Complications: None  Delayed Cord Clamping?: Yes  Cord Clamped Date/Time: 2022 21:20:00  Cord Blood Disposition: Lab  Gases Sent?: Yes       Placenta    Date/Time: 2022 21:23:00  Removal: Spontaneous  Appearance: Intact  Disposition: Discarded       Lacerations    Episiotomy: None  Perineal Lacerations: None  Other Lacerations: no non-perineal laceration       Vaginal Counts    Initial Count Personnel: TOBIAS  Initial Count Verified By: James Deluca    Sponges Needles Instruments   Initial Counts Correct Correct Correct   Final Counts      If the count is incorrect due to Intentionally Retained Foreign Object (IRFO) add the IRFO LDA in Lines/Drains. Add LDA: Link to [facility]       Blood Loss  Mother: Maikel Prajapati #[id number]     Start of Mother's Information      Delivery Blood Loss  22 0919 - 22      None                 End of Mother's Information  Mother: Maikel Prajapati #[id number]                Delivery Providers    Delivering clinician: DARRIAN Williamson CNM     Provider Role     Obstetrician    Shannan Pozo, RN Primary Nurse     Primary [place] Nurse     NICU Nurse     Neonatologist     Anesthesiologist     Nurse Anesthetist     Nurse Practitioner     Midwife     Nursery Nurse               Assessment       Apgar Scoring Key:    0 1 2    Skin Color: Blue or pale Acrocyanotic Completely pink    Heart Rate: Absent <100 bpm >100 bpm    Reflex Irritability: No response Grimace Cry or active withdrawal    Muscle Tone: Limp Some flexion Active motion    Respiratory Effort: Absent Weak cry; hypoventilation Good, crying                      Skin Color:   Heart Rate:   Reflex Irritability:   Muscle Tone:   Respiratory Effort:    Total:            1 Minute:        Apgar 1 total from OB History    5 Minute:        Apgar 5 total from OB History    10 Minute:              15 Minute:              20 Minute:                                 Resuscitation               [place] Measurements               Title      Skin to Skin Initiation Date/Time:       Skin to Skin End Date/Time:                                                          [facility] Midwives Vaginal Delivery Summary          Information for the patient's :  Scottie Catena Girl Girtha Gilford [[id number]]        Pre-operative Diagnosis:  38.2 wk induction for FGR    Post-operative Diagnosis:  Same, delivered of viable female    Procedure:  Spontaneous vaginal delivery    Provider:   Kandace Trotter CNM/JAREK Greenwood DO    Information for the patient's :  Tess Davis [[id number]]        Anesthesia:  none    Estimated blood loss:  200 mL    Specimen:  Placenta sent to pathology     Cord blood sent Yes    Complications:  none    Condition:  mother stable    Details of Procedure: The patient is a 25 y.o. female at 36w4d   OB History          3    Para   2    Term   2       0    AB   0    Living   2         SAB   0    IAB   0    Ectopic   0    Molar        Multiple   0    Live Births   2             who was admitted for induction of FGR    Pre-Delivery Note:  Increasing pressure with involuntary pushing and found to be Ant Lip/0 st and was reduced easily with vertex immediately to . She received the following interventions:   1. Ovalle balloon  2. Pitocin  3. AROM      The patient progressed, did not receive an epidural, became Complete and started to push for 1 contraction  She progressed to spontaneous vaginal delivery of viable female infant, who was delivered atraumatically, shoulders easily delivered and placed on mother's abdomen. The cord clamping was delayed. The cord was clamped and cut and cord blood obtained. The delivery of the placenta was spontaneous,  [address] [place] presentation. Placental examination revealed a grossly intact placenta with a  3 vessel cord. The uterus was massaged to firm and contracted immediately, with bleeding under control. IV  Pitocin infusion  started. The perineum and vagina were inspected and no lacerations were found. Mother and baby stable. Mother holding baby.     IUD placed per Dr Star Banegas refer to her note

## 2022-12-15 NOTE — CARE COORDINATION
CASE MANAGEMENT POST-PARTUM TRANSITIONAL CARE PLAN    38 weeks gestation of pregnancy [Z3A.38]    OB Provider: [facility] met w/ Carmen Reich at bedside to discuss DCP. She is S/P  on 2022. The adoption counselor is in the room, states she will step out for a few minutes to go speak with adoptive parents. Writer verified name/address/phone number correct on facesheet. She states she lives with her mom, dad, grandmother and her son. Carmen Reich does verbalize difficulty with transportation and medicaid cab discussed. She stated she doesn't like to use them for fear of being left somewhere. She states she usually takes buses or walks. She verbalized no problems with transportation to and from doctors appointments or with obtaining or paying for medications upon discharge home. [employer] correct. Did not discuss infant insurance with mom as she is giving baby up for adoption so adoptive parents would insure. Patient denies concerns or questions at this time.       DME: no  HOME CARE: no    Anticipate DC of mother 12/15/2022    Readmission Risk              Risk of Unplanned Readmission:  8

## 2022-12-15 NOTE — PROCEDURES
Ob/Gyn Resident IUD Insertion Note:    Procedure: Post placental insertion of Mirena IUD    Indications: Dysmenorrhea    Lot: [id number]  Exp: Jan 2025    Procedure Details:   Chaperone for Intimate Exam: Chaperone was present for entire exam, Chaperone Name: Conrado Mccarty CNM present    The patient was counseled on the procedure. Risks, benefits and alternatives were reviewed. The patient is aware that this is diagnostic and not curative and a second procedure may be needed. A consent was reviewed and obtained. The patient was positioned in stirrups immediately follow delivery of her placenta. Small volume of clots were cleared from the lower uterine segment. The cervical opening was manually palpated and noted to be dilated immediately following delivery. IUD was opened and loaded into the delivery system. The wand was guided gently into the uterus inserted taking care to avoid perforation. The wand was held in place for 10 seconds and then the button was retracted to its final position while the IUD was moved to the fundus. The string was trimmed and left long to compensate for changing uterine size post-delivery. The patient was counseled on the need for string checks after her menses and coital activity. She is to notify the office if she can not locate the IUD string at anytime. She is aware that she will need a speculum exam and possibly an ultrasound to confirm the proper orientation of the IUD.         Dipti Drummond DO  Ob/Gyn Resident  [facility], [facility]  12/14/2022, 10:42 PM

## 2022-12-15 NOTE — PROGRESS NOTES
Called to room for report of pressure  SVE 8cm/70%/-1 st with bloody fluid  FHR Category with episodic variable decelerations noted, self resolved  Breathing reinforced and doing well, continue with support
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
## 2022-12-15 NOTE — PROGRESS NOTES
Lauren requesting ROM. Advised this may increase intensity of contractions and therefore may help with dilation. Advised to continue with NO2   Advised also to notify agency of ROM in case of dilation  AROM of  clear (blood-tinged) fluid.  FHR stable during and after procedure and repositioned to chair  Pitocin discontinued at this time

## 2022-12-16 VITALS
RESPIRATION RATE: 16 BRPM | DIASTOLIC BLOOD PRESSURE: 71 MMHG | SYSTOLIC BLOOD PRESSURE: 111 MMHG | TEMPERATURE: 98.2 F | HEART RATE: 76 BPM | OXYGEN SATURATION: 98 %

## 2022-12-16 PROCEDURE — 6370000000 HC RX 637 (ALT 250 FOR IP): Performed by: STUDENT IN AN ORGANIZED HEALTH CARE EDUCATION/TRAINING PROGRAM

## 2022-12-16 RX ADMIN — ACETAMINOPHEN 1000 MG: 500 TABLET ORAL at 07:59

## 2022-12-16 RX ADMIN — DOCUSATE SODIUM 100 MG: 100 CAPSULE ORAL at 07:59

## 2022-12-16 RX ADMIN — SERTRALINE 75 MG: 50 TABLET, FILM COATED ORAL at 08:09

## 2022-12-16 RX ADMIN — IBUPROFEN 600 MG: 600 TABLET, FILM COATED ORAL at 07:59

## 2022-12-16 ASSESSMENT — PAIN DESCRIPTION - DESCRIPTORS: DESCRIPTORS: CRAMPING

## 2022-12-16 ASSESSMENT — PAIN SCALES - GENERAL: PAINLEVEL_OUTOF10: 4

## 2022-12-16 ASSESSMENT — PAIN DESCRIPTION - LOCATION: LOCATION: ABDOMEN

## 2022-12-16 ASSESSMENT — PAIN DESCRIPTION - ORIENTATION: ORIENTATION: LOWER;MID

## 2022-12-16 NOTE — PLAN OF CARE
Problem: ABCDS Injury Assessment  Goal: Absence of physical injury  Outcome: Completed     Problem: Pain  Goal: Verbalizes/displays adequate comfort level or baseline comfort level  Outcome: Completed     Problem: Chronic Conditions and Co-morbidities  Goal: Patient's chronic conditions and co-morbidity symptoms are monitored and maintained or improved  Outcome: Completed

## 2022-12-16 NOTE — FLOWSHEET NOTE
CLINICAL PHARMACY NOTE: MEDS TO BEDS    Total # of Prescriptions Filled: 3   The following medications were delivered to the patient:  Docusate Sodium 100 mg  Ibuprofen 600 mg  Sertraline 25 mg    Additional Documentation: medications were delivered on 12/16 at 11:20 am to the patient with no co-pay.
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
## 2022-12-16 NOTE — FLOWSHEET NOTE
[place]  Depression Scale completed and documented. Paper copy placed in chart. Pt has no questions or concerns at this time. Pt score is 8.

## 2022-12-19 ENCOUNTER — HOSPITAL ENCOUNTER (INPATIENT)
Age: 22
LOS: 2 days | Discharge: HOME | End: 2022-12-21
Attending: EMERGENCY MEDICINE | Admitting: STUDENT IN AN ORGANIZED HEALTH CARE EDUCATION/TRAINING PROGRAM
Payer: COMMERCIAL

## 2022-12-19 DIAGNOSIS — N71.9 ENDOMETRITIS: Primary | ICD-10-CM

## 2022-12-19 LAB
ABSOLUTE EOS #: 0.16 K/UL (ref 0–0.44)
ABSOLUTE IMMATURE GRANULOCYTE: 0.05 K/UL (ref 0–0.3)
ABSOLUTE LYMPH #: 1.44 K/UL (ref 1.1–3.7)
ABSOLUTE MONO #: 0.52 K/UL (ref 0.1–1.2)
ANION GAP SERPL CALCULATED.3IONS-SCNC: 8 MMOL/L (ref 9–17)
BASOPHILS # BLD: 0 % (ref 0–2)
BASOPHILS ABSOLUTE: 0.05 K/UL (ref 0–0.2)
BILIRUBIN URINE: NEGATIVE
BUN BLDV-MCNC: 6 MG/DL (ref 6–20)
CALCIUM SERPL-MCNC: 8.6 MG/DL (ref 8.6–10.4)
CANDIDA SPECIES, DNA PROBE: NEGATIVE
CASTS UA: NORMAL /LPF (ref 0–8)
CHLORIDE BLD-SCNC: 104 MMOL/L (ref 98–107)
CO2: 27 MMOL/L (ref 20–31)
COLOR: ABNORMAL
CREAT SERPL-MCNC: 0.58 MG/DL (ref 0.5–0.9)
EOSINOPHILS RELATIVE PERCENT: 1 % (ref 1–4)
EPITHELIAL CELLS UA: NORMAL /HPF (ref 0–5)
GARDNERELLA VAGINALIS, DNA PROBE: POSITIVE
GFR SERPL CREATININE-BSD FRML MDRD: >60 ML/MIN/1.73M2
GLUCOSE BLD-MCNC: 84 MG/DL (ref 70–99)
GLUCOSE URINE: NEGATIVE
HCT VFR BLD CALC: 42.2 % (ref 36.3–47.1)
HEMOGLOBIN: 13.6 G/DL (ref 11.9–15.1)
IMMATURE GRANULOCYTES: 0 %
KETONES, URINE: NEGATIVE
LEUKOCYTE ESTERASE, URINE: ABNORMAL
LYMPHOCYTES # BLD: 11 % (ref 24–43)
MCH RBC QN AUTO: 31.8 PG (ref 25.2–33.5)
MCHC RBC AUTO-ENTMCNC: 32.2 G/DL (ref 28.4–34.8)
MCV RBC AUTO: 98.6 FL (ref 82.6–102.9)
MONOCYTES # BLD: 4 % (ref 3–12)
NITRITE, URINE: NEGATIVE
NRBC AUTOMATED: 0 PER 100 WBC
PDW BLD-RTO: 13.5 % (ref 11.8–14.4)
PH UA: 6.5 (ref 5–8)
PLATELET # BLD: 374 K/UL (ref 138–453)
PMV BLD AUTO: 8.5 FL (ref 8.1–13.5)
POTASSIUM SERPL-SCNC: 3.9 MMOL/L (ref 3.7–5.3)
PROTEIN UA: ABNORMAL
RBC # BLD: 4.28 M/UL (ref 3.95–5.11)
RBC UA: NORMAL /HPF (ref 0–4)
SEG NEUTROPHILS: 84 % (ref 36–65)
SEGMENTED NEUTROPHILS ABSOLUTE COUNT: 10.92 K/UL (ref 1.5–8.1)
SODIUM BLD-SCNC: 139 MMOL/L (ref 135–144)
SOURCE: ABNORMAL
SPECIFIC GRAVITY UA: 1.01 (ref 1–1.03)
SURGICAL PATHOLOGY REPORT: NORMAL
TRICHOMONAS VAGINALIS DNA: NEGATIVE
TURBIDITY: CLEAR
URINE HGB: ABNORMAL
UROBILINOGEN, URINE: NORMAL
WBC # BLD: 13.1 K/UL (ref 3.5–11.3)
WBC UA: NORMAL /HPF (ref 0–5)

## 2022-12-19 PROCEDURE — 87660 TRICHOMONAS VAGIN DIR PROBE: CPT

## 2022-12-19 PROCEDURE — 87510 GARDNER VAG DNA DIR PROBE: CPT

## 2022-12-19 PROCEDURE — 2500000003 HC RX 250 WO HCPCS: Performed by: STUDENT IN AN ORGANIZED HEALTH CARE EDUCATION/TRAINING PROGRAM

## 2022-12-19 PROCEDURE — 1200000000 HC SEMI PRIVATE

## 2022-12-19 PROCEDURE — 6370000000 HC RX 637 (ALT 250 FOR IP): Performed by: STUDENT IN AN ORGANIZED HEALTH CARE EDUCATION/TRAINING PROGRAM

## 2022-12-19 PROCEDURE — 87086 URINE CULTURE/COLONY COUNT: CPT

## 2022-12-19 PROCEDURE — 99222 1ST HOSP IP/OBS MODERATE 55: CPT | Performed by: STUDENT IN AN ORGANIZED HEALTH CARE EDUCATION/TRAINING PROGRAM

## 2022-12-19 PROCEDURE — 87591 N.GONORRHOEAE DNA AMP PROB: CPT

## 2022-12-19 PROCEDURE — 87491 CHLMYD TRACH DNA AMP PROBE: CPT

## 2022-12-19 PROCEDURE — 2580000003 HC RX 258: Performed by: STUDENT IN AN ORGANIZED HEALTH CARE EDUCATION/TRAINING PROGRAM

## 2022-12-19 PROCEDURE — 99285 EMERGENCY DEPT VISIT HI MDM: CPT

## 2022-12-19 PROCEDURE — 80048 BASIC METABOLIC PNL TOTAL CA: CPT

## 2022-12-19 PROCEDURE — 81001 URINALYSIS AUTO W/SCOPE: CPT

## 2022-12-19 PROCEDURE — 87480 CANDIDA DNA DIR PROBE: CPT

## 2022-12-19 PROCEDURE — 85025 COMPLETE CBC W/AUTO DIFF WBC: CPT

## 2022-12-19 PROCEDURE — 6360000002 HC RX W HCPCS: Performed by: STUDENT IN AN ORGANIZED HEALTH CARE EDUCATION/TRAINING PROGRAM

## 2022-12-19 RX ORDER — PROMETHAZINE HYDROCHLORIDE 12.5 MG/1
12.5 TABLET ORAL EVERY 6 HOURS PRN
Status: DISCONTINUED | OUTPATIENT
Start: 2022-12-19 | End: 2022-12-21 | Stop reason: HOSPADM

## 2022-12-19 RX ORDER — IBUPROFEN 800 MG/1
800 TABLET ORAL ONCE
Status: COMPLETED | OUTPATIENT
Start: 2022-12-19 | End: 2022-12-19

## 2022-12-19 RX ORDER — SENNA AND DOCUSATE SODIUM 50; 8.6 MG/1; MG/1
1 TABLET, FILM COATED ORAL NIGHTLY
Status: DISCONTINUED | OUTPATIENT
Start: 2022-12-19 | End: 2022-12-20

## 2022-12-19 RX ORDER — IBUPROFEN 400 MG/1
600 TABLET ORAL EVERY 6 HOURS PRN
Status: DISCONTINUED | OUTPATIENT
Start: 2022-12-19 | End: 2022-12-20

## 2022-12-19 RX ORDER — CEPHALEXIN 500 MG/1
500 CAPSULE ORAL ONCE
Status: COMPLETED | OUTPATIENT
Start: 2022-12-19 | End: 2022-12-19

## 2022-12-19 RX ORDER — ENOXAPARIN SODIUM 100 MG/ML
40 INJECTION SUBCUTANEOUS DAILY
Status: DISCONTINUED | OUTPATIENT
Start: 2022-12-19 | End: 2022-12-21 | Stop reason: HOSPADM

## 2022-12-19 RX ORDER — CLINDAMYCIN PHOSPHATE 900 MG/50ML
900 INJECTION INTRAVENOUS EVERY 8 HOURS
Status: DISCONTINUED | OUTPATIENT
Start: 2022-12-19 | End: 2022-12-20

## 2022-12-19 RX ORDER — ONDANSETRON 2 MG/ML
4 INJECTION INTRAMUSCULAR; INTRAVENOUS EVERY 6 HOURS PRN
Status: DISCONTINUED | OUTPATIENT
Start: 2022-12-19 | End: 2022-12-21 | Stop reason: HOSPADM

## 2022-12-19 RX ORDER — SIMETHICONE 80 MG
80 TABLET,CHEWABLE ORAL EVERY 6 HOURS PRN
Status: DISCONTINUED | OUTPATIENT
Start: 2022-12-19 | End: 2022-12-21 | Stop reason: HOSPADM

## 2022-12-19 RX ORDER — SODIUM CHLORIDE 9 MG/ML
INJECTION, SOLUTION INTRAVENOUS CONTINUOUS
Status: DISCONTINUED | OUTPATIENT
Start: 2022-12-19 | End: 2022-12-21 | Stop reason: HOSPADM

## 2022-12-19 RX ORDER — POLYETHYLENE GLYCOL 3350 17 G/17G
17 POWDER, FOR SOLUTION ORAL DAILY PRN
Status: DISCONTINUED | OUTPATIENT
Start: 2022-12-19 | End: 2022-12-21 | Stop reason: HOSPADM

## 2022-12-19 RX ORDER — ACETAMINOPHEN 500 MG
1000 TABLET ORAL EVERY 6 HOURS PRN
Status: DISCONTINUED | OUTPATIENT
Start: 2022-12-19 | End: 2022-12-20

## 2022-12-19 RX ORDER — METRONIDAZOLE 500 MG/1
500 TABLET ORAL ONCE
Status: COMPLETED | OUTPATIENT
Start: 2022-12-19 | End: 2022-12-19

## 2022-12-19 RX ADMIN — CEPHALEXIN 500 MG: 500 CAPSULE ORAL at 14:28

## 2022-12-19 RX ADMIN — DOCUSATE SODIUM 50 MG AND SENNOSIDES 8.6 MG 1 TABLET: 8.6; 5 TABLET, FILM COATED ORAL at 19:37

## 2022-12-19 RX ADMIN — GENTAMICIN SULFATE 351.6 MG: 40 INJECTION, SOLUTION INTRAMUSCULAR; INTRAVENOUS at 18:43

## 2022-12-19 RX ADMIN — SERTRALINE 75 MG: 50 TABLET, FILM COATED ORAL at 17:31

## 2022-12-19 RX ADMIN — METRONIDAZOLE 500 MG: 500 TABLET ORAL at 14:28

## 2022-12-19 RX ADMIN — SODIUM CHLORIDE: 9 INJECTION, SOLUTION INTRAVENOUS at 16:56

## 2022-12-19 RX ADMIN — ACETAMINOPHEN 1000 MG: 500 TABLET ORAL at 16:54

## 2022-12-19 RX ADMIN — CLINDAMYCIN PHOSPHATE 900 MG: 900 INJECTION, SOLUTION INTRAVENOUS at 17:44

## 2022-12-19 RX ADMIN — IBUPROFEN 600 MG: 400 TABLET, FILM COATED ORAL at 19:36

## 2022-12-19 RX ADMIN — IBUPROFEN 800 MG: 800 TABLET, FILM COATED ORAL at 12:15

## 2022-12-19 RX ADMIN — SIMETHICONE 80 MG: 80 TABLET, CHEWABLE ORAL at 19:39

## 2022-12-19 ASSESSMENT — ENCOUNTER SYMPTOMS
SORE THROAT: 0
COUGH: 0
BACK PAIN: 1
SHORTNESS OF BREATH: 0
NAUSEA: 0
VOMITING: 0
ABDOMINAL PAIN: 1

## 2022-12-19 ASSESSMENT — PAIN - FUNCTIONAL ASSESSMENT: PAIN_FUNCTIONAL_ASSESSMENT: 0-10

## 2022-12-19 ASSESSMENT — PAIN SCALES - GENERAL
PAINLEVEL_OUTOF10: 6
PAINLEVEL_OUTOF10: 8
PAINLEVEL_OUTOF10: 8
PAINLEVEL_OUTOF10: 10
PAINLEVEL_OUTOF10: 6

## 2022-12-19 ASSESSMENT — PAIN DESCRIPTION - DESCRIPTORS: DESCRIPTORS: STABBING

## 2022-12-19 ASSESSMENT — PAIN DESCRIPTION - ORIENTATION
ORIENTATION: MID;UPPER;LOWER;RIGHT;LEFT
ORIENTATION: LOWER

## 2022-12-19 ASSESSMENT — PAIN DESCRIPTION - LOCATION
LOCATION: ABDOMEN
LOCATION: ABDOMEN
LOCATION: ABDOMEN;PELVIS
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
## 2022-12-19 NOTE — DISCHARGE INSTRUCTIONS
Please follow-up with your OB/GYN as soon as you can. Take all medications as prescribed. For pain use ibuprofen (Motrin) or acetaminophen (Tylenol), unless prescribed medications that have acetaminophen in it. You can take over the counter acetaminophen tablets (1 - 2 tablets of the 500-mg strength every 6 hours) or ibuprofen tablets (2 tablets every 4 hours). PLEASE RETURN TO THE EMERGENCY DEPARTMENT IMMEDIATELY for worsening symptoms, or if you develop any concerning symptoms such as: high fever not relieved by acetaminophen (Tylenol) and/or ibuprofen (Motrin), chills, shortness of breath, chest pain, persistent nausea and/or vomiting, numbness, weakness or tingling in the arms or legs or change in color of the extremities, changes in mental status, persistent headache, blurry vision. Return within 8 - 12 hours if you have any of the following: worsening of pain in your abdomen, no food sounds good to you, you continue to vomit, pain goes to your back, have pain in the abdomen when going over a bump in the car or when you jump up and down, develop vaginal bleeding or discharge, inability to urinate, unable to follow up with your physician, or other any other care or concern.

## 2022-12-19 NOTE — ED TRIAGE NOTES
Pt arrived to ED 37 via triage. Pt co lower abdominal pain. Pt is 5 days postpartum vaginal delivery. No complications with the delivery. Pt denies any vaginal discharge or bleeding. Pt states that the pain radiates to her pelvis. Pt is resting on stretcher with call light within reach. Breathing is non labored and no acute distress is noted.    Will continue to follow plan of care
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
## 2022-12-20 LAB
C TRACH DNA GENITAL QL NAA+PROBE: NEGATIVE
CULTURE: NORMAL
HCT VFR BLD CALC: 39.1 % (ref 36.3–47.1)
HEMOGLOBIN: 12.4 G/DL (ref 11.9–15.1)
MCH RBC QN AUTO: 31.5 PG (ref 25.2–33.5)
MCHC RBC AUTO-ENTMCNC: 31.7 G/DL (ref 28.4–34.8)
MCV RBC AUTO: 99.2 FL (ref 82.6–102.9)
N. GONORRHOEAE DNA: NEGATIVE
NRBC AUTOMATED: 0 PER 100 WBC
PDW BLD-RTO: 13.5 % (ref 11.8–14.4)
PLATELET # BLD: 351 K/UL (ref 138–453)
PMV BLD AUTO: 8.6 FL (ref 8.1–13.5)
RBC # BLD: 3.94 M/UL (ref 3.95–5.11)
SPECIMEN DESCRIPTION: NORMAL
SPECIMEN DESCRIPTION: NORMAL
WBC # BLD: 8.2 K/UL (ref 3.5–11.3)

## 2022-12-20 PROCEDURE — 6370000000 HC RX 637 (ALT 250 FOR IP): Performed by: STUDENT IN AN ORGANIZED HEALTH CARE EDUCATION/TRAINING PROGRAM

## 2022-12-20 PROCEDURE — 2500000003 HC RX 250 WO HCPCS: Performed by: STUDENT IN AN ORGANIZED HEALTH CARE EDUCATION/TRAINING PROGRAM

## 2022-12-20 PROCEDURE — 2580000003 HC RX 258: Performed by: STUDENT IN AN ORGANIZED HEALTH CARE EDUCATION/TRAINING PROGRAM

## 2022-12-20 PROCEDURE — 6360000002 HC RX W HCPCS: Performed by: STUDENT IN AN ORGANIZED HEALTH CARE EDUCATION/TRAINING PROGRAM

## 2022-12-20 PROCEDURE — 36415 COLL VENOUS BLD VENIPUNCTURE: CPT

## 2022-12-20 PROCEDURE — 85027 COMPLETE CBC AUTOMATED: CPT

## 2022-12-20 PROCEDURE — 1200000000 HC SEMI PRIVATE

## 2022-12-20 RX ORDER — IBUPROFEN 600 MG/1
600 TABLET ORAL EVERY 6 HOURS
Status: DISCONTINUED | OUTPATIENT
Start: 2022-12-20 | End: 2022-12-21 | Stop reason: HOSPADM

## 2022-12-20 RX ORDER — CLINDAMYCIN PHOSPHATE 900 MG/50ML
900 INJECTION INTRAVENOUS EVERY 8 HOURS
Status: COMPLETED | OUTPATIENT
Start: 2022-12-20 | End: 2022-12-20

## 2022-12-20 RX ORDER — SENNA AND DOCUSATE SODIUM 50; 8.6 MG/1; MG/1
2 TABLET, FILM COATED ORAL 2 TIMES DAILY
Status: DISCONTINUED | OUTPATIENT
Start: 2022-12-20 | End: 2022-12-21 | Stop reason: HOSPADM

## 2022-12-20 RX ORDER — ACETAMINOPHEN 500 MG
1000 TABLET ORAL EVERY 6 HOURS
Status: DISCONTINUED | OUTPATIENT
Start: 2022-12-20 | End: 2022-12-21 | Stop reason: HOSPADM

## 2022-12-20 RX ADMIN — SODIUM CHLORIDE: 9 INJECTION, SOLUTION INTRAVENOUS at 15:05

## 2022-12-20 RX ADMIN — DOCUSATE SODIUM 50 MG AND SENNOSIDES 8.6 MG 2 TABLET: 8.6; 5 TABLET, FILM COATED ORAL at 20:39

## 2022-12-20 RX ADMIN — CLINDAMYCIN PHOSPHATE 900 MG: 900 INJECTION, SOLUTION INTRAVENOUS at 16:24

## 2022-12-20 RX ADMIN — CLINDAMYCIN PHOSPHATE 900 MG: 900 INJECTION, SOLUTION INTRAVENOUS at 10:08

## 2022-12-20 RX ADMIN — IBUPROFEN 600 MG: 600 TABLET, FILM COATED ORAL at 17:50

## 2022-12-20 RX ADMIN — ACETAMINOPHEN 1000 MG: 500 TABLET ORAL at 00:44

## 2022-12-20 RX ADMIN — ACETAMINOPHEN 1000 MG: 500 TABLET, FILM COATED ORAL at 16:23

## 2022-12-20 RX ADMIN — ACETAMINOPHEN 1000 MG: 500 TABLET ORAL at 11:21

## 2022-12-20 RX ADMIN — CLINDAMYCIN PHOSPHATE 900 MG: 900 INJECTION, SOLUTION INTRAVENOUS at 00:39

## 2022-12-20 RX ADMIN — SODIUM CHLORIDE 100 ML/HR: 9 INJECTION, SOLUTION INTRAVENOUS at 03:34

## 2022-12-20 RX ADMIN — SERTRALINE 75 MG: 50 TABLET, FILM COATED ORAL at 10:06

## 2022-12-20 RX ADMIN — GENTAMICIN SULFATE 351.6 MG: 40 INJECTION, SOLUTION INTRAMUSCULAR; INTRAVENOUS at 17:53

## 2022-12-20 ASSESSMENT — PAIN DESCRIPTION - LOCATION
LOCATION: ABDOMEN

## 2022-12-20 ASSESSMENT — PAIN SCALES - GENERAL
PAINLEVEL_OUTOF10: 7
PAINLEVEL_OUTOF10: 5
PAINLEVEL_OUTOF10: 7
PAINLEVEL_OUTOF10: 2
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
## 2022-12-20 NOTE — PLAN OF CARE
Problem: Discharge Planning  Goal: Discharge to home or other facility with appropriate resources  12/20/2022 0834 by Luis Antonio Cohen RN  Outcome: Progressing  12/20/2022 0335 by Sunny Patel RN  Outcome: Progressing

## 2022-12-20 NOTE — CARE COORDINATION
12/20/22 1659   Service Assessment   Patient Orientation Alert and Oriented   Cognition Alert   History Provided By Patient   Primary [address]  Parent   PCP Verified by CM No  (PCP list given)   Prior Functional Level Independent in ADLs/IADLs   Current Functional Level Independent in ADLs/IADLs   Can patient return to prior living arrangement Yes   Ability to make needs known: Good   Family able to assist with home care needs: Yes   Financial Resources Medicaid   Social/Functional History   Lives With Family;Son  (3 yr old son)   Type of [address] One level   [address] to enter with rails   Bathroom Shower/Tub Tub/Shower unit   1 [employer]   Ambulation Assistance Independent   Transfer Assistance Independent   Active  Yes   Occupation Full time employment   Discharge Planning   Type of [place] Family Members   Current Services Prior To Admission None   Potential [address] Medications No   Patient expects to be discharged to: House   History of falls? 0   Condition of Participation: Discharge Planning   The Plan for Transition of Care is related to the following treatment goals: clear infection and go home   The Patient and/or Patient Representative was provided with a Choice of Provider? Patient   Freedom of Choice list was provided with basic dialogue that supports the patient's individualized plan of care/goals, treatment preferences, and shares the quality data associated with the providers?   Yes   Pt lives with Adrian Scales and her 3year old son just recently had a baby placed for adoption goal is home does not have a PCP provided her with PCP list

## 2022-12-20 NOTE — PROGRESS NOTES
Ob/Gyn Interval Note     Patient was seen and examined. She still continues to complain of lower abdominal pain and states the Motrin and Tylenol due improve her pain slightly. Her abdomen is  suprapubically, but does not seem to be specific to the uterus. She does voluntarily guard in all quadrants. Overall her abdomen is soft with no peritoneal signs. She has remained afebrile and her leukocytosis has resolved. She reports bowel movements most recently this morning and denies any urinary complaints. She denies any abnormal vaginal discharge, odor, or irritation. She states her bleeding is stable. She denies any signs or symptoms of mastitis. She denies any fevers, chills, chest pain, or shortness of breath. She is tolerating p.o. but states she does have a decreased appetite. Her antibiotics were timed for 1 more dose for a total of 24 hrs. we will continue to monitor after antibiotics have been stopped. Her GCC and urine culture were negative.     Vitals:    12/19/22 1930 12/20/22 0015 12/20/22 0330 12/20/22 0745   BP: (!) 96/59 101/65 122/81 118/76   Pulse: 81 74 71 64   Resp: 18 18 18 18   Temp: 98.4 °F (36.9 °C) 98.4 °F (36.9 °C) 97.9 °F (36.6 °C) 98.1 °F (36.7 °C)   TempSrc: Oral Oral Oral Oral   SpO2: 98% 98% 98% 97%   Weight:       Height:           Shantell Merrill  OB/GYN Resident  [facility]  12/20/2022 2:47 PM

## 2022-12-20 NOTE — PLAN OF CARE
Problem: Discharge Planning  Goal: Discharge to home or other facility with appropriate resources  Outcome: Progressing     Problem: Pain  Goal: Verbalizes/displays adequate comfort level or baseline comfort level  Outcome: Progressing     Problem: Chronic Conditions and Co-morbidities  Goal: Patient's chronic conditions and co-morbidity symptoms are monitored and maintained or improved  Outcome: Progressing    - will continue to monitor the patient

## 2022-12-21 VITALS
DIASTOLIC BLOOD PRESSURE: 71 MMHG | TEMPERATURE: 98.2 F | WEIGHT: 155 LBS | BODY MASS INDEX: 27.46 KG/M2 | RESPIRATION RATE: 16 BRPM | SYSTOLIC BLOOD PRESSURE: 115 MMHG | HEART RATE: 58 BPM | OXYGEN SATURATION: 98 % | HEIGHT: 63 IN

## 2022-12-21 LAB
HCT VFR BLD CALC: 39.1 % (ref 36.3–47.1)
HEMOGLOBIN: 12.3 G/DL (ref 11.9–15.1)
MCH RBC QN AUTO: 31.1 PG (ref 25.2–33.5)
MCHC RBC AUTO-ENTMCNC: 31.5 G/DL (ref 28.4–34.8)
MCV RBC AUTO: 98.7 FL (ref 82.6–102.9)
NRBC AUTOMATED: 0 PER 100 WBC
PDW BLD-RTO: 13.5 % (ref 11.8–14.4)
PLATELET # BLD: 332 K/UL (ref 138–453)
PMV BLD AUTO: 8.6 FL (ref 8.1–13.5)
RBC # BLD: 3.96 M/UL (ref 3.95–5.11)
WBC # BLD: 5.5 K/UL (ref 3.5–11.3)

## 2022-12-21 PROCEDURE — 36415 COLL VENOUS BLD VENIPUNCTURE: CPT

## 2022-12-21 PROCEDURE — 2580000003 HC RX 258: Performed by: STUDENT IN AN ORGANIZED HEALTH CARE EDUCATION/TRAINING PROGRAM

## 2022-12-21 PROCEDURE — 6370000000 HC RX 637 (ALT 250 FOR IP): Performed by: STUDENT IN AN ORGANIZED HEALTH CARE EDUCATION/TRAINING PROGRAM

## 2022-12-21 PROCEDURE — 2580000003 HC RX 258

## 2022-12-21 PROCEDURE — 85027 COMPLETE CBC AUTOMATED: CPT

## 2022-12-21 PROCEDURE — 6360000002 HC RX W HCPCS: Performed by: STUDENT IN AN ORGANIZED HEALTH CARE EDUCATION/TRAINING PROGRAM

## 2022-12-21 RX ORDER — 0.9 % SODIUM CHLORIDE 0.9 %
500 INTRAVENOUS SOLUTION INTRAVENOUS ONCE
Status: COMPLETED | OUTPATIENT
Start: 2022-12-21 | End: 2022-12-21

## 2022-12-21 RX ORDER — METRONIDAZOLE 500 MG/1
500 TABLET ORAL 2 TIMES DAILY
Qty: 14 TABLET | Refills: 0 | Status: SHIPPED | OUTPATIENT
Start: 2022-12-21 | End: 2022-12-28

## 2022-12-21 RX ADMIN — ACETAMINOPHEN 1000 MG: 500 TABLET, FILM COATED ORAL at 00:01

## 2022-12-21 RX ADMIN — SODIUM CHLORIDE 500 ML: 9 INJECTION, SOLUTION INTRAVENOUS at 12:01

## 2022-12-21 RX ADMIN — SODIUM CHLORIDE: 9 INJECTION, SOLUTION INTRAVENOUS at 14:00

## 2022-12-21 RX ADMIN — IBUPROFEN 600 MG: 600 TABLET, FILM COATED ORAL at 11:53

## 2022-12-21 RX ADMIN — DOCUSATE SODIUM 50 MG AND SENNOSIDES 8.6 MG 2 TABLET: 8.6; 5 TABLET, FILM COATED ORAL at 09:07

## 2022-12-21 RX ADMIN — SODIUM CHLORIDE: 9 INJECTION, SOLUTION INTRAVENOUS at 03:41

## 2022-12-21 RX ADMIN — IBUPROFEN 600 MG: 600 TABLET, FILM COATED ORAL at 00:01

## 2022-12-21 RX ADMIN — SERTRALINE 75 MG: 50 TABLET, FILM COATED ORAL at 09:08

## 2022-12-21 RX ADMIN — ACETAMINOPHEN 1000 MG: 500 TABLET, FILM COATED ORAL at 17:57

## 2022-12-21 RX ADMIN — ACETAMINOPHEN 1000 MG: 500 TABLET, FILM COATED ORAL at 11:54

## 2022-12-21 ASSESSMENT — PAIN SCALES - GENERAL
PAINLEVEL_OUTOF10: 4
PAINLEVEL_OUTOF10: 3

## 2022-12-21 ASSESSMENT — PAIN DESCRIPTION - DESCRIPTORS
DESCRIPTORS: CRAMPING
DESCRIPTORS: ACHING

## 2022-12-21 ASSESSMENT — PAIN DESCRIPTION - LOCATION
LOCATION: ABDOMEN
LOCATION: HEAD

## 2022-12-21 ASSESSMENT — PAIN DESCRIPTION - ORIENTATION: ORIENTATION: RIGHT

## 2022-12-21 NOTE — PROGRESS NOTES
CLINICAL PHARMACY NOTE: MEDS TO BEDS    Total # of Prescriptions Filled: 1   The following medications were delivered to the patient:  FLAGYL 500    Additional Documentation:

## 2022-12-21 NOTE — PLAN OF CARE
Problem: Discharge Planning  Goal: Discharge to home or other facility with appropriate resources  12/21/2022 1644 by Mary Almonte RN  Outcome: Progressing  Flowsheets (Taken 12/21/2022 1200)  Discharge to home or other facility with appropriate resources: Identify barriers to discharge with patient and caregiver  12/21/2022 1139 by Mary Almonte RN  Outcome: Progressing  Flowsheets (Taken 12/21/2022 0800)  Discharge to home or other facility with appropriate resources: Identify barriers to discharge with patient and caregiver     Problem: Pain  Goal: Verbalizes/displays adequate comfort level or baseline comfort level  12/21/2022 1644 by Mary Almonte RN  Outcome: Progressing  12/21/2022 1139 by [place] Mira Macdonald RN  Outcome: Progressing     Problem: Chronic Conditions and Co-morbidities  Goal: Patient's chronic conditions and co-morbidity symptoms are monitored and maintained or improved  12/21/2022 1644 by Mary Almonte RN  Outcome: Progressing  Flowsheets (Taken 12/21/2022 1200)  Care Plan - Patient's Chronic Conditions and Co-Morbidity Symptoms are Monitored and Maintained or Improved: Monitor and assess patient's chronic conditions and comorbid symptoms for stability, deterioration, or improvement  12/21/2022 1139 by Mary Almonte RN  Outcome: Progressing  Flowsheets (Taken 12/21/2022 0800)  Care Plan - Patient's Chronic Conditions and Co-Morbidity Symptoms are Monitored and Maintained or Improved: Monitor and assess patient's chronic conditions and comorbid symptoms for stability, deterioration, or improvement     Problem: Discharge Planning  Goal: Discharge to home or other facility with appropriate resources  12/21/2022 1645 by Mary Almonte RN  Outcome: Completed  12/21/2022 1644 by Mary Almonte RN  Outcome: Progressing  Flowsheets (Taken 12/21/2022 1200)  Discharge to home or other facility with appropriate resources: Identify barriers to discharge with patient and caregiver  12/21/2022 1139 by Hannah Dominguez RN  Outcome: Progressing  Flowsheets (Taken 12/21/2022 0800)  Discharge to home or other facility with appropriate resources: Identify barriers to discharge with patient and caregiver     Problem: Pain  Goal: Verbalizes/displays adequate comfort level or baseline comfort level  12/21/2022 1645 by Hannah Dominguez RN  Outcome: Completed  12/21/2022 1644 by Hannah Dominguez RN  Outcome: Progressing  12/21/2022 1139 by [facility] [place] Mira Macdonald RN  Outcome: Progressing     Problem: Chronic Conditions and Co-morbidities  Goal: Patient's chronic conditions and co-morbidity symptoms are monitored and maintained or improved  12/21/2022 1645 by Hannah Dominguez RN  Outcome: Completed  12/21/2022 1644 by Hannah Dominguez RN  Outcome: Progressing  Flowsheets (Taken 12/21/2022 1200)  Care Plan - Patient's Chronic Conditions and Co-Morbidity Symptoms are Monitored and Maintained or Improved: Monitor and assess patient's chronic conditions and comorbid symptoms for stability, deterioration, or improvement  12/21/2022 1139 by Hannah Dominguez RN  Outcome: Progressing  Flowsheets (Taken 12/21/2022 0800)  Care Plan - Patient's Chronic Conditions and Co-Morbidity Symptoms are Monitored and Maintained or Improved: Monitor and assess patient's chronic conditions and comorbid symptoms for stability, deterioration, or improvement

## 2022-12-21 NOTE — PLAN OF CARE
Problem: Discharge Planning  Goal: Discharge to home or other facility with appropriate resources  12/21/2022 1139 by Siena Kamara RN  Outcome: Progressing  12/20/2022 2303 by Pj Fitzpatrick RN  Outcome: Progressing     Problem: Pain  Goal: Verbalizes/displays adequate comfort level or baseline comfort level  12/21/2022 1139 by Siena Kamara RN  Outcome: Progressing  12/20/2022 2303 by Pj Fitzpatrick RN  Outcome: Progressing     Problem: Chronic Conditions and Co-morbidities  Goal: Patient's chronic conditions and co-morbidity symptoms are monitored and maintained or improved  12/21/2022 1139 by Siena Kamara RN  Outcome: Progressing  12/20/2022 2303 by Pj Fitzpatrick RN  Outcome: Progressing
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
## 2022-12-22 ENCOUNTER — TELEPHONE (OUTPATIENT)
Dept: OBGYN CLINIC | Age: 22
End: 2022-12-22
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
## 2023-02-14 ENCOUNTER — HOSPITAL ENCOUNTER (EMERGENCY)
Age: 23
Discharge: HOME | End: 2023-02-14
Attending: EMERGENCY MEDICINE
Payer: COMMERCIAL

## 2023-02-14 VITALS
DIASTOLIC BLOOD PRESSURE: 70 MMHG | RESPIRATION RATE: 16 BRPM | TEMPERATURE: 97 F | HEIGHT: 63 IN | OXYGEN SATURATION: 99 % | WEIGHT: 150 LBS | BODY MASS INDEX: 26.58 KG/M2 | HEART RATE: 87 BPM | SYSTOLIC BLOOD PRESSURE: 116 MMHG

## 2023-02-14 DIAGNOSIS — N93.9 VAGINAL BLEEDING: Primary | ICD-10-CM

## 2023-02-14 LAB
BILIRUBIN URINE: NEGATIVE
CANDIDA SPECIES, DNA PROBE: NEGATIVE
CASTS UA: NORMAL /LPF (ref 0–8)
COLOR: YELLOW
EPITHELIAL CELLS UA: NORMAL /HPF (ref 0–5)
GARDNERELLA VAGINALIS, DNA PROBE: NEGATIVE
GLUCOSE UR STRIP.AUTO-MCNC: NEGATIVE MG/DL
HCG(URINE) PREGNANCY TEST: NEGATIVE
KETONES UR STRIP.AUTO-MCNC: ABNORMAL MG/DL
LEUKOCYTE ESTERASE UR QL STRIP.AUTO: ABNORMAL
NITRITE UR QL STRIP.AUTO: NEGATIVE
PROT UR STRIP.AUTO-MCNC: 6 MG/DL (ref 5–8)
PROT UR STRIP.AUTO-MCNC: ABNORMAL MG/DL
RBC CLUMPS #/AREA URNS AUTO: NORMAL /HPF (ref 0–4)
SOURCE: NORMAL
SPECIFIC GRAVITY UA: 1.02 (ref 1–1.03)
TRICHOMONAS VAGINALIS DNA: NEGATIVE
TURBIDITY: CLEAR
URINE HGB: ABNORMAL
UROBILINOGEN, URINE: NORMAL
WBC UA: NORMAL /HPF (ref 0–5)

## 2023-02-14 PROCEDURE — 81001 URINALYSIS AUTO W/SCOPE: CPT

## 2023-02-14 PROCEDURE — 87591 N.GONORRHOEAE DNA AMP PROB: CPT

## 2023-02-14 PROCEDURE — 87660 TRICHOMONAS VAGIN DIR PROBE: CPT

## 2023-02-14 PROCEDURE — 87480 CANDIDA DNA DIR PROBE: CPT

## 2023-02-14 PROCEDURE — 87510 GARDNER VAG DNA DIR PROBE: CPT

## 2023-02-14 PROCEDURE — 99283 EMERGENCY DEPT VISIT LOW MDM: CPT

## 2023-02-14 PROCEDURE — 87491 CHLMYD TRACH DNA AMP PROBE: CPT

## 2023-02-14 PROCEDURE — 81025 URINE PREGNANCY TEST: CPT

## 2023-02-14 ASSESSMENT — ENCOUNTER SYMPTOMS
VOMITING: 0
CHOKING: 0
NAUSEA: 0
SHORTNESS OF BREATH: 0
COUGH: 0
DIARRHEA: 0
CHEST TIGHTNESS: 0
RHINORRHEA: 0
ABDOMINAL PAIN: 1
CONSTIPATION: 0

## 2023-02-14 ASSESSMENT — PAIN DESCRIPTION - LOCATION: LOCATION: ABDOMEN

## 2023-02-14 ASSESSMENT — PAIN - FUNCTIONAL ASSESSMENT: PAIN_FUNCTIONAL_ASSESSMENT: 0-10

## 2023-02-14 ASSESSMENT — PAIN SCALES - GENERAL: PAINLEVEL_OUTOF10: 6

## 2023-02-14 ASSESSMENT — PAIN DESCRIPTION - ORIENTATION: ORIENTATION: MID

## 2023-02-14 NOTE — DISCHARGE INSTRUCTIONS
Seen in the emergency department for vaginal bleeding. We did urinalysis and pelvic exam which came back unremarkable. At this time you are stable for discharge. We recommend you following up with your OB/GYN and primary care provider within a few days of discharge for further management of your vaginal bleeding. Please return to the emergency department experience any new or worsening symptoms including fever, chills, increasing vaginal bleeding, increasing or abdominal pain or any other concerning symptoms.

## 2023-02-14 NOTE — ED NOTES
The following labs were labeled with appropriate pt sticker and tubed to lab:     [] Blue     [] Lavender   [] on ice  [] Green/yellow  [] Green/black [] on ice  [] Cat Rivera  [] on ice  [] Yellow  [] Red  [] Type/ Screen  [] ABG  [] VBG    [] COVID-19 swab    [] Rapid  [] PCR  [] Flu swab  [] Peds Viral Panel     [x] Urine Sample  [] Fecal Sample  [x] Pelvic Cultures  [] Blood Cultures  [] X 2  [] STREP Cultures       Kelsie Hinson LPN  71/92/76 [id number]

## 2023-02-14 NOTE — ED PROVIDER NOTES
[address] HANDOFF       Handoff taken on the following patient from prior Attending Physician:  Pt Name: Oral Xiomycynthiaadrienne  PCP:  DARRIAN Stephenson CNM    Attestation  I was available and discussed any additional care issues that arose and coordinated the management plans with the resident(s) caring for the patient during my duty period. Any areas of disagreement with resident's documentation of care or procedures are noted on the chart. I was personally present for the key portions of any/all procedures during my duty period. I have documented in the chart those procedures where I was not present during the key portions. CHIEF COMPLAINT       Chief Complaint   Patient presents with    Vaginal Bleeding     Pt states IUD fell out last night          CURRENT MEDICATIONS     Previous Medications  Previous Medications    ACETAMINOPHEN (TYLENOL) 500 MG TABLET    Take 2 tablets by mouth every 8 hours as needed for Pain    ALBUTEROL SULFATE HFA (VENTOLIN HFA) 108 (90 BASE) MCG/ACT INHALER    Inhale 2 puffs into the lungs 4 times daily as needed for Wheezing    DOCUSATE SODIUM (COLACE) 100 MG CAPSULE    Take 1 capsule by mouth 2 times daily as needed for Constipation    ELASTIC BANDAGES & SUPPORTS (JOBST RELIEF KNEE HIGH/MEDIUM) MISC    1 box by Does not apply route daily    IBUPROFEN (ADVIL;MOTRIN) 600 MG TABLET    Take 1 tablet by mouth every 6 hours as needed for Pain    SERTRALINE (ZOLOFT) 25 MG TABLET    Take 3 tablets by mouth daily       Encounter Medications  No orders of the defined types were placed in this encounter. ALLERGIES     is allergic to sulfa antibiotics.       RECENT VITALS:   Temp: 97 °F (36.1 °C),  Heart Rate: 87, Resp: 16, BP: 116/70    RADIOLOGY:   No orders to display       LABS:  Labs Reviewed   URINALYSIS WITH REFLEX TO CULTURE - Abnormal; Notable for the following components:       Result Value    Ketones, Urine TRACE (*)     Urine Hgb LARGE (*)     Protein, UA TRACE (*)     Leukocyte Esterase, Urine TRACE (*)     All other components within normal limits   VAGINITIS DNA PROBE   C.TRACHOMATIS N.GONORRHOEAE DNA   PREGNANCY, URINE   MICROSCOPIC URINALYSIS       Vaginal bleeding after IUD removal, pregnancy test negative. Awaiting urinalysis, anticipate discharge with return precautions    PLAN/ TASKS OUTSTANDING       Labs, disposition    (Please note that portions of this note were completed with a voice recognition program.  Efforts were made to edit the dictations but occasionally words are mis-transcribed. )    Brenda Thompson MD,, MD, F.A.C.E.P.   Attending Emergency Physician        Brenda Thompson MD  02/14/23 1357       Brenda Thompson MD  02/14/23 7218
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
## 2023-02-14 NOTE — ED PROVIDER NOTES
[facility]     Emergency Department     Faculty Attestation    I performed a history and physical examination of the patient and discussed management with the resident. I reviewed the resident´s note and agree with the documented findings and plan of care. Any areas of disagreement are noted on the chart. I was personally present for the key portions of any procedures. I have documented in the chart those procedures where I was not present during the key portions. I have reviewed the emergency nurses triage note. I agree with the chief complaint, past medical history, past surgical history, allergies, medications, social and family history as documented unless otherwise noted below. For Physician Assistant/ Nurse Practitioner cases/documentation I have personally evaluated this patient and have completed at least one if not all key elements of the E/M (history, physical exam, and MDM). Additional findings are as noted.    Patient states that her IUD was falling out and she pulled out yesterday and then started having vaginal bleeding.  On exam now her abdomen is soft and nontender.  No adnexal pain, no suprapubic pain.     Doug Gómez MD  02/14/23 8412

## 2023-02-15 LAB
C TRACH DNA SPEC QL PROBE+SIG AMP: NEGATIVE
N GONORRHOEA DNA SPEC QL PROBE+SIG AMP: NEGATIVE
SPECIMEN DESCRIPTION: NORMAL

## 2023-02-17 ENCOUNTER — APPOINTMENT (OUTPATIENT)
Dept: GENERAL RADIOLOGY | Age: 23
End: 2023-02-17
Payer: COMMERCIAL

## 2023-02-17 ENCOUNTER — HOSPITAL ENCOUNTER (EMERGENCY)
Age: 23
Discharge: HOME | End: 2023-02-17
Attending: EMERGENCY MEDICINE
Payer: COMMERCIAL

## 2023-02-17 VITALS
HEART RATE: 61 BPM | RESPIRATION RATE: 17 BRPM | DIASTOLIC BLOOD PRESSURE: 74 MMHG | TEMPERATURE: 97.8 F | SYSTOLIC BLOOD PRESSURE: 105 MMHG | OXYGEN SATURATION: 97 %

## 2023-02-17 DIAGNOSIS — R55 SYNCOPE AND COLLAPSE: Primary | ICD-10-CM

## 2023-02-17 LAB
ABSOLUTE EOS #: 0.14 K/UL (ref 0–0.44)
ABSOLUTE IMMATURE GRANULOCYTE: <0.03 K/UL (ref 0–0.3)
ABSOLUTE LYMPH #: 2.1 K/UL (ref 1.1–3.7)
ABSOLUTE MONO #: 0.66 K/UL (ref 0.1–1.2)
ANION GAP SERPL CALCULATED.3IONS-SCNC: 12 MMOL/L (ref 9–17)
BASOPHILS # BLD: 0 % (ref 0–2)
BASOPHILS ABSOLUTE: 0.03 K/UL (ref 0–0.2)
BUN SERPL-MCNC: 12 MG/DL (ref 6–20)
CALCIUM SERPL-MCNC: 9.4 MG/DL (ref 8.6–10.4)
CHLORIDE SERPL-SCNC: 101 MMOL/L (ref 98–107)
CO2 SERPL-SCNC: 23 MMOL/L (ref 20–31)
CREAT SERPL-MCNC: 0.63 MG/DL (ref 0.5–0.9)
EOSINOPHILS RELATIVE PERCENT: 2 % (ref 1–4)
GFR SERPL CREATININE-BSD FRML MDRD: >60 ML/MIN/1.73M2
GLUCOSE SERPL-MCNC: 87 MG/DL (ref 70–99)
HCT VFR BLD AUTO: 37.6 % (ref 36.3–47.1)
HGB BLD-MCNC: 12.6 G/DL (ref 11.9–15.1)
IMMATURE GRANULOCYTES: 0 %
LYMPHOCYTES # BLD: 31 % (ref 24–43)
MCH RBC QN AUTO: 31.4 PG (ref 25.2–33.5)
MCHC RBC AUTO-ENTMCNC: 33.5 G/DL (ref 28.4–34.8)
MCV RBC AUTO: 93.8 FL (ref 82.6–102.9)
MONOCYTES # BLD: 10 % (ref 3–12)
NRBC AUTOMATED: 0 PER 100 WBC
PDW BLD-RTO: 13.2 % (ref 11.8–14.4)
PLATELET # BLD AUTO: 293 K/UL (ref 138–453)
PMV BLD AUTO: 9.2 FL (ref 8.1–13.5)
POTASSIUM SERPL-SCNC: 3.9 MMOL/L (ref 3.7–5.3)
RBC # BLD: 4.01 M/UL (ref 3.95–5.11)
SEG NEUTROPHILS: 57 % (ref 36–65)
SEGMENTED NEUTROPHILS ABSOLUTE COUNT: 3.75 K/UL (ref 1.5–8.1)
SODIUM SERPL-SCNC: 136 MMOL/L (ref 135–144)
TROPONIN I SERPL DL<=0.01 NG/ML-MCNC: <6 NG/L (ref 0–14)
TSH SERPL-ACNC: 1.55 UIU/ML (ref 0.3–5)
WBC # BLD AUTO: 6.7 K/UL (ref 3.5–11.3)

## 2023-02-17 PROCEDURE — 6360000002 HC RX W HCPCS: Performed by: STUDENT IN AN ORGANIZED HEALTH CARE EDUCATION/TRAINING PROGRAM

## 2023-02-17 PROCEDURE — 93005 ELECTROCARDIOGRAM TRACING: CPT | Performed by: STUDENT IN AN ORGANIZED HEALTH CARE EDUCATION/TRAINING PROGRAM

## 2023-02-17 PROCEDURE — 84484 ASSAY OF TROPONIN QUANT: CPT

## 2023-02-17 PROCEDURE — 71046 X-RAY EXAM CHEST 2 VIEWS: CPT

## 2023-02-17 PROCEDURE — 99285 EMERGENCY DEPT VISIT HI MDM: CPT

## 2023-02-17 PROCEDURE — 96374 THER/PROPH/DIAG INJ IV PUSH: CPT

## 2023-02-17 PROCEDURE — 84443 ASSAY THYROID STIM HORMONE: CPT

## 2023-02-17 PROCEDURE — 80048 BASIC METABOLIC PNL TOTAL CA: CPT

## 2023-02-17 PROCEDURE — 85025 COMPLETE CBC W/AUTO DIFF WBC: CPT

## 2023-02-17 RX ORDER — DIPHENHYDRAMINE HYDROCHLORIDE 50 MG/ML
12.5 INJECTION INTRAMUSCULAR; INTRAVENOUS ONCE
Status: COMPLETED | OUTPATIENT
Start: 2023-02-17 | End: 2023-02-17

## 2023-02-17 RX ORDER — PROCHLORPERAZINE EDISYLATE 5 MG/ML
10 INJECTION INTRAMUSCULAR; INTRAVENOUS ONCE
Status: COMPLETED | OUTPATIENT
Start: 2023-02-17 | End: 2023-02-17

## 2023-02-17 RX ADMIN — DIPHENHYDRAMINE HYDROCHLORIDE 12.5 MG: 50 INJECTION, SOLUTION INTRAMUSCULAR; INTRAVENOUS at 17:21

## 2023-02-17 RX ADMIN — PROCHLORPERAZINE EDISYLATE 10 MG: 5 INJECTION INTRAMUSCULAR; INTRAVENOUS at 17:21

## 2023-02-17 ASSESSMENT — ENCOUNTER SYMPTOMS
FACIAL SWELLING: 0
BACK PAIN: 0
SHORTNESS OF BREATH: 0
ABDOMINAL PAIN: 0

## 2023-02-17 ASSESSMENT — PAIN DESCRIPTION - DESCRIPTORS: DESCRIPTORS: DISCOMFORT

## 2023-02-17 ASSESSMENT — PAIN - FUNCTIONAL ASSESSMENT: PAIN_FUNCTIONAL_ASSESSMENT: 0-10

## 2023-02-17 ASSESSMENT — PAIN DESCRIPTION - LOCATION: LOCATION: HEAD

## 2023-02-17 ASSESSMENT — PAIN DESCRIPTION - FREQUENCY: FREQUENCY: CONTINUOUS

## 2023-02-17 ASSESSMENT — PAIN SCALES - GENERAL: PAINLEVEL_OUTOF10: 6

## 2023-02-17 ASSESSMENT — PAIN DESCRIPTION - PAIN TYPE: TYPE: ACUTE PAIN
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
## 2023-02-17 NOTE — DISCHARGE INSTRUCTIONS
Thank you for coming to [facility] emergency department! You were seen today for syncope. Follow up with your primary care doctor. If you have any worsening of symptoms or any other concerns, please return to the emergency department. We are always available!

## 2023-02-17 NOTE — ED NOTES
Pt given instructions for follow-up and discharge. Pt verbalizes understanding. Pt is A&O x4, PWD, eupneic, and ambulatory with steady, even gait upon discharge.         Cintia Melchor RN  02/17/23 6991

## 2023-02-17 NOTE — ED PROVIDER NOTES
[facility] ED     Emergency Department     Faculty Attestation        I performed a history and physical examination of the patient and discussed management with the resident. I reviewed the residents note and agree with the documented findings and plan of care. Any areas of disagreement are noted on the chart. I was personally present for the key portions of any procedures. I have documented in the chart those procedures where I was not present during the key portions. I have reviewed the emergency nurses triage note. I agree with the chief complaint, past medical history, past surgical history, allergies, medications, social and family history as documented unless otherwise noted below. For mid-level providers such as nurse practitioners as well as physicians assistants:    I have personally seen and evaluated the patient. I find the patient's history and physical exam are consistent with NP/PA documentation. I agree with the care provided, treatment rendered, disposition, & follow-up plan. Additional findings are as noted.     Vital Signs: /72   Pulse 65   Temp 97.8 °F (36.6 °C) (Oral)   Resp 18   LMP 02/01/2023   SpO2 99%   PCP:  DARRIAN Gregorio CNM    Pertinent Comments:           Critical Care  None          Jannie Avilez MD    Attending Emergency Medicine Physician            Malina Horvath MD  02/17/23 9638

## 2023-02-17 NOTE — Clinical Note
Erick Burnett was seen and treated in our emergency department on 2/17/2023. She may return to work on 02/18/2023. If you have any questions or concerns, please don't hesitate to call.       Atul Munguia, DO

## 2023-02-18 LAB
EKG ATRIAL RATE: 63 BPM
EKG P AXIS: 48 DEGREES
EKG P-R INTERVAL: 128 MS
EKG Q-T INTERVAL: 426 MS
EKG QRS DURATION: 80 MS
EKG QTC CALCULATION (BAZETT): 435 MS
EKG R AXIS: 66 DEGREES
EKG T AXIS: 47 DEGREES
EKG VENTRICULAR RATE: 63 BPM

## 2023-02-18 PROCEDURE — 93010 ELECTROCARDIOGRAM REPORT: CPT | Performed by: INTERNAL MEDICINE

## 2023-03-10 ENCOUNTER — CARE COORDINATION (OUTPATIENT)
Dept: CARE COORDINATION | Age: 23
End: 2023-03-10

## 2023-03-12 NOTE — CARE COORDINATION
States she has not needed to use albuterol inhaler in past 3-4 weeks. Denies cough, shortness of breath. States she stopped smoking cigarettes in May 2022 and engages in [address] [place] [address] [employer]. Plan  Schedule PCP follow up.     Coordinate for care with [facility] high acuity team.       Anitra Boyce RN, [address] [place]

## 2023-04-19 ENCOUNTER — CARE COORDINATION (OUTPATIENT)
Dept: CARE COORDINATION | Age: 23
End: 2023-04-19

## 2023-04-19 NOTE — CARE COORDINATION
Lulu Wallace was scheduled for initial PCP vs with new primary care provider per her request and did not show for 2 scheduled appointments at [facility] office [address]. She additionally has not engaged in program follow up with [address] [place] [address] worker, [place]. [address] high acuity staff has connected Lulu Wallace with behavioral health for follow up. Lulu Wallace is discharged from [facility] services due to lack of willingness to engage with services at this time.     Viv Beasley RN, BSN    [facility]

## 2023-05-02 ENCOUNTER — HOSPITAL ENCOUNTER (EMERGENCY)
Age: 23
Discharge: HOME | End: 2023-05-02
Attending: EMERGENCY MEDICINE
Payer: COMMERCIAL

## 2023-05-02 VITALS
HEART RATE: 86 BPM | HEIGHT: 63 IN | SYSTOLIC BLOOD PRESSURE: 115 MMHG | DIASTOLIC BLOOD PRESSURE: 74 MMHG | TEMPERATURE: 98.1 F | RESPIRATION RATE: 14 BRPM | BODY MASS INDEX: 26.57 KG/M2 | OXYGEN SATURATION: 100 %
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
## 2023-05-02 PROCEDURE — 99283 EMERGENCY DEPT VISIT LOW MDM: CPT

## 2023-05-02 PROCEDURE — 6370000000 HC RX 637 (ALT 250 FOR IP): Performed by: STUDENT IN AN ORGANIZED HEALTH CARE EDUCATION/TRAINING PROGRAM

## 2023-05-02 RX ORDER — CETIRIZINE HYDROCHLORIDE 10 MG/1
10 TABLET ORAL DAILY
Qty: 30 TABLET | Refills: 0 | Status: SHIPPED | OUTPATIENT
Start: 2023-05-02

## 2023-05-02 RX ORDER — DIPHENHYDRAMINE HCL 25 MG
25 CAPSULE ORAL EVERY 4 HOURS PRN
Qty: 5 CAPSULE | Refills: 0 | Status: SHIPPED | OUTPATIENT
Start: 2023-05-02 | End: 2023-05-12

## 2023-05-02 RX ORDER — DIAPER,BRIEF,INFANT-TODD,DISP
EACH MISCELLANEOUS
Qty: 20 G | Refills: 0 | Status: SHIPPED | OUTPATIENT
Start: 2023-05-02 | End: 2023-05-09

## 2023-05-02 RX ORDER — CETIRIZINE HYDROCHLORIDE 10 MG/1
10 TABLET ORAL ONCE
Status: COMPLETED | OUTPATIENT
Start: 2023-05-02 | End: 2023-05-02

## 2023-05-02 RX ADMIN — CETIRIZINE HYDROCHLORIDE 10 MG: 10 TABLET ORAL at 18:24

## 2023-05-02 ASSESSMENT — PAIN - FUNCTIONAL ASSESSMENT: PAIN_FUNCTIONAL_ASSESSMENT: NONE - DENIES PAIN

## 2023-05-02 NOTE — ED PROVIDER NOTES
[facility] ED  Emergency Department Encounter  Emergency Medicine Resident     Pt Edmund Pena  MRN: [id number]  [place] 2000  Date of evaluation: 23  PCP:  DARRIAN Armijo CNM  Note Started: 6:32 PM EDT      CHIEF COMPLAINT       Chief Complaint   Patient presents with    Rash       HISTORY OF PRESENT ILLNESS  (Location/Symptom, Timing/Onset, Context/Setting, Quality, Duration, Modifying Factors, Severity.)      Nathalie Hodge is a 25 y.o. female who presents with rash over trunk and right axilla. Rash has been present for three days. Progressively worsening. Intensely pruritic. Patient lives at a homeless shelter and was told it may be scabies. Has not yet taken any medications for this condition. PAST MEDICAL / SURGICAL / SOCIAL / FAMILY HISTORY      has a past medical history of ADD (attention deficit disorder), Adopted, Anxiety, Asthma, CMV (cytomegalovirus infection) ([address] Depression, Family history of clotting disorder, Family history of diabetes mellitus, Gestational diabetes mellitus (GDM), antepartum, Hearing loss in left ear, Heart disease, Hypothyroidism, Immunizations up to date in pediatric patient, Neurocardiogenic syncope, Pseudoseizures ([address] PTSD (post-traumatic stress disorder), Raynaud disease, Seizures ([address] Severe recurrent major depression without psychotic features ([address]  19 M Apg 8/9 Wt 7#11, SVT (supraventricular tachycardia) ([address] Tachycardia, Traumatic injury during pregnancy, third trimester, and Wears glasses. has a past surgical history that includes REMOVAL FOREIGN BODY EAR (Left) and Tympanoplasty (Left, 2016).        Social History     Socioeconomic History    Marital status: Single     Spouse name: Not on file    Number of children: Not on file    Years of education: Not on file    Highest education level: Not on file   Occupational History    Not on file   Tobacco Use    Smoking status: Former

## 2023-05-02 NOTE — ED TRIAGE NOTES
Patient presented to the ED today with complaints of a rash that presented on bilateral arms and back 3 days ago.

## 2023-05-02 NOTE — DISCHARGE INSTRUCTIONS
Thank you for coming to [facility]. [facility] emergency department! You were seen today for rash, you were diagnosed with bed bug rash. Treatment for this includes steroids. You were prescribed  , please pick these medications up at the pharmacy. Follow up with your primary care doctor. If you have any worsening of symptoms or any other concerns, please return to the emergency department. We are always available!

## 2023-05-02 NOTE — ED PROVIDER NOTES
[address] [facility]     Emergency Department     Faculty Attestation    I performed a history and physical examination of the patient and discussed management with the resident. I have reviewed and agree with the residents findings including all diagnostic interpretations, and treatment plans as written at the time of my review. Any areas of disagreement are noted on the chart. I was personally present for the key portions of any procedures. I have documented in the chart those procedures where I was not present during the key portions. For Physician Assistant/ Nurse Practitioner cases/documentation I have personally evaluated this patient and have completed at least one if not all key elements of the E/M (history, physical exam, and MDM). Additional findings are as noted. PtName: Chuy Frazier  MRN: [id number]  [place] 2000  Date of evaluation: 5/2/23  Note Started: 5:28 PM EDT    Primary Care Physician: DARRIAN Trejo CNM        History: This is a 25 y.o. female who presents to the Emergency Department with complaint of itching. Patient presents emerged department complaint of itching has been ongoing for the past couple of days. Patient states she been scratching significant for this. She does live at a homeless shelter    Physical:   vitals were not taken for this visit. Patient has some linear lesions noted on the side in the back. No lesions noted on the hands. No lesions noted around the panty line or at the bra line. Impression: Bedbugs    Plan: Antipruritic medication      Medical Decision Making  Risk  OTC drugs. (Please note that portions of this note were completed with a voice recognition program.  Efforts were made to edit the dictations but occasionally words are mis-transcribed.)    Kylee Chavez.  Silvia Donald MD, [facility]  Attending Emergency Medicine Physician        Denise Storey MD  05/02/23 6838

## 2023-05-03 ENCOUNTER — APPOINTMENT (OUTPATIENT)
Dept: GENERAL RADIOLOGY | Age: 23
End: 2023-05-03
Payer: COMMERCIAL

## 2023-05-03 ENCOUNTER — APPOINTMENT (OUTPATIENT)
Dept: CT IMAGING | Age: 23
End: 2023-05-03
Payer: COMMERCIAL

## 2023-05-03 ENCOUNTER — HOSPITAL ENCOUNTER (EMERGENCY)
Age: 23
Discharge: HOME | End: 2023-05-03
Attending: EMERGENCY MEDICINE
Payer: COMMERCIAL

## 2023-05-03 VITALS
TEMPERATURE: 98.3 F | RESPIRATION RATE: 16 BRPM | OXYGEN SATURATION: 96 % | HEIGHT: 63 IN | BODY MASS INDEX: 26.57 KG/M2 | HEART RATE: 85 BPM | SYSTOLIC BLOOD PRESSURE: 112 MMHG | DIASTOLIC BLOOD PRESSURE: 68 MMHG

## 2023-05-03 DIAGNOSIS — N93.9 VAGINAL BLEEDING: Primary | ICD-10-CM

## 2023-05-03 DIAGNOSIS — R10.13 ABDOMINAL PAIN, EPIGASTRIC: ICD-10-CM

## 2023-05-03 LAB
ABSOLUTE EOS #: 0.09 K/UL (ref 0–0.44)
ABSOLUTE IMMATURE GRANULOCYTE: <0.03 K/UL (ref 0–0.3)
ABSOLUTE LYMPH #: 1.63 K/UL (ref 1.1–3.7)
ABSOLUTE MONO #: 0.64 K/UL (ref 0.1–1.2)
ANION GAP SERPL CALCULATED.3IONS-SCNC: 14 MMOL/L (ref 9–17)
BASOPHILS # BLD: 0 % (ref 0–2)
BASOPHILS ABSOLUTE: 0.03 K/UL (ref 0–0.2)
BILIRUBIN URINE: NEGATIVE
BUN SERPL-MCNC: 12 MG/DL (ref 6–20)
CALCIUM SERPL-MCNC: 9.9 MG/DL (ref 8.6–10.4)
CANDIDA SPECIES, DNA PROBE: NEGATIVE
CHLORIDE SERPL-SCNC: 106 MMOL/L (ref 98–107)
CO2 SERPL-SCNC: 20 MMOL/L (ref 20–31)
COLOR: YELLOW
CREAT SERPL-MCNC: 0.48 MG/DL (ref 0.5–0.9)
D DIMER BLD IA.RAPID-MCNC: <0.27 UG/ML FEU (ref 0–0.57)
EOSINOPHILS RELATIVE PERCENT: 1 % (ref 1–4)
EPITHELIAL CELLS UA: ABNORMAL /HPF (ref 0–5)
GARDNERELLA VAGINALIS, DNA PROBE: NEGATIVE
GFR SERPL CREATININE-BSD FRML MDRD: >60 ML/MIN/1.73M2
GLUCOSE SERPL-MCNC: 67 MG/DL (ref 70–99)
GLUCOSE UR STRIP.AUTO-MCNC: NEGATIVE MG/DL
HCG QUALITATIVE: NEGATIVE
HCG QUANTITATIVE: <1 MIU/ML
HCT VFR BLD AUTO: 40.5 % (ref 36.3–47.1)
HGB BLD-MCNC: 13.6 G/DL (ref 11.9–15.1)
IMMATURE GRANULOCYTES: 0 %
KETONES UR STRIP.AUTO-MCNC: NEGATIVE MG/DL
LEUKOCYTE ESTERASE UR QL STRIP.AUTO: ABNORMAL
LYMPHOCYTES # BLD: 24 % (ref 24–43)
MCH RBC QN AUTO: 30.9 PG (ref 25.2–33.5)
MCHC RBC AUTO-ENTMCNC: 33.6 G/DL (ref 28.4–34.8)
MCV RBC AUTO: 92 FL (ref 82.6–102.9)
MONOCYTES # BLD: 9 % (ref 3–12)
MUCUS: ABNORMAL
NITRITE UR QL STRIP.AUTO: NEGATIVE
NRBC AUTOMATED: 0 PER 100 WBC
PDW BLD-RTO: 12 % (ref 11.8–14.4)
PLATELET # BLD AUTO: 316 K/UL (ref 138–453)
PMV BLD AUTO: 9 FL (ref 8.1–13.5)
POTASSIUM SERPL-SCNC: 4.4 MMOL/L (ref 3.7–5.3)
PROT UR STRIP.AUTO-MCNC: 6.5 MG/DL (ref 5–8)
PROT UR STRIP.AUTO-MCNC: NEGATIVE MG/DL
RBC # BLD: 4.4 M/UL (ref 3.95–5.11)
RBC CLUMPS #/AREA URNS AUTO: ABNORMAL /HPF (ref 0–2)
SEG NEUTROPHILS: 66 % (ref 36–65)
SEGMENTED NEUTROPHILS ABSOLUTE COUNT: 4.43 K/UL (ref 1.5–8.1)
SODIUM SERPL-SCNC: 140 MMOL/L (ref 135–144)
SOURCE: NORMAL
SPECIFIC GRAVITY UA: 1.02 (ref 1–1.03)
TRICHOMONAS VAGINALIS DNA: NEGATIVE
TROPONIN I SERPL DL<=0.01 NG/ML-MCNC: <6 NG/L (ref 0–14)
TURBIDITY: CLEAR
URINE HGB: ABNORMAL
UROBILINOGEN, URINE: NORMAL
WBC # BLD AUTO: 6.8 K/UL (ref 3.5–11.3)
WBC UA: ABNORMAL /HPF (ref 0–5)

## 2023-05-03 PROCEDURE — 81001 URINALYSIS AUTO W/SCOPE: CPT

## 2023-05-03 PROCEDURE — 87491 CHLMYD TRACH DNA AMP PROBE: CPT

## 2023-05-03 PROCEDURE — 87510 GARDNER VAG DNA DIR PROBE: CPT

## 2023-05-03 PROCEDURE — 6360000004 HC RX CONTRAST MEDICATION: Performed by: STUDENT IN AN ORGANIZED HEALTH CARE EDUCATION/TRAINING PROGRAM

## 2023-05-03 PROCEDURE — 87660 TRICHOMONAS VAGIN DIR PROBE: CPT

## 2023-05-03 PROCEDURE — 85025 COMPLETE CBC W/AUTO DIFF WBC: CPT

## 2023-05-03 PROCEDURE — 80048 BASIC METABOLIC PNL TOTAL CA: CPT

## 2023-05-03 PROCEDURE — 93005 ELECTROCARDIOGRAM TRACING: CPT | Performed by: STUDENT IN AN ORGANIZED HEALTH CARE EDUCATION/TRAINING PROGRAM

## 2023-05-03 PROCEDURE — 84484 ASSAY OF TROPONIN QUANT: CPT

## 2023-05-03 PROCEDURE — 87480 CANDIDA DNA DIR PROBE: CPT

## 2023-05-03 PROCEDURE — 84703 CHORIONIC GONADOTROPIN ASSAY: CPT

## 2023-05-03 PROCEDURE — 74177 CT ABD & PELVIS W/CONTRAST: CPT

## 2023-05-03 PROCEDURE — 87591 N.GONORRHOEAE DNA AMP PROB: CPT

## 2023-05-03 PROCEDURE — 85379 FIBRIN DEGRADATION QUANT: CPT

## 2023-05-03 PROCEDURE — 71046 X-RAY EXAM CHEST 2 VIEWS: CPT

## 2023-05-03 PROCEDURE — 99285 EMERGENCY DEPT VISIT HI MDM: CPT

## 2023-05-03 PROCEDURE — 6370000000 HC RX 637 (ALT 250 FOR IP): Performed by: STUDENT IN AN ORGANIZED HEALTH CARE EDUCATION/TRAINING PROGRAM

## 2023-05-03 PROCEDURE — 84702 CHORIONIC GONADOTROPIN TEST: CPT

## 2023-05-03 PROCEDURE — 2580000003 HC RX 258: Performed by: STUDENT IN AN ORGANIZED HEALTH CARE EDUCATION/TRAINING PROGRAM

## 2023-05-03 RX ORDER — ACETAMINOPHEN 325 MG/1
650 TABLET ORAL ONCE
Status: COMPLETED | OUTPATIENT
Start: 2023-05-03 | End: 2023-05-03

## 2023-05-03 RX ORDER — 0.9 % SODIUM CHLORIDE 0.9 %
1000 INTRAVENOUS SOLUTION INTRAVENOUS ONCE
Status: COMPLETED | OUTPATIENT
Start: 2023-05-03 | End: 2023-05-03

## 2023-05-03 RX ORDER — FAMOTIDINE 20 MG/1
20 TABLET, FILM COATED ORAL 2 TIMES DAILY
Qty: 60 TABLET | Refills: 0 | Status: SHIPPED | OUTPATIENT
Start: 2023-05-03

## 2023-05-03 RX ADMIN — IOPAMIDOL 75 ML: 755 INJECTION, SOLUTION INTRAVENOUS at 14:16

## 2023-05-03 RX ADMIN — ACETAMINOPHEN 650 MG: 325 TABLET ORAL at 13:18

## 2023-05-03 RX ADMIN — SODIUM CHLORIDE 1000 ML: 9 INJECTION, SOLUTION INTRAVENOUS at 13:21

## 2023-05-03 ASSESSMENT — ENCOUNTER SYMPTOMS
ABDOMINAL PAIN: 1
SHORTNESS OF BREATH: 1

## 2023-05-03 ASSESSMENT — HEART SCORE: ECG: 0

## 2023-05-03 NOTE — DISCHARGE INSTRUCTIONS
Thank you for coming to [facility]. [facility] emergency department! You were seen today for abnormal uterine bleeding, chest pain, you are not anemic. Your lab work looked ok for discharge. Your CT scan did not show any abnormalities. Your chest x - ray was also normal.     You were prescribed tylenol and pepcid (for stomach pain), please pick these medications up at the pharmacy. Follow up with your primary care doctor. If you have any worsening of symptoms or any other concerns, please return to the emergency department. We are always available!

## 2023-05-03 NOTE — ED PROVIDER NOTES
[address] [facility]     Emergency Department     Faculty Note/ Attestation      Pt Name: Jacqui Trotter                                       MRN: [id number]  [place] 2000  Date of evaluation: 5/3/2023  Note Started: 2:10 PM EDT    Patients PCP:    DARRIAN Garcia CNM    Attestation  I performed a history and physical examination of the patient and discussed management with the resident. I reviewed the residents note and agree with the documented findings and plan of care. Any areas of disagreement are noted on the chart. I was personally present for the key portions of any procedures. I have documented in the chart those procedures where I was not present during the key portions. I have reviewed the emergency nurses triage note. I agree with the chief complaint, past medical history, past surgical history, allergies, medications, social and family history as documented unless otherwise noted below. For Physician Assistant/ Nurse Practitioner cases/documentation I have personally evaluated this patient and have completed at least one if not all key elements of the E/M (history, physical exam, and MDM). Additional findings are as noted.     Initial Screens:     Heart Score for chest pain patients  History: Slightly Suspicious  ECG: Normal  Patient Age: < 45 years  Risk Factors: 1 or 2 risk factors  Troponin: < 1X normal limit  Heart Score Total: 1  [place] Coma Scale  Eye Opening: Spontaneous  Best Verbal Response: Oriented  Best Motor Response: Obeys commands  [place] Coma Scale Score: 15    Vitals:    Vitals:    05/03/23 1255 05/03/23 1345   BP: 117/79 112/68   Pulse: 100 85   Resp: 16    Temp: 98.3 °F (36.8 °C)    SpO2: 96%    Height: 5' 3\" (1.6 m)        CHIEF COMPLAINT       Chief Complaint   Patient presents with    Vaginal Bleeding    Chest Pain     Patient is a 26-year-old female who has been having some significant vaginal bleeding unsure if she is pregnant currently
Neurological:  Positive for dizziness and syncope (pre-syncopal). PHYSICAL EXAM      INITIAL VITALS:   /68   Pulse 85   Temp 98.3 °F (36.8 °C)   Resp 16   Ht 5' 3\" (1.6 m)   SpO2 96%   BMI 26.57 kg/m²     Physical Exam  Constitutional:       General: She is not in acute distress. HENT:      Head: Normocephalic and atraumatic. Right Ear: External ear normal.      Left Ear: External ear normal.      Nose: Nose normal.   Eyes:      Conjunctiva/sclera: Conjunctivae normal.   Cardiovascular:      Rate and Rhythm: Regular rhythm. Tachycardia present. Pulses: Normal pulses. Heart sounds: Normal heart sounds. Pulmonary:      Effort: Pulmonary effort is normal. No respiratory distress. Breath sounds: Normal breath sounds. Abdominal:      General: Abdomen is flat. There is no distension. Palpations: Abdomen is soft. Tenderness: There is abdominal tenderness (suprapubic). There is no guarding or rebound. Musculoskeletal:         General: No swelling. Cervical back: No tenderness. Skin:     General: Skin is warm. Capillary Refill: Capillary refill takes less than 2 seconds. Neurological:      Mental Status: She is alert and oriented to person, place, and time. Psychiatric:         Mood and Affect: Mood normal.         DDX/DIAGNOSTIC RESULTS / EMERGENCY DEPARTMENT COURSE / MDM     Medical Decision Making  This is a 22yoF presenting with chest pain and vaginal bleeding while sexually active. Ddx: ectopic pregnancy, SAB, IUP, AUB, adenomyosis, anemia, ACS less likely however will obtain ekg, troponin, will obtain pelvic labs. Amount and/or Complexity of Data Reviewed  Independent Historian: EMS  Labs: ordered. Decision-making details documented in ED Course. Radiology: ordered. ECG/medicine tests: ordered. Risk  OTC drugs. Prescription drug management.         EMERGENCY DEPARTMENT COURSE:    ED Course as of 05/03/23 1619   Wed May 03, 2023   1305 EKG

## 2023-05-03 NOTE — ED NOTES
Pt to ED via EMS a/o x4 with c/o vaginal bleeding x3 weeks. Pt stated she received one depo shot and started bleeding. Pt also c/o chest pain, pt denies any cough or recent sickness, pt stated the pain is worse with deep breath. Pt stated she has hx of SVT. Pt denies any SOB.  Pt placed on full monitor, call light is in reach      RIA Del Rio RN  05/03/23 1300

## 2023-05-06 LAB
EKG ATRIAL RATE: 80 BPM
EKG P AXIS: 46 DEGREES
EKG P-R INTERVAL: 122 MS
EKG Q-T INTERVAL: 356 MS
EKG QRS DURATION: 82 MS
EKG QTC CALCULATION (BAZETT): 410 MS
EKG R AXIS: 63 DEGREES
EKG T AXIS: 34 DEGREES
EKG VENTRICULAR RATE: 80 BPM

## 2023-05-06 PROCEDURE — 93010 ELECTROCARDIOGRAM REPORT: CPT | Performed by: INTERNAL MEDICINE

## 2025-05-23 ENCOUNTER — HOSPITAL ENCOUNTER (EMERGENCY)
Age: 25
Discharge: HOME | End: 2025-05-24
Attending: EMERGENCY MEDICINE
Payer: COMMERCIAL

## 2025-05-23 VITALS
HEIGHT: 62 IN | SYSTOLIC BLOOD PRESSURE: 114 MMHG | DIASTOLIC BLOOD PRESSURE: 97 MMHG | HEART RATE: 91 BPM | RESPIRATION RATE: 16 BRPM | TEMPERATURE: 98.1 F | BODY MASS INDEX: 28.34 KG/M2 | WEIGHT: 154 LBS | OXYGEN SATURATION: 98 %

## 2025-05-23 DIAGNOSIS — R10.13 ABDOMINAL PAIN, EPIGASTRIC: Primary | ICD-10-CM

## 2025-05-23 LAB
BILIRUB UR QL STRIP: NEGATIVE
CLARITY UR: CLEAR
COLOR UR: YELLOW
COMMENT: ABNORMAL
GLUCOSE UR STRIP-MCNC: NEGATIVE MG/DL
HGB UR QL STRIP.AUTO: NEGATIVE
KETONES UR STRIP-MCNC: NEGATIVE MG/DL
LEUKOCYTE ESTERASE UR QL STRIP: NEGATIVE
NITRITE UR QL STRIP: NEGATIVE
PH UR STRIP: 5.5 [PH] (ref 5–8)
PROT UR STRIP-MCNC: NEGATIVE MG/DL
SP GR UR STRIP: 1.03 (ref 1–1.03)
UROBILINOGEN UR STRIP-ACNC: NORMAL EU/DL (ref 0–1)

## 2025-05-23 PROCEDURE — 80053 COMPREHEN METABOLIC PANEL: CPT

## 2025-05-23 PROCEDURE — 96374 THER/PROPH/DIAG INJ IV PUSH: CPT

## 2025-05-23 PROCEDURE — 96375 TX/PRO/DX INJ NEW DRUG ADDON: CPT

## 2025-05-23 PROCEDURE — 81025 URINE PREGNANCY TEST: CPT

## 2025-05-23 PROCEDURE — 81003 URINALYSIS AUTO W/O SCOPE: CPT

## 2025-05-23 PROCEDURE — 6360000002 HC RX W HCPCS

## 2025-05-23 PROCEDURE — 83690 ASSAY OF LIPASE: CPT

## 2025-05-23 PROCEDURE — 99285 EMERGENCY DEPT VISIT HI MDM: CPT

## 2025-05-23 PROCEDURE — 36415 COLL VENOUS BLD VENIPUNCTURE: CPT

## 2025-05-23 PROCEDURE — 85025 COMPLETE CBC W/AUTO DIFF WBC: CPT

## 2025-05-23 PROCEDURE — 84703 CHORIONIC GONADOTROPIN ASSAY: CPT

## 2025-05-23 RX ORDER — KETOROLAC TROMETHAMINE 30 MG/ML
30 INJECTION, SOLUTION INTRAMUSCULAR; INTRAVENOUS ONCE
Status: COMPLETED | OUTPATIENT
Start: 2025-05-23 | End: 2025-05-23

## 2025-05-23 RX ORDER — ONDANSETRON 2 MG/ML
4 INJECTION INTRAMUSCULAR; INTRAVENOUS ONCE
Status: COMPLETED | OUTPATIENT
Start: 2025-05-23 | End: 2025-05-23

## 2025-05-23 RX ADMIN — ONDANSETRON 4 MG: 2 INJECTION, SOLUTION INTRAMUSCULAR; INTRAVENOUS at 23:53

## 2025-05-23 RX ADMIN — KETOROLAC TROMETHAMINE 30 MG: 30 INJECTION, SOLUTION INTRAMUSCULAR at 23:53

## 2025-05-23 ASSESSMENT — LIFESTYLE VARIABLES
HOW OFTEN DO YOU HAVE A DRINK CONTAINING ALCOHOL: NEVER
HOW MANY STANDARD DRINKS CONTAINING ALCOHOL DO YOU HAVE ON A TYPICAL DAY: PATIENT DOES NOT DRINK

## 2025-05-23 ASSESSMENT — PAIN DESCRIPTION - ORIENTATION: ORIENTATION: MID

## 2025-05-23 ASSESSMENT — PAIN DESCRIPTION - DESCRIPTORS: DESCRIPTORS: STABBING

## 2025-05-23 ASSESSMENT — PAIN SCALES - GENERAL: PAINLEVEL_OUTOF10: 5

## 2025-05-23 ASSESSMENT — PAIN DESCRIPTION - LOCATION: LOCATION: ABDOMEN

## 2025-05-23 ASSESSMENT — PAIN - FUNCTIONAL ASSESSMENT: PAIN_FUNCTIONAL_ASSESSMENT: NONE - DENIES PAIN

## 2025-05-24 ENCOUNTER — APPOINTMENT (OUTPATIENT)
Dept: CT IMAGING | Age: 25
End: 2025-05-24
Payer: COMMERCIAL

## 2025-05-24 LAB
ALBUMIN SERPL-MCNC: 4.3 G/DL (ref 3.5–5.2)
ALP SERPL-CCNC: 120 U/L (ref 35–104)
ALT SERPL-CCNC: 29 U/L (ref 10–35)
ANION GAP SERPL CALCULATED.3IONS-SCNC: 11 MMOL/L (ref 9–16)
AST SERPL-CCNC: 22 U/L (ref 10–35)
BASOPHILS # BLD: 0.03 K/UL (ref 0–0.2)
BASOPHILS NFR BLD: 1 % (ref 0–2)
BILIRUB SERPL-MCNC: 0.2 MG/DL (ref 0–1.2)
BUN SERPL-MCNC: 13 MG/DL (ref 6–20)
CALCIUM SERPL-MCNC: 9.4 MG/DL (ref 8.6–10.4)
CHLORIDE SERPL-SCNC: 107 MMOL/L (ref 98–107)
CO2 SERPL-SCNC: 23 MMOL/L (ref 20–31)
CREAT SERPL-MCNC: 0.7 MG/DL (ref 0.7–1.2)
EOSINOPHIL # BLD: 0.1 K/UL (ref 0–0.44)
EOSINOPHILS RELATIVE PERCENT: 2 % (ref 0–4)
ERYTHROCYTE [DISTWIDTH] IN BLOOD BY AUTOMATED COUNT: 16.7 % (ref 11.5–14.9)
GFR, ESTIMATED: >90 ML/MIN/1.73M2
GLUCOSE SERPL-MCNC: 111 MG/DL (ref 74–99)
HCG SERPL QL: NEGATIVE
HCG UR QL: NEGATIVE
HCT VFR BLD AUTO: 36.9 % (ref 36–46)
HGB BLD-MCNC: 11.5 G/DL (ref 12–16)
IMM GRANULOCYTES # BLD AUTO: <0.03 K/UL (ref 0–0.3)
IMM GRANULOCYTES NFR BLD: 0 %
LIPASE SERPL-CCNC: 26 U/L (ref 13–60)
LYMPHOCYTES NFR BLD: 1.8 K/UL (ref 1.1–3.7)
LYMPHOCYTES RELATIVE PERCENT: 34 % (ref 24–44)
MCH RBC QN AUTO: 26.8 PG (ref 26–34)
MCHC RBC AUTO-ENTMCNC: 31.2 G/DL (ref 31–37)
MCV RBC AUTO: 86 FL (ref 80–100)
MONOCYTES NFR BLD: 0.53 K/UL (ref 0.1–1.2)
MONOCYTES NFR BLD: 10 % (ref 3–12)
NEUTROPHILS NFR BLD: 53 % (ref 36–66)
NEUTS SEG NFR BLD: 2.89 K/UL (ref 1.5–8.1)
NRBC BLD-RTO: 0 PER 100 WBC
PLATELET # BLD AUTO: 335 K/UL (ref 150–450)
PMV BLD AUTO: 9.1 FL (ref 8–13.5)
POTASSIUM SERPL-SCNC: 3.9 MMOL/L (ref 3.7–5.3)
PROT SERPL-MCNC: 7.4 G/DL (ref 6.6–8.7)
RBC # BLD AUTO: 4.29 M/UL (ref 3.95–5.11)
SODIUM SERPL-SCNC: 141 MMOL/L (ref 136–145)
WBC OTHER # BLD: 5.4 K/UL (ref 3.5–11)

## 2025-05-24 PROCEDURE — 74177 CT ABD & PELVIS W/CONTRAST: CPT

## 2025-05-24 PROCEDURE — 2500000003 HC RX 250 WO HCPCS

## 2025-05-24 PROCEDURE — 6360000004 HC RX CONTRAST MEDICATION

## 2025-05-24 PROCEDURE — 2580000003 HC RX 258

## 2025-05-24 RX ORDER — IOPAMIDOL 755 MG/ML
75 INJECTION, SOLUTION INTRAVASCULAR
Status: COMPLETED | OUTPATIENT
Start: 2025-05-24 | End: 2025-05-24

## 2025-05-24 RX ORDER — ACETAMINOPHEN 500 MG
500 TABLET ORAL 4 TIMES DAILY PRN
Qty: 56 TABLET | Refills: 0 | Status: SHIPPED | OUTPATIENT
Start: 2025-05-24 | End: 2025-06-07

## 2025-05-24 RX ORDER — SODIUM CHLORIDE 0.9 % (FLUSH) 0.9 %
10 SYRINGE (ML) INJECTION PRN
Status: DISCONTINUED | OUTPATIENT
Start: 2025-05-24 | End: 2025-05-24 | Stop reason: HOSPADM

## 2025-05-24 RX ORDER — 0.9 % SODIUM CHLORIDE 0.9 %
100 INTRAVENOUS SOLUTION INTRAVENOUS ONCE
Status: COMPLETED | OUTPATIENT
Start: 2025-05-24 | End: 2025-05-24

## 2025-05-24 RX ADMIN — SODIUM CHLORIDE 100 ML: 9 INJECTION, SOLUTION INTRAVENOUS at 00:54

## 2025-05-24 RX ADMIN — IOPAMIDOL 75 ML: 755 INJECTION, SOLUTION INTRAVENOUS at 00:54

## 2025-05-24 RX ADMIN — SODIUM CHLORIDE, PRESERVATIVE FREE 10 ML: 5 INJECTION INTRAVENOUS at 00:54

## 2025-05-24 ASSESSMENT — PAIN SCALES - GENERAL
PAINLEVEL_OUTOF10: 3
PAINLEVEL_OUTOF10: 0

## 2025-05-24 ASSESSMENT — PAIN - FUNCTIONAL ASSESSMENT: PAIN_FUNCTIONAL_ASSESSMENT: 0-10

## 2025-05-24 NOTE — ED PROVIDER NOTES
[facility] EMERGENCY DEPARTMENT  Emergency Department Encounter  Emergency Medicine Resident     Pt Name:Lauren Oviedo  MRN: [id number]  Birthdate 2000  Date of evaluation: 25  PCP:  No primary care provider on file.  Note Started: 11:08 PM EDT      CHIEF COMPLAINT       Chief Complaint   Patient presents with    Abdominal Pain    Nausea    Diarrhea       HISTORY OF PRESENT ILLNESS  (Location/Symptom, Timing/Onset, Context/Setting, Quality, Duration, Modifying Factors, Severity.)      Lauren Oviedo is a 24 y.o. female who presents with epigastric and periumbilical abdominal pain that started approximately 2 days ago.  Associated with nausea and diarrhea but no vomiting.  Patient recently postpartum approximately 2 months ago with an uncomplicated vaginal birth.  Denies any urinary symptoms.  Denies any chest pain or shortness of breath.  No blood in the diarrhea. No fever or chills.     PAST MEDICAL / SURGICAL / SOCIAL / FAMILY HISTORY      has a past medical history of ADD (attention deficit disorder), Adopted, Anxiety, Asthma, CMV (cytomegalovirus infection) ([facility]), Depression, Family history of clotting disorder, Family history of diabetes mellitus, Gestational diabetes mellitus (GDM), antepartum, Hearing loss in left ear, Heart disease, Hypothyroidism, Immunizations up to date in pediatric patient, Neurocardiogenic syncope, Pseudoseizures, PTSD (post-traumatic stress disorder), Raynaud disease, Seizures ([facility]), Severe recurrent major depression without psychotic features ([facility]),  19 M Apg 8/9 Wt 7#11, SVT (supraventricular tachycardia), Tachycardia, Traumatic injury during pregnancy, third trimester, and Wears glasses.     has a past surgical history that includes REMOVAL FOREIGN BODY EAR (Left) and Tympanoplasty (Left, 2016).    Social History     Socioeconomic History    Marital status: Single     Spouse name: Not on file    Number of children: Not on file    Years

## 2025-05-24 NOTE — ED PROVIDER NOTES
[facility] EMERGENCY DEPARTMENT  eMERGENCY dEPARTMENT eNCOUnter   Attending Attestation     Pt Name: Lauren Oviedo  MRN: [id number]  Birthdate 2000  Date of evaluation: 5/23/25       Lauren Oviedo is a 24 y.o. female who presents with Abdominal Pain, Nausea, and Diarrhea      History:   Patient is here complaining of epigastric abdominal pain this been ongoing for the last 2 days.  Patient states nothing makes it better or worse but she is able to fall asleep with it.  Patient is felt nauseous and has some diarrhea.  Patient denies any urinary symptoms.  Patient denies fevers.    Exam: Vitals:   Vitals:    05/23/25 2222   BP: (!) 114/97   Pulse: 91   Resp: 16   Temp: 98.1 °F (36.7 °C)   TempSrc: Oral   SpO2: 98%   Weight: 69.9 kg (154 lb)   Height: 1.575 m (5' 2\")     Heart regular rate rhythm no murmurs.  Lungs clear to auscultation bilaterally.  Abdomen is soft nondistended with tenderness to palpation mostly epigastrium and left upper quadrant.  No peritoneal signs.    I performed a history and physical examination of the patient and discussed management with the resident. I reviewed the resident’s note and agree with the documented findings and plan of care. Any areas of disagreement are noted on the chart. I was personally present for the key portions of any procedures. I have documented in the chart those procedures where I was not present during the key portions. I have personally reviewed all images and agree with the resident's interpretation. I have reviewed the emergency nurses triage note. I agree with the chief complaint, past medical history, past surgical history, allergies, medications, social and family history as documented unless otherwise noted below. Documentation of the HPI, Physical Exam and Medical Decision Making performed by medical students or scribes is based on my personal performance of the HPI, PE and MDM. I personally evaluated and examined the patient in  conjunction with the APC and agree with the assessment, treatment plan, and disposition of the patient as recorded by the APC. Additional findings are as noted.    Morales Lobato MD  Attending Emergency  Physician              Morales Lobato MD  05/23/25 3943

## 2025-05-24 NOTE — DISCHARGE INSTRUCTIONS
You are seen in the emergency room for epigastric abdominal pain.  Your CT did show a slightly enlarged appendix however your physical exam did not show any right lower quadrant abdominal pain.  Your white count was also normal at this time.  If you develop significant fevers or pain migrating down to the right lower quadrant or any new symptoms of concern such as chest pain or shortness of breath please return to the emergency room for reevaluation.  You may take Tylenol and Motrin for symptomatic management as needed.  Please establish care with [facility] for reevaluation and continued care.